# Patient Record
Sex: FEMALE | Race: WHITE | NOT HISPANIC OR LATINO | Employment: OTHER | URBAN - NONMETROPOLITAN AREA
[De-identification: names, ages, dates, MRNs, and addresses within clinical notes are randomized per-mention and may not be internally consistent; named-entity substitution may affect disease eponyms.]

---

## 2017-01-03 ENCOUNTER — OFFICE VISIT (OUTPATIENT)
Dept: ENDOCRINOLOGY | Facility: CLINIC | Age: 72
End: 2017-01-03

## 2017-01-03 VITALS
HEART RATE: 83 BPM | HEIGHT: 63 IN | SYSTOLIC BLOOD PRESSURE: 130 MMHG | BODY MASS INDEX: 28.74 KG/M2 | DIASTOLIC BLOOD PRESSURE: 78 MMHG | WEIGHT: 162.2 LBS

## 2017-01-03 DIAGNOSIS — M85.80 OSTEOPENIA: ICD-10-CM

## 2017-01-03 DIAGNOSIS — M81.0 OSTEOPOROSIS: ICD-10-CM

## 2017-01-03 DIAGNOSIS — E78.2 MIXED HYPERLIPIDEMIA: ICD-10-CM

## 2017-01-03 DIAGNOSIS — Z79.4 TYPE 2 DIABETES MELLITUS WITH DIABETIC POLYNEUROPATHY, WITH LONG-TERM CURRENT USE OF INSULIN (HCC): Primary | ICD-10-CM

## 2017-01-03 DIAGNOSIS — I10 ESSENTIAL HYPERTENSION: ICD-10-CM

## 2017-01-03 DIAGNOSIS — E11.42 TYPE 2 DIABETES MELLITUS WITH DIABETIC POLYNEUROPATHY, WITHOUT LONG-TERM CURRENT USE OF INSULIN (HCC): Primary | ICD-10-CM

## 2017-01-03 DIAGNOSIS — E11.42 TYPE 2 DIABETES MELLITUS WITH DIABETIC POLYNEUROPATHY, WITH LONG-TERM CURRENT USE OF INSULIN (HCC): Primary | ICD-10-CM

## 2017-01-03 LAB — GLUCOSE BLDC GLUCOMTR-MCNC: 185 MG/DL (ref 70–130)

## 2017-01-03 PROCEDURE — PTNOCHG PR CUSTOM PT NO CHARGE VISIT: Performed by: INTERNAL MEDICINE

## 2017-01-03 PROCEDURE — 95250 CONT GLUC MNTR PHYS/QHP EQP: CPT | Performed by: INTERNAL MEDICINE

## 2017-01-03 PROCEDURE — 99214 OFFICE O/P EST MOD 30 MIN: CPT | Performed by: INTERNAL MEDICINE

## 2017-01-03 NOTE — PROGRESS NOTES
Alisia Velez is a 71 y.o. female seen by diabetes educator 01/03/2017 for insertion of [employer] diagnostic continuous glucose monitor.     Sensor inserted in office. Instructed patient to wear sensor up to 14 days. Patient is to return to clinic in 2 weeks for sensor removal and results. Instructed patient to remove sensor if he has a CT scan, MRI, or X-ray, or if skin irritation occurs.     Mela Smith MS, RD, LD, CDE

## 2017-01-03 NOTE — PROGRESS NOTES
Alisia Velez is a 71 y.o. female who presents for  evaluation of   Chief Complaint   Patient presents with   • Diabetes         Primary Care / Referring Provider  Mike Erazo MD    Duration/Timing:  Diabetes mellitus type 2, Age at onset of diabetes: 42 years  timing, constant    quality , nearly well controlled    severity , moderate    Severity (Complications/Hospitalizations)  Secondary Macrovascular Complications:  No CAD, CVA, No PAD  cva in 1999  Secondary Microvascular Complications:  No Diabetic Nephropathy, No proteinuria, No Diabetic Retinopathy, No Diabetic Neuropathy    Context  Diabetes Regimen:  Insulin, Oral Medications, Compliant with regimen, Last HgbA1c% 8.5 from 9-16  Blood Glucose Readings  at goal   Diet  counts carbs  Exercise:  Exercises    Associated Signs/Symptoms  Hyperglycemic Symptoms:  Polyuria, Polydipsia, Polyphagia  Modifying Factors/Comorbidities  aggravating , steroids for back pain     Past Medical History   Diagnosis Date   • Blister of great toe of left foot      Nonthermal, initial encounter   • Dyslipidemia    • Elevated blood pressure    • Essential hypertension 9/13/2016   • GERD (gastroesophageal reflux disease)    • History of cerebrovascular accident    • Hypo-osmolality and hyponatremia    • Ingrowing nail    • Onychomycosis    • Type 2 diabetes mellitus    • 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   • Vitamin D deficiency      Family History   Problem Relation Age of Onset   • Cancer Other    • Diabetes Other    • Heart disease Other    • Hypertension Other      Social History   Substance Use Topics   • Smoking status: Never Smoker   • Smokeless tobacco: Not on file   • Alcohol use No         Current Outpatient Prescriptions:   •  Aspirin Buf,UhKcc-HmTjm-BiWea, (ASCRIPTIN PO), Take  by mouth. Ascriptin 81 mg tablet,delayed release, Disp: , Rfl:   •  atorvastatin (LIPITOR) 20 MG tablet, Take 20 mg by mouth daily., Disp: , Rfl:   •  JEROME  "CONTOUR TEST test strip, USE TO CHECK BLOOD SUGAR 5 TIMES A DAY, Disp: 150 each, Rfl: 3  •  Canagliflozin 100 MG tablet, One tablet daily by mouth before breakfast, Disp: 30 tablet, Rfl: 11  •  Coenzyme Q10-Vitamin E 200-400 MG-UNIT wafer, Take  by mouth., Disp: , Rfl:   •  insulin glargine (LANTUS) 100 UNIT/ML injection, Inject 90 Units under the skin every night., Disp: , Rfl:   •  Insulin Glargine (TOUJEO SOLOSTAR) 300 UNIT/ML solution pen-injector, Inject 60 Units under the skin every night., Disp: , Rfl:   •  insulin lispro (HumaLOG) 100 UNIT/ML injection, Inject 40 Units under the skin 3 (three) times a day., Disp: , Rfl:   •  Insulin Pen Needle (B-D ULTRAFINE III SHORT PEN) 31G X 8 MM misc, 4 (four) times a day. Use as indicated., Disp: , Rfl:   •  Insulin Syringe-Needle U-100 31G X 5/16\" 1 ML misc, daily. Use once daily., Disp: , Rfl:   •  metoprolol succinate XL (TOPROL-XL) 25 MG 24 hr tablet, , Disp: , Rfl:   •  metoprolol tartrate (LOPRESSOR) 25 MG tablet, Take 25 mg by mouth daily., Disp: , Rfl:   •  OMEGA-3 FATTY ACIDS-VITAMIN E PO, Take  by mouth. omega-3 fatty acids-vitamin E 1,000 mg-5 unit capsule., Disp: , Rfl:   •  ranitidine (ZANTAC) 150 MG tablet, Take 150 mg by mouth 2 (two) times a day., Disp: , Rfl:   •  sitaGLIPtin (JANUVIA) 100 MG tablet, Take 1 tablet by mouth daily., Disp: 30 tablet, Rfl: 11  •  sucralfate (CARAFATE) 1 G tablet, Take 1 g by mouth 3 (three) times a day., Disp: , Rfl:   •  tiZANidine (ZANAFLEX) 4 MG tablet, TAKE 1 TABLET BY ORAL ROUTE 3 TIMES EVERY DAY AS NEEDED NOT TO EXCEED 3 DOSES IN 24 HOURS, Disp: , Rfl: 0  •  Unable to find, 1 each 1 (one) time. B.infantis-B.animalis-B.longum-B.bifidum oral, Disp: , Rfl:   •  cetirizine (ZyrTEC) 10 MG tablet, Take 10 mg by mouth daily., Disp: , Rfl:   •  diclofenac (VOLTAREN) 75 MG EC tablet, TAKE 1 TABLET BY MOUTH TWICE A DAY EVERY DAY., Disp: , Rfl: 3  •  doxycycline (VIBRAMYCIN) 50 MG/5ML syrup, Take  by mouth. Take as directed., " Disp: , Rfl:   •  FLUZONE HIGH-DOSE 0.5 ML suspension prefilled syringe injection, TO BE ADMINISTERED BY PHARMACIST FOR IMMUNIZATION, Disp: , Rfl: 0    Review of Systems    Review of Systems   Constitutional: Negative for activity change, appetite change, chills, diaphoresis, fatigue, fever and unexpected weight change.   HENT: Negative for congestion, drooling, ear discharge, ear pain, facial swelling, mouth sores, nosebleeds, postnasal drip, sinus pressure, sneezing, sore throat, tinnitus, trouble swallowing and voice change.    Eyes: Negative.  Negative for photophobia, pain, discharge, redness and itching.   Respiratory: Negative.  Negative for apnea, cough, choking, chest tightness, shortness of breath, wheezing and stridor.    Cardiovascular: Negative.  Negative for chest pain, palpitations and leg swelling.   Gastrointestinal: Negative.  Negative for abdominal distention, abdominal pain, constipation, diarrhea, nausea and vomiting.   Endocrine: Negative.  Negative for cold intolerance, heat intolerance, polydipsia, polyphagia and polyuria.   Genitourinary: Negative for difficulty urinating, dysuria, flank pain and frequency.   Musculoskeletal: Negative for arthralgias, back pain, gait problem, joint swelling, myalgias, neck pain and neck stiffness.   Skin: Negative for color change, pallor, rash and wound.   Allergic/Immunologic: Negative for environmental allergies, food allergies and immunocompromised state.   Neurological: Negative for dizziness, tremors, seizures, syncope, facial asymmetry, speech difficulty, weakness, light-headedness, numbness and headaches.   Hematological: Negative for adenopathy. Does not bruise/bleed easily.   Psychiatric/Behavioral: Negative for agitation, behavioral problems, confusion, decreased concentration, dysphoric mood, hallucinations, self-injury, sleep disturbance and suicidal ideas. The patient is not nervous/anxious and is not hyperactive.         Objective:     Visit  "Vitals   • /78 (BP Location: Left arm, Patient Position: Sitting, Cuff Size: Large Adult)   • Pulse 83   • Ht 63\" (160 cm)   • Wt 162 lb 3.2 oz (73.6 kg)   • BMI 28.73 kg/m2       Physical Exam   Constitutional: She is oriented to person, place, and time. She appears well-developed.   HENT:   Head: Normocephalic.   Right Ear: External ear normal.   Left Ear: External ear normal.   Nose: Nose normal.   Eyes: Conjunctivae and EOM are normal. No scleral icterus.   Neck: Normal range of motion. Neck supple. No tracheal deviation present. No thyromegaly present.   Cardiovascular: Normal rate, regular rhythm, normal heart sounds and intact distal pulses.  Exam reveals no gallop and no friction rub.    No murmur heard.  Pulmonary/Chest: Effort normal and breath sounds normal. No stridor. No respiratory distress. She has no wheezes. She has no rales. She exhibits no tenderness.   Abdominal: Soft. Bowel sounds are normal. She exhibits no distension and no mass. There is no tenderness. There is no rebound and no guarding.   Musculoskeletal: Normal range of motion. She exhibits no tenderness or deformity.   Lymphadenopathy:     She has no cervical adenopathy.   Neurological: She is alert and oriented to person, place, and time. She displays normal reflexes. She exhibits normal muscle tone. Coordination normal.   Skin: No rash noted. No erythema. No pallor.   Psychiatric: She has a normal mood and affect. Her behavior is normal. Judgment and thought content normal.       Lab Review    Assessment/Plan        ICD-10-CM ICD-9-CM   1. Controlled type 2 diabetes mellitus without complication, with long-term current use of insulin E11.9 250.00    Z79.4 V58.67              Type 2 diabetes mellitus with circulatory disorder  Toujeo 68, back off to 60      compare night and morning and if dropping decrease         Humalog , 4-4-6 carb ratio for bkfast - lunch - supper change to 2-3-5    Change to 1.5-2-3    change 2.5 for bkfast " if cereal        Correction , 17               during steroids  typically the carb ratio and correction need to be doubled and the basal( Lantus ) needs to be increased by 30%         We will add orals     Suggest      Can't tolerate metformin , diarrhea  Can't tolerate glyburide, developed allergy       jardiance and tradjenta, too expensive and afraid of side effects       Mixed hyperlipidemia  On atorvastatin 20 mg qhs      Essential hypertension  On metoprolol ho cva and svt     longterm nsaid, on diclofenac, unfortunately can't use longterm   Takes carafte                      I reviewed and summarized records from Mike Erazo MD from 2016 and I reviewed / ordered labs.     Orders Placed This Encounter   Procedures   • POC Glucose Fingerstick         A copy of my note was sent to Mike Erazo MD    Please see my above opinion and suggestions.

## 2017-01-03 NOTE — MR AVS SNAPSHOT
Alisia Velez   1/3/2017 2:00 PM   Office Visit    Dept Phone:  [phone]   Encounter #:  [id number]    Provider:  Omid Jules MD   Department:  [facility] ENDOCRINOLOGY                Your Full Care Plan              Today's Medication Changes          These changes are accurate as of: 1/3/17  2:33 PM.  If you have any questions, ask your nurse or doctor.               New Medication(s)Ordered:     Insulin Lispro 100 UNIT/ML solution pen-injector   Commonly known as:  HUMALOG   Up to 50 units with meals   Replaces:  insulin lispro 100 UNIT/ML injection         Medication(s)that have changed:     glucose blood test strip   Commonly known as:  JEROME CONTOUR TEST   Use as instructed 5 x daily , ICD-10 code is E11.42   What changed:  See the new instructions.       Insulin Glargine 300 UNIT/ML solution pen-injector   Commonly known as:  TOUJEO SOLOSTAR   Inject 80 Units under the skin Every Night.   What changed:    - how much to take  - Another medication with the same name was removed. Continue taking this medication, and follow the directions you see here.       Insulin Pen Needle 31G X 5 MM misc   Commonly known as:  B-D UF III MINI PEN NEEDLES   Use 5 times daily   What changed:    - medication strength  - how to take this  - when to take this  - additional instructions         Stop taking medication(s)listed here:     insulin lispro 100 UNIT/ML injection   Commonly known as:  humaLOG   Replaced by:  Insulin Lispro 100 UNIT/ML solution pen-injector           SITagliptin 100 MG tablet   Commonly known as:  SUDHEER                Where to Get Your Medications      These medications were sent to [facility]/pharmacy #[id number] - [place] - [address] AT [facility] - [phone]  - [phone] FX  [address], [place] [address]     Phone:  [phone]     glucose blood test strip         You can get these medications from any pharmacy     Bring a paper  "prescription for each of these medications     Insulin Glargine 300 UNIT/ML solution pen-injector    Insulin Lispro 100 UNIT/ML solution pen-injector    Insulin Pen Needle 31G X 5 MM misc                  Your Updated Medication List          This list is accurate as of: 1/3/17  2:33 PM.  Always use your most recent med list.                ASCRIPTIN PO       atorvastatin 20 MG tablet   Commonly known as:  LIPITOR       Canagliflozin 100 MG tablet   One tablet daily by mouth before breakfast       cetirizine 10 MG tablet   Commonly known as:  zyrTEC       Coenzyme Q10-Vitamin E 200-400 MG-UNIT wafer       diclofenac 75 MG EC tablet   Commonly known as:  VOLTAREN       doxycycline 50 MG/5ML syrup   Commonly known as:  VIBRAMYCIN       FLUZONE HIGH-DOSE 0.5 ML suspension prefilled syringe injection   Generic drug:  influenza vac split high-dose       glucose blood test strip   Commonly known as:  JEROME CONTOUR TEST   Use as instructed 5 x daily , ICD-10 code is E11.42       Insulin Glargine 300 UNIT/ML solution pen-injector   Commonly known as:  TOUJEO SOLOSTAR   Inject 80 Units under the skin Every Night.       Insulin Lispro 100 UNIT/ML solution pen-injector   Commonly known as:  HUMALOG   Up to 50 units with meals       Insulin Pen Needle 31G X 5 MM misc   Commonly known as:  B-D UF III MINI PEN NEEDLES   Use 5 times daily       Insulin Syringe-Needle U-100 31G X 5/16\" 1 ML misc       metoprolol succinate XL 25 MG 24 hr tablet   Commonly known as:  TOPROL-XL       metoprolol tartrate 25 MG tablet   Commonly known as:  LOPRESSOR       OMEGA-3 FATTY ACIDS-VITAMIN E PO       raNITIdine 150 MG tablet   Commonly known as:  ZANTAC       sucralfate 1 G tablet   Commonly known as:  CARAFATE       tiZANidine 4 MG tablet   Commonly known as:  ZANAFLEX       Unable to find               We Performed the Following     CBC Auto Differential     CMP14+eGFR     DEXA Bone Density Axial     Hemoglobin A1c     Lipid Panel     " Microalbumin / Creatinine Urine Ratio     POC Glucose Fingerstick     Protein / Creatinine Ratio, Urine     TSH     Vitamin B12     Vitamin D 25 Hydroxy       You Were Diagnosed With        Codes Comments    Type 2 diabetes mellitus with diabetic polyneuropathy, without long-term current use of insulin    -  Primary ICD-10-CM: E11.42  ICD-9-CM: 250.60, 357.2     Mixed hyperlipidemia     ICD-10-CM: E78.2  ICD-9-CM: 272.2     Essential hypertension     ICD-10-CM: I10  ICD-9-CM: 401.9     Osteopenia     ICD-10-CM: M85.80  ICD-9-CM: 733.90     Osteoporosis     ICD-10-CM: M81.0  ICD-9-CM: 733.00       Instructions     None    Patient Instructions History      Upcoming Appointments     Visit Type Date Time Department    FOLLOW UP 1/3/2017  2:00 PM [facility] [place] [facility]    DIABETES EDUCATION 2017  2:00 PM [facility] [place] [facility]    FOLLOW UP 4/10/2017  2:00 PM [facility] [place] [facility] Signup     [facility] [employer] allows you to send messages to your doctor, view your test results, renew your prescriptions, schedule appointments, and more. To sign up, go to [employer] and click on the Sign Up Now link in the New User? box. Enter your [employer] Activation Code exactly as it appears below along with the last four digits of your Social Security Number and your Date of Birth () to complete the sign-up process. If you do not sign up before the expiration date, you must request a new code.    [employer] Activation Code: [email or web address]  Expires: 2017  2:33 PM    If you have questions, you can email [email or web address] or call [phone] to talk to our [employer] staff. Remember, [employer] is NOT to be used for urgent needs. For medical emergencies, dial 911.               Other Info from Your Visit           Your Appointments     2017  2:00 PM CST   Diabetic Education with [facility], [facility] ENDOCR[facility] (--)    [address]  [place] [facility] [address]   [phone]            Apr 10, 2017  2:00 PM CDT   Follow Up with Omid Jules MD   [facility] ENDOCRINOLOGY (--)    [address]  [place] [address] [facility] [id number] [phone]           Arrive 15 minutes prior to appointment.              Allergies     Erythromycin      Humulin N [Insulin Isophane]      Mold Extract [Trichophyton]      Other      Silk Tape    Sudafed [Pseudoephedrine]      Sulfa Antibiotics        Reason for Visit     Diabetes           Vital Signs     Blood Pressure Pulse Height Weight Body Mass Index Smoking Status    130/78 (BP Location: Left arm, Patient Position: Sitting, Cuff Size: Large Adult) 83 63\" (160 cm) 162 lb 3.2 oz (73.6 kg) 28.73 kg/m2 Never Smoker      Problems and Diagnoses Noted     High blood pressure    Mixed hyperlipidemia    Bone disease    Type 2 diabetes mellitus with diabetic polyneuropathy, without long-term current use of insulin    Osteoporosis          No Longer an Issue     Type 2 diabetes mellitus with circulatory disorder      Results     POC Glucose Fingerstick      Component Value Standard Range & Units    Glucose 185 70 - 130 mg/dL                    "

## 2017-01-24 ENCOUNTER — HOSPITAL ENCOUNTER (OUTPATIENT)
Facility: HOSPITAL | Age: 72
Setting detail: SURGERY ADMIT
End: 2017-01-24
Attending: ORTHOPAEDIC SURGERY | Admitting: ORTHOPAEDIC SURGERY

## 2017-03-06 ENCOUNTER — HOSPITAL ENCOUNTER (OUTPATIENT)
Dept: GENERAL RADIOLOGY | Facility: HOSPITAL | Age: 72
Discharge: HOME | End: 2017-03-06
Admitting: ORTHOPAEDIC SURGERY

## 2017-03-06 ENCOUNTER — APPOINTMENT (OUTPATIENT)
Dept: PREADMISSION TESTING | Facility: HOSPITAL | Age: 72
End: 2017-03-06

## 2017-03-06 VITALS
OXYGEN SATURATION: 96 % | BODY MASS INDEX: 28.7 KG/M2 | SYSTOLIC BLOOD PRESSURE: 145 MMHG | RESPIRATION RATE: 20 BRPM | DIASTOLIC BLOOD PRESSURE: 64 MMHG | HEART RATE: 72 BPM | HEIGHT: 63 IN | WEIGHT: 162 LBS

## 2017-03-06 LAB
ALBUMIN SERPL-MCNC: 4.3 G/DL (ref 3.5–5)
ALBUMIN/GLOB SERPL: 1.5 G/DL (ref 1.1–2.5)
ALP SERPL-CCNC: 53 U/L (ref 24–120)
ALT SERPL W P-5'-P-CCNC: 63 U/L (ref 0–54)
ANION GAP SERPL CALCULATED.3IONS-SCNC: 12 MMOL/L (ref 4–13)
APTT PPP: 26.4 SECONDS (ref 24.1–34.8)
AST SERPL-CCNC: 68 U/L (ref 7–45)
BASOPHILS # BLD AUTO: 0.01 10*3/MM3 (ref 0–0.2)
BASOPHILS NFR BLD AUTO: 0.1 % (ref 0–2)
BILIRUB SERPL-MCNC: 0.4 MG/DL (ref 0.1–1)
BILIRUB UR QL STRIP: NEGATIVE
BUN BLD-MCNC: 13 MG/DL (ref 5–21)
BUN/CREAT SERPL: 19.7 (ref 7–25)
CALCIUM SPEC-SCNC: 9.9 MG/DL (ref 8.4–10.4)
CHLORIDE SERPL-SCNC: 100 MMOL/L (ref 98–110)
CLARITY UR: CLEAR
CO2 SERPL-SCNC: 26 MMOL/L (ref 24–31)
COLOR UR: YELLOW
CREAT BLD-MCNC: 0.66 MG/DL (ref 0.5–1.4)
DEPRECATED RDW RBC AUTO: 42.7 FL (ref 40–54)
EOSINOPHIL # BLD AUTO: 0.08 10*3/MM3 (ref 0–0.7)
EOSINOPHIL NFR BLD AUTO: 1.1 % (ref 0–4)
ERYTHROCYTE [DISTWIDTH] IN BLOOD BY AUTOMATED COUNT: 13 % (ref 12–15)
GFR SERPL CREATININE-BSD FRML MDRD: 89 ML/MIN/1.73
GLOBULIN UR ELPH-MCNC: 2.8 GM/DL
GLUCOSE BLD-MCNC: 232 MG/DL (ref 70–100)
GLUCOSE UR STRIP-MCNC: ABNORMAL MG/DL
HCT VFR BLD AUTO: 40.8 % (ref 37–47)
HGB BLD-MCNC: 13.9 G/DL (ref 12–16)
HGB UR QL STRIP.AUTO: NEGATIVE
IMM GRANULOCYTES # BLD: 0.01 10*3/MM3 (ref 0–0.03)
IMM GRANULOCYTES NFR BLD: 0.1 % (ref 0–5)
INR PPP: 0.92 (ref 0.91–1.09)
KETONES UR QL STRIP: NEGATIVE
LEUKOCYTE ESTERASE UR QL STRIP.AUTO: NEGATIVE
LYMPHOCYTES # BLD AUTO: 2.37 10*3/MM3 (ref 0.72–4.86)
LYMPHOCYTES NFR BLD AUTO: 33.1 % (ref 15–45)
MCH RBC QN AUTO: 30.7 PG (ref 28–32)
MCHC RBC AUTO-ENTMCNC: 34.1 G/DL (ref 33–36)
MCV RBC AUTO: 90.1 FL (ref 82–98)
MONOCYTES # BLD AUTO: 0.71 10*3/MM3 (ref 0.19–1.3)
MONOCYTES NFR BLD AUTO: 9.9 % (ref 4–12)
NEUTROPHILS # BLD AUTO: 3.98 10*3/MM3 (ref 1.87–8.4)
NEUTROPHILS NFR BLD AUTO: 55.7 % (ref 39–78)
NITRITE UR QL STRIP: NEGATIVE
PH UR STRIP.AUTO: <=5 [PH] (ref 5–8)
PLATELET # BLD AUTO: 201 10*3/MM3 (ref 130–400)
PMV BLD AUTO: 10.6 FL (ref 6–12)
POTASSIUM BLD-SCNC: 4 MMOL/L (ref 3.5–5.3)
PROT SERPL-MCNC: 7.1 G/DL (ref 6.3–8.7)
PROT UR QL STRIP: NEGATIVE
PROTHROMBIN TIME: 12.6 SECONDS (ref 11.9–14.6)
RBC # BLD AUTO: 4.53 10*6/MM3 (ref 4.2–5.4)
SODIUM BLD-SCNC: 138 MMOL/L (ref 135–145)
SP GR UR STRIP: 1.02 (ref 1–1.03)
UROBILINOGEN UR QL STRIP: ABNORMAL
WBC NRBC COR # BLD: 7.16 10*3/MM3 (ref 4.8–10.8)

## 2017-03-06 PROCEDURE — 85025 COMPLETE CBC W/AUTO DIFF WBC: CPT | Performed by: ORTHOPAEDIC SURGERY

## 2017-03-06 PROCEDURE — 81003 URINALYSIS AUTO W/O SCOPE: CPT | Performed by: ORTHOPAEDIC SURGERY

## 2017-03-06 PROCEDURE — 93005 ELECTROCARDIOGRAM TRACING: CPT

## 2017-03-06 PROCEDURE — 85610 PROTHROMBIN TIME: CPT | Performed by: ORTHOPAEDIC SURGERY

## 2017-03-06 PROCEDURE — 80053 COMPREHEN METABOLIC PANEL: CPT | Performed by: ORTHOPAEDIC SURGERY

## 2017-03-06 PROCEDURE — 71010 HC CHEST PA OR AP: CPT

## 2017-03-06 PROCEDURE — 93010 ELECTROCARDIOGRAM REPORT: CPT | Performed by: INTERNAL MEDICINE

## 2017-03-06 PROCEDURE — 85730 THROMBOPLASTIN TIME PARTIAL: CPT | Performed by: ORTHOPAEDIC SURGERY

## 2017-03-06 RX ORDER — ASPIRIN 81 MG/1
81 TABLET ORAL DAILY
COMMUNITY

## 2017-03-06 RX ORDER — AMPICILLIN TRIHYDRATE 250 MG
1 CAPSULE ORAL DAILY
COMMUNITY

## 2017-03-06 RX ORDER — FAMOTIDINE 20 MG
3 TABLET ORAL DAILY
COMMUNITY
End: 2021-05-21

## 2017-03-06 RX ORDER — MELOXICAM 7.5 MG/1
7.5 TABLET ORAL DAILY
COMMUNITY
End: 2017-03-20 | Stop reason: HOSPADM

## 2017-03-06 NOTE — DISCHARGE INSTRUCTIONS
DAY OF SURGERY INSTRUCTIONS        YOUR SURGEON: dr borden    PROCEDURE: left lateral lumbar interbody fusion l2-3    DATE OF SURGERY: 3/13/2017    ARRIVAL TIME: AS DIRECTED BY OFFICE    DAY OF SURGERY TAKE ONLY THESE MEDICATIONS: ***metoprolol      BEFORE YOU COME TO THE HOSPITAL  (Pre-op instructions)  • Do not eat, drink, smoke or chew gum after midnight the night before surgery.  This also includes no mints.  • Morning of surgery take only the medicines you have been instructed with a sip of water unless otherwise instructed  by your physician.  • Do not shave, wear makeup or dark nail polish.  • Remove all jewelry including rings.  • Leave anything you consider valuable at home.  • Leave your suitcase in the car until after your surgery.  • Bring the following with you if applicable:  o Picture ID and insurance, Medicare or Medicaid cards  o Co-pay/deductible required by insurance (cash, check, credit card)  o Medications (no narcotics) in original bottles (not a list) including all over-the-counter medications.  o Copy of advance directive, living will or power-of- documents if not brought to PAT  o CPAP or BIPAP mask and tubing  o Skin prep instruction sheet  o Relaxation aids (MP3 player, book, magazine)  • Confirm your arrival time with you surgeon the day before your surgery (surgery times are subject to change)  • On the day of surgery check in at registration located at the main entrance of the hospital.       Outpatient Surgery Guidelines, Adult  Outpatient procedures are those for which the person having the procedure is allowed to go home the same day as the procedure. Various procedures are done on an outpatient basis. You should follow some general guidelines if you will be having an outpatient procedure.  LET YOUR HEALTH CARE PROVIDER KNOW ABOUT:  · Any allergies you have.  · All medicines you are taking, including vitamins, herbs, eye drops, creams, and over-the-counter  medicines.  · Previous problems you or members of your family have had with the use of anesthetics.  · Any blood disorders you have.  · Previous surgeries you have had.  · Medical conditions you have.  RISKS AND COMPLICATIONS  Your health care provider will discuss possible risks and complications with you before surgery. Common risks and complications include:    · Problems due to the use of anesthetics.  · Blood loss and replacement (does not apply to minor surgical procedures).  · Temporary increase in pain due to surgery.  · Uncorrected pain or problems that the surgery was meant to correct.  · Infection.  · New damage.  BEFORE THE PROCEDURE  · Ask your health care provider about changing or stopping your regular medicines. You may need to stop taking certain medicines in the days or weeks before the procedure.  · Stop smoking at least 2 weeks before surgery. This lowers your risk for complications during and after surgery. Ask your health care provider for help with this if needed.  · Eat your usual meals and a light supper the day before surgery. Continue fluid intake. Do not drink alcohol.  · Do not eat or drink after midnight the night before your surgery.   · Arrange for someone to take you home and to stay with you for 24 hours after the procedure. Medicine given for your procedure may affect your ability to drive or to care for yourself.  · Call your health care provider's office if you develop an illness or problem that may prevent you from safely having your procedure.  AFTER THE PROCEDURE  After surgery, you will be taken to a recovery area, where your progress will be monitored. If there are no complications, you will be allowed to go home when you are awake, stable, and taking fluids well. You may have numbness around the surgical site. Healing will take some time. You will have tenderness at the surgical site and may have some swelling and bruising. You may also have some nausea.  HOME CARE  INSTRUCTIONS  · Do not drive for 24 hours, or as directed by your health care provider. Do not drive while taking prescription pain medicines.  · Do not drink alcohol for 24 hours.  · Do not make important decisions or sign legal documents for 24 hours.  · You may resume a normal diet and activities as directed.  · Do not lift anything heavier than 10 pounds (4.5 kg) or play contact sports until your health care provider says it is okay.  · Change your bandages (dressings) as directed.  · Only take over-the-counter or prescription medicines as directed by your health care provider.  · Follow up with your health care provider as directed.  SEEK MEDICAL CARE IF:  · You have increased bleeding (more than a small spot) from the surgical site.  · You have redness, swelling, or increasing pain in the wound.  · You see pus coming from the wound.  · You have a fever.  · You notice a bad smell coming from the wound or dressing.  · You feel lightheaded or faint.  · You develop a rash.  · You have trouble breathing.  · You develop allergies.  MAKE SURE YOU:  · Understand these instructions.  · Will watch your condition.  · Will get help right away if you are not doing well or get worse.     This information is not intended to replace advice given to you by your health care provider. Make sure you discuss any questions you have with your health care provider.     Document Released: 09/12/2002 Document Revised: 05/03/2016 Document Reviewed: 05/22/2014  [employer] Interactive Patient Education ©2016 [employer]       Fall Prevention in Hospitals, Adult  As a hospital patient, your condition and the treatments you receive can increase your risk for falls. Some additional risk factors for falls in a hospital include:  · Being in an unfamiliar environment.  · Being on bed rest.  · Your surgery.  · Taking certain medicines.  · Your tubing requirements, such as intravenous (IV) therapy or catheters.  It is important that you learn how  to decrease fall risks while at the hospital. Below are important tips that can help prevent falls.  SAFETY TIPS FOR PREVENTING FALLS  Talk about your risk of falling.  · Ask your health care provider why you are at risk for falling. Is it your medicine, illness, tubing placement, or something else?  · Make a plan with your health care provider to keep you safe from falls.  · Ask your health care provider or pharmacist about side effects of your medicines. Some medicines can make you dizzy or affect your coordination.  Ask for help.  · Ask for help before getting out of bed. You may need to press your call button.  · Ask for assistance in getting safely to the toilet.  · Ask for a walker or cane to be put at your bedside. Ask that most of the side rails on your bed be placed up before your health care provider leaves the room.  · Ask family or friends to sit with you.  · Ask for things that are out of your reach, such as your glasses, hearing aids, telephone, bedside table, or call button.  Follow these tips to avoid falling:  · Stay lying or seated, rather than standing, while waiting for help.  · Wear rubber-soled slippers or shoes whenever you walk in the hospital.  · Avoid quick, sudden movements.  ¨ Change positions slowly.  ¨ Sit on the side of your bed before standing.  ¨ Stand up slowly and wait before you start to walk.  · Let your health care provider know if there is a spill on the floor.  · Pay careful attention to the medical equipment, electrical cords, and tubes around you.  · When you need help, use your call button by your bed or in the bathroom. Wait for one of your health care providers to help you.  · If you feel dizzy or unsure of your footing, return to bed and wait for assistance.  · Avoid being distracted by the TV, telephone, or another person in your room.  · Do not lean or support yourself on rolling objects, such as IV poles or bedside tables.     This information is not intended to  replace advice given to you by your health care provider. Make sure you discuss any questions you have with your health care provider.     Document Released: 12/15/2001 Document Revised: 01/08/2016 Document Reviewed: 08/25/2013  [employer] Interactive Patient Education ©2016 [employer]       Surgical Site Infections FAQs  What is a Surgical Site Infection (SSI)?  A surgical site infection is an infection that occurs after surgery in the part of the body where the surgery took place. Most patients who have surgery do not develop an infection. However, infections develop in about 1 to 3 out of every 100 patients who have surgery.  Some of the common symptoms of a surgical site infection are:  · Redness and pain around the area where you had surgery  · Drainage of cloudy fluid from your surgical wound  · Fever  Can SSIs be treated?  Yes. Most surgical site infections can be treated with antibiotics. The antibiotic given to you depends on the bacteria (germs) causing the infection. Sometimes patients with SSIs also need another surgery to treat the infection.  What are some of the things that hospitals are doing to prevent SSIs?  To prevent SSIs, doctors, nurses, and other healthcare providers:  · Clean their hands and arms up to their elbows with an antiseptic agent just before the surgery.  · Clean their hands with soap and water or an alcohol-based hand rub before and after caring for each patient.  · May remove some of your hair immediately before your surgery using electric clippers if the hair is in the same area where the procedure will occur. They should not shave you with a razor.  · Wear special hair covers, masks, gowns, and gloves during surgery to keep the surgery area clean.  · Give you antibiotics before your surgery starts. In most cases, you should get antibiotics within 60 minutes before the surgery starts and the antibiotics should be stopped within 24 hours after surgery.  · Clean the skin at the  site of your surgery with a special soap that kills germs.  What can I do to help prevent SSIs?  Before your surgery:  · Tell your doctor about other medical problems you may have. Health problems such as allergies, diabetes, and obesity could affect your surgery and your treatment.  · Quit smoking. Patients who smoke get more infections. Talk to your doctor about how you can quit before your surgery.  · Do not shave near where you will have surgery. Shaving with a razor can irritate your skin and make it easier to develop an infection.  At the time of your surgery:  · Speak up if someone tries to shave you with a razor before surgery. Ask why you need to be shaved and talk with your surgeon if you have any concerns.  · Ask if you will get antibiotics before surgery.  After your surgery:  · Make sure that your healthcare providers clean their hands before examining you, either with soap and water or an alcohol-based hand rub.  · If you do not see your providers clean their hands, please ask them to do so.  · Family and friends who visit you should not touch the surgical wound or dressings.  · Family and friends should clean their hands with soap and water or an alcohol-based hand rub before and after visiting you. If you do not see them clean their hands, ask them to clean their hands.  What do I need to do when I go home from the hospital?  · Before you go home, your doctor or nurse should explain everything you need to know about taking care of your wound. Make sure you understand how to care for your wound before you leave the hospital.  · Always clean your hands before and after caring for your wound.  · Before you go home, make sure you know who to contact if you have questions or problems after you get home.  · If you have any symptoms of an infection, such as redness and pain at the surgery site, drainage, or fever, call your doctor immediately.  If you have additional questions, please ask your doctor or  nurse.  Developed and co-sponsored by The Society for Healthcare Epidemiology of [place] (SHEA); Infectious Diseases Society of [place] (IDSA); American Hospital Association; Association for Professionals in Infection Control and Epidemiology (APIC); Centers for Disease Control and Prevention (CDC); and The Joint Commission.     This information is not intended to replace advice given to you by your health care provider. Make sure you discuss any questions you have with your health care provider.     Document Released: 12/23/2014 Document Revised: 01/08/2016 Document Reviewed: 03/02/2016  [employer] Interactive Patient Education ©2016 [employer]       [facility]  CHG 4% Patient Instruction Sheet    Preparing the Skin Before Surgery  Preparing or “prepping” skin before surgery can reduce the risk of infection at the surgical site. To make the process easier,[facility] has chosen 4% Chlorhexidine Gluconate (CHG) antiseptic solution.   The steps below outline the prepping process and should be carefully followed.                                                                                                                                                      Prep the skin at the following time(s):                                                      We recommend you shower the night before surgery, and again the morning of surgery with the 4% CHG antiseptic solution using half of the bottle and a cloth each time.  Dress in clean clothes/sleepwear after showering.  See instructions below for application.          Do not apply any lotions or moisturizers.       Do not shave the area to be prepped for at least 2 days prior to surgery.    Clipping the hair may be done immediately prior to your surgery at the hospital    if needed.    Directions:  Thoroughly rinse your body with water.  Apply 4% CHG to a cloth and wash skin gently, paying special attention to the operative site.  Rinse again thoroughly.  Once you  have begun using this product do not apply anything else to your skin. If itching or redness persists, rinse affected areas and discontinue use.    When using this product:  • Keep out of eyes, ears, and mouth.  • If solution should contact these areas, rinse out promptly and thoroughly with water.  • For external use only.  • Do not use in genital area, on your face or head.      PATIENT/FAMILY/RESPONSIBLE PARTY VERBALIZES UNDERSTANDING OF ABOVE EDUCATION

## 2017-03-13 ENCOUNTER — HOSPITAL ENCOUNTER (INPATIENT)
Facility: HOSPITAL | Age: 72
LOS: 7 days | Discharge: NURSING, REHAB OR LONG-TERM CARE | End: 2017-03-20
Attending: ORTHOPAEDIC SURGERY | Admitting: ORTHOPAEDIC SURGERY

## 2017-03-13 ENCOUNTER — ANESTHESIA (OUTPATIENT)
Dept: PERIOP | Facility: HOSPITAL | Age: 72
End: 2017-03-13

## 2017-03-13 ENCOUNTER — APPOINTMENT (OUTPATIENT)
Dept: GENERAL RADIOLOGY | Facility: HOSPITAL | Age: 72
End: 2017-03-13

## 2017-03-13 ENCOUNTER — ANESTHESIA EVENT (OUTPATIENT)
Dept: PERIOP | Facility: HOSPITAL | Age: 72
End: 2017-03-13

## 2017-03-13 DIAGNOSIS — Z74.09 IMPAIRED FUNCTIONAL MOBILITY AND ACTIVITY TOLERANCE: ICD-10-CM

## 2017-03-13 DIAGNOSIS — Z78.9 DECREASED ACTIVITIES OF DAILY LIVING (ADL): ICD-10-CM

## 2017-03-13 PROBLEM — M48.061 SPINAL STENOSIS, LUMBAR: Status: ACTIVE | Noted: 2017-03-13

## 2017-03-13 PROBLEM — K21.9 GASTROESOPHAGEAL REFLUX DISEASE WITHOUT ESOPHAGITIS: Chronic | Status: ACTIVE | Noted: 2017-03-13

## 2017-03-13 PROBLEM — E11.9 TYPE 2 DIABETES MELLITUS WITHOUT COMPLICATION, WITHOUT LONG-TERM CURRENT USE OF INSULIN (HCC): Status: ACTIVE | Noted: 2017-01-03

## 2017-03-13 LAB
ABO GROUP BLD: NORMAL
BLD GP AB SCN SERPL QL: NEGATIVE
GLUCOSE BLDC GLUCOMTR-MCNC: 166 MG/DL (ref 70–130)
GLUCOSE BLDC GLUCOMTR-MCNC: 169 MG/DL (ref 70–130)
GLUCOSE BLDC GLUCOMTR-MCNC: 184 MG/DL (ref 70–130)
GLUCOSE BLDC GLUCOMTR-MCNC: 204 MG/DL (ref 70–130)
GLUCOSE BLDC GLUCOMTR-MCNC: 244 MG/DL (ref 70–130)
RH BLD: POSITIVE

## 2017-03-13 PROCEDURE — 97161 PT EVAL LOW COMPLEX 20 MIN: CPT

## 2017-03-13 PROCEDURE — 0SB20ZZ EXCISION OF LUMBAR VERTEBRAL DISC, OPEN APPROACH: ICD-10-PCS | Performed by: ORTHOPAEDIC SURGERY

## 2017-03-13 PROCEDURE — C1713 ANCHOR/SCREW BN/BN,TIS/BN: HCPCS | Performed by: ORTHOPAEDIC SURGERY

## 2017-03-13 PROCEDURE — 94760 N-INVAS EAR/PLS OXIMETRY 1: CPT

## 2017-03-13 PROCEDURE — G8988 SELF CARE GOAL STATUS: HCPCS

## 2017-03-13 PROCEDURE — 86900 BLOOD TYPING SEROLOGIC ABO: CPT | Performed by: ORTHOPAEDIC SURGERY

## 2017-03-13 PROCEDURE — 94799 UNLISTED PULMONARY SVC/PX: CPT

## 2017-03-13 PROCEDURE — 63710000001 INSULIN LISPRO (HUMAN) PER 5 UNITS: Performed by: ORTHOPAEDIC SURGERY

## 2017-03-13 PROCEDURE — 25010000002 SUCCINYLCHOLINE PER 20 MG: Performed by: NURSE ANESTHETIST, CERTIFIED REGISTERED

## 2017-03-13 PROCEDURE — 25010000002 HYDROMORPHONE PER 4 MG: Performed by: ORTHOPAEDIC SURGERY

## 2017-03-13 PROCEDURE — G8978 MOBILITY CURRENT STATUS: HCPCS

## 2017-03-13 PROCEDURE — G8979 MOBILITY GOAL STATUS: HCPCS

## 2017-03-13 PROCEDURE — 25010000002 MIDAZOLAM PER 1 MG: Performed by: ANESTHESIOLOGY

## 2017-03-13 PROCEDURE — 25010000002 PROPOFOL 10 MG/ML EMULSION: Performed by: NURSE ANESTHETIST, CERTIFIED REGISTERED

## 2017-03-13 PROCEDURE — 25010000002 DEXAMETHASONE PER 1 MG: Performed by: ANESTHESIOLOGY

## 2017-03-13 PROCEDURE — 86901 BLOOD TYPING SEROLOGIC RH(D): CPT | Performed by: ORTHOPAEDIC SURGERY

## 2017-03-13 PROCEDURE — 25010000002 HEPARIN (PORCINE) PER 1000 UNITS: Performed by: ORTHOPAEDIC SURGERY

## 2017-03-13 PROCEDURE — 25010000002 ONDANSETRON PER 1 MG: Performed by: ORTHOPAEDIC SURGERY

## 2017-03-13 PROCEDURE — L0637 LSO SC R ANT/POS PNL PRE CST: HCPCS

## 2017-03-13 PROCEDURE — 25010000003 CEFAZOLIN PER 500 MG: Performed by: ORTHOPAEDIC SURGERY

## 2017-03-13 PROCEDURE — 97165 OT EVAL LOW COMPLEX 30 MIN: CPT

## 2017-03-13 PROCEDURE — 0SG10A0 FUSION OF 2 OR MORE LUMBAR VERTEBRAL JOINTS WITH INTERBODY FUSION DEVICE, ANTERIOR APPROACH, ANTERIOR COLUMN, OPEN APPROACH: ICD-10-PCS | Performed by: ORTHOPAEDIC SURGERY

## 2017-03-13 PROCEDURE — 86850 RBC ANTIBODY SCREEN: CPT | Performed by: ORTHOPAEDIC SURGERY

## 2017-03-13 PROCEDURE — G8987 SELF CARE CURRENT STATUS: HCPCS

## 2017-03-13 PROCEDURE — 25010000002 ONDANSETRON PER 1 MG: Performed by: NURSE ANESTHETIST, CERTIFIED REGISTERED

## 2017-03-13 PROCEDURE — 82962 GLUCOSE BLOOD TEST: CPT

## 2017-03-13 DEVICE — IMPLANTABLE DEVICE: Type: IMPLANTABLE DEVICE | Status: FUNCTIONAL

## 2017-03-13 DEVICE — BONE CANC CRUSHED FDZ 15CC: Type: IMPLANTABLE DEVICE | Status: FUNCTIONAL

## 2017-03-13 DEVICE — SCRW IPF TIMBERLINE VARI S/TAP 5.5X45MM: Type: IMPLANTABLE DEVICE | Status: FUNCTIONAL

## 2017-03-13 RX ORDER — ONDANSETRON 2 MG/ML
4 INJECTION INTRAMUSCULAR; INTRAVENOUS EVERY 6 HOURS PRN
Status: DISCONTINUED | OUTPATIENT
Start: 2017-03-13 | End: 2017-03-13 | Stop reason: SDUPTHER

## 2017-03-13 RX ORDER — SUCRALFATE 1 G/1
1 TABLET ORAL 3 TIMES DAILY
Status: DISCONTINUED | OUTPATIENT
Start: 2017-03-13 | End: 2017-03-20 | Stop reason: HOSPADM

## 2017-03-13 RX ORDER — VASOPRESSIN 20 U/ML
INJECTION PARENTERAL AS NEEDED
Status: DISCONTINUED | OUTPATIENT
Start: 2017-03-13 | End: 2017-03-13 | Stop reason: SURG

## 2017-03-13 RX ORDER — METOPROLOL SUCCINATE 25 MG/1
25 TABLET, EXTENDED RELEASE ORAL 2 TIMES DAILY
Status: DISCONTINUED | OUTPATIENT
Start: 2017-03-13 | End: 2017-03-20 | Stop reason: HOSPADM

## 2017-03-13 RX ORDER — CALCIUM CHLORIDE 100 MG/ML
INJECTION INTRAVENOUS; INTRAVENTRICULAR AS NEEDED
Status: DISCONTINUED | OUTPATIENT
Start: 2017-03-13 | End: 2017-03-13 | Stop reason: HOSPADM

## 2017-03-13 RX ORDER — FENTANYL CITRATE 50 UG/ML
25 INJECTION, SOLUTION INTRAMUSCULAR; INTRAVENOUS
Status: DISCONTINUED | OUTPATIENT
Start: 2017-03-13 | End: 2017-03-18 | Stop reason: HOSPADM

## 2017-03-13 RX ORDER — SODIUM CHLORIDE 9 MG/ML
75 INJECTION, SOLUTION INTRAVENOUS CONTINUOUS
Status: DISPENSED | OUTPATIENT
Start: 2017-03-13 | End: 2017-03-15

## 2017-03-13 RX ORDER — LABETALOL HYDROCHLORIDE 5 MG/ML
5 INJECTION, SOLUTION INTRAVENOUS
Status: DISCONTINUED | OUTPATIENT
Start: 2017-03-13 | End: 2017-03-13 | Stop reason: HOSPADM

## 2017-03-13 RX ORDER — HEPARIN SODIUM 10000 [USP'U]/ML
INJECTION, SOLUTION INTRAVENOUS; SUBCUTANEOUS AS NEEDED
Status: DISCONTINUED | OUTPATIENT
Start: 2017-03-13 | End: 2017-03-13 | Stop reason: HOSPADM

## 2017-03-13 RX ORDER — MEPERIDINE HYDROCHLORIDE 25 MG/ML
12.5 INJECTION INTRAMUSCULAR; INTRAVENOUS; SUBCUTANEOUS
Status: DISCONTINUED | OUTPATIENT
Start: 2017-03-13 | End: 2017-03-13 | Stop reason: HOSPADM

## 2017-03-13 RX ORDER — FAMOTIDINE 20 MG/1
20 TABLET, FILM COATED ORAL
Status: DISCONTINUED | OUTPATIENT
Start: 2017-03-13 | End: 2017-03-18 | Stop reason: HOSPADM

## 2017-03-13 RX ORDER — DIPHENHYDRAMINE HCL 25 MG
25 CAPSULE ORAL NIGHTLY PRN
Status: DISCONTINUED | OUTPATIENT
Start: 2017-03-13 | End: 2017-03-20 | Stop reason: HOSPADM

## 2017-03-13 RX ORDER — PROPOFOL 10 MG/ML
VIAL (ML) INTRAVENOUS AS NEEDED
Status: DISCONTINUED | OUTPATIENT
Start: 2017-03-13 | End: 2017-03-13 | Stop reason: SURG

## 2017-03-13 RX ORDER — ONDANSETRON 2 MG/ML
4 INJECTION INTRAMUSCULAR; INTRAVENOUS EVERY 6 HOURS PRN
Status: DISCONTINUED | OUTPATIENT
Start: 2017-03-13 | End: 2017-03-20 | Stop reason: HOSPADM

## 2017-03-13 RX ORDER — FAMOTIDINE 10 MG/ML
20 INJECTION, SOLUTION INTRAVENOUS 2 TIMES DAILY
Status: DISCONTINUED | OUTPATIENT
Start: 2017-03-13 | End: 2017-03-20 | Stop reason: HOSPADM

## 2017-03-13 RX ORDER — DEXAMETHASONE SODIUM PHOSPHATE 4 MG/ML
4 INJECTION, SOLUTION INTRA-ARTICULAR; INTRALESIONAL; INTRAMUSCULAR; INTRAVENOUS; SOFT TISSUE ONCE AS NEEDED
Status: COMPLETED | OUTPATIENT
Start: 2017-03-13 | End: 2017-03-13

## 2017-03-13 RX ORDER — DIPHENHYDRAMINE HYDROCHLORIDE 50 MG/ML
12.5 INJECTION INTRAMUSCULAR; INTRAVENOUS
Status: DISCONTINUED | OUTPATIENT
Start: 2017-03-13 | End: 2017-03-13 | Stop reason: HOSPADM

## 2017-03-13 RX ORDER — MIDAZOLAM HYDROCHLORIDE 1 MG/ML
2 INJECTION INTRAMUSCULAR; INTRAVENOUS
Status: DISCONTINUED | OUTPATIENT
Start: 2017-03-13 | End: 2017-03-18 | Stop reason: HOSPADM

## 2017-03-13 RX ORDER — FAMOTIDINE 10 MG/ML
20 INJECTION, SOLUTION INTRAVENOUS
Status: DISCONTINUED | OUTPATIENT
Start: 2017-03-13 | End: 2017-03-18 | Stop reason: HOSPADM

## 2017-03-13 RX ORDER — PHENYLEPHRINE HCL IN 0.9% NACL 0.8MG/10ML
SYRINGE (ML) INTRAVENOUS AS NEEDED
Status: DISCONTINUED | OUTPATIENT
Start: 2017-03-13 | End: 2017-03-13 | Stop reason: SURG

## 2017-03-13 RX ORDER — SODIUM CHLORIDE, SODIUM LACTATE, POTASSIUM CHLORIDE, CALCIUM CHLORIDE 600; 310; 30; 20 MG/100ML; MG/100ML; MG/100ML; MG/100ML
20 INJECTION, SOLUTION INTRAVENOUS CONTINUOUS
Status: DISCONTINUED | OUTPATIENT
Start: 2017-03-13 | End: 2017-03-20 | Stop reason: HOSPADM

## 2017-03-13 RX ORDER — FAMOTIDINE 20 MG/1
20 TABLET, FILM COATED ORAL 2 TIMES DAILY
Status: DISCONTINUED | OUTPATIENT
Start: 2017-03-13 | End: 2017-03-20 | Stop reason: HOSPADM

## 2017-03-13 RX ORDER — LIDOCAINE HYDROCHLORIDE 20 MG/ML
INJECTION, SOLUTION INFILTRATION; PERINEURAL AS NEEDED
Status: DISCONTINUED | OUTPATIENT
Start: 2017-03-13 | End: 2017-03-13 | Stop reason: SURG

## 2017-03-13 RX ORDER — LUBIPROSTONE 24 UG/1
24 CAPSULE ORAL 2 TIMES DAILY WITH MEALS
Status: DISCONTINUED | OUTPATIENT
Start: 2017-03-13 | End: 2017-03-20 | Stop reason: HOSPADM

## 2017-03-13 RX ORDER — ONDANSETRON 4 MG/1
4 TABLET, FILM COATED ORAL EVERY 6 HOURS PRN
Status: DISCONTINUED | OUTPATIENT
Start: 2017-03-13 | End: 2017-03-20 | Stop reason: HOSPADM

## 2017-03-13 RX ORDER — ONDANSETRON 2 MG/ML
INJECTION INTRAMUSCULAR; INTRAVENOUS AS NEEDED
Status: DISCONTINUED | OUTPATIENT
Start: 2017-03-13 | End: 2017-03-13 | Stop reason: SURG

## 2017-03-13 RX ORDER — SODIUM CHLORIDE 0.9 % (FLUSH) 0.9 %
1-10 SYRINGE (ML) INJECTION AS NEEDED
Status: DISCONTINUED | OUTPATIENT
Start: 2017-03-13 | End: 2017-03-20 | Stop reason: HOSPADM

## 2017-03-13 RX ORDER — OXYCODONE AND ACETAMINOPHEN 10; 325 MG/1; MG/1
2 TABLET ORAL EVERY 4 HOURS PRN
Status: DISCONTINUED | OUTPATIENT
Start: 2017-03-13 | End: 2017-03-20 | Stop reason: HOSPADM

## 2017-03-13 RX ORDER — DEXTROSE MONOHYDRATE 25 G/50ML
12.5 INJECTION, SOLUTION INTRAVENOUS AS NEEDED
Status: DISCONTINUED | OUTPATIENT
Start: 2017-03-13 | End: 2017-03-18 | Stop reason: HOSPADM

## 2017-03-13 RX ORDER — ONDANSETRON 2 MG/ML
4 INJECTION INTRAMUSCULAR; INTRAVENOUS ONCE AS NEEDED
Status: DISCONTINUED | OUTPATIENT
Start: 2017-03-13 | End: 2017-03-13 | Stop reason: HOSPADM

## 2017-03-13 RX ORDER — MIDAZOLAM HYDROCHLORIDE 1 MG/ML
1 INJECTION INTRAMUSCULAR; INTRAVENOUS
Status: DISCONTINUED | OUTPATIENT
Start: 2017-03-13 | End: 2017-03-18 | Stop reason: HOSPADM

## 2017-03-13 RX ORDER — MORPHINE SULFATE 2 MG/ML
2 INJECTION, SOLUTION INTRAMUSCULAR; INTRAVENOUS
Status: DISCONTINUED | OUTPATIENT
Start: 2017-03-13 | End: 2017-03-13 | Stop reason: HOSPADM

## 2017-03-13 RX ORDER — ONDANSETRON 4 MG/1
4 TABLET, ORALLY DISINTEGRATING ORAL EVERY 6 HOURS PRN
Status: DISCONTINUED | OUTPATIENT
Start: 2017-03-13 | End: 2017-03-20 | Stop reason: HOSPADM

## 2017-03-13 RX ORDER — SODIUM CHLORIDE 0.9 % (FLUSH) 0.9 %
1-10 SYRINGE (ML) INJECTION AS NEEDED
Status: DISCONTINUED | OUTPATIENT
Start: 2017-03-13 | End: 2017-03-18 | Stop reason: HOSPADM

## 2017-03-13 RX ORDER — SUFENTANIL CITRATE 50 UG/ML
INJECTION EPIDURAL; INTRAVENOUS AS NEEDED
Status: DISCONTINUED | OUTPATIENT
Start: 2017-03-13 | End: 2017-03-13 | Stop reason: SURG

## 2017-03-13 RX ORDER — MAGNESIUM HYDROXIDE 1200 MG/15ML
LIQUID ORAL AS NEEDED
Status: DISCONTINUED | OUTPATIENT
Start: 2017-03-13 | End: 2017-03-13 | Stop reason: HOSPADM

## 2017-03-13 RX ORDER — HYDRALAZINE HYDROCHLORIDE 20 MG/ML
5 INJECTION INTRAMUSCULAR; INTRAVENOUS
Status: DISCONTINUED | OUTPATIENT
Start: 2017-03-13 | End: 2017-03-13 | Stop reason: HOSPADM

## 2017-03-13 RX ORDER — SUCCINYLCHOLINE CHLORIDE 20 MG/ML
INJECTION INTRAMUSCULAR; INTRAVENOUS AS NEEDED
Status: DISCONTINUED | OUTPATIENT
Start: 2017-03-13 | End: 2017-03-13 | Stop reason: SURG

## 2017-03-13 RX ORDER — NALOXONE HCL 0.4 MG/ML
0.4 VIAL (ML) INJECTION AS NEEDED
Status: DISCONTINUED | OUTPATIENT
Start: 2017-03-13 | End: 2017-03-13 | Stop reason: HOSPADM

## 2017-03-13 RX ORDER — IPRATROPIUM BROMIDE AND ALBUTEROL SULFATE 2.5; .5 MG/3ML; MG/3ML
3 SOLUTION RESPIRATORY (INHALATION) ONCE AS NEEDED
Status: DISCONTINUED | OUTPATIENT
Start: 2017-03-13 | End: 2017-03-13 | Stop reason: HOSPADM

## 2017-03-13 RX ORDER — OXYCODONE HCL 20 MG/1
20 TABLET, FILM COATED, EXTENDED RELEASE ORAL ONCE
Status: COMPLETED | OUTPATIENT
Start: 2017-03-13 | End: 2017-03-13

## 2017-03-13 RX ORDER — SODIUM CHLORIDE, SODIUM LACTATE, POTASSIUM CHLORIDE, CALCIUM CHLORIDE 600; 310; 30; 20 MG/100ML; MG/100ML; MG/100ML; MG/100ML
9 INJECTION, SOLUTION INTRAVENOUS CONTINUOUS
Status: DISCONTINUED | OUTPATIENT
Start: 2017-03-13 | End: 2017-03-18 | Stop reason: HOSPADM

## 2017-03-13 RX ADMIN — SUFENTANIL CITRATE 25 MCG: 50 INJECTION, SOLUTION EPIDURAL; INTRAVENOUS at 07:50

## 2017-03-13 RX ADMIN — CEFAZOLIN SODIUM 1 G: 1 INJECTION, SOLUTION INTRAVENOUS at 15:23

## 2017-03-13 RX ADMIN — DEXAMETHASONE SODIUM PHOSPHATE 4 MG: 4 INJECTION, SOLUTION INTRA-ARTICULAR; INTRALESIONAL; INTRAMUSCULAR; INTRAVENOUS; SOFT TISSUE at 06:50

## 2017-03-13 RX ADMIN — ONDANSETRON 4 MG: 2 INJECTION INTRAMUSCULAR; INTRAVENOUS at 15:16

## 2017-03-13 RX ADMIN — LIDOCAINE HYDROCHLORIDE 0.5 ML: 10 INJECTION, SOLUTION EPIDURAL; INFILTRATION; INTRACAUDAL; PERINEURAL at 06:44

## 2017-03-13 RX ADMIN — FAMOTIDINE 20 MG: 20 TABLET, FILM COATED ORAL at 17:41

## 2017-03-13 RX ADMIN — ONDANSETRON HYDROCHLORIDE 4 MG: 2 SOLUTION INTRAMUSCULAR; INTRAVENOUS at 08:52

## 2017-03-13 RX ADMIN — FAMOTIDINE 20 MG: 10 INJECTION, SOLUTION INTRAVENOUS at 06:50

## 2017-03-13 RX ADMIN — SUCCINYLCHOLINE CHLORIDE 100 MG: 20 INJECTION, SOLUTION INTRAMUSCULAR; INTRAVENOUS at 07:04

## 2017-03-13 RX ADMIN — PROPOFOL 60 MG: 10 INJECTION, EMULSION INTRAVENOUS at 07:04

## 2017-03-13 RX ADMIN — SUFENTANIL CITRATE 50 MCG: 50 INJECTION, SOLUTION EPIDURAL; INTRAVENOUS at 07:04

## 2017-03-13 RX ADMIN — HYDROMORPHONE HYDROCHLORIDE 1 MG: 1 INJECTION, SOLUTION INTRAMUSCULAR; INTRAVENOUS; SUBCUTANEOUS at 10:51

## 2017-03-13 RX ADMIN — Medication 160 MCG: at 07:15

## 2017-03-13 RX ADMIN — SUCRALFATE 1 G: 1 TABLET ORAL at 12:09

## 2017-03-13 RX ADMIN — VASOPRESSIN 1 UNITS: 20 INJECTION INTRAVENOUS at 08:07

## 2017-03-13 RX ADMIN — METOPROLOL SUCCINATE 25 MG: 25 TABLET, FILM COATED, EXTENDED RELEASE ORAL at 17:41

## 2017-03-13 RX ADMIN — SODIUM CHLORIDE, POTASSIUM CHLORIDE, SODIUM LACTATE AND CALCIUM CHLORIDE: 600; 310; 30; 20 INJECTION, SOLUTION INTRAVENOUS at 09:00

## 2017-03-13 RX ADMIN — SUFENTANIL CITRATE 25 MCG: 50 INJECTION, SOLUTION EPIDURAL; INTRAVENOUS at 08:15

## 2017-03-13 RX ADMIN — VASOPRESSIN 0.5 UNITS: 20 INJECTION INTRAVENOUS at 08:00

## 2017-03-13 RX ADMIN — LIDOCAINE HYDROCHLORIDE 100 MG: 20 INJECTION, SOLUTION INFILTRATION; PERINEURAL at 07:04

## 2017-03-13 RX ADMIN — VASOPRESSIN 0.5 UNITS: 20 INJECTION INTRAVENOUS at 07:53

## 2017-03-13 RX ADMIN — SUCRALFATE 1 G: 1 TABLET ORAL at 16:19

## 2017-03-13 RX ADMIN — SODIUM CHLORIDE 75 ML/HR: 9 INJECTION, SOLUTION INTRAVENOUS at 10:52

## 2017-03-13 RX ADMIN — OXYCODONE HYDROCHLORIDE AND ACETAMINOPHEN 2 TABLET: 10; 325 TABLET ORAL at 16:19

## 2017-03-13 RX ADMIN — OXYCODONE HYDROCHLORIDE AND ACETAMINOPHEN 2 TABLET: 10; 325 TABLET ORAL at 12:09

## 2017-03-13 RX ADMIN — SODIUM CHLORIDE, POTASSIUM CHLORIDE, SODIUM LACTATE AND CALCIUM CHLORIDE 20 ML/HR: 600; 310; 30; 20 INJECTION, SOLUTION INTRAVENOUS at 06:45

## 2017-03-13 RX ADMIN — SODIUM CHLORIDE, POTASSIUM CHLORIDE, SODIUM LACTATE AND CALCIUM CHLORIDE 9 ML/HR: 600; 310; 30; 20 INJECTION, SOLUTION INTRAVENOUS at 06:44

## 2017-03-13 RX ADMIN — ONDANSETRON 4 MG: 2 INJECTION INTRAMUSCULAR; INTRAVENOUS at 21:43

## 2017-03-13 RX ADMIN — INSULIN LISPRO 33 UNITS: 100 INJECTION, SOLUTION INTRAVENOUS; SUBCUTANEOUS at 17:42

## 2017-03-13 RX ADMIN — INSULIN LISPRO 38 UNITS: 100 INJECTION, SOLUTION INTRAVENOUS; SUBCUTANEOUS at 12:37

## 2017-03-13 RX ADMIN — CEFAZOLIN SODIUM 1 G: 1 INJECTION, SOLUTION INTRAVENOUS at 21:43

## 2017-03-13 RX ADMIN — CEFAZOLIN 1 G: 1 INJECTION, POWDER, FOR SOLUTION INTRAMUSCULAR; INTRAVENOUS; PARENTERAL at 06:59

## 2017-03-13 RX ADMIN — HYDROMORPHONE HYDROCHLORIDE 1 MG: 1 INJECTION, SOLUTION INTRAMUSCULAR; INTRAVENOUS; SUBCUTANEOUS at 21:43

## 2017-03-13 RX ADMIN — OXYCODONE HYDROCHLORIDE 20 MG: 20 TABLET, FILM COATED, EXTENDED RELEASE ORAL at 06:14

## 2017-03-13 RX ADMIN — Medication 160 MCG: at 07:05

## 2017-03-13 RX ADMIN — MIDAZOLAM HYDROCHLORIDE 2 MG: 1 INJECTION, SOLUTION INTRAMUSCULAR; INTRAVENOUS at 06:50

## 2017-03-13 NOTE — PLAN OF CARE
Problem: Patient Care Overview (Adult)  Goal: Plan of Care Review  Outcome: Ongoing (interventions implemented as appropriate)    03/13/17 1531   Coping/Psychosocial Response Interventions   Plan Of Care Reviewed With patient   Outcome Evaluation   Outcome Summary/Follow up Plan PT eval completed. Pt completed bed mobility requiring supervision. Pt completed transfers and ambulation requiring CGA x 2 with either HHA x 2 or RW. Pt educated on spinal precautions, brace wear schedule, donning/doffing, and purpose of brace. Skilled physical therapy necessary to improve pts safety and I with functional mobility while improving her overall exercise tolerance. Anticipate d/c home with assist.          Problem: Inpatient Physical Therapy  Goal: Bed Mobility Goal LTG- PT  Outcome: Ongoing (interventions implemented as appropriate)    03/13/17 1531   Bed Mobility PT LTG   Bed Mobility PT LTG, Date Established 03/13/17   Bed Mobility PT LTG, Time to Achieve by discharge   Bed Mobility PT LTG, Activity Type all bed mobility   Bed Mobility PT LTG, [place] Level independent   Bed Mobility PT LTG, Additional Goal while maintaining spinal precautions       Goal: Transfer Training Goal 1 LTG- PT  Outcome: Ongoing (interventions implemented as appropriate)    03/13/17 1531   Transfer Training PT LTG   Transfer Training PT LTG, Date Established 03/13/17   Transfer Training PT LTG, Time to Achieve by discharge   Transfer Training PT LTG, Activity Type bed to chair /chair to bed;sit to stand/stand to sit   Transfer Training PT LTG, [place] Level conditional independence   Transfer Training PT LTG, Assist Device walker, rolling       Goal: Gait Training Goal LTG- PT  Outcome: Ongoing (interventions implemented as appropriate)    03/13/17 1531   Gait Training PT LTG   Gait Training Goal PT LTG, Date Established 03/13/17   Gait Training Goal PT LTG, Time to Achieve by discharge   Gait Training Goal PT LTG, [place] Level  conditional independence   Gait Training Goal PT LTG, Assist Device walker, rolling   Gait Training Goal PT LTG, Distance to Achieve 150       Goal: Stair Training Goal LTG- PT  Outcome: Ongoing (interventions implemented as appropriate)    03/13/17 1531   Stair Training PT LTG   Stair Training Goal PT LTG, Date Established 03/13/17   Stair Training Goal PT LTG, Time to Achieve by discharge   Stair Training Goal PT LTG, Number of Steps 4   Stair Training Goal PT LTG, [place] Level conditional independence   Stair Training Goal PT LTG, Assist Device 1 handrail  (on R)       Goal: Patient Education Goal LTG- PT  Outcome: Ongoing (interventions implemented as appropriate)    03/13/17 1531   Patient Education PT LTG   Patient Education PT LTG, Date Established 03/13/17   Patient Education PT LTG, Time to Achieve by discharge   Patient Education PT LTG, Education Type precaution per surgeon   Patient Education PT LTG, Education Understanding demonstrate adequately;verbalize understanding

## 2017-03-13 NOTE — PLAN OF CARE
Problem: Fall Risk (Adult)  Goal: Identify Related Risk Factors and Signs and Symptoms  Outcome: Outcome(s) achieved Date Met:  03/13/17 03/13/17 7235   Fall Risk   Fall Risk: Related Risk Factors environment unfamiliar;fear of falling;history of falls   Fall Risk: Signs and Symptoms presence of risk factors

## 2017-03-13 NOTE — ANESTHESIA POSTPROCEDURE EVALUATION
Patient: Alisia Velez    Procedure Summary     Date Anesthesia Start Anesthesia Stop Room / Location    03/13/17 0659 0910  [facility] OR 05 / [facility]       Procedure Diagnosis Surgeon Provider    LEFT LUMBAR LATERAL INTERBODY FUSION L 3-5 WITH INSTRUMENTATION (Left Spine Lumbar) (M54.4) MD Hung Bergman CRNA          Anesthesia Type: general  Last vitals  /60 (03/13/17 0930)    Temp 97.1 °F (36.2 °C) (03/13/17 0915)    Pulse 94 (03/13/17 0930)   Resp 8 (03/13/17 0930)    SpO2 100 % (03/13/17 0930)      Post Anesthesia Care and Evaluation    Patient location during evaluation: PACU  Patient participation: complete - patient participated  Level of consciousness: awake and alert  Pain score: 0  Pain management: adequate  Airway patency: patent  Anesthetic complications: No anesthetic complications    Cardiovascular status: acceptable and stable  Respiratory status: acceptable  Hydration status: acceptable

## 2017-03-13 NOTE — ADDENDUM NOTE
Addendum  created 03/13/17 1041 by Hung Faulkner, JOSE EDUARDO    Anesthesia Event edited, Anesthesia Intra Blocks edited, Anesthesia Intra Flowsheets edited, Anesthesia Intra Meds edited, Anesthesia Staff edited, Child order released for a procedure order, Flowsheet data copied forward, LDA created via procedure documentation, Patient device added, Patient device removed, Procedure Event Log accessed, Sign clinical note

## 2017-03-13 NOTE — PLAN OF CARE
Problem: Patient Care Overview (Adult)  Goal: Plan of Care Review  Outcome: Ongoing (interventions implemented as appropriate)    03/13/17 1215   Coping/Psychosocial Response Interventions   Plan Of Care Reviewed With patient;spouse   Patient Care Overview   Progress no change   Outcome Evaluation   Outcome Summary/Follow up Plan Prn iv pain med given as ordered. Left flank drsg mepilex CDI with no drainage. VSS. 3 lliters oxygen NC. Jonas. Perrla. ALert and orientated. SPouse at bedside. DTV. Waiting for brace.       Goal: Adult Individualization and Mutuality  Outcome: Ongoing (interventions implemented as appropriate)  Goal: Discharge Needs Assessment  Outcome: Ongoing (interventions implemented as appropriate)    Problem: Perioperative Period (Adult)  Goal: Signs and Symptoms of Listed Potential Problems Will be Absent or Manageable (Perioperative Period)  Outcome: Ongoing (interventions implemented as appropriate)    Problem: Diabetes, Type 2 (Adult)  Goal: Signs and Symptoms of Listed Potential Problems Will be Absent or Manageable (Diabetes, Type 2)  Outcome: Ongoing (interventions implemented as appropriate)    Problem: Fall Risk (Adult)  Goal: Identify Related Risk Factors and Signs and Symptoms  Outcome: Ongoing (interventions implemented as appropriate)  Goal: Absence of Falls  Outcome: Ongoing (interventions implemented as appropriate)

## 2017-03-13 NOTE — PROGRESS NOTES
Acute Care - Physical Therapy Initial Evaluation  [facility]     Patient Name: Alisia Velez  : 1945  MRN: [id number]  Today's Date: 3/13/2017   Onset of Illness/Injury or Date of Surgery Date: 17  Date of Referral to PT: 17  Referring Physician: Dr. Horton      Admit Date: 3/13/2017     Visit Dx:    ICD-10-CM ICD-9-CM   1. Impaired functional mobility and activity tolerance Z74.09 V49.89   2. Decreased activities of daily living (ADL) Z78.9 V49.89     Patient Active Problem List   Diagnosis   • Osteopenia   • Mixed hyperlipidemia   • Essential hypertension   • Type 2 diabetes mellitus with diabetic polyneuropathy, without long-term current use of insulin   • Spinal stenosis, lumbar     Past Medical History   Diagnosis Date   • Arthritis    • Back pain    • Blister of great toe of left foot      Nonthermal, initial encounter   • Cancer      skin    • Dyslipidemia    • Elevated blood pressure    • Essential hypertension 2016   • GERD (gastroesophageal reflux disease)    • History of cerebrovascular accident         • Hypo-osmolality and hyponatremia    • Ingrowing nail    • Onychomycosis    • PONV (postoperative nausea and vomiting)    • Snores    • Type 2 diabetes mellitus    • Type 2 diabetes mellitus with circulatory disorder 2016   • Urinary incontinence    • Vitamin D deficiency      Past Surgical History   Procedure Laterality Date   • Blepharoplasty     • Carpal tunnel release     • Nail bed removal/revision  2016   • Lumbar disc surgery     • Neck surgery     • Laparoscopic nissen fundoplication     • Cervical spine surgery       cydney-laminotomy c7-t1   • Other surgical history       had left and right big toenials removed mar 2016   • Colonoscopy     • Endoscopy     • Cardiac catheterization     • Lumbar fusion Left 3/13/2017     Procedure: LEFT LUMBAR LATERAL INTERBODY FUSION L 3-5 WITH INSTRUMENTATION;  Surgeon: MIGDALIA Horton MD;  Location: [facility];  Service:            PT ASSESSMENT (last 72 hours)      PT Evaluation       03/13/17 1540 03/13/17 1537    General Information    Equipment Currently Used at Home  walker, rolling;cane, straight;shower chair  -LR    Living Environment    Lives With spouse  -LR     Living Arrangements house  -LR     Home Accessibility bed and bath on same level  -LR     Stair Railings at Home outside, present on right side  -LR     Type of Financial/Environmental Concern none  -LR     Transportation Available none  -LR       03/13/17 1441 03/13/17 1200    Rehab Evaluation    Document Type evaluation   See MAR  -PB (r) KD (t) PB (c) evaluation   See MAR, entered room 1441  -ND    Subjective Information agree to therapy;complains of;pain;weakness;fatigue   shakiness  -PB (r) KD (t) PB (c) agree to therapy;complains of;pain;weakness;fatigue;nausea/vomiting  -ND    Patient Effort, Rehab Treatment good  -PB (r) KD (t) PB (c) good  -ND    Symptoms Noted During/After Treatment --   nausea  -PB (r) KD (t) PB (c) --   nausea  -ND    General Information    Patient Profile Review yes  -PB (r) KD (t) PB (c) yes  -ND    Onset of Illness/Injury or Date of Surgery Date 03/13/17  -PB (r) KD (t) PB (c) 03/13/17  -ND    Referring Physician Dr. Horton  -PB (r) KD (t) PB (c) Dr. Horton  -ND    General Observations Pt fowlers in bed, IV site, O2 via NC, alert, B SCD pumps  -PB (r) KD (t) PB (c) Pt. fowlers in bed, alert, SCDs donned, IV site  -ND    Pertinent History Of Current Problem Pt. having severe back pain last several years. Back pain radiates mainly through L. buttocks down L. legs with some R. side pain. S/P 3/13/17 L. lumbar lateral interbody fusion L3-5 with instrumentation. All labs therapeutic. 2nd part back scheduled 3/15/17   -PB (r) KD (t) PB (c) Pt. having severe back pain last several years. Back pain radiates mainly through L. buttocks down L. legs with some R. side pain.  S/P 3/13/17 L. lumbar lateral interbody fusion L3-5 with  instrumentation. All labs therapeutic. 2nd part back scheduled 3/15/17   -ND    Precautions/Limitations brace on when up;spinal precautions;fall precautions  -PB (r) KD (t) PB (c) brace on when up;fall precautions;spinal precautions  -ND    Prior Level of Function independent:;all household mobility;community mobility;gait;transfer;ADL's;dressing;bathing;home management;cooking;cleaning;driving  -PB (r) KD (t) PB (c) independent:;all household mobility;community mobility;ADL's;home management  -ND    Equipment Currently Used at Home walker, rolling;bath bench;cane, straight;grab bar  -PB (r) KD (t) PB (c) bath bench;walker, rolling;cane, straight;grab bar  -ND    Plans/Goals Discussed With patient;agreed upon  -PB (r) KD (t) PB (c) patient;agreed upon  -ND    Risks Reviewed patient:;nausea/vomiting;dizziness;increased discomfort;LOB;change in vital signs  -PB (r) KD (t) PB (c) patient:;nausea/vomiting;LOB;increased discomfort;dizziness;change in vital signs  -ND    Benefits Reviewed patient:;improve function;increase independence;increase strength;increase balance;decrease pain  -PB (r) KD (t) PB (c) patient:;improve function;increase independence;increase strength;increase balance;decrease pain;increase knowledge  -ND    Barriers to Rehab physical barrier  -PB (r) KD (t) PB (c) physical barrier  -ND    Living Environment    Lives With spouse  -PB (r) KD (t) PB (c) spouse  -ND    Living Arrangements house  -PB (r) KD (t) PB (c) house  -ND    Home Accessibility stairs to enter home;tub/shower is not walk in;bed and bath on same level;grab bars present (bathtub)  -PB (r) KD (t) PB (c) stairs to enter home;tub/shower is not walk in;bed and bath on same level;grab bars present (bathtub)  -ND    Number of Stairs to Enter Home 4  -PB (r) KD (t) PB (c) 4  -ND    Stair Railings at Home outside, present on right side  -PB (r) KD (t) PB (c) outside, present on right side  -ND    Clinical Impression    Date of Referral to PT  03/13/17  -PB (r) KD (t) PB (c)     PT Diagnosis impaired functional mobility  -PB (r) KD (t) PB (c)     Functional Level At Time Of Evaluation supervision bed mobility, CGA x 2 transfers and ambulation  -PB (r) KD (t) PB (c)     Patient/Family Goals Statement be able to walk longer distances  -PB (r) KD (t) PB (c)     Criteria for Skilled Therapeutic Interventions Met yes;treatment indicated  -PB (r) KD (t) PB (c)     Pathology/Pathophysiology Noted (Describe Specifically for Each System) musculoskeletal  -PB (r) KD (t) PB (c)     Impairments Found (describe specific impairments) aerobic capacity/endurance;gait, locomotion, and balance;motor function;muscle performance  -PB (r) KD (t) PB (c)     Functional Limitations in Following Categories (Describe Specific Limitations) self-care;home management;community/leisure  -PB (r) KD (t) PB (c)     Disability: Inability to Perform Actions/Activities of Required Roles (describe specific disability) community/leisure  -PB (r) KD (t) PB (c)     Rehab Potential good, to achieve stated therapy goals  -PB (r) KD (t) PB (c)     Predicted Duration of Therapy Intervention (days/wks) until d/c  -PB (r) KD (t) PB (c)     Vital Signs    Pre SpO2 (%) 96  -PB (r) KD (t) PB (c)     O2 Delivery Pre Treatment supplemental O2   2L  -PB (r) KD (t) PB (c)     Pre Patient Position Sitting  -PB (r) KD (t) PB (c)     Pain Assessment    Pain Assessment 0-10  -PB (r) KD (t) PB (c) 0-10  -ND    Pain Score 3  -PB (r) KD (t) PB (c) 3  -ND    Pain Type Surgical pain;Acute pain  -PB (r) KD (t) PB (c) Chronic pain;Acute pain  -ND    Pain Location Back  -PB (r) KD (t) PB (c) Back  -ND    Pain Orientation Right  -PB (r) KD (t) PB (c) Right  -ND    Pain Descriptors Dull  -PB (r) KD (t) PB (c) Dull  -ND    Pain Intervention(s) Medication (See MAR);Repositioned  -PB (r) KD (t) PB (c) Medication (See MAR);Repositioned  -ND    Response to Interventions tolerated  -PB (r) KD (t) PB (c) tolerated  -ND     Vision Assessment/Intervention    Visual Impairment WFL with corrective lenses  -PB (r) KD (t) PB (c) WFL with corrective lenses  -ND    Cognitive Assessment/Intervention    Current Cognitive/Communication Assessment functional  -PB (r) KD (t) PB (c) functional  -ND    Orientation Status oriented x 4  -PB (r) KD (t) PB (c) oriented x 4  -ND    Follows Commands/Answers Questions 100% of the time;able to follow multi-step instructions  -PB (r) KD (t) PB (c) 100% of the time;able to follow multi-step instructions  -ND    Personal Safety WNL/WFL  -PB (r) KD (t) PB (c) WNL/WFL  -ND    Personal Safety Interventions fall prevention program maintained;gait belt;nonskid shoes/slippers when out of bed;supervised activity  -PB (r) KD (t) PB (c) fall prevention program maintained;gait belt;nonskid shoes/slippers when out of bed;supervised activity  -ND    ROM (Range of Motion)    General ROM  no range of motion deficits identified  -ND    General ROM Detail B LE AROM WFL  -PB (r) KD (t) PB (c) BUE AROM WFL  -ND    MMT (Manual Muscle Testing)    General MMT Assessment  no strength deficits identified  -ND    General MMT Assessment Detail B LE functionally at least 3+/5  -PB (r) KD (t) PB (c)     Bed Mobility, Assessment/Treatment    Bed Mobility, Assistive Device bed rails  -PB (r) KD (t) PB (c) bed rails  -ND    Bed Mobility, Roll Left, [place] supervision required;verbal cues required  -PB (r) KD (t) PB (c) supervision required  -ND    Bed Mobility, Scoot/Bridge, [place] supervision required;verbal cues required  -PB (r) KD (t) PB (c) supervision required  -ND    Bed Mob, Sidelying to Sit, [place] verbal cues required;supervision required  -PB (r) KD (t) PB (c) supervision required;verbal cues required;nonverbal cues required (demo/gesture)  -ND    Bed Mob, Sit to Sidelying, [place] verbal cues required;supervision required  -PB (r) KD (t) PB (c) verbal cues required;nonverbal cues required  (demo/gesture);supervision required  -ND    Bed Mobility, Safety Issues decreased use of arms for pushing/pulling;decreased use of legs for bridging/pushing  -PB (r) KD (t) PB (c)     Bed Mobility, Impairments strength decreased;pain  -PB (r) KD (t) PB (c) pain  -ND    Bed Mobility, Comment Pt educated on log rolling technique to maintain spinal precautions  -PB (r) KD (t) PB (c)     Transfer Assessment/Treatment    Transfers, Sit-Stand [place] supervision required;verbal cues required;2 person assist required  -PB (r) KD (t) PB (c) verbal cues required;nonverbal cues required (demo/gesture);contact guard assist;2 person assist required;upper extremity support  -ND    Transfers, Stand-Sit [place] supervision required;verbal cues required;2 person assist required  -PB (r) KD (t) PB (c) verbal cues required;nonverbal cues required (demo/gesture);contact guard assist;2 person assist required;upper extremity support  -ND    Transfers, Sit-Stand-Sit, Assist Device rolling walker   HHA x 2 progressed to RW  -PB (r) KD (t) PB (c) rolling walker   HHAx2 initially then progressed with r/w  -ND    Toilet Transfer, [place] contact guard assist;verbal cues required;2 person assist required  -PB (r) KD (t) PB (c) verbal cues required;nonverbal cues required (demo/gesture);contact guard assist  -ND    Toilet Transfer, Assistive Device rolling walker  -PB (r) KD (t) PB (c) rolling walker   grab bar  -ND    Transfer, Safety Issues step length decreased;balance decreased during turns  -PB (r) KD (t) PB (c)     Transfer, Impairments strength decreased;impaired balance;pain  -PB (r) KD (t) PB (c) impaired balance  -ND    Gait Assessment/Treatment    Gait, [place] Level verbal cues required;contact guard assist;2 person assist required  -PB (r) KD (t) PB (c)     Gait, Assistive Device rolling walker   HHA x 2 progressed to RW  -PB (r) KD (t) PB (c)     Gait, Distance (Feet) 15   rest 15'  -PB (r) KD (t) PB (c)      Gait, Gait Pattern Analysis swing-through gait  -PB (r) KD (t) PB (c)     Gait, Gait Deviations antalgic;jaimie decreased;decreased heel strike;toe-to-floor clearance decreased  -PB (r) KD (t) PB (c)     Gait, Safety Issues balance decreased during turns;step length decreased  -PB (r) KD (t) PB (c)     Gait, Impairments strength decreased;impaired balance;pain  -PB (r) KD (t) PB (c)     Motor Skills/Interventions    Additional Documentation Balance Skills Training (Group)  -PB (r) KD (t) PB (c) Balance Skills Training (Group)  -ND    Balance Skills Training    Sitting-Level of Assistance Contact guard;x2  -PB (r) KD (t) PB (c) Close supervision  -ND    Sitting-Balance Support Right upper extremity supported;Left upper extremity supported;Feet supported  -PB (r) KD (t) PB (c) Right upper extremity supported;Left upper extremity supported;Feet supported  -ND    Standing-Level of Assistance Contact guard;x2  -PB (r) KD (t) PB (c) Contact guard;x2  -ND    Static Standing Balance Support assistive device   or HHA x 2  -PB (r) KD (t) PB (c) Left upper extremity supported;Right upper extremity supported  -ND    Gait Balance-Level of Assistance Contact guard;x2  -PB (r) KD (t) PB (c) Contact guard;x2  -ND    Gait Balance Support assistive device   HHA x 2 or RW  -PB (r) KD (t) PB (c) Right upper extremity supported;Left upper extremity supported;assistive device   HHAX2 then progressed to assitive device  -ND    Orthotics Prosthetics    Additional Documentation Orthosis Location (Group);Orthosis Management/Training (Group)  -PB (r) KD (t) PB (c) Orthosis Management/Training (Group);Orthosis Location (Group)  -ND    Orthosis Location    Orthosis Location/Type neck/back  -PB (r) KD (t) PB (c) neck/back  -ND    Orthosis, Neck/Back LSO (lumbar sacral orthosis)  -PB (r) KD (t) PB (c) LSO (lumbar sacral orthosis)  -ND    Orthosis Management/Training    Orthosis Indications immobilize, protect/position healing structures;restrict  motion;rest, reduce pain;rest, reduce inflammation  -PB (r) KD (t) PB (c) immobilize, protect/position healing structures;rest, reduce pain  -ND    Orthosis Skills Training doffing orthosis;donning orthosis;purpose/goals of orthosis  -PB (r) KD (t) PB (c) donning orthosis;doffing orthosis;purpose/goals of orthosis;restrictions/precautions  -ND    Orthosis Wear Schedule wear when out of bed only;wear with activity/work  -PB (r) KD (t) PB (c) wear when out of bed only  -ND    Sensory Assessment/Intervention    Sensory Impairment --   WNL per pt  -PB (r) KD (t) PB (c) --   WNL per pt.   -ND    Positioning and Restraints    Pre-Treatment Position in bed  -PB (r) KD (t) PB (c) in bed  -ND    Post Treatment Position bed  -PB (r) KD (t) PB (c) bed  -ND    In Bed fowlers;call light within reach;encouraged to call for assist;side rails up x3;SCD pump applied  -PB (r) KD (t) PB (c) fowlers;call light within reach;encouraged to call for assist;side rails up x2;SCD pump applied  -ND      User Key  (r) = Recorded By, (t) = Taken By, (c) = Cosigned By    Initials Name Provider Type    LAWRENCE Dorado, RN Registered Nurse    FRANCIS Hicks, PT DPT Physical Therapist    MATT Leyva, OTR/L Occupational Therapist    HENRY Hampton, PT Student PT Student          Physical Therapy Education     Title: PT OT SLP Therapies (Active)     Topic: Physical Therapy (Active)     Point: Mobility training (Active)    Learning Progress Summary    Learner Readiness Method Response Comment Documented by Status   Patient Acceptance E NR Pt educated on spinal precautions, donning/doffing LSO brace, brace purpose, and brace wear schedule. Pt educted log roll technique and importance of activity.  03/13/17 1529 Active               Point: Precautions (Active)    Learning Progress Summary    Learner Readiness Method Response Comment Documented by Status   Patient Acceptance E NR Pt educated on spinal precautions, donning/doffing LSO  brace, brace purpose, and brace wear schedule. Pt educted log roll technique and importance of activity.  03/13/17 1529 Active                      User Key     Initials Effective Dates Name Provider Type Discipline     12/19/16 -  Yarelis Hampton, PT Student PT Student PT                PT Recommendation and Plan  Anticipated Discharge Disposition: home with assist, home  Planned Therapy Interventions: balance training, bed mobility training, gait training, home exercise program, patient/family education, orthotic fitting/training, stair training, strengthening, transfer training  PT Frequency: 2 times/day, per priority policy  Plan of Care Review  Plan Of Care Reviewed With: patient  Outcome Summary/Follow up Plan: PT eval completed. Pt completed bed mobility requiring supervision. Pt completed transfers and ambulation requiring CGA x 2 with either HHA x 2 or RW. Pt educated on spinal precautions, brace wear schedule, donning/doffing, and purpose of brace.  Skilled physical therapy necessary to improve pts safety and I with functional mobility while improving her overall exercise tolerance. Anticipate d/c home with assist.           IP PT Goals       03/13/17 1531          Bed Mobility PT LTG    Bed Mobility PT LTG, Date Established 03/13/17  -PB (r) KD (t) PB (c)      Bed Mobility PT LTG, Time to Achieve by discharge  -PB (r) KD (t) PB (c)      Bed Mobility PT LTG, Activity Type all bed mobility  -PB (r) KD (t) PB (c)      Bed Mobility PT LTG, [place] Level independent  -PB (r) KD (t) PB (c)      Bed Mobility PT LTG, Additional Goal while maintaining spinal precautions  -PB (r) KD (t) PB (c)      Transfer Training PT LTG    Transfer Training PT LTG, Date Established 03/13/17  -PB (r) KD (t) PB (c)      Transfer Training PT LTG, Time to Achieve by discharge  -PB (r) KD (t) PB (c)      Transfer Training PT LTG, Activity Type bed to chair /chair to bed;sit to stand/stand to sit  -PB (r) KD (t) PB (c)       Transfer Training PT LTG, [place] Level conditional independence  -PB (r) KD (t) PB (c)      Transfer Training PT LTG, Assist Device walker, rolling  -PB (r) KD (t) PB (c)      Gait Training PT LTG    Gait Training Goal PT LTG, Date Established 03/13/17  -PB (r) KD (t) PB (c)      Gait Training Goal PT LTG, Time to Achieve by discharge  -PB (r) KD (t) PB (c)      Gait Training Goal PT LTG, [place] Level conditional independence  -PB (r) KD (t) PB (c)      Gait Training Goal PT LTG, Assist Device walker, rolling  -PB (r) KD (t) PB (c)      Gait Training Goal PT LTG, Distance to Achieve 150  -PB (r) KD (t) PB (c)      Stair Training PT LTG    Stair Training Goal PT LTG, Date Established 03/13/17  -PB (r) KD (t) PB (c)      Stair Training Goal PT LTG, Time to Achieve by discharge  -PB (r) KD (t) PB (c)      Stair Training Goal PT LTG, Number of Steps 4  -PB (r) KD (t) PB (c)      Stair Training Goal PT LTG, [place] Level conditional independence  -PB (r) KD (t) PB (c)      Stair Training Goal PT LTG, Assist Device 1 handrail   on R  -PB (r) KD (t) PB (c)      Patient Education PT LTG    Patient Education PT LTG, Date Established 03/13/17  -PB (r) KD (t) PB (c)      Patient Education PT LTG, Time to Achieve by discharge  -PB (r) KD (t) PB (c)      Patient Education PT LTG, Education Type precaution per surgeon  -PB (r) KD (t) PB (c)      Patient Education PT LTG, Education Understanding demonstrate adequately;verbalize understanding  -PB (r) KD (t) PB (c)        User Key  (r) = Recorded By, (t) = Taken By, (c) = Cosigned By    Initials Name Provider Type    PB Dilshad Hicks, PT DPT Physical Therapist    KD Yarelis Hampton, PT Student PT Student                Outcome Measures       03/13/17 1500 03/13/17 8811       How much help from another person do you currently need...    Turning from your back to your side while in flat bed without using bedrails?  3  -PB (r) KD (t) PB (c)     Moving from lying  on back to sitting on the side of a flat bed without bedrails?  3  -PB (r) KD (t) PB (c)     Moving to and from a bed to a chair (including a wheelchair)?  3  -PB (r) KD (t) PB (c)     Standing up from a chair using your arms (e.g., wheelchair, bedside chair)?  3  -PB (r) KD (t) PB (c)     Climbing 3-5 steps with a railing?  2  -PB (r) KD (t) PB (c)     To walk in hospital room?  3  -PB (r) KD (t) PB (c)     AM-PAC 6 Clicks Score  17  -PB (r) KD (t)     How much help from another is currently needed...    Putting on and taking off regular lower body clothing? 2  -ND      Bathing (including washing, rinsing, and drying) 2  -ND      Toileting (which includes using toilet bed pan or urinal) 3  -ND      Putting on and taking off regular upper body clothing 3  -ND      Taking care of personal grooming (such as brushing teeth) 4  -ND      Eating meals 4  -ND      Score 18  -ND      Functional Assessment    Outcome Measure Options AM-PAC 6 Clicks Daily Activity (OT)  -ND AM-PAC 6 Clicks Basic Mobility (PT)  -PB (r) KD (t) PB (c)       User Key  (r) = Recorded By, (t) = Taken By, (c) = Cosigned By    Initials Name Provider Type    FRANCIS Hicks, PT DPT Physical Therapist    MATT Leyva, OTR/L Occupational Therapist    HENRY Hampton, PT Student PT Student           Time Calculation:         PT Charges       03/13/17 1526          Time Calculation    Start Time 1441  -PB (r) KD (t) PB (c)      Stop Time 1516  -PB (r) KD (t) PB (c)      Time Calculation (min) 35 min  -PB (r) KD (t)      PT Received On 03/13/17  -PB (r) KD (t) PB (c)      PT Goal Re-Cert Due Date 03/23/17  -PB (r) KD (t) PB (c)        User Key  (r) = Recorded By, (t) = Taken By, (c) = Cosigned By    Initials Name Provider Type    FRANCIS Hicks, PT DPT Physical Therapist    HENRY Hampton, PT Student PT Student          Therapy Charges for Today     Code Description Service Date Service Provider Modifiers Qty    [id number]  PT  EVAL LOW COMPLEXITY 2 3/13/2017 Yarelis Hampton, PT Student GP 1          PT G-Codes  Outcome Measure Options: AM-PAC 6 Clicks Daily Activity (OT)  Score: 17  Functional Limitation: Mobility: Walking and moving around  Mobility: Walking and Moving Around Current Status (): At least 40 percent but less than 60 percent impaired, limited or restricted  Mobility: Walking and Moving Around Goal Status (): At least 20 percent but less than 40 percent impaired, limited or restricted      Yarelis Hampton, PT Student  3/13/2017

## 2017-03-13 NOTE — ANESTHESIA PREPROCEDURE EVALUATION
Anesthesia Evaluation     Patient summary reviewed   history of anesthetic complications: PONV  NPO Status: > 8 hours   Airway   Mallampati: II  TM distance: >3 FB  Neck ROM: full  Dental      Pulmonary    (-) COPD, asthma, sleep apnea, not a smoker  Cardiovascular   Exercise tolerance: good (4-7 METS)    ECG reviewed  Patient on routine beta blocker and Beta blocker given within 24 hours of surgery    (+) hypertension,   (-) pacemaker, past MI, angina, cardiac stents      Neuro/Psych  (+) TIA,    (-) seizures, CVA  GI/Hepatic/Renal/Endo    (+)  GERD, diabetes mellitus type 2 using insulin,   (-) liver disease, renal disease    Musculoskeletal     Abdominal    Substance History      OB/GYN          Other                                    Anesthesia Plan    ASA 3     general     intravenous induction   Anesthetic plan and risks discussed with patient.

## 2017-03-13 NOTE — OP NOTE
LUMBAR LATERAL INTERBODY FUSION  Procedure Note    Alisia Velez  3/13/2017    Pre-op Diagnosis:     1.  Status post previous left L5-S1 decompression, , October 2002  2.  Increasing chronic back pain  3.  Bilateral buttock, thigh, and leg radiculopathy, left worse than right  4.  Neurogenic claudication  5.  Multilevel thoracolumbar degenerative disc disease  6.  Degenerative lumbar scoliosis, worse L3 to S1  7.  Multilevel lumbar facet arthropathy, worse L3 to S1  8.  Central and bilateral foraminal stenosis L3 to S1    Post-op Diagnosis:    same    Procedure/CPT® Codes:    1.  Left LLIF L3-4, L4-5  2.  Anterior spinal instrumentation L3-4 (Lanx lateral plate and screws)  3.  Use of PEEK interbody biomechanical device for fusion L3-4, L4-5 (Lanx PEEK spacer x 2)  4.  Use of bone marrow aspirate obtained through separate incision for fusion  5.  Use of allograft bone chips for fusion  6.  Use of fluoroscopy for confirmation of surgical level, placement of PEEK spacers and instrumentation  7.  Intraoperative neural monitoring    Anesthesia: General    Surgeon: DENG Horton MD    Assistant: Clarence Morin PA-C    Estimated Blood Loss: Minimal    Complications: None    Condition: Stable to PACU.    Indications:    The patient is a 71-year-old who sees Dr. Mike Erazo for medical issues.  She presented to the office with a history of a previous left L5-S1 decompression done in October 2002.  She did well with this procedure but unfortunately, she has had worsening complaints of back pain for the last several years along with symptoms that radiated down her buttocks, thighs, and into her legs with the left side worse than the right.  Her symptoms were very consistent with neurogenic claudication.  Imaging studies revealed multilevel degenerative disc disease with a degenerative scoliosis and facet arthropathy worse from L3 to S1 causing central and bilateral foraminal stenosis.  This was felt to be  contributing to the vast majority of her symptoms.     After failing conservative measures, it was mutually decided that surgery would be the best option. Risks, benefits, and complications of surgery were discussed with the patient. The patient appeared well informed and wished to proceed. We specifically discussed the risk of infection, blood loss, nerve root injury, CSF leak, and the possibility of incomplete resolution of symptoms. We also discussed the possible risk of a nonunion and the potential need for additional surgery in the event of a pseudoarthrosis or hardware failure.     We elected proceed with a staged operation.  It is planned that we will be returning to surgery for a second posterior procedure at a later date.    Operative Procedure:    After obtaining informed consent and verifying the correct operative site, the patient was brought to the operating room and placed supine on operating table.  A general anesthetic was provided by the anesthesia service with the assistance of an endotracheal tube.  Once this was appropriately positioned and secured, the patient was carefully rotated into the lateral decubitus position with the left side up.  All bony prominences were well-padded.  3 inch wide cloth tape was used to maintain the patient's position.  It was also used to tip open the pelvis slightly allowing better access to the lumbar spine through a lateral approach.  Fluoroscopy was then used to identify the L3-4 and L4-5 levels, and the skin was marked for our planned incision.  The left flank was then prepped and draped in the usual sterile fashion.  A surgical timeout was taken to confirm this was the correct patient, we were working at the correct level, and that preoperative antibiotics were given in a timely fashion.    A small stab incision was created over the left anterior iliac crest using a 15 blade scalpel.  Through this stab incision, a Jamshidi needle was advanced into the iliac  crest.  Through the needle we aspirated approximately 50-60 mL of bone marrow from the iliac crest.  This bone marrow aspirate was then passed off in a sterile fashion for processing and concentration to be later mixed with allograft bone chips to assist with obtaining a fusion.    An oblique incision was created of the left flank using a 10 blade scalpel directly centered between the L3-4 and L4-5 segments. Dissection was carried bluntly through subcutaneous tissues. Blunt dissection was also carried between the external oblique and internal oblique musculature. The transversalis fascia was pierced allowing access to the retro-peroneal space. At this point, a series of neuro monitoring probes were used to safely access the L4-5 level. We used stimulated and free run EMG for this purpose. Once nerve roots were confirmed to be out of harm's way, self retaining retractors were placed for continuous exposure.     An annulotomy was then created at the L4-5 area using a 15 blade scalpel on a long handle. A Ross elevator was used to remove disc material off of the endplates. Disc material was removed using Kerrisons, pituitaries, and forward angled curettes. Once all disc material had been retrieved, I used the Ross elevator to divide the contralateral annulus. This assisted with mobilization of the vertebral body. We then used a series of endplate scrapers to prepare the endplates for interbody fusion. Trial spacers were malleted into position and for the disc space it was felt that a 12 mm x 50 mm implant would be the best fit to restore disc height. The disc space was then thoroughly irrigated with saline solution.     A PEEK spacer from the [employer] instrumentation set measuring 12 mm x 50 mm x 22 mm was then packed as tightly as possible with a combination of the previously obtained bone marrow aspirate and allograft bone chips. This PEEK spacer was then malleted into the L4-5 disc space under fluoroscopic guidance. It  was placed as a PEEK interbody biomechanical device to assist with interbody fusion.  After confirming the spacer was properly positioned in both the AP and lateral projections, next attention was turned to the L3-4 segment.    The neuro monitoring probes were once again used this time to access L3-4.  Once nerve roots were confirmed to be out of harm's way, self retaining retractors were placed for continuous exposure of L3-4.  This disc space was prepared in an identical fashion as L4-5.  We used first the 15 blade scalpel to create an annulotomy.  A Ross elevators were used to remove disc material off of the endplates.  Disc material was removed using Kerrisons, pituitaries, and forward angled curettes.  Endplate scrapers were used to prepare the endplates for interbody fusion and the contralateral annulus was divided with the Ross elevator.  Trial spacers were then malleted into position across L3-4.  A second PEEK spacer was then chosen matching the final trial.  In this case we used a 10 mm x 45 mm implant.  This spacer was packed as tightly as possible with a combination of allograft bone and bone marrow aspirate.  This spacer was malleted into the L3-4 disc space under fluoroscopic guidance as a PEEK interbody biomechanical device to assist with interbody fusion.    Once this spacer was confirmed to be properly positioned, I chose a 4-hole plate to help augment the fusion of L3-4. This plate was held into position using 4 screws. I placed 45 mm screws into both L3 and L4.  A cover plate was then screwed into position to prevent screw backout once all 4 screws were properly positioned.     The wound was then irrigated thoroughly. A thorough inspection was then undertaken to ensure that we had adequate hemostasis. Bleeding at this point was controlled using FloSeal and bipolar cautery. Closure was then accomplished with a #1 Vicryl reapproximating the transversalis fascia as well as the internal and external  oblique musculature. Immediate subcutaneous tissues were closed with a 3-0 Vicryl and skin closure was accomplished using Mastisol and Steri-Strips. The wound was then sterilely dressed. The patient was then carefully rotated supine onto a hospital gurney, extubated, and sent to the recovery room in good stable condition.     The patient tolerated the procedure well. There were no complications. We estimated blood loss to be minimal. The patient remained hemodynamically stable.     Clarence Morin PA-C provided critical assistance during the procedure. His assistance was medically necessary in order to allow the procedure to occur in the most safe and efficient manner.     DENG Horton MD     Date: 3/13/2017  Time: 6:38 AM

## 2017-03-13 NOTE — PROGRESS NOTES
Acute Care - Occupational Therapy Initial Evaluation  [facility]     Patient Name: Alisia Velez  : 1945  MRN: [id number]  Today's Date: 3/13/2017  Onset of Illness/Injury or Date of Surgery Date: (P) 17  Date of Referral to OT: 17  Referring Physician: (P) Dr. Horton    Admit Date: 3/13/2017       ICD-10-CM ICD-9-CM   1. Impaired functional mobility and activity tolerance Z74.09 V49.89   2. Decreased activities of daily living (ADL) Z78.9 V49.89     Patient Active Problem List   Diagnosis   • Osteopenia   • Mixed hyperlipidemia   • Essential hypertension   • Type 2 diabetes mellitus with diabetic polyneuropathy, without long-term current use of insulin   • Spinal stenosis, lumbar     Past Medical History   Diagnosis Date   • Arthritis    • Back pain    • Blister of great toe of left foot      Nonthermal, initial encounter   • Cancer      skin    • Dyslipidemia    • Elevated blood pressure    • Essential hypertension 2016   • GERD (gastroesophageal reflux disease)    • History of cerebrovascular accident         • Hypo-osmolality and hyponatremia    • Ingrowing nail    • Onychomycosis    • PONV (postoperative nausea and vomiting)    • Snores    • Type 2 diabetes mellitus    • Type 2 diabetes mellitus with circulatory disorder 2016   • Urinary incontinence    • Vitamin D deficiency      Past Surgical History   Procedure Laterality Date   • Blepharoplasty     • Carpal tunnel release     • Nail bed removal/revision  2016   • Lumbar disc surgery     • Neck surgery     • Laparoscopic nissen fundoplication     • Cervical spine surgery       cydney-laminotomy c7-t1   • Other surgical history       had left and right big toenials removed mar 2016   • Colonoscopy     • Endoscopy     • Cardiac catheterization     • Lumbar fusion Left 3/13/2017     Procedure: LEFT LUMBAR LATERAL INTERBODY FUSION L 3-5 WITH INSTRUMENTATION;  Surgeon: MIGDALIA Horton MD;  Location: [facility];  Service:            OT ASSESSMENT FLOWSHEET (last 72 hours)      OT Evaluation       03/13/17 1540 03/13/17 1537 03/13/17 1441 03/13/17 1200       Rehab Evaluation    Document Type   (P)  evaluation   See MAR  -KD evaluation   See MAR, entered room 1441  -ND     Subjective Information   (P)  agree to therapy;complains of;pain;weakness;fatigue   shakiness  -KD agree to therapy;complains of;pain;weakness;fatigue;nausea/vomiting  -ND     Patient Effort, Rehab Treatment   (P)  good  -KD good  -ND     Symptoms Noted During/After Treatment   (P)  --   nausea  -KD --   nausea  -ND     General Information    Patient Profile Review   (P)  yes  -KD yes  -ND     Onset of Illness/Injury or Date of Surgery Date   (P)  03/13/17  -KD 03/13/17  -ND     Referring Physician   (P)  Dr. Horton  -KD Dr. Horton  -ND     General Observations   (P)  Pt fowlers in bed, IV site, O2 via NC, alert, B SCD pumps  -KD Pt. fowlers in bed, alert, SCDs donned, IV site  -ND     Pertinent History Of Current Problem   (P)  Pt. having severe back pain last several years. Back pain radiates mainly through L. buttocks down L. legs with some R. side pain. S/P 3/13/17 L. lumbar lateral interbody fusion L3-5 with instrumentation. All labs therapeutic. 2nd part back scheduled 3/15/17   -KD Pt. having severe back pain last several years. Back pain radiates mainly through L. buttocks down L. legs with some R. side pain.  S/P 3/13/17 L. lumbar lateral interbody fusion L3-5 with instrumentation. All labs therapeutic. 2nd part back scheduled 3/15/17   -ND     Precautions/Limitations   (P)  brace on when up;spinal precautions;fall precautions  -KD brace on when up;fall precautions;spinal precautions  -ND     Prior Level of Function   (P)  independent:;all household mobility;community mobility;gait;transfer;ADL's;dressing;bathing;home management;cooking;cleaning;driving  -KD independent:;all household mobility;community mobility;ADL's;home management  -ND     Equipment  Currently Used at Home  walker, rolling;cane, straight;shower chair  -LR (P)  walker, rolling;bath bench;cane, straight;grab bar  -KD bath bench;walker, rolling;cane, straight;grab bar  -ND     Plans/Goals Discussed With   (P)  patient;agreed upon  -KD patient;agreed upon  -ND     Risks Reviewed   (P)  patient:;nausea/vomiting;dizziness;increased discomfort;LOB;change in vital signs  -KD patient:;nausea/vomiting;LOB;increased discomfort;dizziness;change in vital signs  -ND     Benefits Reviewed   (P)  patient:;improve function;increase independence;increase strength;increase balance;decrease pain  -KD patient:;improve function;increase independence;increase strength;increase balance;decrease pain;increase knowledge  -ND     Barriers to Rehab   (P)  physical barrier  -KD physical barrier  -ND     Living Environment    Lives With spouse  -LR  (P)  spouse  -KD spouse  -ND     Living Arrangements house  -LR  (P)  house  -KD house  -ND     Home Accessibility bed and bath on same level  -LR  (P)  stairs to enter home;tub/shower is not walk in;bed and bath on same level;grab bars present (bathtub)  -KD stairs to enter home;tub/shower is not walk in;bed and bath on same level;grab bars present (bathtub)  -ND     Number of Stairs to Enter Home   (P)  4  -KD 4  -ND     Stair Railings at Home outside, present on right side  -LR  (P)  outside, present on right side  -KD outside, present on right side  -ND     Type of Financial/Environmental Concern none  -LR        Transportation Available none  -LR        Clinical Impression    Date of Referral to OT    03/13/17  -ND     OT Diagnosis    decreased adl  -ND     Prognosis    good  -ND     Impairments Found (describe specific impairments)    ergonomics and body mechanics;gait, locomotion, and balance  -ND     Patient/Family Goals Statement    to go home  -ND     Criteria for Skilled Therapeutic Interventions Met    yes;treatment indicated  -ND     Rehab Potential    good, to  achieve stated therapy goals  -ND     Therapy Frequency    3-5 times/wk  -ND     Predicted Duration of Therapy Intervention (days/wks)    10 days  -ND     Anticipated Equipment Needs At Discharge    bathing equipment;dressing equipment   depending on pt. progress  -ND     Anticipated Discharge Disposition    home with assist;home with home health  -ND     Functional Level Prior    Ambulation  0-->independent  -LR       Transferring  0-->independent  -LR       Toileting  0-->independent  -LR       Bathing  0-->independent  -LR       Dressing  0-->independent  -LR       Eating  0-->independent  -LR       Communication  0-->understands/communicates without difficulty  -LR       Swallowing  0-->swallows foods/liquids without difficulty  -LR       Vital Signs    Pre SpO2 (%)   (P)  96  -KD      O2 Delivery Pre Treatment   (P)  supplemental O2   2L  -KD      Pre Patient Position   (P)  Sitting  -KD      Pain Assessment    Pain Assessment   (P)  0-10  -KD 0-10  -ND     Pain Score   (P)  3  -KD 3  -ND     Pain Type   (P)  Surgical pain;Acute pain  -KD Chronic pain;Acute pain  -ND     Pain Location   (P)  Back  -KD Back  -ND     Pain Orientation   (P)  Right  -KD Right  -ND     Pain Descriptors   (P)  Dull  -KD Dull  -ND     Pain Intervention(s)   (P)  Medication (See MAR);Repositioned  -KD Medication (See MAR);Repositioned  -ND     Response to Interventions   (P)  tolerated  -KD tolerated  -ND     Vision Assessment/Intervention    Visual Impairment   (P)  WFL with corrective lenses  -KD WFL with corrective lenses  -ND     Cognitive Assessment/Intervention    Current Cognitive/Communication Assessment   (P)  functional  -KD functional  -ND     Orientation Status   (P)  oriented x 4  -KD oriented x 4  -ND     Follows Commands/Answers Questions   (P)  100% of the time;able to follow multi-step instructions  -% of the time;able to follow multi-step instructions  -ND     Personal Safety   (P)  WNL/WFL  -KD WNL/WFL  -ND      Personal Safety Interventions   (P)  fall prevention program maintained;gait belt;nonskid shoes/slippers when out of bed;supervised activity  -KD fall prevention program maintained;gait belt;nonskid shoes/slippers when out of bed;supervised activity  -ND     ROM (Range of Motion)    General ROM    no range of motion deficits identified  -ND     General ROM Detail   (P)  B LE AROM WFL  -KD BUE AROM WFL  -ND     MMT (Manual Muscle Testing)    General MMT Assessment    no strength deficits identified  -ND     General MMT Assessment Detail   (P)  B LE functionally at least 3+/5  -KD      Bed Mobility, Assessment/Treatment    Bed Mobility, Assistive Device   (P)  bed rails  -KD bed rails  -ND     Bed Mobility, Roll Left, [place]   (P)  supervision required;verbal cues required  -KD supervision required  -ND     Bed Mobility, Scoot/Bridge, [place]   (P)  supervision required;verbal cues required  -KD supervision required  -ND     Bed Mob, Sidelying to Sit, [place]   (P)  verbal cues required;supervision required  -KD supervision required;verbal cues required;nonverbal cues required (demo/gesture)  -ND     Bed Mob, Sit to Sidelying, [place]   (P)  verbal cues required;supervision required  -KD verbal cues required;nonverbal cues required (demo/gesture);supervision required  -ND     Bed Mobility, Safety Issues   (P)  decreased use of arms for pushing/pulling;decreased use of legs for bridging/pushing  -KD      Bed Mobility, Impairments   (P)  strength decreased;pain  -KD pain  -ND     Bed Mobility, Comment   (P)  Pt educated on log rolling technique to maintain spinal precautions  -KD      Transfer Assessment/Treatment    Transfers, Sit-Stand [place]ricks   (P)  supervision required;verbal cues required;2 person assist required  -KD verbal cues required;nonverbal cues required (demo/gesture);contact guard assist;2 person assist required;upper extremity support  -ND     Transfers, Stand-Sit [place]ricks    (P)  supervision required;verbal cues required;2 person assist required  -KD verbal cues required;nonverbal cues required (demo/gesture);contact guard assist;2 person assist required;upper extremity support  -ND     Transfers, Sit-Stand-Sit, Assist Device   (P)  rolling walker   HHA x 2 progressed to RW  -KD rolling walker   HHAx2 initially then progressed with r/w  -ND     Toilet Transfer, [place]   (P)  contact guard assist;verbal cues required;2 person assist required  -KD verbal cues required;nonverbal cues required (demo/gesture);contact guard assist  -ND     Toilet Transfer, Assistive Device   (P)  rolling walker  -KD rolling walker   grab bar  -ND     Transfer, Safety Issues   (P)  step length decreased;balance decreased during turns  -KD      Transfer, Impairments   (P)  strength decreased;impaired balance;pain  -KD impaired balance  -ND     Functional Mobility    Functional Mobility- Ind. Level    verbal cues required;nonverbal cues required (demo/gesture);contact guard assist;2 person assist required   HHAX2 initially then progressed with r/w  -ND     Functional Mobility- Device    rolling walker  -ND     Functional Mobility-Distance (Feet)    --   from room to bathroom and back to bed  -ND     Functional Mobility- Comment    Pt. became nauseated while walking in bathroom and had to lay back down  -ND     Lower Body Dressing Assessment/Training    LB Dressing Assess/Train, Clothing Type    doffing:;donning:;socks  -ND     LB Dressing Assess/Train, Position    edge of bed  -ND     LB Dressing Assess/Train, [place]    maximum assist (25% patient effort)  -ND     LB Dressing Assess/Train, Impairments    decreased flexibility;pain  -ND     Toileting Assessment/Training    Toileting Assess/Train, Assistive Device    grab bars  -ND     Toileting Assess/Train, Position    sitting;standing  -ND     Toileting Assess/Train, Indepen Level    contact guard assist;nonverbal cues required (demo/gesture);verbal  cues required  -ND     Toileting Assess/Train, Impairments    impaired balance;pain  -ND     Grooming Assessment/Training    Grooming Assess/Train, Assistive Device    --   wash/dry hands  -ND     Grooming Assess/Train, Position    sink side  -ND     Grooming Assess/Train, Indepen Level    supervision required  -ND     Motor Skills/Interventions    Additional Documentation   (P)  Balance Skills Training (Group)  -KD Balance Skills Training (Group)  -ND     Balance Skills Training    Sitting-Level of Assistance   (P)  Contact guard;x2  -KD Close supervision  -ND     Sitting-Balance Support   (P)  Right upper extremity supported;Left upper extremity supported;Feet supported  -KD Right upper extremity supported;Left upper extremity supported;Feet supported  -ND     Standing-Level of Assistance   (P)  Contact guard;x2  -KD Contact guard;x2  -ND     Static Standing Balance Support   (P)  assistive device   or HHA x 2  -KD Left upper extremity supported;Right upper extremity supported  -ND     Gait Balance-Level of Assistance   (P)  Contact guard;x2  -KD Contact guard;x2  -ND     Gait Balance Support   (P)  assistive device   HHA x 2 or RW  -KD Right upper extremity supported;Left upper extremity supported;assistive device   HHAX2 then progressed to assitive device  -ND     Orthotics Prosthetics    Additional Documentation   (P)  Orthosis Location (Group);Orthosis Management/Training (Group)  -KD Orthosis Management/Training (Group);Orthosis Location (Group)  -ND     Orthosis Location    Orthosis Location/Type   (P)  neck/back  -KD neck/back  -ND     Orthosis, Neck/Back   (P)  LSO (lumbar sacral orthosis)  -KD LSO (lumbar sacral orthosis)  -ND     Orthosis Management/Training    Orthosis Indications   (P)  immobilize, protect/position healing structures;restrict motion;rest, reduce pain;rest, reduce inflammation  -KD immobilize, protect/position healing structures;rest, reduce pain  -ND     Orthosis Skills Training   (P)   doffing orthosis;donning orthosis;purpose/goals of orthosis  -KD donning orthosis;doffing orthosis;purpose/goals of orthosis;restrictions/precautions  -ND     Orthosis Wear Schedule   (P)  wear when out of bed only;wear with activity/work  -KD wear when out of bed only  -ND     Sensory Assessment/Intervention    Sensory Impairment   (P)  --   WNL per pt  -KD --   WNL per pt.   -ND     General Therapy Interventions    Planned Therapy Interventions    activity intolerance;ADL retraining;adaptive equipment training;balance training;bed mobility training;transfer training  -ND     Positioning and Restraints    Pre-Treatment Position   (P)  in bed  -KD in bed  -ND     Post Treatment Position   (P)  bed  -KD bed  -ND     In Bed   (P)  fowlers;call light within reach;encouraged to call for assist;side rails up x3;SCD pump applied  -KD fowlers;call light within reach;encouraged to call for assist;side rails up x2;SCD pump applied  -ND       User Key  (r) = Recorded By, (t) = Taken By, (c) = Cosigned By    Initials Name Effective Dates    LR Denisha Dorado RN 08/02/16 -     ND Beatrice Leyva, OTR/L 10/21/16 -     KD Yarelis Hampton, PT Student 12/19/16 -            Occupational Therapy Education     Title: PT OT SLP Therapies (Active)     Topic: Occupational Therapy (Done)     Point: ADL training (Done)    Description: Instruct learner(s) on proper safety adaptation and remediation techniques during self care or transfers.   Instruct in proper use of assistive devices.    Learning Progress Summary    Learner Readiness Method Response Comment Documented by Status   Patient Acceptance E VU,NR Pt. educated on role of OT, safe t/f, safe functional mob, LSO brace use/care, back precautions, and progression with poc ND 03/13/17 1550 Done               Point: Home exercise program (Done)    Description: Instruct learner(s) on appropriate technique for monitoring, assisting and/or progressing therapeutic exercises/activities.     Learning Progress Summary    Learner Readiness Method Response Comment Documented by Status   Patient Acceptance E VU,NR Pt. educated on role of OT, safe t/f, safe functional mob, LSO brace use/care, back precautions, and progression with poc ND 03/13/17 1550 Done               Point: Precautions (Done)    Description: Instruct learner(s) on prescribed precautions during self-care and functional transfers.    Learning Progress Summary    Learner Readiness Method Response Comment Documented by Status   Patient Acceptance E VU,NR Pt. educated on role of OT, safe t/f, safe functional mob, LSO brace use/care, back precautions, and progression with poc ND 03/13/17 1550 Done               Point: Body mechanics (Done)    Description: Instruct learner(s) on proper positioning and spine alignment during self-care, functional mobility activities and/or exercises.    Learning Progress Summary    Learner Readiness Method Response Comment Documented by Status   Patient Acceptance E VU,NR Pt. educated on role of OT, safe t/f, safe functional mob, LSO brace use/care, back precautions, and progression with poc ND 03/13/17 1550 Done                      User Key     Initials Effective Dates Name Provider Type Discipline    ND 10/21/16 -  Beatrice Leyva, OTR/L Occupational Therapist OT                  OT Recommendation and Plan  Anticipated Equipment Needs At Discharge: bathing equipment, dressing equipment (depending on pt. progress)  Anticipated Discharge Disposition: home with assist, home with home health  Planned Therapy Interventions: activity intolerance, ADL retraining, adaptive equipment training, balance training, bed mobility training, transfer training  Therapy Frequency: 3-5 times/wk  Plan of Care Review  Plan Of Care Reviewed With: patient  Progress: progress toward functional goals as expected  Outcome Summary/Follow up Plan: OT june completed this date. Pt. required supervision to complete all bed mobility. Pt.  first required CGA x2 with HHAX2 to complete sit to stand t/f from bed. Pt. completed CGA with r/w and use of grab bar to t/f from standard commode. Pt. required CGAx2 with HHA x2 to complete functional mobility initially and then was given walker. Pt. educated on brace use and care and back precautions. Pt. cont to benefit from skilled OT due to decreased balance, decreased activity tolerance, and decreased independence in ADLs. Anticipated dc is home with HH and assist. 3/13/17 1514          OT Goals       03/13/17 1551          Transfer Training OT LTG    Transfer Training OT LTG, Date Established 03/13/17  -ND      Transfer Training OT LTG, Time to Achieve by discharge  -ND      Transfer Training OT LTG, Activity Type all transfers  -ND      Transfer Training OT LTG, [place] Level conditional independence  -ND      Transfer Training OT LTG, Assist Device walker, rolling;commode, bedside  -ND      Toileting OT LTG    Toileting Goal OT LTG, Date Established 03/13/17  -ND      Toileting Goal OT LTG, Time to Achieve by discharge  -ND      Toileting Goal OT LTG, [place] Level supervision required  -ND      Toileting Goal OT LTG, Additional Goal Practice wiping after bowel movement.   -ND      LB Dressing OT LTG    LB Dressing Goal OT LTG, Date Established 03/13/17  -ND      LB Dressing Goal OT LTG, Time to Achieve by discharge  -ND      LB Dressing Goal OT LTG, [place] Level conditional independence  -ND      LB Dressing Goal OT LTG, Additional Goal AE PRN  -ND        User Key  (r) = Recorded By, (t) = Taken By, (c) = Cosigned By    Initials Name Provider Type    MATT Leyva, OTR/L Occupational Therapist                Outcome Measures       03/13/17 1500 03/13/17 1441       How much help from another person do you currently need...    Turning from your back to your side while in flat bed without using bedrails?  (P)  3  -KD     Moving from lying on back to sitting on the side of a flat bed  without bedrails?  (P)  3  -KD     Moving to and from a bed to a chair (including a wheelchair)?  (P)  3  -KD     Standing up from a chair using your arms (e.g., wheelchair, bedside chair)?  (P)  3  -KD     Climbing 3-5 steps with a railing?  (P)  2  -KD     To walk in hospital room?  (P)  3  -KD     AM-PAC 6 Clicks Score  (P)  17  -ND (r) KD (t)     How much help from another is currently needed...    Putting on and taking off regular lower body clothing? 2  -ND      Bathing (including washing, rinsing, and drying) 2  -ND      Toileting (which includes using toilet bed pan or urinal) 3  -ND      Putting on and taking off regular upper body clothing 3  -ND      Taking care of personal grooming (such as brushing teeth) 4  -ND      Eating meals 4  -ND      Score 18  -ND      Functional Assessment    Outcome Measure Options AM-PAC 6 Clicks Daily Activity (OT)  -ND (P)  AM-PAC 6 Clicks Basic Mobility (PT)  -KD       User Key  (r) = Recorded By, (t) = Taken By, (c) = Cosigned By    Initials Name Provider Type    ND Beatrice Leyva OTR/L Occupational Therapist    KD Yarelis Hampton, PT Student PT Student          Time Calculation:   OT Start Time: 1441 (10 min chart review not included)  OT Stop Time: 1515  OT Time Calculation (min): 34 min    Therapy Charges for Today     Code Description Service Date Service Provider Modifiers Qty    [id number]  OT SELFCARE CURRENT 3/13/2017 Beatrice Leyva OTR/L ISAEL CK 1    [id number] HC OT SELFCARE PROJECTED 3/13/2017 Beatrice Leyva OTR/L ISAEL CJ 1    [id number]  OT EVAL LOW COMPLEXITY 3 3/13/2017 Beatrice Leyva OTR/L ISAEL KX 1          OT G-codes  OT Functional Scales Options: AM-PAC 6 Clicks Daily Activity (OT)  Functional Limitation: Self care  Self Care Current Status (): At least 40 percent but less than 60 percent impaired, limited or restricted  Self Care Goal Status (): At least 20 percent but less than 40 percent impaired, limited or  restricted    Beatrice Leyva, OTR/L  3/13/2017

## 2017-03-13 NOTE — ANESTHESIA PROCEDURE NOTES
Airway  Urgency: elective    Airway not difficult    General Information and Staff    Patient location during procedure: OR  CRNA: MICKEY BRADY    Indications and Patient Condition  Indications for airway management: airway protection    Preoxygenated: yes  MILS maintained throughout  Mask difficulty assessment: 1 - vent by mask    Final Airway Details  Final airway type: endotracheal airway      Successful airway: ETT  Cuffed: yes   Successful intubation technique: direct laryngoscopy  Facilitating devices/methods: intubating stylet  Endotracheal tube insertion site: oral  Blade: [place]  Blade size: #3  ETT size: 7.0 mm  Cormack-Lehane Classification: grade I - full view of glottis  Placement verified by: chest auscultation and capnometry   Cuff volume (mL): 7  Measured from: lips  ETT to lips (cm): 21  Number of attempts at approach: 1

## 2017-03-13 NOTE — CONSULTS
Referring Provider: Sol  Reason for Consultation: Medical management    Patient Care Team:  Mike Erazo MD as PCP - General  Mike Erazo MD as PCP - Family Medicine  Serina Mendoza as Technician    Chief complaint back pain    Subjective .     History of present illness:  Patient is a 71-year-old white female followed by Dr. 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  She presents to Dr. Horton service after failing conservative management of her chronic back pain.  She has been through anti-inflammatories muscle relaxers and opiate pain medication and continues to have worsening back pain is now affecting her activities of daily living.  She is admitted postoperatively after step 1 of 2 decompressive surgery.  Pain is as expected no other precipitating or relieving factors.      REVIEW OF SYSTEMS:    CONSTITUTIONAL:  Negative for anorexia, chills, fevers, night sweats and weight loss  EYES:  negative for eye dryness, icterus and redness  HEENT:   negative for dental problems, epistaxis, facial trauma and thrush  RESPIRATORY:  negative for chest tightness, cough, dyspnea on exertion, pneumonia and sputum  CARDIOVASCULAR: negative for chest pain, dyspnea, exertional chest pressure/discomfort, irregular heart beat, palpitations, paroxysmal nocturnal dyspnea and syncope  GASTROINTESTINAL:  negative for abdominal pain, hematemesis, jaundice, melena and rectal bleeding  MUSCULOSKELETAL:  negative for muscle weakness, myalgias and neck pain  NEUROLOGICAL:   negative for dizziness, headaches, seizures, speech problems, tremors and vertigo  INTEGUMENT: negative for pruritus, rash, skin color change and skin lesion(s)         History    Past Medical History   Diagnosis Date   • Arthritis    • Back pain    • Blister of great toe of left foot      Nonthermal, initial encounter   • Cancer      skin    • Dyslipidemia    • Elevated blood pressure    •  Essential hypertension 9/13/2016   • GERD (gastroesophageal reflux disease)    • History of cerebrovascular accident      1999   • Hypo-osmolality and hyponatremia    • Ingrowing nail    • Onychomycosis    • PONV (postoperative nausea and vomiting)    • Snores    • Type 2 diabetes mellitus    • 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   • Urinary incontinence    • Vitamin D deficiency      Past Surgical History   Procedure Laterality Date   • Blepharoplasty     • Carpal tunnel release     • Nail bed removal/revision  03/11/2016   • Lumbar disc surgery     • Neck surgery     • Laparoscopic nissen fundoplication     • Cervical spine surgery       cydney-laminotomy [id number]   • Other surgical history       had left and right big toenials removed mar 2016   • Colonoscopy     • Endoscopy     • Cardiac catheterization     • Lumbar fusion Left 3/13/2017     Procedure: LEFT LUMBAR LATERAL INTERBODY FUSION L 3-5 WITH INSTRUMENTATION;  Surgeon: MIGDALIA Horton MD;  Location: [facility] OR;  Service:      Family History   Problem Relation Age of Onset   • Cancer Other    • Diabetes Other    • Heart disease Other    • Hypertension Other    • Cancer Mother    • Heart disease Father      Social History   Substance Use Topics   • Smoking status: Never Smoker   • Smokeless tobacco: Never Used   • Alcohol use No     Prescriptions Prior to Admission   Medication Sig Dispense Refill Last Dose   • atorvastatin (LIPITOR) 20 MG tablet Take 20 mg by mouth daily.   3/12/2017 at 0800   • cetirizine (ZyrTEC) 10 MG tablet Take 10 mg by mouth daily.   3/12/2017 at 0800   • Coenzyme Q10 (COQ10) 200 MG capsule Take 1 tablet/day by mouth.   3/12/2017 at 1600   • Insulin Glargine (TOUJEO SOLOSTAR) 300 UNIT/ML solution pen-injector Inject 80 Units under the skin Every Night. (Patient taking differently: Inject 62 Units under the skin Every Night.) 16 pen 11 3/12/2017 at 2100   • insulin lispro (humaLOG) 100 UNIT/ML injection Inject 50  Units under the skin 3 (Three) Times a Day With Meals. Up to 50 units scale as follows: 1 unit per every 1.5 carbs at breakfast, 1u per every 2 grams at lunch, and 1u for every 3 grams at supper   3/12/2017 at 1600   • metoprolol succinate XL (TOPROL-XL) 25 MG 24 hr tablet Take 25 mg by mouth 2 (Two) Times a Day.   3/13/2017 at 0315   • Probiotic Product (PROBIOTIC MULTI-ENZYME) tablet Take 1 tablet by mouth 2 (Two) Times a Day.   3/12/2017 at 1600   • ranitidine (ZANTAC) 150 MG tablet Take 150 mg by mouth 2 (two) times a day.   3/12/2017 at 1600   • sucralfate (CARAFATE) 1 G tablet Take 1 g by mouth 3 (three) times a day.   3/12/2017 at 1500   • Vitamin D, Cholecalciferol, 1000 UNITS capsule 3 tablets Daily.   3/12/2017 at 1600   • aspirin 81 MG EC tablet Take 81 mg by mouth Daily.   3/3/2017   • glucose blood (JEROME CONTOUR TEST) test strip Use as instructed 5 x daily , ICD-10 code is E11.42 150 each 11    • Insulin Pen Needle (B-D UF III MINI PEN NEEDLES) 31G X 5 MM misc Use 5 times daily 450 each 3    • meloxicam (MOBIC) 7.5 MG tablet Take 7.5 mg by mouth Daily.   3/3/2017   • OMEGA-3 FATTY ACIDS-VITAMIN E PO Take 1 capsule by mouth. omega-3 fatty acids-vitamin E 1,000 mg-5 unit capsule.    3/3/2017     Humulin n [insulin isophane]; Mold extract [trichophyton]; Sudafed [pseudoephedrine]; Sulfa antibiotics; Other; and Erythromycin  Objective     Vital Signs   Temp:  [97.1 °F (36.2 °C)-97.9 °F (36.6 °C)] 97.3 °F (36.3 °C)  Heart Rate:  [71-98] 84  Resp:  [8-18] 16  BP: (116-168)/(47-87) 116/55          Physical Exam:  Constitutional: oriented to person, place, and time. appears well-developed.   HEENT:   Head: Normocephalic and atraumatic.   Eyes: Pupils are equal, round, and reactive to light.   Neck: Neck supple.   Cardiovascular: Regular rhythm and normal heart sounds.    Pulmonary/Chest: Effort normal and breath sounds normal. CTAB  Abdominal: Soft. Bowel sounds are normal. He exhibits no distension. There is  no tenderness. There is no rebound and no guarding.   Musculoskeletal: Normal range of motion. He exhibits no edema or tenderness.   Neurological: He is alert and oriented to person, place, and time. He has normal reflexes.   Skin: Skin is warm and dry.     Results Review:   I reviewed the patient's new imaging results and agree with the interpretation.      Assessment/Plan     Active Problems:    Mixed hyperlipidemia    Essential hypertension    Type 2 diabetes mellitus without complication, without long-term current use of insulin    Spinal stenosis, lumbar    Gastroesophageal reflux disease without esophagitis    Constipation-start with Miralax 1 capful BID until BM, then decrease to 1 x a day,then can step up to Amitizia 24 mcg po BID which can be used for opiod induced constipation,and ultimately end up with Relistor 12 mcg sub Q q 48 hours to block the effect of narcotics on the gut.  Hypertension-review pre and post BP's,will restart home BP meds when BP allows. Add Clonidine 0.1 mg q 4 hours prn if bp> 140/90,monitor BP and adjust meds as necessary.  Explained to patient and staff that we were consulted for medical management during their acute care hospitalization. They need to f/u with their regular primary care provider concerning any further treatment and review of abnormalities found during their hospitalization at [facility] and they agree with that treatment plan.     She states she has had constipation since childhood therefore we will go ahead and add Amitiza 24 mg bid, give her a dose of Relistor to block the narcotics effect on the gut.  Labs ordered this evening.  If she is nothing by mouth for surgical intervention would recommend half dose of her basal insulin.  She can continue with sliding scale coverage otherwise    I discussed the patients findings and my recommendations with patient and nursing staff    Enrico Ureña MD  03/13/17  5:42 PM

## 2017-03-13 NOTE — PLAN OF CARE
Problem: Patient Care Overview (Adult)  Goal: Plan of Care Review  Outcome: Ongoing (interventions implemented as appropriate)    03/13/17 1551   Coping/Psychosocial Response Interventions   Plan Of Care Reviewed With patient   Patient Care Overview   Progress progress toward functional goals as expected   Outcome Evaluation   Outcome Summary/Follow up Plan OT june completed this date. Pt. required supervision to complete all bed mobility. Pt. first required CGA x2 with HHAX2 to complete sit to stand t/f from bed. Pt. completed CGA with r/w and use of grab bar to t/f from standard commode. Pt. required CGAx2 with HHA x2 to complete functional mobility initially and then was given walker. Pt. educated on brace use and care and back precautions. Pt. cont to benefit from skilled OT due to decreased balance, decreased activity tolerance, and decreased independence in ADLs. Anticipated dc is home with HH and assist. 3/13/17 1514         Problem: Inpatient Occupational Therapy  Goal: Transfer Training Goal 1 LTG- OT  Outcome: Ongoing (interventions implemented as appropriate)    03/13/17 1551   Transfer Training OT LTG   Transfer Training OT LTG, Date Established 03/13/17   Transfer Training OT LTG, Time to Achieve by discharge   Transfer Training OT LTG, Activity Type all transfers   Transfer Training OT LTG, [place] Level conditional independence   Transfer Training OT LTG, Assist Device walker, rolling;commode, bedside       Goal: Toileting Goal LTG- OT  Outcome: Ongoing (interventions implemented as appropriate)    03/13/17 1551   Toileting OT LTG   Toileting Goal OT LTG, Date Established 03/13/17   Toileting Goal OT LTG, Time to Achieve by discharge   Toileting Goal OT LTG, [place] Level supervision required   Toileting Goal OT LTG, Additional Goal Practice wiping after bowel movement.        Goal: LB Dressing Goal LTG- OT  Outcome: Ongoing (interventions implemented as appropriate)    03/13/17 1551   LB  Dressing OT LTG   LB Dressing Goal OT LTG, Date Established 03/13/17   LB Dressing Goal OT LTG, Time to Achieve by discharge   LB Dressing Goal OT LTG, [place] Level conditional independence   LB Dressing Goal OT LTG, Additional Goal AE PRN

## 2017-03-13 NOTE — PLAN OF CARE
Problem: Perioperative Period (Adult)  Goal: Signs and Symptoms of Listed Potential Problems Will be Absent or Manageable (Perioperative Period)    03/13/17 1638   Perioperative Period   Problems Assessed (Perioperative Period) pain   Problems Present (Perioperative Period) pain   Medicated x 2 for c/o. drsg to left flank c/d/i. ivf infusing. lso brace. Worked with physical therapy    Problem: Fall Risk (Adult)  Goal: Absence of Falls  Outcome: Ongoing (interventions implemented as appropriate)    03/13/17 1638   Fall Risk (Adult)   Absence of Falls making progress toward outcome

## 2017-03-14 LAB
ANION GAP SERPL CALCULATED.3IONS-SCNC: 8 MMOL/L (ref 4–13)
BASOPHILS # BLD AUTO: 0.01 10*3/MM3 (ref 0–0.2)
BASOPHILS NFR BLD AUTO: 0.1 % (ref 0–2)
BUN BLD-MCNC: 15 MG/DL (ref 5–21)
BUN/CREAT SERPL: 21.1 (ref 7–25)
CALCIUM SPEC-SCNC: 8.7 MG/DL (ref 8.4–10.4)
CHLORIDE SERPL-SCNC: 98 MMOL/L (ref 98–110)
CO2 SERPL-SCNC: 26 MMOL/L (ref 24–31)
CREAT BLD-MCNC: 0.71 MG/DL (ref 0.5–1.4)
DEPRECATED RDW RBC AUTO: 44.8 FL (ref 40–54)
EOSINOPHIL # BLD AUTO: 0 10*3/MM3 (ref 0–0.7)
EOSINOPHIL NFR BLD AUTO: 0 % (ref 0–4)
ERYTHROCYTE [DISTWIDTH] IN BLOOD BY AUTOMATED COUNT: 13.3 % (ref 12–15)
GFR SERPL CREATININE-BSD FRML MDRD: 82 ML/MIN/1.73
GLUCOSE BLD-MCNC: 156 MG/DL (ref 70–100)
GLUCOSE BLDC GLUCOMTR-MCNC: 156 MG/DL (ref 70–130)
HBA1C MFR BLD: 7.9 %
HCT VFR BLD AUTO: 34.3 % (ref 37–47)
HGB BLD-MCNC: 11.5 G/DL (ref 12–16)
IMM GRANULOCYTES # BLD: 0.02 10*3/MM3 (ref 0–0.03)
IMM GRANULOCYTES NFR BLD: 0.2 % (ref 0–5)
IRON 24H UR-MRATE: 60 MCG/DL (ref 42–180)
LYMPHOCYTES # BLD AUTO: 1.59 10*3/MM3 (ref 0.72–4.86)
LYMPHOCYTES NFR BLD AUTO: 13.3 % (ref 15–45)
MCH RBC QN AUTO: 31.1 PG (ref 28–32)
MCHC RBC AUTO-ENTMCNC: 33.5 G/DL (ref 33–36)
MCV RBC AUTO: 92.7 FL (ref 82–98)
MONOCYTES # BLD AUTO: 1.21 10*3/MM3 (ref 0.19–1.3)
MONOCYTES NFR BLD AUTO: 10.1 % (ref 4–12)
NEUTROPHILS # BLD AUTO: 9.14 10*3/MM3 (ref 1.87–8.4)
NEUTROPHILS NFR BLD AUTO: 76.3 % (ref 39–78)
PLATELET # BLD AUTO: 201 10*3/MM3 (ref 130–400)
PMV BLD AUTO: 10.5 FL (ref 6–12)
POTASSIUM BLD-SCNC: 4.4 MMOL/L (ref 3.5–5.3)
RBC # BLD AUTO: 3.7 10*6/MM3 (ref 4.2–5.4)
SODIUM BLD-SCNC: 132 MMOL/L (ref 135–145)
VIT B12 BLD-MCNC: 423 PG/ML (ref 239–931)
WBC NRBC COR # BLD: 11.97 10*3/MM3 (ref 4.8–10.8)

## 2017-03-14 PROCEDURE — 25010000002 LEVOFLOXACIN PER 250 MG: Performed by: PHYSICIAN ASSISTANT

## 2017-03-14 PROCEDURE — 82607 VITAMIN B-12: CPT | Performed by: FAMILY MEDICINE

## 2017-03-14 PROCEDURE — 25010000002 HYDROMORPHONE PER 4 MG: Performed by: ORTHOPAEDIC SURGERY

## 2017-03-14 PROCEDURE — 80048 BASIC METABOLIC PNL TOTAL CA: CPT | Performed by: ORTHOPAEDIC SURGERY

## 2017-03-14 PROCEDURE — 83036 HEMOGLOBIN GLYCOSYLATED A1C: CPT | Performed by: FAMILY MEDICINE

## 2017-03-14 PROCEDURE — 25010000003 CEFAZOLIN PER 500 MG: Performed by: ORTHOPAEDIC SURGERY

## 2017-03-14 PROCEDURE — 82962 GLUCOSE BLOOD TEST: CPT

## 2017-03-14 PROCEDURE — 83540 ASSAY OF IRON: CPT | Performed by: FAMILY MEDICINE

## 2017-03-14 PROCEDURE — 87040 BLOOD CULTURE FOR BACTERIA: CPT | Performed by: PHYSICIAN ASSISTANT

## 2017-03-14 PROCEDURE — 97116 GAIT TRAINING THERAPY: CPT

## 2017-03-14 PROCEDURE — 85025 COMPLETE CBC W/AUTO DIFF WBC: CPT | Performed by: ORTHOPAEDIC SURGERY

## 2017-03-14 RX ORDER — ACETAMINOPHEN 325 MG/1
650 TABLET ORAL EVERY 6 HOURS PRN
Status: DISCONTINUED | OUTPATIENT
Start: 2017-03-14 | End: 2017-03-20 | Stop reason: HOSPADM

## 2017-03-14 RX ORDER — LEVOFLOXACIN 5 MG/ML
500 INJECTION, SOLUTION INTRAVENOUS EVERY 24 HOURS
Status: DISCONTINUED | OUTPATIENT
Start: 2017-03-14 | End: 2017-03-17

## 2017-03-14 RX ADMIN — CEFAZOLIN SODIUM 1 G: 1 INJECTION, SOLUTION INTRAVENOUS at 05:37

## 2017-03-14 RX ADMIN — INSULIN LISPRO 40 UNITS: 100 INJECTION, SOLUTION INTRAVENOUS; SUBCUTANEOUS at 12:32

## 2017-03-14 RX ADMIN — HYDROMORPHONE HYDROCHLORIDE 1 MG: 1 INJECTION, SOLUTION INTRAMUSCULAR; INTRAVENOUS; SUBCUTANEOUS at 04:03

## 2017-03-14 RX ADMIN — INSULIN LISPRO 7 UNITS: 100 INJECTION, SOLUTION INTRAVENOUS; SUBCUTANEOUS at 18:12

## 2017-03-14 RX ADMIN — LEVOFLOXACIN 500 MG: 500 INJECTION, SOLUTION INTRAVENOUS at 21:55

## 2017-03-14 RX ADMIN — LUBIPROSTONE 24 MCG: 24 CAPSULE, GELATIN COATED ORAL at 08:14

## 2017-03-14 RX ADMIN — SUCRALFATE 1 G: 1 TABLET ORAL at 21:23

## 2017-03-14 RX ADMIN — SUCRALFATE 1 G: 1 TABLET ORAL at 16:29

## 2017-03-14 RX ADMIN — FAMOTIDINE 20 MG: 20 TABLET, FILM COATED ORAL at 08:14

## 2017-03-14 RX ADMIN — SODIUM CHLORIDE 75 ML/HR: 9 INJECTION, SOLUTION INTRAVENOUS at 00:13

## 2017-03-14 RX ADMIN — INSULIN LISPRO 37 UNITS: 100 INJECTION, SOLUTION INTRAVENOUS; SUBCUTANEOUS at 08:13

## 2017-03-14 RX ADMIN — SODIUM CHLORIDE 75 ML/HR: 9 INJECTION, SOLUTION INTRAVENOUS at 18:37

## 2017-03-14 RX ADMIN — METOPROLOL SUCCINATE 25 MG: 25 TABLET, FILM COATED, EXTENDED RELEASE ORAL at 21:23

## 2017-03-14 RX ADMIN — SUCRALFATE 1 G: 1 TABLET ORAL at 10:13

## 2017-03-14 RX ADMIN — HYDROMORPHONE HYDROCHLORIDE 1 MG: 1 INJECTION, SOLUTION INTRAMUSCULAR; INTRAVENOUS; SUBCUTANEOUS at 10:36

## 2017-03-14 RX ADMIN — METOPROLOL SUCCINATE 25 MG: 25 TABLET, FILM COATED, EXTENDED RELEASE ORAL at 08:14

## 2017-03-14 RX ADMIN — HYDROMORPHONE HYDROCHLORIDE 1 MG: 1 INJECTION, SOLUTION INTRAMUSCULAR; INTRAVENOUS; SUBCUTANEOUS at 16:29

## 2017-03-14 RX ADMIN — FAMOTIDINE 20 MG: 20 TABLET, FILM COATED ORAL at 18:11

## 2017-03-14 RX ADMIN — ACETAMINOPHEN 650 MG: 325 TABLET ORAL at 21:40

## 2017-03-14 NOTE — PROGRESS NOTES
Discharge Planning Assessment  [facility]     Patient Name: Alisia Velez  MRN: [id number]  Today's Date: 3/14/2017    Admit Date: 3/13/2017          Discharge Needs Assessment       03/14/17 1042    Living Environment    Lives With spouse    Living Arrangements house    Primary Care Provided By spouse/significant other    Quality Of Family Relationships supportive;helpful;involved    Able to Return to Prior Living Arrangements yes    Discharge Needs Assessment    Equipment Currently Used at Home walker, rolling;shower chair;cane, straight    Equipment Needed After Discharge none    Discharge Facility/Level Of Care Needs acute rehab;home with home health;other (see comments)   to be determined following second surgery    Discharge Planning Comments Patient lives with spouse and has needed DME at home. Patient to have second surgery tomorrow. SW will follow up with her to assist in discharge planning: home with spouse, home health or acute rehab if needed.            Discharge Plan     None        Discharge Placement     No information found        Expected Discharge Date and Time     Expected Discharge Date Expected Discharge Time    Mar 16, 2017               Demographic Summary     None            Functional Status     None            Psychosocial     None            Abuse/Neglect     None            Legal     None            Substance Abuse     None            Patient Forms     None          SARAH Wells

## 2017-03-14 NOTE — PROGRESS NOTES
Alisia Velez is a 71 y.o. female patient.    Current Facility-Administered Medications   Medication Dose Route Frequency Provider Last Rate Last Dose   • dextrose (D50W) solution 12.5 g  12.5 g Intravenous PRN Mike Ann MD       • diphenhydrAMINE (BENADRYL) capsule 25 mg  25 mg Oral Nightly PRN MIGDALIA Horton MD       • famotidine (PEPCID) injection 20 mg  20 mg Intravenous 60 Min Pre-Op Mike Ann MD   20 mg at 03/13/17 0650    Or   • famotidine (PEPCID) tablet 20 mg  20 mg Oral 60 Min Pre-Op Mike Ann MD       • famotidine (PEPCID) injection 20 mg  20 mg Intravenous BID K Jose Horton MD        Or   • famotidine (PEPCID) tablet 20 mg  20 mg Oral BID K Jose Horton MD   20 mg at 03/13/17 1741   • FentaNYL Citrate (PF) (SUBLIMAZE) injection 25 mcg  25 mcg Intravenous Q5 Min PRN Mike Ann MD       • HYDROmorphone (DILAUDID) injection 1 mg  1 mg Intravenous Q3H PRN K Jose Horton MD   1 mg at 03/14/17 0403   • insulin detemir (LEVEMIR) injection 62 Units  62 Units Subcutaneous Nightly MIGDALIA Horton MD   62 Units at 03/13/17 2015   • insulin lispro (humaLOG) injection 20 Units  20 Units Subcutaneous TID With Meals DENISA Mari   33 Units at 03/13/17 1742   • lactated ringers infusion  9 mL/hr Intravenous Continuous Mike Ann MD   Stopped at 03/13/17 1051   • lactated ringers infusion  20 mL/hr Intravenous Continuous Mike Ann MD   Stopped at 03/13/17 1051   • lubiprostone (AMITIZA) capsule 24 mcg  24 mcg Oral BID With Meals Enrico Ureña MD   24 mcg at 03/13/17 2016   • metoprolol succinate XL (TOPROL-XL) 24 hr tablet 25 mg  25 mg Oral BID MIGDALIA Horton MD   25 mg at 03/13/17 1741   • midazolam (VERSED) injection 1 mg  1 mg Intravenous Q5 Min PRN Mike Ann MD        Or   • midazolam (VERSED) injection 2 mg  2 mg Intravenous Q5 Min PRN Mike Ann MD   2  "mg at 03/13/17 0650   • ondansetron (ZOFRAN) tablet 4 mg  4 mg Oral Q6H PRN K Jose Horton MD        Or   • ondansetron ODT (ZOFRAN-ODT) disintegrating tablet 4 mg  4 mg Oral Q6H PRN K Jose Horton MD        Or   • ondansetron (ZOFRAN) injection 4 mg  4 mg Intravenous Q6H PRN K Jose Horton MD   4 mg at 03/13/17 2143   • oxyCODONE-acetaminophen (PERCOCET)  MG per tablet 2 tablet  2 tablet Oral Q4H PRN K Jose Horton MD   2 tablet at 03/13/17 1619   • sodium chloride 0.9 % flush 1-10 mL  1-10 mL Intravenous PRN Mike Ann MD       • sodium chloride 0.9 % flush 1-10 mL  1-10 mL Intravenous PRN Mike Ann MD       • sodium chloride 0.9 % flush 1-10 mL  1-10 mL Intravenous PRN K Jose Horton MD       • sodium chloride 0.9 % infusion  75 mL/hr Intravenous Continuous MIGDALIA Horton MD 75 mL/hr at 03/13/17 1052 75 mL/hr at 03/13/17 1052   • sodium chloride 0.9 % infusion  75 mL/hr Intravenous Continuous MIGDALIA Horton MD 75 mL/hr at 03/14/17 0013 75 mL/hr at 03/14/17 0013   • sucralfate (CARAFATE) tablet 1 g  1 g Oral TID MIGDALIA Horton MD   1 g at 03/13/17 1619     Allergies   Allergen Reactions   • Humulin N [Insulin Isophane] Shortness Of Breath   • Mold Extract [Trichophyton] Shortness Of Breath   • Sudafed [Pseudoephedrine] Shortness Of Breath   • Sulfa Antibiotics Shortness Of Breath   • Other Itching     Silk Tape   • Erythromycin Other (See Comments)     Eyes see big black spots     Active Problems:    Mixed hyperlipidemia    Essential hypertension    Type 2 diabetes mellitus without complication, without long-term current use of insulin    Spinal stenosis, lumbar    Gastroesophageal reflux disease without esophagitis    Blood pressure 110/52, pulse 76, temperature 98 °F (36.7 °C), temperature source Oral, resp. rate 18, height 63\" (160 cm), weight 162 lb (73.5 kg), SpO2 97 %.    Subjective:  Symptoms:  Stable.  She reports weakness and anxiety.  " "No shortness of breath or chest pain.    Diet:  Adequate intake.  No nausea or vomiting.    Activity level: Impaired due to weakness.    Pain:  She complains of pain that is moderate.  She reports pain is improving.  Pain is well controlled.      Review of Systems   Constitutional: Negative for activity change, appetite change, chills and fever.   HENT: Negative for congestion, ear pain, trouble swallowing and voice change.    Eyes: Negative for pain, discharge and visual disturbance.   Respiratory: Negative for apnea, chest tightness, shortness of breath and wheezing.    Cardiovascular: Negative for chest pain and palpitations.   Gastrointestinal: Negative for abdominal distention, blood in stool, nausea and vomiting.   Endocrine: Negative for cold intolerance, heat intolerance, polydipsia, polyphagia and polyuria.   Genitourinary: Negative for difficulty urinating, frequency and hematuria.   Musculoskeletal: Negative for joint swelling and myalgias.   Skin: Negative for color change, pallor and rash.   Neurological: Positive for weakness. Negative for tremors, seizures, syncope, speech difficulty and headaches.   Hematological: Negative for adenopathy. Does not bruise/bleed easily.   Psychiatric/Behavioral: Negative for behavioral problems, confusion and hallucinations.     Objective:  General Appearance:  Comfortable.    Vital signs: (most recent): Blood pressure 110/52, pulse 76, temperature 98 °F (36.7 °C), temperature source Oral, resp. rate 18, height 63\" (160 cm), weight 162 lb (73.5 kg), SpO2 97 %.  Vital signs are normal.  No fever.    Output: Producing urine and producing stool.    HEENT: Normal HEENT exam.    Lungs:  Normal respiratory rate and normal effort.    Heart: Normal rate.  Regular rhythm.    Abdomen: Abdomen is soft.    Extremities: Normal range of motion.    Neurological: Patient is alert and oriented to person, place and time.    Skin:  Warm and dry.            Assessment:    Condition: In " stable condition.  Improving.   (Patient Active Problem List:     Osteopenia     Mixed hyperlipidemia     Essential hypertension     Type 2 diabetes mellitus without complication, without long-term current use of insulin     Spinal stenosis, lumbar     Gastroesophageal reflux disease without esophagitis    ).     Plan:   Encourage ambulation and per physical therapy.  (She is concerned about her insulin-explained will get keep her dose the same just rec 1/2 of basal when NPO, explained to pt  Type 2 DM- review labs and home medication. Discuss with patient importance of blood sugar control in the healing process. Review diet, medical,and lifestyle modifications for optimal medical treatment.  Hypertension-review pre and post BP's,will restart home BP meds when BP allows. Add Clonidine 0.1 mg q 4 hours prn if bp> 140/90,monitor BP and adjust meds as necessary.  Constipation-start with Miralax 1 capful BID until BM, then decrease to 1 x a day,then can step up to Amitizia 24 mcg po BID which can be used for opiod induced constipation,and ultimately end up with Relistor 12 mcg sub Q q 48 hours to block the effect of narcotics on the gut.).     Patient Active Problem List   Diagnosis   • Osteopenia   • Mixed hyperlipidemia   • Essential hypertension   • Type 2 diabetes mellitus without complication, without long-term current use of insulin   • Spinal stenosis, lumbar   • Gastroesophageal reflux disease without esophagitis     Enrico Ureña MD  3/14/2017

## 2017-03-14 NOTE — PLAN OF CARE
Problem: Patient Care Overview (Adult)  Goal: Plan of Care Review  Outcome: Ongoing (interventions implemented as appropriate)    03/14/17 0315   Coping/Psychosocial Response Interventions   Plan Of Care Reviewed With patient   Patient Care Overview   Progress progress toward functional goals as expected       03/14/17 0315   Coping/Psychosocial Response Interventions   Plan Of Care Reviewed With patient   Patient Care Overview   Progress progress toward functional goals as expected   Outcome Evaluation   Outcome Summary/Follow up Plan Patient was medicatd with nausea and pain meds early in shift. Effective towards relief. Dressing to left flank, clean/dry and intact. Neuro intact. Wearing brace when up. Safety maintained. Part 2 surgery Wednesday.           Goal: Adult Individualization and Mutuality  Outcome: Ongoing (interventions implemented as appropriate)  Goal: Discharge Needs Assessment  Outcome: Ongoing (interventions implemented as appropriate)    Problem: Perioperative Period (Adult)  Goal: Signs and Symptoms of Listed Potential Problems Will be Absent or Manageable (Perioperative Period)  Outcome: Ongoing (interventions implemented as appropriate)    Problem: Diabetes, Type 2 (Adult)  Goal: Signs and Symptoms of Listed Potential Problems Will be Absent or Manageable (Diabetes, Type 2)  Outcome: Ongoing (interventions implemented as appropriate)    Problem: Fall Risk (Adult)  Goal: Absence of Falls  Outcome: Ongoing (interventions implemented as appropriate)

## 2017-03-14 NOTE — PROGRESS NOTES
Orthopedic Spine Progress Note    Alisia Velez  71 y.o.  female  Subjective     Post-Operative Day: 1  Systemic or Specific Complaints:   Sore, no new c/o    Objective     Vital signs in last 24 hours:  Temp:  [97.1 °F (36.2 °C)-98 °F (36.7 °C)] 98 °F (36.7 °C)  Heart Rate:  [64-98] 83  Resp:  [8-18] 16  BP: (102-148)/(47-66) 108/48    Physical Exam:  lert oriented ×3, no acute distress, vital signs stable, grossly neurovascularly intact, dressing clean dry and intact    Diagnostic Data:  CBC:  Results from last 7 days  Lab Units 03/14/17  0243   WBC 10*3/mm3 11.97*   RBC 10*6/mm3 3.70*   HEMOGLOBIN g/dL 11.5*   HEMATOCRIT % 34.3*   PLATELETS 10*3/mm3 201      BMP:@LABCNTIP(BMP)@    Assessment/Plan     1. Status Post left LLIF L3-5 with instrumentation 3/13/17          Plan:  1. Nothing by mouth after midnight  2. Plan second stage intervention tomorrow  3. PT/OT/brace today                  DENISA Mari    Date: 3/14/2017  Time: 7:35 AMs

## 2017-03-15 ENCOUNTER — APPOINTMENT (OUTPATIENT)
Dept: GENERAL RADIOLOGY | Facility: HOSPITAL | Age: 72
End: 2017-03-15

## 2017-03-15 ENCOUNTER — ANESTHESIA EVENT (OUTPATIENT)
Dept: PERIOP | Facility: HOSPITAL | Age: 72
End: 2017-03-15

## 2017-03-15 ENCOUNTER — ANESTHESIA (OUTPATIENT)
Dept: PERIOP | Facility: HOSPITAL | Age: 72
End: 2017-03-15

## 2017-03-15 LAB
BILIRUB UR QL STRIP: NEGATIVE
CLARITY UR: CLEAR
COLOR UR: YELLOW
GLUCOSE BLDC GLUCOMTR-MCNC: 170 MG/DL (ref 70–130)
GLUCOSE BLDC GLUCOMTR-MCNC: 208 MG/DL (ref 70–130)
GLUCOSE BLDC GLUCOMTR-MCNC: 220 MG/DL (ref 70–130)
GLUCOSE UR STRIP-MCNC: NEGATIVE MG/DL
HGB UR QL STRIP.AUTO: NEGATIVE
KETONES UR QL STRIP: NEGATIVE
LEUKOCYTE ESTERASE UR QL STRIP.AUTO: NEGATIVE
NITRITE UR QL STRIP: NEGATIVE
PH UR STRIP.AUTO: <=5 [PH] (ref 5–8)
PROT UR QL STRIP: NEGATIVE
SP GR UR STRIP: 1.01 (ref 1–1.03)
UROBILINOGEN UR QL STRIP: NORMAL

## 2017-03-15 PROCEDURE — 0SG1071 FUSION OF 2 OR MORE LUMBAR VERTEBRAL JOINTS WITH AUTOLOGOUS TISSUE SUBSTITUTE, POSTERIOR APPROACH, POSTERIOR COLUMN, OPEN APPROACH: ICD-10-PCS | Performed by: ORTHOPAEDIC SURGERY

## 2017-03-15 PROCEDURE — 25010000002 LEVOFLOXACIN PER 250 MG: Performed by: PHYSICIAN ASSISTANT

## 2017-03-15 PROCEDURE — 25010000003 CEFAZOLIN PER 500 MG: Performed by: ORTHOPAEDIC SURGERY

## 2017-03-15 PROCEDURE — 81003 URINALYSIS AUTO W/O SCOPE: CPT | Performed by: PHYSICIAN ASSISTANT

## 2017-03-15 PROCEDURE — 76000 FLUOROSCOPY <1 HR PHYS/QHP: CPT

## 2017-03-15 PROCEDURE — C1713 ANCHOR/SCREW BN/BN,TIS/BN: HCPCS | Performed by: ORTHOPAEDIC SURGERY

## 2017-03-15 PROCEDURE — 0SG30AJ FUSION OF LUMBOSACRAL JOINT WITH INTERBODY FUSION DEVICE, POSTERIOR APPROACH, ANTERIOR COLUMN, OPEN APPROACH: ICD-10-PCS | Performed by: ORTHOPAEDIC SURGERY

## 2017-03-15 PROCEDURE — 72100 X-RAY EXAM L-S SPINE 2/3 VWS: CPT

## 2017-03-15 PROCEDURE — 25010000002 DEXAMETHASONE PER 1 MG: Performed by: ANESTHESIOLOGY

## 2017-03-15 PROCEDURE — 82962 GLUCOSE BLOOD TEST: CPT

## 2017-03-15 PROCEDURE — 25010000002 MIDAZOLAM PER 1 MG: Performed by: ANESTHESIOLOGY

## 2017-03-15 PROCEDURE — 25010000002 PROPOFOL 10 MG/ML EMULSION: Performed by: NURSE ANESTHETIST, CERTIFIED REGISTERED

## 2017-03-15 PROCEDURE — 25010000002 HYDROMORPHONE PER 4 MG: Performed by: ORTHOPAEDIC SURGERY

## 2017-03-15 PROCEDURE — 25010000002 NEOSTIGMINE PER 0.5 MG: Performed by: NURSE ANESTHETIST, CERTIFIED REGISTERED

## 2017-03-15 PROCEDURE — 25010000002 ONDANSETRON PER 1 MG: Performed by: NURSE ANESTHETIST, CERTIFIED REGISTERED

## 2017-03-15 PROCEDURE — 25010000002 HEPARIN (PORCINE) PER 1000 UNITS: Performed by: ORTHOPAEDIC SURGERY

## 2017-03-15 PROCEDURE — 25010000002 SUCCINYLCHOLINE PER 20 MG: Performed by: NURSE ANESTHETIST, CERTIFIED REGISTERED

## 2017-03-15 DEVICE — SCRW T2 2HD 6.5X45MM: Type: IMPLANTABLE DEVICE | Status: FUNCTIONAL

## 2017-03-15 DEVICE — SCRW ST UNIV FIX SYS: Type: IMPLANTABLE DEVICE | Status: FUNCTIONAL

## 2017-03-15 DEVICE — IMPLANTABLE DEVICE: Type: IMPLANTABLE DEVICE | Status: FUNCTIONAL

## 2017-03-15 DEVICE — SCRW T2 2HD 6.5X50MM: Type: IMPLANTABLE DEVICE | Status: FUNCTIONAL

## 2017-03-15 RX ORDER — FLUMAZENIL 0.1 MG/ML
0.2 INJECTION INTRAVENOUS AS NEEDED
Status: DISCONTINUED | OUTPATIENT
Start: 2017-03-15 | End: 2017-03-15 | Stop reason: HOSPADM

## 2017-03-15 RX ORDER — DEXAMETHASONE SODIUM PHOSPHATE 4 MG/ML
4 INJECTION, SOLUTION INTRA-ARTICULAR; INTRALESIONAL; INTRAMUSCULAR; INTRAVENOUS; SOFT TISSUE ONCE AS NEEDED
Status: COMPLETED | OUTPATIENT
Start: 2017-03-15 | End: 2017-03-15

## 2017-03-15 RX ORDER — DIPHENHYDRAMINE HCL 25 MG
25 CAPSULE ORAL NIGHTLY PRN
Status: DISCONTINUED | OUTPATIENT
Start: 2017-03-15 | End: 2017-03-15 | Stop reason: SDUPTHER

## 2017-03-15 RX ORDER — ROCURONIUM BROMIDE 10 MG/ML
INJECTION, SOLUTION INTRAVENOUS AS NEEDED
Status: DISCONTINUED | OUTPATIENT
Start: 2017-03-15 | End: 2017-03-15 | Stop reason: SURG

## 2017-03-15 RX ORDER — LIDOCAINE HYDROCHLORIDE 20 MG/ML
INJECTION, SOLUTION INFILTRATION; PERINEURAL AS NEEDED
Status: DISCONTINUED | OUTPATIENT
Start: 2017-03-15 | End: 2017-03-15 | Stop reason: SURG

## 2017-03-15 RX ORDER — HEPARIN SODIUM 10000 [USP'U]/ML
INJECTION, SOLUTION INTRAVENOUS; SUBCUTANEOUS AS NEEDED
Status: DISCONTINUED | OUTPATIENT
Start: 2017-03-15 | End: 2017-03-15 | Stop reason: HOSPADM

## 2017-03-15 RX ORDER — MAGNESIUM HYDROXIDE 1200 MG/15ML
LIQUID ORAL AS NEEDED
Status: DISCONTINUED | OUTPATIENT
Start: 2017-03-15 | End: 2017-03-15 | Stop reason: HOSPADM

## 2017-03-15 RX ORDER — ONDANSETRON 2 MG/ML
4 INJECTION INTRAMUSCULAR; INTRAVENOUS EVERY 6 HOURS PRN
Status: DISCONTINUED | OUTPATIENT
Start: 2017-03-15 | End: 2017-03-15 | Stop reason: SDUPTHER

## 2017-03-15 RX ORDER — IPRATROPIUM BROMIDE AND ALBUTEROL SULFATE 2.5; .5 MG/3ML; MG/3ML
3 SOLUTION RESPIRATORY (INHALATION)
Status: CANCELLED | OUTPATIENT
Start: 2017-03-15

## 2017-03-15 RX ORDER — ONDANSETRON 2 MG/ML
INJECTION INTRAMUSCULAR; INTRAVENOUS AS NEEDED
Status: DISCONTINUED | OUTPATIENT
Start: 2017-03-15 | End: 2017-03-15 | Stop reason: SURG

## 2017-03-15 RX ORDER — PHENYLEPHRINE HCL IN 0.9% NACL 0.8MG/10ML
SYRINGE (ML) INTRAVENOUS AS NEEDED
Status: DISCONTINUED | OUTPATIENT
Start: 2017-03-15 | End: 2017-03-15 | Stop reason: SURG

## 2017-03-15 RX ORDER — FAMOTIDINE 20 MG/1
20 TABLET, FILM COATED ORAL 2 TIMES DAILY
Status: DISCONTINUED | OUTPATIENT
Start: 2017-03-15 | End: 2017-03-15 | Stop reason: SDUPTHER

## 2017-03-15 RX ORDER — SODIUM CHLORIDE 9 MG/ML
75 INJECTION, SOLUTION INTRAVENOUS CONTINUOUS
Status: DISPENSED | OUTPATIENT
Start: 2017-03-15 | End: 2017-03-17

## 2017-03-15 RX ORDER — SODIUM CHLORIDE 0.9 % (FLUSH) 0.9 %
1-10 SYRINGE (ML) INJECTION AS NEEDED
Status: DISCONTINUED | OUTPATIENT
Start: 2017-03-15 | End: 2017-03-20 | Stop reason: HOSPADM

## 2017-03-15 RX ORDER — GLYCOPYRROLATE 0.2 MG/ML
INJECTION INTRAMUSCULAR; INTRAVENOUS AS NEEDED
Status: DISCONTINUED | OUTPATIENT
Start: 2017-03-15 | End: 2017-03-15 | Stop reason: SURG

## 2017-03-15 RX ORDER — ONDANSETRON 2 MG/ML
4 INJECTION INTRAMUSCULAR; INTRAVENOUS AS NEEDED
Status: DISCONTINUED | OUTPATIENT
Start: 2017-03-15 | End: 2017-03-15 | Stop reason: HOSPADM

## 2017-03-15 RX ORDER — HYDRALAZINE HYDROCHLORIDE 20 MG/ML
5 INJECTION INTRAMUSCULAR; INTRAVENOUS
Status: DISCONTINUED | OUTPATIENT
Start: 2017-03-15 | End: 2017-03-15 | Stop reason: HOSPADM

## 2017-03-15 RX ORDER — PROPOFOL 10 MG/ML
VIAL (ML) INTRAVENOUS AS NEEDED
Status: DISCONTINUED | OUTPATIENT
Start: 2017-03-15 | End: 2017-03-15 | Stop reason: SURG

## 2017-03-15 RX ORDER — IPRATROPIUM BROMIDE AND ALBUTEROL SULFATE 2.5; .5 MG/3ML; MG/3ML
3 SOLUTION RESPIRATORY (INHALATION) ONCE AS NEEDED
Status: DISCONTINUED | OUTPATIENT
Start: 2017-03-15 | End: 2017-03-15 | Stop reason: HOSPADM

## 2017-03-15 RX ORDER — FAMOTIDINE 10 MG/ML
20 INJECTION, SOLUTION INTRAVENOUS 2 TIMES DAILY
Status: DISCONTINUED | OUTPATIENT
Start: 2017-03-15 | End: 2017-03-15 | Stop reason: SDUPTHER

## 2017-03-15 RX ORDER — MIDAZOLAM HYDROCHLORIDE 1 MG/ML
2 INJECTION INTRAMUSCULAR; INTRAVENOUS
Status: DISCONTINUED | OUTPATIENT
Start: 2017-03-15 | End: 2017-03-15 | Stop reason: HOSPADM

## 2017-03-15 RX ORDER — MEPERIDINE HYDROCHLORIDE 25 MG/ML
12.5 INJECTION INTRAMUSCULAR; INTRAVENOUS; SUBCUTANEOUS
Status: DISCONTINUED | OUTPATIENT
Start: 2017-03-15 | End: 2017-03-15 | Stop reason: HOSPADM

## 2017-03-15 RX ORDER — ONDANSETRON 4 MG/1
4 TABLET, FILM COATED ORAL EVERY 6 HOURS PRN
Status: DISCONTINUED | OUTPATIENT
Start: 2017-03-15 | End: 2017-03-15 | Stop reason: SDUPTHER

## 2017-03-15 RX ORDER — CALCIUM CHLORIDE 100 MG/ML
INJECTION INTRAVENOUS; INTRAVENTRICULAR AS NEEDED
Status: DISCONTINUED | OUTPATIENT
Start: 2017-03-15 | End: 2017-03-15 | Stop reason: HOSPADM

## 2017-03-15 RX ORDER — SCOLOPAMINE TRANSDERMAL SYSTEM 1 MG/1
1 PATCH, EXTENDED RELEASE TRANSDERMAL ONCE
Status: DISCONTINUED | OUTPATIENT
Start: 2017-03-15 | End: 2017-03-16

## 2017-03-15 RX ORDER — LIDOCAINE HYDROCHLORIDE 40 MG/ML
SOLUTION TOPICAL AS NEEDED
Status: DISCONTINUED | OUTPATIENT
Start: 2017-03-15 | End: 2017-03-15 | Stop reason: SURG

## 2017-03-15 RX ORDER — SODIUM CHLORIDE, SODIUM LACTATE, POTASSIUM CHLORIDE, CALCIUM CHLORIDE 600; 310; 30; 20 MG/100ML; MG/100ML; MG/100ML; MG/100ML
100 INJECTION, SOLUTION INTRAVENOUS CONTINUOUS
Status: DISCONTINUED | OUTPATIENT
Start: 2017-03-15 | End: 2017-03-18 | Stop reason: HOSPADM

## 2017-03-15 RX ORDER — METOCLOPRAMIDE HYDROCHLORIDE 5 MG/ML
10 INJECTION INTRAMUSCULAR; INTRAVENOUS ONCE AS NEEDED
Status: DISCONTINUED | OUTPATIENT
Start: 2017-03-15 | End: 2017-03-15 | Stop reason: HOSPADM

## 2017-03-15 RX ORDER — NALOXONE HCL 0.4 MG/ML
0.04 VIAL (ML) INJECTION AS NEEDED
Status: DISCONTINUED | OUTPATIENT
Start: 2017-03-15 | End: 2017-03-15 | Stop reason: HOSPADM

## 2017-03-15 RX ORDER — METOCLOPRAMIDE HYDROCHLORIDE 5 MG/ML
5 INJECTION INTRAMUSCULAR; INTRAVENOUS
Status: DISCONTINUED | OUTPATIENT
Start: 2017-03-15 | End: 2017-03-15 | Stop reason: HOSPADM

## 2017-03-15 RX ORDER — SUCCINYLCHOLINE CHLORIDE 20 MG/ML
INJECTION INTRAMUSCULAR; INTRAVENOUS AS NEEDED
Status: DISCONTINUED | OUTPATIENT
Start: 2017-03-15 | End: 2017-03-15 | Stop reason: SURG

## 2017-03-15 RX ORDER — SUFENTANIL CITRATE 50 UG/ML
INJECTION EPIDURAL; INTRAVENOUS AS NEEDED
Status: DISCONTINUED | OUTPATIENT
Start: 2017-03-15 | End: 2017-03-15 | Stop reason: SURG

## 2017-03-15 RX ORDER — SODIUM CHLORIDE 0.9 % (FLUSH) 0.9 %
1-10 SYRINGE (ML) INJECTION AS NEEDED
Status: DISCONTINUED | OUTPATIENT
Start: 2017-03-15 | End: 2017-03-15 | Stop reason: HOSPADM

## 2017-03-15 RX ORDER — LABETALOL HYDROCHLORIDE 5 MG/ML
5 INJECTION, SOLUTION INTRAVENOUS
Status: DISCONTINUED | OUTPATIENT
Start: 2017-03-15 | End: 2017-03-15 | Stop reason: HOSPADM

## 2017-03-15 RX ORDER — OXYCODONE AND ACETAMINOPHEN 10; 325 MG/1; MG/1
2 TABLET ORAL EVERY 4 HOURS PRN
Status: DISCONTINUED | OUTPATIENT
Start: 2017-03-15 | End: 2017-03-20 | Stop reason: HOSPADM

## 2017-03-15 RX ORDER — ONDANSETRON 4 MG/1
4 TABLET, ORALLY DISINTEGRATING ORAL EVERY 6 HOURS PRN
Status: DISCONTINUED | OUTPATIENT
Start: 2017-03-15 | End: 2017-03-15 | Stop reason: SDUPTHER

## 2017-03-15 RX ORDER — SODIUM CHLORIDE 9 MG/ML
INJECTION, SOLUTION INTRAVENOUS AS NEEDED
Status: DISCONTINUED | OUTPATIENT
Start: 2017-03-15 | End: 2017-03-15 | Stop reason: HOSPADM

## 2017-03-15 RX ORDER — MIDAZOLAM HYDROCHLORIDE 1 MG/ML
1 INJECTION INTRAMUSCULAR; INTRAVENOUS
Status: DISCONTINUED | OUTPATIENT
Start: 2017-03-15 | End: 2017-03-15 | Stop reason: HOSPADM

## 2017-03-15 RX ORDER — MORPHINE SULFATE 2 MG/ML
2 INJECTION, SOLUTION INTRAMUSCULAR; INTRAVENOUS AS NEEDED
Status: DISCONTINUED | OUTPATIENT
Start: 2017-03-15 | End: 2017-03-15 | Stop reason: HOSPADM

## 2017-03-15 RX ORDER — ACETAMINOPHEN 10 MG/ML
1000 INJECTION, SOLUTION INTRAVENOUS ONCE
Status: COMPLETED | OUTPATIENT
Start: 2017-03-15 | End: 2017-03-15

## 2017-03-15 RX ADMIN — GLYCOPYRROLATE 0.4 MG: 0.2 INJECTION, SOLUTION INTRAMUSCULAR; INTRAVENOUS at 13:25

## 2017-03-15 RX ADMIN — ACETAMINOPHEN 1000 MG: 10 INJECTION, SOLUTION INTRAVENOUS at 09:09

## 2017-03-15 RX ADMIN — SUFENTANIL CITRATE 30 MCG: 50 INJECTION, SOLUTION EPIDURAL; INTRAVENOUS at 10:06

## 2017-03-15 RX ADMIN — SODIUM CHLORIDE, POTASSIUM CHLORIDE, SODIUM LACTATE AND CALCIUM CHLORIDE: 600; 310; 30; 20 INJECTION, SOLUTION INTRAVENOUS at 11:55

## 2017-03-15 RX ADMIN — SODIUM CHLORIDE, POTASSIUM CHLORIDE, SODIUM LACTATE AND CALCIUM CHLORIDE: 600; 310; 30; 20 INJECTION, SOLUTION INTRAVENOUS at 12:25

## 2017-03-15 RX ADMIN — METOPROLOL SUCCINATE 25 MG: 25 TABLET, FILM COATED, EXTENDED RELEASE ORAL at 07:41

## 2017-03-15 RX ADMIN — SODIUM CHLORIDE, POTASSIUM CHLORIDE, SODIUM LACTATE AND CALCIUM CHLORIDE 20 ML/HR: 600; 310; 30; 20 INJECTION, SOLUTION INTRAVENOUS at 08:13

## 2017-03-15 RX ADMIN — HYDROMORPHONE HYDROCHLORIDE 1 MG: 1 INJECTION, SOLUTION INTRAMUSCULAR; INTRAVENOUS; SUBCUTANEOUS at 17:21

## 2017-03-15 RX ADMIN — FAMOTIDINE 20 MG: 20 TABLET, FILM COATED ORAL at 17:21

## 2017-03-15 RX ADMIN — CEFAZOLIN SODIUM 1 G: 1 INJECTION, SOLUTION INTRAVENOUS at 18:06

## 2017-03-15 RX ADMIN — LIDOCAINE HYDROCHLORIDE 1 EACH: 40 SOLUTION TOPICAL at 09:45

## 2017-03-15 RX ADMIN — DEXAMETHASONE SODIUM PHOSPHATE 4 MG: 4 INJECTION, SOLUTION INTRA-ARTICULAR; INTRALESIONAL; INTRAMUSCULAR; INTRAVENOUS; SOFT TISSUE at 09:09

## 2017-03-15 RX ADMIN — PROPOFOL 150 MG: 10 INJECTION, EMULSION INTRAVENOUS at 09:43

## 2017-03-15 RX ADMIN — ROCURONIUM BROMIDE 40 MG: 10 INJECTION INTRAVENOUS at 09:52

## 2017-03-15 RX ADMIN — EPHEDRINE SULFATE 10 MG: 50 INJECTION INTRAMUSCULAR; INTRAVENOUS; SUBCUTANEOUS at 09:55

## 2017-03-15 RX ADMIN — LEVOFLOXACIN 500 MG: 500 INJECTION, SOLUTION INTRAVENOUS at 21:29

## 2017-03-15 RX ADMIN — FAMOTIDINE 20 MG: 10 INJECTION, SOLUTION INTRAVENOUS at 09:10

## 2017-03-15 RX ADMIN — LIDOCAINE HYDROCHLORIDE 100 MG: 20 INJECTION, SOLUTION INFILTRATION; PERINEURAL at 09:43

## 2017-03-15 RX ADMIN — HYDROMORPHONE HYDROCHLORIDE 1 MG: 1 INJECTION, SOLUTION INTRAMUSCULAR; INTRAVENOUS; SUBCUTANEOUS at 05:55

## 2017-03-15 RX ADMIN — SODIUM CHLORIDE, POTASSIUM CHLORIDE, SODIUM LACTATE AND CALCIUM CHLORIDE 9 ML/HR: 600; 310; 30; 20 INJECTION, SOLUTION INTRAVENOUS at 08:13

## 2017-03-15 RX ADMIN — ROCURONIUM BROMIDE 15 MG: 10 INJECTION INTRAVENOUS at 12:10

## 2017-03-15 RX ADMIN — SUCRALFATE 1 G: 1 TABLET ORAL at 21:29

## 2017-03-15 RX ADMIN — CEFAZOLIN 1 G: 1 INJECTION, POWDER, FOR SOLUTION INTRAMUSCULAR; INTRAVENOUS; PARENTERAL at 09:52

## 2017-03-15 RX ADMIN — SCOPALAMINE 1 PATCH: 1 PATCH, EXTENDED RELEASE TRANSDERMAL at 09:10

## 2017-03-15 RX ADMIN — SUFENTANIL CITRATE 50 MCG: 50 INJECTION, SOLUTION EPIDURAL; INTRAVENOUS at 10:11

## 2017-03-15 RX ADMIN — ROCURONIUM BROMIDE 15 MG: 10 INJECTION INTRAVENOUS at 11:50

## 2017-03-15 RX ADMIN — PROPOFOL 50 MG: 10 INJECTION, EMULSION INTRAVENOUS at 10:06

## 2017-03-15 RX ADMIN — Medication 5 MG: at 13:25

## 2017-03-15 RX ADMIN — MIDAZOLAM HYDROCHLORIDE 2 MG: 1 INJECTION, SOLUTION INTRAMUSCULAR; INTRAVENOUS at 09:10

## 2017-03-15 RX ADMIN — Medication 80 MCG: at 10:25

## 2017-03-15 RX ADMIN — SUFENTANIL CITRATE 20 MCG: 50 INJECTION, SOLUTION EPIDURAL; INTRAVENOUS at 09:43

## 2017-03-15 RX ADMIN — SUCCINYLCHOLINE CHLORIDE 100 MG: 20 INJECTION, SOLUTION INTRAMUSCULAR; INTRAVENOUS at 09:44

## 2017-03-15 RX ADMIN — ONDANSETRON HYDROCHLORIDE 4 MG: 2 SOLUTION INTRAMUSCULAR; INTRAVENOUS at 13:11

## 2017-03-15 RX ADMIN — HYDROMORPHONE HYDROCHLORIDE 1 MG: 1 INJECTION, SOLUTION INTRAMUSCULAR; INTRAVENOUS; SUBCUTANEOUS at 21:44

## 2017-03-15 RX ADMIN — ROCURONIUM BROMIDE 10 MG: 10 INJECTION INTRAVENOUS at 09:43

## 2017-03-15 RX ADMIN — SODIUM CHLORIDE 75 ML/HR: 9 INJECTION, SOLUTION INTRAVENOUS at 17:20

## 2017-03-15 RX ADMIN — HYDROMORPHONE HYDROCHLORIDE 1 MG: 1 INJECTION, SOLUTION INTRAMUSCULAR; INTRAVENOUS; SUBCUTANEOUS at 01:01

## 2017-03-15 NOTE — PLAN OF CARE
Problem: Patient Care Overview (Adult)  Goal: Plan of Care Review  Outcome: Ongoing (interventions implemented as appropriate)    03/15/17 0510   Coping/Psychosocial Response Interventions   Plan Of Care Reviewed With patient   Patient Care Overview   Progress no change   Outcome Evaluation   Outcome Summary/Follow up Plan Patient has complained of pain this shift was given PRN pain medications, patients vitlas have been stable except elevated temp, called  and recieved orders for tylenol temp is now within normal limtis , [place] [facility] [place]1 and had her brace on at the time, call light within reach , patient has had pre op bath and bed change ,consent is signed on the chart will continue to monitor        Goal: Adult Individualization and Mutuality  Outcome: Ongoing (interventions implemented as appropriate)    03/13/17 1215 03/15/17 0510   Individualization   Patient Specific Preferences --  Patient likes door left open   Patient Specific Goals Pain control and second part surgery. --    Patient Specific Interventions --  PRN medications and education on 2nd part surgery        Goal: Discharge Needs Assessment  Outcome: Ongoing (interventions implemented as appropriate)    03/14/17 1042 03/14/17 1938   Discharge Needs Assessment   Concerns To Be Addressed --  denies needs/concerns at this time   Equipment Needed After Discharge none --    Discharge Facility/Level Of Care Needs acute rehab;home with home health;other (see comments)  (to be determined following second surgery) --    Self-Care   Equipment Currently Used at Home walker, rolling;shower chair;cane, straight --          Problem: Perioperative Period (Adult)  Goal: Signs and Symptoms of Listed Potential Problems Will be Absent or Manageable (Perioperative Period)  Outcome: Ongoing (interventions implemented as appropriate)    03/15/17 0510   Perioperative Period   Problems Assessed (Perioperative Period) all    Problems Present (Perioperative Period) pain;situational response         Problem: Diabetes, Type 2 (Adult)  Goal: Signs and Symptoms of Listed Potential Problems Will be Absent or Manageable (Diabetes, Type 2)  Outcome: Ongoing (interventions implemented as appropriate)    03/15/17 0510   Diabetes, Type 2   Problems Assessed (Type 2 Diabetes) all   Problems Present (Type 2 Diabetes) none         Problem: Fall Risk (Adult)  Goal: Absence of Falls  Outcome: Outcome(s) achieved Date Met:  03/15/17    03/15/17 0510   Fall Risk (Adult)   Absence of Falls achieves outcome

## 2017-03-15 NOTE — PROGRESS NOTES
Alisia Velez is a 71 y.o. female patient.    Current Facility-Administered Medications   Medication Dose Route Frequency Provider Last Rate Last Dose   • [MAR Hold] acetaminophen (TYLENOL) tablet 650 mg  650 mg Oral Q6H PRN DENISA Smith   650 mg at 03/14/17 2140   • buffered lidocaine syringe 0.5 mL  0.5 mL Injection Once PRN Linda Dewitt MD       • ceFAZolin (ANCEF) 1 g/100 mL 0.9% NS IVPB (mbp)  1 g Intravenous On Call to OR DENISA Mari       • [MAR Hold] dextrose (D50W) solution 12.5 g  12.5 g Intravenous PRN Mike Ann MD       • [MAR Hold] diphenhydrAMINE (BENADRYL) capsule 25 mg  25 mg Oral Nightly PRN K Jose Horton MD       • famotidine (PEPCID) injection 20 mg  20 mg Intravenous 60 Min Pre-Op Mike Ann MD   20 mg at 03/13/17 0650    Or   • famotidine (PEPCID) tablet 20 mg  20 mg Oral 60 Min Pre-Op Mike Ann MD       • famotidine (PEPCID) injection 20 mg  20 mg Intravenous BID K Jose Horton MD   20 mg at 03/15/17 0910    Or   • famotidine (PEPCID) tablet 20 mg  20 mg Oral BID K Jose Horton MD   20 mg at 03/14/17 1811   • [MAR Hold] FentaNYL Citrate (PF) (SUBLIMAZE) injection 25 mcg  25 mcg Intravenous Q5 Min PRN Mike Ann MD       • [MAR Hold] HYDROmorphone (DILAUDID) injection 1 mg  1 mg Intravenous Q3H PRN K Jose Horton MD   1 mg at 03/15/17 0555   • [MAR Hold] insulin detemir (LEVEMIR) injection 62 Units  62 Units Subcutaneous Nightly K Jose Horton MD   62 Units at 03/13/17 2015   • [MAR Hold] insulin lispro (humaLOG) injection 20 Units  20 Units Subcutaneous TID With Meals DENISA Mari   7 Units at 03/14/17 1812   • lactated ringers infusion  9 mL/hr Intravenous Continuous Mike Ann MD 9 mL/hr at 03/15/17 0813 9 mL/hr at 03/15/17 0813   • lactated ringers infusion  20 mL/hr Intravenous Continuous Mike Ann MD 20 mL/hr at 03/15/17 0813 20 mL/hr at  03/15/17 0813   • lactated ringers infusion  100 mL/hr Intravenous Continuous Linda Dewitt MD       • [MAR Hold] levoFLOXacin (LEVAQUIN) 500 mg/100 mL D5W (premix) (LEVAQUIN) 500 mg  500 mg Intravenous Q24H DENISA Smith   500 mg at 03/14/17 2155   • [MAR Hold] lubiprostone (AMITIZA) capsule 24 mcg  24 mcg Oral BID With Meals Enrico Ureña MD   24 mcg at 03/14/17 0814   • metoclopramide (REGLAN) injection 10 mg  10 mg Intravenous Once PRN Linda Dewitt MD       • metoprolol succinate XL (TOPROL-XL) 24 hr tablet 25 mg  25 mg Oral BID K Jose Horton MD   25 mg at 03/15/17 0741   • [MAR Hold] midazolam (VERSED) injection 1 mg  1 mg Intravenous Q5 Min PRN Mike Ann MD        Or   • [MAR Hold] midazolam (VERSED) injection 2 mg  2 mg Intravenous Q5 Min PRN Mike Ann MD   2 mg at 03/13/17 0650   • midazolam (VERSED) injection 1 mg  1 mg Intravenous Q5 Min PRN Linda Dewitt MD        Or   • midazolam (VERSED) injection 2 mg  2 mg Intravenous Q5 Min PRN Linda Dewitt MD   2 mg at 03/15/17 0910   • [MAR Hold] ondansetron (ZOFRAN) tablet 4 mg  4 mg Oral Q6H PRN K Jose Horton MD        Or   • [MAR Hold] ondansetron ODT (ZOFRAN-ODT) disintegrating tablet 4 mg  4 mg Oral Q6H PRN K Jose Horton MD        Or   • [MAR Hold] ondansetron (ZOFRAN) injection 4 mg  4 mg Intravenous Q6H PRN K Jose Horton MD   4 mg at 03/13/17 2143   • [MAR Hold] oxyCODONE-acetaminophen (PERCOCET)  MG per tablet 2 tablet  2 tablet Oral Q4H PRN K Jose Horton MD   2 tablet at 03/13/17 1619   • Scopolamine (TRANSDERM-SCOP) 1.5 MG/3DAYS patch 1 patch  1 patch Transdermal Once Linda Dewitt MD   1 patch at 03/15/17 0910   • [MAR Hold] sodium chloride 0.9 % flush 1-10 mL  1-10 mL Intravenous PRN Mike Ann MD       • [MAR Hold] sodium chloride 0.9 % flush 1-10 mL  1-10 mL Intravenous PRN Mike Ann MD       • [MAR  "Hold] sodium chloride 0.9 % flush 1-10 mL  1-10 mL Intravenous PRN MIGDALIA Horton MD       • sodium chloride 0.9 % flush 1-10 mL  1-10 mL Intravenous PRN Linda Dewitt MD       • sodium chloride 0.9 % infusion  75 mL/hr Intravenous Continuous K Jose Horton MD 75 mL/hr at 03/13/17 1052 75 mL/hr at 03/13/17 1052   • [MAR Hold] sucralfate (CARAFATE) tablet 1 g  1 g Oral TID MIGDALIA Horton MD   1 g at 03/14/17 2123     Allergies   Allergen Reactions   • Humulin N [Insulin Isophane] Shortness Of Breath   • Mold Extract [Trichophyton] Shortness Of Breath   • Sudafed [Pseudoephedrine] Shortness Of Breath   • Sulfa Antibiotics Shortness Of Breath   • Other Itching     Silk Tape   • Erythromycin Other (See Comments)     Eyes see big black spots     Active Problems:    Mixed hyperlipidemia    Essential hypertension    Type 2 diabetes mellitus without complication, without long-term current use of insulin    Spinal stenosis, lumbar    Gastroesophageal reflux disease without esophagitis    Blood pressure 122/47, pulse 91, temperature 99.6 °F (37.6 °C), temperature source Tympanic, resp. rate 20, height 63\" (160 cm), weight 162 lb (73.5 kg), SpO2 92 %.      Subjective:  Symptoms:  She reports anxiety.  No shortness of breath or chest pain.  (Pain controlled overnight doing well, anxiously awaiting step 2 of LIFF procedure).    Diet:  NPO.  No nausea or vomiting.    Activity level: Returning to normal.    Pain:  She complains of pain that is moderate.  She reports pain is improving.  Pain is well controlled.      Review of Systems   Constitutional: Negative for activity change, appetite change, chills and fever.   HENT: Negative for congestion, ear pain, trouble swallowing and voice change.    Eyes: Negative for pain, discharge and visual disturbance.   Respiratory: Negative for apnea, chest tightness, shortness of breath and wheezing.    Cardiovascular: Negative for chest pain and palpitations. " "  Gastrointestinal: Negative for abdominal distention, blood in stool, nausea and vomiting.   Endocrine: Negative for cold intolerance, heat intolerance, polydipsia, polyphagia and polyuria.   Genitourinary: Negative for difficulty urinating, frequency and hematuria.   Musculoskeletal: Negative for joint swelling and myalgias.   Skin: Negative for color change, pallor and rash.   Neurological: Negative for tremors, seizures, syncope, speech difficulty and headaches.   Hematological: Negative for adenopathy. Does not bruise/bleed easily.   Psychiatric/Behavioral: Negative for behavioral problems, confusion and hallucinations.     Objective:  General Appearance:  Comfortable and well-appearing.    Vital signs: (most recent): Blood pressure 122/47, pulse 91, temperature 99.6 °F (37.6 °C), temperature source Tympanic, resp. rate 20, height 63\" (160 cm), weight 162 lb (73.5 kg), SpO2 92 %.  Vital signs are normal.  No fever.    Output: Producing urine and producing stool.    HEENT: Normal HEENT exam.    Lungs:  Normal respiratory rate and normal effort.    Heart: Normal rate.  Regular rhythm.    Abdomen: Abdomen is soft.  Bowel sounds are normal.     Extremities: Normal range of motion.    Neurological: Patient is alert.    Skin:  Warm and dry.              Labs:  Lab Results (last 72 hours)     Procedure Component Value Units Date/Time    POC Glucose Fingerstick [[id number]]  (Abnormal) Collected:  03/13/17 0634    Specimen:  Blood Updated:  03/13/17 0646     Glucose 169 (H) mg/dL       : [id number] Corby Banda ID: [id number]       POC Glucose Fingerstick [[id number]]  (Abnormal) Collected:  03/13/17 0919    Specimen:  Blood Updated:  03/13/17 0931     Glucose 204 (H) mg/dL       : [id number] Phillip Rojas ID: [id number]       POC Glucose Fingerstick [[id number]]  (Abnormal) Collected:  03/13/17 1158    Specimen:  Blood Updated:  03/13/17 1209     Glucose 244 (H) mg/dL       : [id number] Estrada Denny " AnnMeter ID: [id number]       POC Glucose Fingerstick [[id number]]  (Abnormal) Collected:  03/13/17 1654    Specimen:  Blood Updated:  03/13/17 1715     Glucose 184 (H) mg/dL       : [id number] Estrada Denny AnnMeter ID: [id number]       POC Glucose Fingerstick [[id number]]  (Abnormal) Collected:  03/13/17 2012    Specimen:  Blood Updated:  03/13/17 2024     Glucose 166 (H) mg/dL       : [id number] Adrianna AleaciaMeter ID: [id number]       CBC & Differential [[id number]] Collected:  03/14/17 0243    Specimen:  Blood Updated:  03/14/17 0429    Narrative:       The following orders were created for panel order CBC & Differential.  Procedure                               Abnormality         Status                     ---------                               -----------         ------                     CBC Auto Differential[[id number]]         Abnormal            Final result                 Please view results for these tests on the individual orders.    CBC Auto Differential [[id number]]  (Abnormal) Collected:  03/14/17 0243    Specimen:  Blood Updated:  03/14/17 0429     WBC 11.97 (H) 10*3/mm3      RBC 3.70 (L) 10*6/mm3      Hemoglobin 11.5 (L) g/dL      Hematocrit 34.3 (L) %      MCV 92.7 fL      MCH 31.1 pg      MCHC 33.5 g/dL      RDW 13.3 %      RDW-SD 44.8 fl      MPV 10.5 fL      Platelets 201 10*3/mm3      Neutrophil % 76.3 %      Lymphocyte % 13.3 (L) %      Monocyte % 10.1 %      Eosinophil % 0.0 %      Basophil % 0.1 %      Immature Grans % 0.2 %      Neutrophils, Absolute 9.14 (H) 10*3/mm3      Lymphocytes, Absolute 1.59 10*3/mm3      Monocytes, Absolute 1.21 10*3/mm3      Eosinophils, Absolute 0.00 10*3/mm3      Basophils, Absolute 0.01 10*3/mm3      Immature Grans, Absolute 0.02 10*3/mm3     Basic Metabolic Panel [[id number]]  (Abnormal) Collected:  03/14/17 0243    Specimen:  Blood Updated:  03/14/17 0444     Glucose 156 (H) mg/dL      BUN 15 mg/dL      Creatinine 0.71 mg/dL      Sodium 132 (L) mmol/L       Potassium 4.4 mmol/L      Chloride 98 mmol/L      CO2 26.0 mmol/L      Calcium 8.7 mg/dL      eGFR Non African Amer 82 mL/min/1.73      BUN/Creatinine Ratio 21.1      Anion Gap 8.0 mmol/L     Narrative:       GFR Normal >60  Chronic Kidney Disease <60  Kidney Failure <15    Iron [[id number]]  (Normal) Collected:  03/14/17 0243    Specimen:  Blood Updated:  03/14/17 0445     Iron 60 mcg/dL     Vitamin B12 [[id number]]  (Normal) Collected:  03/14/17 0243    Specimen:  Blood Updated:  03/14/17 0530     Vitamin B-12 423 pg/mL     Hemoglobin A1c [[id number]] Collected:  03/14/17 0243    Specimen:  Blood Updated:  03/14/17 0621     Hemoglobin A1C 7.9 %     Narrative:       Less than 6.0           Non-Diabetic Range  6.0-7.0                 ADA Therapeutic Target  Greater than 7.0        Action Suggested    POC Glucose Fingerstick [[id number]]  (Abnormal) Collected:  03/14/17 2125    Specimen:  Blood Updated:  03/14/17 2136     Glucose 156 (H) mg/dL       : [id number] Bickerstaff RachaelMeter ID: [id number]       Blood Culture [[id number]] Collected:  03/14/17 2148    Specimen:  Blood from Arm, Right Updated:  03/14/17 2210    Urinalysis With / Microscopic If Indicated [[id number]]  (Normal) Collected:  03/15/17 0018    Specimen:  Urine from Urine, Clean Catch Updated:  03/15/17 0031     Color, UA Yellow      Appearance, UA Clear      pH, UA <=5.0      Specific Gravity, UA 1.006      Glucose, UA Negative      Ketones, UA Negative      Bilirubin, UA Negative      Blood, UA Negative      Protein, UA Negative      Leuk Esterase, UA Negative      Nitrite, UA Negative      Urobilinogen, UA 0.2 E.U./dL     Narrative:       Urine microscopic not indicated.    POC Glucose Fingerstick [[id number]]  (Abnormal) Collected:  03/15/17 0810    Specimen:  Blood Updated:  03/15/17 0822     Glucose 170 (H) mg/dL       : [id number] Ken GreereMeter ID: [id number]             Imaging Results (last 72 hours)     ** No results found for the last 72  hours. **                Assessment:    Condition: In stable condition.  Improving.   (Patient Active Problem List:     Osteopenia     Mixed hyperlipidemia     Essential hypertension     Type 2 diabetes mellitus without complication, without long-term current use of insulin     Spinal stenosis, lumbar     Gastroesophageal reflux disease without esophagitis    ).     Plan:   Transfer Plan: To OR today.  Encourage ambulation and per physical therapy.  Administer medications as ordered.   (Type 2 DM- review labs and home medication. Discuss with patient importance of blood sugar control in the healing process. Review diet, medical,and lifestyle modifications for optimal medical treatment.  Anemia post-op expected-check iron, B12,and folate. Replenish as needed.Transfuse at acceptable levels depending on clinical judgement and comorbidities. Recheck in AM  GERD-exacerbated by pain meds and anesthesia, will add PPI as needed and can step up to Carafate 1 gm po q AC and qhs.  ).     Patient Active Problem List   Diagnosis   • Osteopenia   • Mixed hyperlipidemia   • Essential hypertension   • Type 2 diabetes mellitus without complication, without long-term current use of insulin   • Spinal stenosis, lumbar   • Gastroesophageal reflux disease without esophagitis     Enrico Ureña MD  3/15/2017

## 2017-03-15 NOTE — ANESTHESIA PROCEDURE NOTES
Airway  Urgency: elective    Date/Time: 3/15/2017 9:45 AM  End Time:3/15/2017 9:45 AM  Airway not difficult    General Information and Staff    Patient location during procedure: OR  CRNA: PINKY MORILLO    Indications and Patient Condition  Indications for airway management: airway protection    Preoxygenated: yes  MILS maintained throughout  Mask difficulty assessment: 1 - vent by mask    Final Airway Details  Final airway type: endotracheal airway      Successful airway: ETT  Cuffed: yes   Successful intubation technique: direct laryngoscopy  Endotracheal tube insertion site: oral  Blade: Moreno  Blade size: #2  ETT size: 7.5 mm  Cormack-Lehane Classification: grade I - full view of glottis  Placement verified by: chest auscultation and capnometry   Cuff volume (mL): 8  Measured from: lips  ETT to lips (cm): 22  Number of attempts at approach: 1

## 2017-03-15 NOTE — ANESTHESIA PREPROCEDURE EVALUATION
Anesthesia Evaluation     Patient summary reviewed   history of anesthetic complications: PONV  NPO Status: > 8 hours   Airway   Mallampati: II  TM distance: >3 FB  Neck ROM: full  Dental - normal exam     Pulmonary    (-) COPD, asthma, sleep apnea, not a smoker  Cardiovascular   Exercise tolerance: good (4-7 METS)    ECG reviewed  Patient on routine beta blocker and Beta blocker given within 24 hours of surgery    (+) hypertension, dysrhythmias Tachycardia,   (-) pacemaker, past MI, angina, cardiac stents      Neuro/Psych  (+) TIA (1999),    (-) seizures, CVA  GI/Hepatic/Renal/Endo    (+)  GERD, diabetes mellitus type 2 using insulin,   (-) liver disease, renal disease    Musculoskeletal     (+) back pain,   Abdominal    Substance History      OB/GYN          Other                                      Anesthesia Plan    ASA 3     general     intravenous induction   Anesthetic plan and risks discussed with patient.

## 2017-03-15 NOTE — OP NOTE
Procedure Note    Alisia Velez  3/15/2017    Pre-op Diagnosis:      1. Status post previous left L5-S1 decompression, , October 2002  2. Increasing chronic back pain  3. Bilateral buttock, thigh, and leg radiculopathy, left worse than right  4. Neurogenic claudication  5. Multilevel thoracolumbar degenerative disc disease  6. Degenerative lumbar scoliosis, worse L3 to S1  7. Multilevel lumbar facet arthropathy, worse L3 to S1  8. Central and bilateral foraminal stenosis L3 to S1  9.  Status post left LLIF L3 to 5 with instrumentation L3-4, 3/13/17     Post-op Diagnosis:     same     Procedure/CPT® Codes:    1. Right L3-S1 hemilaminectomy decompression with complete facetectomy and neural foraminotomy  2. Posterior lumbar interbody fusion L5-S1  3. Posterior spinal fusion L3-4, L4-5, L5-S1  4. Posterior spinal instrumentation L3 to S1 (Lanx pedicle screws and rods)  5. Use of locally obtained autograft bone for fusion  6. Use of allograft bone chips for fusion  7. Use of bone marrow aspirate obtained through separate incision for fusion  8. Use of operating room microscope for decompression and microdissection  9. Use of fluoroscopy for confirmation of surgical level, placement of PEEK spacer, and instrumentation    Anesthesia: General     Surgeon: DENG Horton MD     Assistant: Clarence Morin PA-C     Estimated Blood Loss: 300 mL     Complications: None     Condition: Stable to PACU.     Indications:     The patient is a 71-year-old who sees Dr. Mike Erazo for medical issues. She presented to the office with a history of a previous left L5-S1 decompression done in October 2002. She did well with this procedure but unfortunately, she has had worsening complaints of back pain for the last several years along with symptoms that radiated down her buttocks, thighs, and into her legs with the left side worse than the right. Her symptoms were very consistent with neurogenic claudication. Imaging studies  revealed multilevel degenerative disc disease with a degenerative scoliosis and facet arthropathy worse from L3 to S1 causing central and bilateral foraminal stenosis. This was felt to be contributing to the vast majority of her symptoms.    After failing conservative measures, it was mutually decided that surgery would be his best option. Risks, benefits, and complications of surgery were discussed with the patient. The patient appeared well informed and wished to proceed. We specifically discussed the risk of infection, blood loss, nerve root injury, CSF leak, and the possibility of incomplete resolution of symptoms. We also discussed the possible risk of a nonunion and the potential need for additional surgery in the event of a pseudoarthrosis or hardware failure.      We elected proceed with a staged operation. Previously on 3/13/17, the patient underwent a left lateral fusion of L3-5 with instrumentation at L3-4. Today we return to surgery for the second posterior stage the procedure.     Operative Procedure:     After obtaining informed consent and verifying the correct operative levels, the patient was brought to the operating room. A general anesthetic was provided by the anesthesia service with the assistance of an endotracheal tube. Once this was appropriately positioned and secured, the patient was carefully rotated into the prone position on a Mikey frame. All bony prominences were well-padded. The lumbar region was prepped and draped in the usual sterile fashion. A surgical timeout was taken to confirm this was the correct patient, we were working at the correct levels, and that preoperative antibiotics were given in a timely fashion.      A small stab incision was created over the right posterior iliac crest using a 15 blade scalpel. Through this stab incision, a Jamshidi needle was advanced into the iliac crest. Through the needle we aspirated approximately 50-60 mL of bone marrow from the iliac  crest. This bone marrow aspirate was then passed off in a sterile fashion for processing and concentration to be later mixed with allograft bone chips to assist with obtaining a fusion.      Next using fluoroscopy for guidance a right sided Wiltsey incision was created spanning L3 to S1 with a 10 blade scalpel. Dissection was carried to subcutaneous cutaneous tissues using Bovie cautery. The lumbar fascia was divided in line with the incision and blunt dissection was carried between the multifidus and the longissimus. Dissection was carried laterally exposing the transverse processes from L3 to the sacral ala. Medial dissection was then done from approximately L3 to S1 exposing the facet joints as well as the inner laminar spaces. Self retaining retractors were placed for continuous exposure. A forward angled curet was placed under the lamina of L5 to confirm we were indeed at the correct level and then the microscope was positioned for the remaining decompression portion of the procedure.      After clearly identifying the facet joints from L3-S1, a rongeur was used to remove some of the lamina of L3, L4 and L5 and as much of the facets joint of L3-4, L4-5 and L5-S1 as possible. All locally obtained autograft bone was saved for later assisting with obtaining a fusion. This bone was cleaned of soft tissues and morselized. A forward angled curette was used to free up the undersurface of the remaining lamina. A right sided hemilaminectomy decompression was then performed at L3, L4 and L5 using Kerrisons. Hypertrophied ligamentum flavum was dissected free of the underlying dural sac using forward angled curettes. This was then resected using Kerrisons. The facetectomy decompression was completed using Kerrisons removing the complete L5-S1 facet joint including the inferior articular process of L5 and the superior articular process of S1. This allowed complete exposure of the exiting L5 nerve root. This nerve root was  highly compressed due to the facet arthropathy as well as a chronic calcified disc herniation from L5-S1. After clearly delineating the border of L5 inferiorly, a 15 blade scalpel was used to cut into the disc material in the foramen of L5-S1 and disc material was removed using backdown curettes and pituitaries.    A similar complete facetectomy was then completed at both L3-4 and L4-5 using forward angled curettes and kerrisons.  After clearly identifying the central dural sac, I was able to dissect laterally on the contralateral left side, which allowed better exposure of the contralateral facets medially and the hypertrophied ligamentum flavum.  This also was resected using Kerrisons completing the decompression.      The decompression performed at L5-S1 was much more involved than what is usually required for a lumbar interbody fusion by itself due to the high-grade foraminal stenosis of the exiting L5 nerve root from a combination of foraminal disc material as well as the severe facet arthropathy.      Next with the decompression completed at L3-4, L4-5, and L5-S1, attention was turned to the L5-S1 interbody fusion. A nerve root retractor was used to gently retract the S1 nerve root and the central dural sac medially allowing more visualization of the L5-S1 disc space. The annulotomy which was done in the foramen was extended medially using a 15 blade scalpel and disc material was removed using backdown curettes pituitaries and Kerrisons. The disc space was markedly degenerated. I attempted to distract the disc space with a paddle distractor but due to the advanced degeneration this was not effective.  I then removed as much disc material as possible from the L5-S1 disc space itself using backdown curettes and pituitaries as well as Kerrisons.  The disc space was thoroughly irrigated with saline solution.      The L5-S1 disc space was then packed with a combination of locally obtained autograft bone and the  bone marrow aspirate allograft bone chips mixture.  Again due to the advanced disc space degeneration and the lack of mobility with attempts at distraction, I did not attempt placing an interbody device but instead used only bone graft into the disc space.    Next attention was turned to obtaining a posterior lateral fusion. I reexposed the transverse processes of L3, L4, L5 and the sacral ala. The high-speed bur was used to decorticate these areas and the lateral gutter was then filled with the remaining bone graft mixture. This constituted a posterolateral fusion from L3 to S1.      Now with the decompression and fusion completed completed from L3 to S1 including the interbody fusion of L5-S1, we turned our attention to placing posterior instrumentation. Under fluoroscopic guidance, I created starting holes for pedicle screws on the right side at L3, L4, L5 and S1. Once the starting holes were created, a pedicle probe was used to gain access through the pedicle to the anterior vertebral body. Each pedicle tract was palpated with a ball-tipped feeler to ensure that there is no medial or lateral breach. I placed a 6.5 mm x 50 mm screw into each of the pedicles on the right at L3 and L4.  I placed shorter 45 mm screws into the right pedicles of L5 and S1.  An appropriate-sized tom was then chosen to span the pedicle screw saddles and this was held into position using set screws. The setscrews were tightened using the appropriate antitorque wrench and torque limiting screwdriver provided by [employer].      This right sided Wiltsey incision was then irrigated and inspected to ensure that we had adequate hemostasis. Bleeding at this point was controlled using FloSeal and bipolar cautery. The wound was then packed while attention was turned to the contralateral side after placing a drain below the fascia exiting through a small poke hole just superior of the incision.      Under fluoroscopic guidance, I created a Wiltsey  incision spanning approximately L5 to S1 with a 10 blade scalpel. Through this incision, I divided the fascia, and bluntly dissected between the multifidus and the longissimus.  I then used Jamsheedi needles to place pedicle screws on the left side at L5 and S1.  Once the Jamshidi needles were properly positioned, guidewires were placed to maintain position. I then placed 6.5 mm x 45 mm Lanx pedicle screws into the pedicles on the left of L5 and S1.  A separate stab incision was then created using a 10 blade scalpel overlying the left L3 pedicle.  The Jamshidi needle was used to cannulate this pedicle and a third pedicle screw was placed measuring 6.5 mm x 50 mm from the Lan[employer] set into the L3 pedicle on the left.  A second tom was chosen to span these pedicle screws which was then held into position using set screws.  Once again the setscrews were tightened using the appropriate antitorque wrench and torque limiting screwdriver provided by [employer].     All incisions were then copiously irrigated with saline solution and again an inspection was done to ensure that we had adequate hemostasis. Final fluoroscopy imaging confirmed adequate position of the instrumentation spanning L3 to S1.      Closure was then accomplished by reapproximating the fascia with a #1 Vicryl. Immediate subcutaneous tissues were closed with a 2-0 Vicryl. Skin closure was then augmented using Mastisol and Steri-Strips. All incisions were then washed and covered with Bioclusive dressings. The patient was then carefully [place] [facility] [place], extubated, and sent to the recovery room in good stable condition. We estimated blood loss to be approximately 300 mL. The patient remained hemodynamically stable and there were no complications.      Clarence Morin PA-C provided critical assistance during the procedure. His assistance was medically necessary in order to allow the procedure to occur in the most safe and efficient manner. He assisted  not only with patient positioning and wound closure, but he also assisted with retraction of delicate neurovascular structures during the decompression from L3-S1, the interbody fusion of L5-S1, and assistance during the placement of the bone graft and instrumentation from L3 to S1 to obtain a fusion.    DENG Horton MD     Date: 3/15/2017  Time: 6:57 AM

## 2017-03-15 NOTE — PLAN OF CARE
Problem: Patient Care Overview (Adult)  Goal: Plan of Care Review  Outcome: Ongoing (interventions implemented as appropriate)  NPO after midnight for part 2 of surgery tomorrow.  Safety maintained, brace when out of bed.   Blood sugars and dosing per patient per Dr. Gibbs.    Goal: Adult Individualization and Mutuality  Outcome: Ongoing (interventions implemented as appropriate)  Goal: Discharge Needs Assessment  Outcome: Ongoing (interventions implemented as appropriate)    Problem: Perioperative Period (Adult)  Goal: Signs and Symptoms of Listed Potential Problems Will be Absent or Manageable (Perioperative Period)  Outcome: Ongoing (interventions implemented as appropriate)    Problem: Diabetes, Type 2 (Adult)  Goal: Signs and Symptoms of Listed Potential Problems Will be Absent or Manageable (Diabetes, Type 2)  Outcome: Ongoing (interventions implemented as appropriate)    Problem: Fall Risk (Adult)  Goal: Absence of Falls  Outcome: Ongoing (interventions implemented as appropriate)

## 2017-03-15 NOTE — ANESTHESIA POSTPROCEDURE EVALUATION
Patient: Alisia Velez    Procedure Summary     Date Anesthesia Start Anesthesia Stop Room / Location    03/15/17 0940 1352 [facility] / [facility]       Procedure Diagnosis Surgeon Provider    RIGHT L3-S1 POSSIBLE RIGHT L 3-4 HEMILAMINECTOMY FACETECTOMY DECOMPRESSION TRANSFORAMINAL LUMBAR INTERBODY FUSION L4-S1 POSTERIOR SPINAL FUSION WITH INSTRUMENTATION L3-S1 (Right Spine Lumbar) (M54.5) MD Ian Bergman CRNA          Anesthesia Type: general  Last vitals  /64 (03/15/17 1518)    Temp      Pulse 80 (03/15/17 1518)   Resp 14 (03/15/17 1518)    SpO2 96 % (03/15/17 1518)      Post Anesthesia Care and Evaluation    Patient location during evaluation: PACU  Patient participation: complete - patient participated  Level of consciousness: awake and alert  Pain management: adequate  Airway patency: patent  Anesthetic complications: No anesthetic complications  PONV Status: none  Cardiovascular status: acceptable and hemodynamically stable  Respiratory status: acceptable  Hydration status: acceptable

## 2017-03-15 NOTE — PLAN OF CARE
Problem: Patient Care Overview (Adult)  Goal: Plan of Care Review  Outcome: Ongoing (interventions implemented as appropriate)    03/15/17 2801   Coping/Psychosocial Response Interventions   Plan Of Care Reviewed With patient;spouse   Outcome Evaluation   Outcome Summary/Follow up Plan Pt had part 2 of back sx today per Dr. Horton. Has moderate pain, using IV med. SPICER. Safety maintained. Monitoring Blood sugars.          Problem: Perioperative Period (Adult)  Goal: Signs and Symptoms of Listed Potential Problems Will be Absent or Manageable (Perioperative Period)  Outcome: Ongoing (interventions implemented as appropriate)    Problem: Diabetes, Type 2 (Adult)  Goal: Signs and Symptoms of Listed Potential Problems Will be Absent or Manageable (Diabetes, Type 2)  Outcome: Ongoing (interventions implemented as appropriate)

## 2017-03-16 LAB
ANION GAP SERPL CALCULATED.3IONS-SCNC: 4 MMOL/L (ref 4–13)
BASOPHILS # BLD AUTO: 0.01 10*3/MM3 (ref 0–0.2)
BASOPHILS NFR BLD AUTO: 0.1 % (ref 0–2)
BUN BLD-MCNC: 10 MG/DL (ref 5–21)
BUN/CREAT SERPL: 16.7 (ref 7–25)
CALCIUM SPEC-SCNC: 8.4 MG/DL (ref 8.4–10.4)
CHLORIDE SERPL-SCNC: 100 MMOL/L (ref 98–110)
CO2 SERPL-SCNC: 31 MMOL/L (ref 24–31)
CREAT BLD-MCNC: 0.6 MG/DL (ref 0.5–1.4)
DEPRECATED RDW RBC AUTO: 44.8 FL (ref 40–54)
EOSINOPHIL # BLD AUTO: 0.01 10*3/MM3 (ref 0–0.7)
EOSINOPHIL NFR BLD AUTO: 0.1 % (ref 0–4)
ERYTHROCYTE [DISTWIDTH] IN BLOOD BY AUTOMATED COUNT: 13.3 % (ref 12–15)
GFR SERPL CREATININE-BSD FRML MDRD: 99 ML/MIN/1.73
GLUCOSE BLD-MCNC: 178 MG/DL (ref 70–100)
GLUCOSE BLDC GLUCOMTR-MCNC: 176 MG/DL (ref 70–130)
GLUCOSE BLDC GLUCOMTR-MCNC: 205 MG/DL (ref 70–130)
GLUCOSE BLDC GLUCOMTR-MCNC: 258 MG/DL (ref 70–130)
GLUCOSE BLDC GLUCOMTR-MCNC: 334 MG/DL (ref 70–130)
HCT VFR BLD AUTO: 27.7 % (ref 37–47)
HGB BLD-MCNC: 9.3 G/DL (ref 12–16)
IMM GRANULOCYTES # BLD: 0.03 10*3/MM3 (ref 0–0.03)
IMM GRANULOCYTES NFR BLD: 0.3 % (ref 0–5)
LYMPHOCYTES # BLD AUTO: 1.88 10*3/MM3 (ref 0.72–4.86)
LYMPHOCYTES NFR BLD AUTO: 20.7 % (ref 15–45)
MCH RBC QN AUTO: 31 PG (ref 28–32)
MCHC RBC AUTO-ENTMCNC: 33.6 G/DL (ref 33–36)
MCV RBC AUTO: 92.3 FL (ref 82–98)
MONOCYTES # BLD AUTO: 1.06 10*3/MM3 (ref 0.19–1.3)
MONOCYTES NFR BLD AUTO: 11.7 % (ref 4–12)
NEUTROPHILS # BLD AUTO: 6.08 10*3/MM3 (ref 1.87–8.4)
NEUTROPHILS NFR BLD AUTO: 67.1 % (ref 39–78)
PLATELET # BLD AUTO: 160 10*3/MM3 (ref 130–400)
PMV BLD AUTO: 10.2 FL (ref 6–12)
POTASSIUM BLD-SCNC: 3.9 MMOL/L (ref 3.5–5.3)
RBC # BLD AUTO: 3 10*6/MM3 (ref 4.2–5.4)
SODIUM BLD-SCNC: 135 MMOL/L (ref 135–145)
WBC NRBC COR # BLD: 9.07 10*3/MM3 (ref 4.8–10.8)

## 2017-03-16 PROCEDURE — 97116 GAIT TRAINING THERAPY: CPT

## 2017-03-16 PROCEDURE — 85025 COMPLETE CBC W/AUTO DIFF WBC: CPT | Performed by: ORTHOPAEDIC SURGERY

## 2017-03-16 PROCEDURE — 63710000001 INSULIN LISPRO (HUMAN) PER 5 UNITS: Performed by: ORTHOPAEDIC SURGERY

## 2017-03-16 PROCEDURE — 25010000002 HYDROMORPHONE PER 4 MG: Performed by: ORTHOPAEDIC SURGERY

## 2017-03-16 PROCEDURE — 25010000002 DEXAMETHASONE PER 1 MG: Performed by: PHYSICIAN ASSISTANT

## 2017-03-16 PROCEDURE — 97530 THERAPEUTIC ACTIVITIES: CPT

## 2017-03-16 PROCEDURE — G8979 MOBILITY GOAL STATUS: HCPCS | Performed by: PHYSICAL THERAPIST

## 2017-03-16 PROCEDURE — 82962 GLUCOSE BLOOD TEST: CPT

## 2017-03-16 PROCEDURE — 25010000002 LEVOFLOXACIN PER 250 MG: Performed by: PHYSICIAN ASSISTANT

## 2017-03-16 PROCEDURE — 97168 OT RE-EVAL EST PLAN CARE: CPT

## 2017-03-16 PROCEDURE — 25010000003 CEFAZOLIN PER 500 MG: Performed by: ORTHOPAEDIC SURGERY

## 2017-03-16 PROCEDURE — 97110 THERAPEUTIC EXERCISES: CPT

## 2017-03-16 PROCEDURE — 97164 PT RE-EVAL EST PLAN CARE: CPT

## 2017-03-16 PROCEDURE — G8978 MOBILITY CURRENT STATUS: HCPCS | Performed by: PHYSICAL THERAPIST

## 2017-03-16 PROCEDURE — 80048 BASIC METABOLIC PNL TOTAL CA: CPT | Performed by: ORTHOPAEDIC SURGERY

## 2017-03-16 RX ORDER — BISACODYL 10 MG
10 SUPPOSITORY, RECTAL RECTAL DAILY PRN
Status: DISCONTINUED | OUTPATIENT
Start: 2017-03-16 | End: 2017-03-20 | Stop reason: HOSPADM

## 2017-03-16 RX ORDER — MAGNESIUM CARB/ALUMINUM HYDROX 105-160MG
296 TABLET,CHEWABLE ORAL ONCE
Status: COMPLETED | OUTPATIENT
Start: 2017-03-16 | End: 2017-03-16

## 2017-03-16 RX ORDER — DEXAMETHASONE SODIUM PHOSPHATE 10 MG/ML
10 INJECTION INTRAMUSCULAR; INTRAVENOUS ONCE
Status: COMPLETED | OUTPATIENT
Start: 2017-03-16 | End: 2017-03-16

## 2017-03-16 RX ADMIN — HYDROMORPHONE HYDROCHLORIDE 1 MG: 1 INJECTION, SOLUTION INTRAMUSCULAR; INTRAVENOUS; SUBCUTANEOUS at 06:13

## 2017-03-16 RX ADMIN — SUCRALFATE 1 G: 1 TABLET ORAL at 18:04

## 2017-03-16 RX ADMIN — INSULIN LISPRO 18 UNITS: 100 INJECTION, SOLUTION INTRAVENOUS; SUBCUTANEOUS at 18:05

## 2017-03-16 RX ADMIN — METOPROLOL SUCCINATE 25 MG: 25 TABLET, FILM COATED, EXTENDED RELEASE ORAL at 20:41

## 2017-03-16 RX ADMIN — HYDROMORPHONE HYDROCHLORIDE 1 MG: 1 INJECTION, SOLUTION INTRAMUSCULAR; INTRAVENOUS; SUBCUTANEOUS at 01:24

## 2017-03-16 RX ADMIN — BISACODYL 10 MG: 5 TABLET, COATED ORAL at 13:16

## 2017-03-16 RX ADMIN — CEFAZOLIN SODIUM 1 G: 1 INJECTION, SOLUTION INTRAVENOUS at 01:24

## 2017-03-16 RX ADMIN — SUCRALFATE 1 G: 1 TABLET ORAL at 20:41

## 2017-03-16 RX ADMIN — HYDROMORPHONE HYDROCHLORIDE 1 MG: 1 INJECTION, SOLUTION INTRAMUSCULAR; INTRAVENOUS; SUBCUTANEOUS at 13:17

## 2017-03-16 RX ADMIN — FAMOTIDINE 20 MG: 20 TABLET, FILM COATED ORAL at 08:51

## 2017-03-16 RX ADMIN — HYDROMORPHONE HYDROCHLORIDE 1 MG: 1 INJECTION, SOLUTION INTRAMUSCULAR; INTRAVENOUS; SUBCUTANEOUS at 18:04

## 2017-03-16 RX ADMIN — DEXAMETHASONE SODIUM PHOSPHATE 10 MG: 10 INJECTION, SOLUTION INTRAMUSCULAR; INTRAVENOUS at 13:16

## 2017-03-16 RX ADMIN — MAGESIUM CITRATE 296 ML: 1.75 LIQUID ORAL at 15:15

## 2017-03-16 RX ADMIN — INSULIN LISPRO 38 UNITS: 100 INJECTION, SOLUTION INTRAVENOUS; SUBCUTANEOUS at 13:17

## 2017-03-16 RX ADMIN — LEVOFLOXACIN 500 MG: 500 INJECTION, SOLUTION INTRAVENOUS at 21:01

## 2017-03-16 RX ADMIN — CEFAZOLIN SODIUM 1 G: 1 INJECTION, SOLUTION INTRAVENOUS at 09:06

## 2017-03-16 RX ADMIN — INSULIN LISPRO 20 UNITS: 100 INJECTION, SOLUTION INTRAVENOUS; SUBCUTANEOUS at 08:52

## 2017-03-16 RX ADMIN — HYDROMORPHONE HYDROCHLORIDE 1 MG: 1 INJECTION, SOLUTION INTRAMUSCULAR; INTRAVENOUS; SUBCUTANEOUS at 09:06

## 2017-03-16 RX ADMIN — SODIUM CHLORIDE 75 ML/HR: 9 INJECTION, SOLUTION INTRAVENOUS at 06:17

## 2017-03-16 RX ADMIN — SUCRALFATE 1 G: 1 TABLET ORAL at 08:51

## 2017-03-16 RX ADMIN — METOPROLOL SUCCINATE 25 MG: 25 TABLET, FILM COATED, EXTENDED RELEASE ORAL at 08:51

## 2017-03-16 RX ADMIN — FAMOTIDINE 20 MG: 20 TABLET, FILM COATED ORAL at 18:04

## 2017-03-16 NOTE — PLAN OF CARE
Problem: Patient Care Overview (Adult)  Goal: Plan of Care Review  Outcome: Ongoing (interventions implemented as appropriate)    03/16/17 1611   Coping/Psychosocial Response Interventions   Plan Of Care Reviewed With patient   Patient Care Overview   Progress progress toward functional goals as expected   Outcome Evaluation   Outcome Summary/Follow up Plan PT treatment complete. Patient shows improvement from evaluation completed this morning. Patient demonstrates ability to ambulate with CGA and improved L foot clearance. Patient still demonstrates impaired balance, activity tolerance, and functional mobility. Patient was still unable to actively dorsiflex R foot. Patient will continue to benefit from skilled PT to include functional mobilty, gait training, transfer training, balance training, strengthening, stair training, and patient education. PT will continue to follow for safety to return home.

## 2017-03-16 NOTE — PROGRESS NOTES
Acute Care - Occupational Therapy Re-Evaluation  [facility]     Patient Name: Alisia Velez  : 1945  MRN: [id number]  Today's Date: 3/16/2017  Onset of Illness/Injury or Date of Surgery Date: (P) 17  Date of Referral to OT: 17  Referring Physician: MIGDALIA Horton MD (P)    Admit Date: 3/13/2017       ICD-10-CM ICD-9-CM   1. Impaired functional mobility and activity tolerance Z74.09 V49.89   2. Decreased activities of daily living (ADL) Z78.9 V49.89     Patient Active Problem List   Diagnosis   • Osteopenia   • Mixed hyperlipidemia   • Essential hypertension   • Type 2 diabetes mellitus without complication, without long-term current use of insulin   • Spinal stenosis, lumbar   • Gastroesophageal reflux disease without esophagitis     Past Medical History   Diagnosis Date   • Arthritis    • Back pain    • Blister of great toe of left foot      Nonthermal, initial encounter   • Cancer      skin    • Dyslipidemia    • Elevated blood pressure    • Essential hypertension 2016   • GERD (gastroesophageal reflux disease)    • History of cerebrovascular accident         • Hypo-osmolality and hyponatremia    • Ingrowing nail    • Onychomycosis    • PONV (postoperative nausea and vomiting)    • Snores    • Type 2 diabetes mellitus    • Type 2 diabetes mellitus with circulatory disorder 2016   • Urinary incontinence    • Vitamin D deficiency      Past Surgical History   Procedure Laterality Date   • Blepharoplasty     • Carpal tunnel release     • Nail bed removal/revision  2016   • Lumbar disc surgery     • Neck surgery     • Laparoscopic nissen fundoplication     • Cervical spine surgery       cydney-laminotomy c7-t1   • Other surgical history       had left and right big toenials removed mar 2016   • Colonoscopy     • Endoscopy     • Cardiac catheterization     • Lumbar fusion Left 3/13/2017     Procedure: LEFT LUMBAR LATERAL INTERBODY FUSION L 3-5 WITH INSTRUMENTATION;  Surgeon: MIGDALIA  Jose Horton MD;  Location:  [facility] OR;  Service:    • Lumbar laminectomy with fusion Right 3/15/2017     Procedure: RIGHT L3-S1 POSSIBLE RIGHT L 3-4 HEMILAMINECTOMY FACETECTOMY DECOMPRESSION TRANSFORAMINAL LUMBAR INTERBODY FUSION L4-S1 POSTERIOR SPINAL FUSION WITH INSTRUMENTATION L3-S1;  Surgeon: MIGDALIA Horton MD;  Location:  [facility] OR;  Service:           OT ASSESSMENT FLOWSHEET (last 72 hours)      OT Evaluation       03/16/17 0920 03/16/17 0819 03/14/17 1458 03/14/17 1042 03/14/17 0900    Rehab Evaluation    Document Type (P)  re-evaluation  -EK re-evaluation  -ND therapy note (daily note)  -TR  therapy note (daily note)  -TR    Subjective Information (P)  agree to therapy;dizziness;nausea/vomiting;weakness  -EK agree to therapy;complains of;weakness;pain;numbness   numbness from L. knee down to toes  -ND agree to therapy;complains of;pain  -TR  agree to therapy;complains of;pain;weakness  -TR    Patient Effort, Rehab Treatment   good  -TR  good  -TR    Symptoms Noted Comment (P)  pt reports numbness in R leg on ant thigh and foot; pt reports inability to  R toes  -EK Pt. requesting to get OOB so OT re-eval completed. Pt. stated numbness in LLE from knee down into toes and pt. unable to wiggle R. toes but is able to complete ankle pumps.   -ND       General Information    Patient Profile Review (P)  yes  -EK yes  -ND       Onset of Illness/Injury or Date of Surgery Date (P)  03/13/17  -EK        Referring Physician (P)  MIGDALIA Horton MD  -EK        General Observations (P)  pt sitting in chair on NC O2 with spouse  -EK Pt. side lying on L. side, alert, O2 via nc, SCDs donned, IV site  -ND       Pertinent History Of Current Problem (P)  s/p 3/15/17 R L3-S1 decompression, PLIF L5-S1, PSF with instrumentation L3-S1   -EK S/P 3/15/17 R. L3-S1, R. L3-4 hemilaminectomy facetectomy decompression transforaminal lumbar interbody fusion L4-S1 posterior fusion with instrumentation.   -ND        Precautions/Limitations (P)  brace on when up;fall precautions;spinal precautions  -EK brace on when up;fall precautions;spinal precautions  -ND brace on when up;spinal precautions;fall precautions   LSO  -TR  brace on when up;spinal precautions;fall precautions   LSO  -TR    Equipment Currently Used at Home    walker, rolling;shower chair;cane, straight  -KP     Plans/Goals Discussed With  patient;agreed upon  -ND       Risks Reviewed  patient:;LOB;nausea/vomiting;dizziness;increased discomfort;change in vital signs  -ND       Benefits Reviewed  patient:;improve function;increase independence;increase strength;increase balance;decrease pain;increase knowledge  -ND       Barriers to Rehab  physical barrier;impaired sensation  -ND       Living Environment    Lives With    spouse  -     Living Arrangements    house  -     Living Environment Comment  Living environment same as eval  -ND       Clinical Impression    OT Diagnosis  decreased adl  -ND       Prognosis  good  -ND       Impairments Found (describe specific impairments)  aerobic capacity/endurance;gait, locomotion, and balance;ergonomics and body mechanics;sensory Integrity;motor function  -ND       Patient/Family Goals Statement  to go home  -ND       Criteria for Skilled Therapeutic Interventions Met  yes;treatment indicated  -ND       Rehab Potential  good, to achieve stated therapy goals  -ND       Therapy Frequency  3-5 times/wk  -ND       Predicted Duration of Therapy Intervention (days/wks)  10 days  -ND       Anticipated Equipment Needs At Discharge  bathing equipment;dressing equipment  -ND       Anticipated Discharge Disposition  home with assist;home with home health  -ND       Vital Signs    Pretreatment Heart Rate (beats/min) (P)  97  -EK        Pre SpO2 (%) (P)  98  -EK        O2 Delivery Pre Treatment   room air  -TR  room air  -TR    Pain Assessment    Pain Assessment (P)  No/denies pain  -EK 0-10  -ND 0-10  -TR  0-10  -TR    Pain Score  7   -ND 7  -TR  3  -TR    Post Pain Score   7  -TR  3  -TR    Pain Type  Acute pain;Surgical pain  -ND Acute pain;Surgical pain  -TR  Surgical pain;Acute pain  -TR    Pain Location  Back  -ND Back  -TR  Back  -TR    Pain Orientation   Right  -TR  Right  -TR    Pain Descriptors  Aching  -ND Dull  -TR  Dull  -TR    Pain Intervention(s)  Medication (See MAR);Repositioned  -ND Medication (See MAR);Repositioned  -TR  Medication (See MAR);Repositioned  -TR    Response to Interventions  tolerated  -ND tolerated  -TR  tolerated  -TR    Vision Assessment/Intervention    Visual Impairment  WFL with corrective lenses  -ND       Cognitive Assessment/Intervention    Current Cognitive/Communication Assessment  functional  -ND functional  -TR  functional  -TR    Orientation Status  oriented x 4  -ND oriented x 4  -TR  oriented x 4  -TR    Follows Commands/Answers Questions  100% of the time;able to follow multi-step instructions  -ND able to follow multi-step instructions;100% of the time  -TR  able to follow multi-step instructions;100% of the time  -TR    Personal Safety  WNL/WFL  -ND       Personal Safety Interventions  fall prevention program maintained;gait belt;nonskid shoes/slippers when out of bed;supervised activity  -ND fall prevention program maintained;gait belt;nonskid shoes/slippers when out of bed  -TR  fall prevention program maintained;muscle strengthening facilitated;nonskid shoes/slippers when out of bed  -TR    ROM (Range of Motion)    General ROM  no range of motion deficits identified  -ND       General ROM Detail  BUE AROM WFL  -ND       MMT (Manual Muscle Testing)    General MMT Assessment  no strength deficits identified  -ND       Bed Mobility, Assessment/Treatment    Bed Mobility, Assistive Device  bed rails  -ND bed rails  -TR  bed rails  -TR    Bed Mobility, Scoot/Bridge, [place]   supervision required  -TR  supervision required  -TR    Bed Mob, Sidelying to Sit, [place]  verbal cues  required;nonverbal cues required (demo/gesture);minimum assist (75% patient effort)  -ND supervision required  -TR      Bed Mob, Sit to Sidelying, [place]  --   up in chair  -ND verbal cues required;contact guard assist  -TR  verbal cues required;contact guard assist  -TR    Bed Mobility, Safety Issues  decreased use of arms for pushing/pulling;decreased use of legs for bridging/pushing  -ND decreased use of arms for pushing/pulling  -TR  decreased use of arms for pushing/pulling  -TR    Bed Mobility, Impairments  pain;impaired balance  -ND strength decreased;pain  -TR  strength decreased;pain  -TR    Bed Mobility, Comment   Pt prefers to get out on the R side  -TR      Transfer Assessment/Treatment    Transfers, Sit-Stand [place]  verbal cues required;nonverbal cues required (demo/gesture);minimum assist (75% patient effort)  -ND supervision required  -TR  supervision required  -TR    Transfers, Stand-Sit [place]  verbal cues required;nonverbal cues required (demo/gesture);contact guard assist  -ND supervision required  -TR  supervision required  -TR    Transfers, Sit-Stand-Sit, Assist Device  rolling walker  -ND rolling walker  -TR  rolling walker  -TR    Toilet Transfer, [place]   contact guard assist  -TR  contact guard assist  -TR    Toilet Transfer, Assistive Device   rolling walker  -TR  rolling walker  -TR    Transfer, Safety Issues   step length decreased  -TR  step length decreased  -TR    Transfer, Impairments  sensory feedback impaired;impaired balance;strength decreased  -ND strength decreased;impaired balance  -TR  strength decreased;impaired balance  -TR    Functional Mobility    Functional Mobility- Ind. Level  verbal cues required;nonverbal cues required (demo/gesture);contact guard assist  -ND       Functional Mobility- Device  rolling walker  -ND       Functional Mobility-Distance (Feet)  --   from bed to chair  -ND       Upper Body Dressing Assessment/Training    UB Dressing  Assess/Train, Clothing Type  donning:   LSO brace  -ND       UB Dressing Assess/Train, Position  edge of bed  -ND       UB Dressing Assess/Train, [place]  moderate assist (50% patient effort)  -ND       UB Dressing Assess/Train, Impairments  decreased flexibility;pain  -ND       Lower Body Dressing Assessment/Training    LB Dressing Assess/Train, Clothing Type  doffing:;donning:;socks  -ND       LB Dressing Assess/Train, Position  edge of bed  -ND       LB Dressing Assess/Train, [place]  maximum assist (25% patient effort)  -ND       LB Dressing Assess/Train, Impairments  decreased flexibility;pain  -ND       Motor Skills/Interventions    Additional Documentation  Balance Skills Training (Group)  -ND       Balance Skills Training    Sitting-Level of Assistance  Close supervision  -ND       Sitting-Balance Support  Right upper extremity supported;Left upper extremity supported;Feet supported  -ND       Standing-Level of Assistance  Contact guard  -ND       Static Standing Balance Support  assistive device  -ND       Gait Balance-Level of Assistance  Contact guard  -ND       Gait Balance Support  assistive device  -ND       Orthotics Prosthetics    Additional Documentation  Orthosis Location (Group);Orthosis Management/Training (Group)  -ND Orthosis Location (Group)  -TR  Orthosis Location (Group)  -TR    Orthosis Location    Orthosis Location/Type  neck/back  -ND neck/back  -TR  neck/back  -TR    Orthosis, Neck/Back  LSO (lumbar sacral orthosis)  -ND LSO (lumbar sacral orthosis)  -TR  LSO (lumbar sacral orthosis)  -TR    Orthosis Management/Training    Orthosis Indications  immobilize, protect/position healing structures;rest, reduce pain  -ND immobilize, protect/position healing structures  -TR  immobilize, protect/position healing structures  -TR    Orthosis Skills Training  donning orthosis;doffing orthosis;purpose/goals of orthosis;restrictions/precautions  -ND donning orthosis;doffing orthosis  -TR   doffing orthosis;donning orthosis;purpose/goals of orthosis;restrictions/precautions  -TR    Orthosis Wear Schedule  wear when out of bed only  -ND wear when out of bed only  -TR  wear when out of bed only  -TR    Sensory Assessment/Intervention    Sensory Impairment  --   Numbness in LLE  -ND       Light Touch (P)  RLE  -EK        RLE Light Touch (P)  moderate impairment  -EK        Sharp/Dull Discrimination (P)  RLE;LLE  -EK        General Therapy Interventions    Planned Therapy Interventions  adaptive equipment training;activity intolerance;ADL retraining;balance training;bed mobility training;energy conservation;orthotic fitting/training;transfer training  -ND       Positioning and Restraints    Pre-Treatment Position  in bed  -ND in bed  -TR  in bed  -TR    Post Treatment Position  chair  -ND bed  -TR  bed  -TR    In Bed   fowlers;call light within reach;encouraged to call for assist;side rails up x2;SCD pump applied;exit alarm on  -TR  fowlers;call light within reach;encouraged to call for assist;side rails up x2  -TR    In Chair  sitting;call light within reach;encouraged to call for assist;with brace  -ND         03/13/17 1540 03/13/17 1537 03/13/17 1441 03/13/17 1200       Rehab Evaluation    Document Type   evaluation   See MAR  -PB (r) KD (t) PB (c) evaluation   See MAR, entered room 1441  -ND     Subjective Information   agree to therapy;complains of;pain;weakness;fatigue   shakiness  -PB (r) KD (t) PB (c) agree to therapy;complains of;pain;weakness;fatigue;nausea/vomiting  -ND     Patient Effort, Rehab Treatment   good  -PB (r) KD (t) PB (c) good  -ND     Symptoms Noted During/After Treatment   --   nausea  -PB (r) KD (t) PB (c) --   nausea  -ND     General Information    Patient Profile Review   yes  -PB (r) KD (t) PB (c) yes  -ND     Onset of Illness/Injury or Date of Surgery Date   03/13/17  -PB (r) KD (t) PB (c) 03/13/17  -ND     Referring Physician   Dr. Horton  -PB (r) KD (t) PB (c) Dr. Horton   -ND     General Observations   Pt fowlers in bed, IV site, O2 via NC, alert, B SCD pumps  -PB (r) KD (t) PB (c) Pt. fowlers in bed, alert, SCDs donned, IV site  -ND     Pertinent History Of Current Problem   Pt. having severe back pain last several years. Back pain radiates mainly through L. buttocks down L. legs with some R. side pain. S/P 3/13/17 L. lumbar lateral interbody fusion L3-5 with instrumentation. All labs therapeutic. 2nd part back scheduled 3/15/17   -PB (r) KD (t) PB (c) Pt. having severe back pain last several years. Back pain radiates mainly through L. buttocks down L. legs with some R. side pain.  S/P 3/13/17 L. lumbar lateral interbody fusion L3-5 with instrumentation. All labs therapeutic. 2nd part back scheduled 3/15/17   -ND     Precautions/Limitations   brace on when up;spinal precautions;fall precautions  -PB (r) KD (t) PB (c) brace on when up;fall precautions;spinal precautions  -ND     Prior Level of Function   independent:;all household mobility;community mobility;gait;transfer;ADL's;dressing;bathing;home management;cooking;cleaning;driving  -PB (r) KD (t) PB (c) independent:;all household mobility;community mobility;ADL's;home management  -ND     Equipment Currently Used at Home  walker, rolling;cane, straight;shower chair  -LR walker, rolling;bath bench;cane, straight;grab bar  -PB (r) KD (t) PB (c) bath bench;walker, rolling;cane, straight;grab bar  -ND     Plans/Goals Discussed With   patient;agreed upon  -PB (r) KD (t) PB (c) patient;agreed upon  -ND     Risks Reviewed   patient:;nausea/vomiting;dizziness;increased discomfort;LOB;change in vital signs  -PB (r) KD (t) PB (c) patient:;nausea/vomiting;LOB;increased discomfort;dizziness;change in vital signs  -ND     Benefits Reviewed   patient:;improve function;increase independence;increase strength;increase balance;decrease pain  -PB (r) KD (t) PB (c) patient:;improve function;increase independence;increase strength;increase  balance;decrease pain;increase knowledge  -ND     Barriers to Rehab   physical barrier  -PB (r) KD (t) PB (c) physical barrier  -ND     Living Environment    Lives With spouse  -LR  spouse  -PB (r) KD (t) PB (c) spouse  -ND     Living Arrangements house  -LR  house  -PB (r) KD (t) PB (c) house  -ND     Home Accessibility bed and bath on same level  -LR  stairs to enter home;tub/shower is not walk in;bed and bath on same level;grab bars present (bathtub)  -PB (r) KD (t) PB (c) stairs to enter home;tub/shower is not walk in;bed and bath on same level;grab bars present (bathtub)  -ND     Number of Stairs to Enter Home   4  -PB (r) KD (t) PB (c) 4  -ND     Stair Railings at Home outside, present on right side  -LR  outside, present on right side  -PB (r) KD (t) PB (c) outside, present on right side  -ND     Type of Financial/Environmental Concern none  -LR        Transportation Available none  -LR        Clinical Impression    Date of Referral to OT    03/13/17  -ND     OT Diagnosis    decreased adl  -ND     Prognosis    good  -ND     Impairments Found (describe specific impairments)    ergonomics and body mechanics;gait, locomotion, and balance  -ND     Patient/Family Goals Statement    to go home  -ND     Criteria for Skilled Therapeutic Interventions Met    yes;treatment indicated  -ND     Rehab Potential    good, to achieve stated therapy goals  -ND     Therapy Frequency    3-5 times/wk  -ND     Predicted Duration of Therapy Intervention (days/wks)    10 days  -ND     Anticipated Equipment Needs At Discharge    bathing equipment;dressing equipment   depending on pt. progress  -ND     Anticipated Discharge Disposition    home with assist;home with home health  -ND     Functional Level Prior    Ambulation  0-->independent  -LR       Transferring  0-->independent  -LR       Toileting  0-->independent  -LR       Bathing  0-->independent  -LR       Dressing  0-->independent  -LR       Eating  0-->independent  -LR        Communication  0-->understands/communicates without difficulty  -LR       Swallowing  0-->swallows foods/liquids without difficulty  -LR       Vital Signs    Pre SpO2 (%)   96  -PB (r) KD (t) PB (c)      O2 Delivery Pre Treatment   supplemental O2   2L  -PB (r) KD (t) PB (c)      Pre Patient Position   Sitting  -PB (r) KD (t) PB (c)      Pain Assessment    Pain Assessment   0-10  -PB (r) KD (t) PB (c) 0-10  -ND     Pain Score   3  -PB (r) KD (t) PB (c) 3  -ND     Pain Type   Surgical pain;Acute pain  -PB (r) KD (t) PB (c) Chronic pain;Acute pain  -ND     Pain Location   Back  -PB (r) KD (t) PB (c) Back  -ND     Pain Orientation   Right  -PB (r) KD (t) PB (c) Right  -ND     Pain Descriptors   Dull  -PB (r) KD (t) PB (c) Dull  -ND     Pain Intervention(s)   Medication (See MAR);Repositioned  -PB (r) KD (t) PB (c) Medication (See MAR);Repositioned  -ND     Response to Interventions   tolerated  -PB (r) KD (t) PB (c) tolerated  -ND     Vision Assessment/Intervention    Visual Impairment   WFL with corrective lenses  -PB (r) KD (t) PB (c) WFL with corrective lenses  -ND     Cognitive Assessment/Intervention    Current Cognitive/Communication Assessment   functional  -PB (r) KD (t) PB (c) functional  -ND     Orientation Status   oriented x 4  -PB (r) KD (t) PB (c) oriented x 4  -ND     Follows Commands/Answers Questions   100% of the time;able to follow multi-step instructions  -PB (r) KD (t) PB (c) 100% of the time;able to follow multi-step instructions  -ND     Personal Safety   WNL/WFL  -PB (r) KD (t) PB (c) WNL/WFL  -ND     Personal Safety Interventions   fall prevention program maintained;gait belt;nonskid shoes/slippers when out of bed;supervised activity  -PB (r) KD (t) PB (c) fall prevention program maintained;gait belt;nonskid shoes/slippers when out of bed;supervised activity  -ND     ROM (Range of Motion)    General ROM    no range of motion deficits identified  -ND     General ROM Detail   B LE AROM WFL  -PB  (r) KD (t) PB (c) BUE AROM WFL  -ND     MMT (Manual Muscle Testing)    General MMT Assessment    no strength deficits identified  -ND     General MMT Assessment Detail   B LE functionally at least 3+/5  -PB (r) KD (t) PB (c)      Bed Mobility, Assessment/Treatment    Bed Mobility, Assistive Device   bed rails  -PB (r) KD (t) PB (c) bed rails  -ND     Bed Mobility, Roll Left, [place]   supervision required;verbal cues required  -PB (r) KD (t) PB (c) supervision required  -ND     Bed Mobility, Scoot/Bridge, [place]   supervision required;verbal cues required  -PB (r) KD (t) PB (c) supervision required  -ND     Bed Mob, Sidelying to Sit, [place]   verbal cues required;supervision required  -PB (r) KD (t) PB (c) supervision required;verbal cues required;nonverbal cues required (demo/gesture)  -ND     Bed Mob, Sit to Sidelying, [place]   verbal cues required;supervision required  -PB (r) KD (t) PB (c) verbal cues required;nonverbal cues required (demo/gesture);supervision required  -ND     Bed Mobility, Safety Issues   decreased use of arms for pushing/pulling;decreased use of legs for bridging/pushing  -PB (r) KD (t) PB (c)      Bed Mobility, Impairments   strength decreased;pain  -PB (r) KD (t) PB (c) pain  -ND     Bed Mobility, Comment   Pt educated on log rolling technique to maintain spinal precautions  -PB (r) KD (t) PB (c)      Transfer Assessment/Treatment    Transfers, Sit-Stand [place]   supervision required;verbal cues required;2 person assist required  -PB (r) KD (t) PB (c) verbal cues required;nonverbal cues required (demo/gesture);contact guard assist;2 person assist required;upper extremity support  -ND     Transfers, Stand-Sit [place]   supervision required;verbal cues required;2 person assist required  -PB (r) KD (t) PB (c) verbal cues required;nonverbal cues required (demo/gesture);contact guard assist;2 person assist required;upper extremity support  -ND     Transfers,  Sit-Stand-Sit, Assist Device   rolling walker   HHA x 2 progressed to RW  -PB (r) KD (t) PB (c) rolling walker   HHAx2 initially then progressed with r/w  -ND     Toilet Transfer, [place]   contact guard assist;verbal cues required;2 person assist required  -PB (r) KD (t) PB (c) verbal cues required;nonverbal cues required (demo/gesture);contact guard assist  -ND     Toilet Transfer, Assistive Device   rolling walker  -PB (r) KD (t) PB (c) rolling walker   grab bar  -ND     Transfer, Safety Issues   step length decreased;balance decreased during turns  -PB (r) KD (t) PB (c)      Transfer, Impairments   strength decreased;impaired balance;pain  -PB (r) KD (t) PB (c) impaired balance  -ND     Functional Mobility    Functional Mobility- Ind. Level    verbal cues required;nonverbal cues required (demo/gesture);contact guard assist;2 person assist required   HHAX2 initially then progressed with r/w  -ND     Functional Mobility- Device    rolling walker  -ND     Functional Mobility-Distance (Feet)    --   from room to bathroom and back to bed  -ND     Functional Mobility- Comment    Pt. became nauseated while walking in bathroom and had to lay back down  -ND     Lower Body Dressing Assessment/Training    LB Dressing Assess/Train, Clothing Type    doffing:;donning:;socks  -ND     LB Dressing Assess/Train, Position    edge of bed  -ND     LB Dressing Assess/Train, [place]    maximum assist (25% patient effort)  -ND     LB Dressing Assess/Train, Impairments    decreased flexibility;pain  -ND     Toileting Assessment/Training    Toileting Assess/Train, Assistive Device    grab bars  -ND     Toileting Assess/Train, Position    sitting;standing  -ND     Toileting Assess/Train, Indepen Level    contact guard assist;nonverbal cues required (demo/gesture);verbal cues required  -ND     Toileting Assess/Train, Impairments    impaired balance;pain  -ND     Grooming Assessment/Training    Grooming Assess/Train, Assistive  Device    --   wash/dry hands  -ND     Grooming Assess/Train, Position    sink side  -ND     Grooming Assess/Train, Indepen Level    supervision required  -ND     Motor Skills/Interventions    Additional Documentation   Balance Skills Training (Group)  -PB (r) KD (t) PB (c) Balance Skills Training (Group)  -ND     Balance Skills Training    Sitting-Level of Assistance   Contact guard;x2  -PB (r) KD (t) PB (c) Close supervision  -ND     Sitting-Balance Support   Right upper extremity supported;Left upper extremity supported;Feet supported  -PB (r) KD (t) PB (c) Right upper extremity supported;Left upper extremity supported;Feet supported  -ND     Standing-Level of Assistance   Contact guard;x2  -PB (r) KD (t) PB (c) Contact guard;x2  -ND     Static Standing Balance Support   assistive device   or HHA x 2  -PB (r) KD (t) PB (c) Left upper extremity supported;Right upper extremity supported  -ND     Gait Balance-Level of Assistance   Contact guard;x2  -PB (r) KD (t) PB (c) Contact guard;x2  -ND     Gait Balance Support   assistive device   HHA x 2 or RW  -PB (r) KD (t) PB (c) Right upper extremity supported;Left upper extremity supported;assistive device   HHAX2 then progressed to assitive device  -ND     Orthotics Prosthetics    Additional Documentation   Orthosis Location (Group);Orthosis Management/Training (Group)  -PB (r) KD (t) PB (c) Orthosis Management/Training (Group);Orthosis Location (Group)  -ND     Orthosis Location    Orthosis Location/Type   neck/back  -PB (r) KD (t) PB (c) neck/back  -ND     Orthosis, Neck/Back   LSO (lumbar sacral orthosis)  -PB (r) KD (t) PB (c) LSO (lumbar sacral orthosis)  -ND     Orthosis Management/Training    Orthosis Indications   immobilize, protect/position healing structures;restrict motion;rest, reduce pain;rest, reduce inflammation  -PB (r) KD (t) PB (c) immobilize, protect/position healing structures;rest, reduce pain  -ND     Orthosis Skills Training   doffing  orthosis;donning orthosis;purpose/goals of orthosis  -PB (r) KD (t) PB (c) donning orthosis;doffing orthosis;purpose/goals of orthosis;restrictions/precautions  -ND     Orthosis Wear Schedule   wear when out of bed only;wear with activity/work  -PB (r) KD (t) PB (c) wear when out of bed only  -ND     Sensory Assessment/Intervention    Sensory Impairment   --   WNL per pt  -PB (r) KD (t) PB (c) --   WNL per pt.   -ND     General Therapy Interventions    Planned Therapy Interventions    activity intolerance;ADL retraining;adaptive equipment training;balance training;bed mobility training;transfer training  -ND     Positioning and Restraints    Pre-Treatment Position   in bed  -PB (r) KD (t) PB (c) in bed  -ND     Post Treatment Position   bed  -PB (r) KD (t) PB (c) bed  -ND     In Bed   fowlers;call light within reach;encouraged to call for assist;side rails up x3;SCD pump applied  -PB (r) KD (t) PB (c) fowlers;call light within reach;encouraged to call for assist;side rails up x2;SCD pump applied  -ND       User Key  (r) = Recorded By, (t) = Taken By, (c) = Cosigned By    Initials Name Effective Dates    LR Denisha Dorado RN 08/02/16 -     IRMA Limon, PTA 08/02/16 -     PB Dilshad Hicks, PT DPT 08/02/16 -     KP Lena Watt, BSW 09/15/16 -     ND Beatrice Leyva, OTR/L 10/21/16 -     KD Yarelis Hampton, PT Student 12/19/16 -     EK Kathi Johnson, PT Student 03/23/16 -            Occupational Therapy Education     Title: PT OT SLP Therapies (Active)     Topic: Occupational Therapy (Done)     Point: ADL training (Done)    Description: Instruct learner(s) on proper safety adaptation and remediation techniques during self care or transfers.   Instruct in proper use of assistive devices.    Learning Progress Summary    Learner Readiness Method Response Comment Documented by Status   Patient Acceptance E VU,NR Pt. educated on role of OT, safe t/f, brace use/care, back precautions ND 03/16/17 0946 Done     Acceptance E VU,NR Pt. educated on role of OT, safe t/f, safe functional mob, LSO brace use/care, back precautions, and progression with poc ND 03/13/17 1550 Done               Point: Home exercise program (Done)    Description: Instruct learner(s) on appropriate technique for monitoring, assisting and/or progressing therapeutic exercises/activities.    Learning Progress Summary    Learner Readiness Method Response Comment Documented by Status   Patient Acceptance E VU,NR Pt. educated on role of OT, safe t/f, brace use/care, back precautions ND 03/16/17 0946 Done    Acceptance E VU,NR Pt. educated on role of OT, safe t/f, safe functional mob, LSO brace use/care, back precautions, and progression with poc ND 03/13/17 1550 Done               Point: Precautions (Done)    Description: Instruct learner(s) on prescribed precautions during self-care and functional transfers.    Learning Progress Summary    Learner Readiness Method Response Comment Documented by Status   Patient Acceptance E VU,NR Pt. educated on role of OT, safe t/f, brace use/care, back precautions ND 03/16/17 0946 Done    Acceptance E VU,NR Pt. educated on role of OT, safe t/f, safe functional mob, LSO brace use/care, back precautions, and progression with poc ND 03/13/17 1550 Done               Point: Body mechanics (Done)    Description: Instruct learner(s) on proper positioning and spine alignment during self-care, functional mobility activities and/or exercises.    Learning Progress Summary    Learner Readiness Method Response Comment Documented by Status   Patient Acceptance E VU,NR Pt. educated on role of OT, safe t/f, brace use/care, back precautions ND 03/16/17 0946 Done    Acceptance E VU,NR Pt. educated on role of OT, safe t/f, safe functional mob, LSO brace use/care, back precautions, and progression with poc ND 03/13/17 1550 Done                      User Key     Initials Effective Dates Name Provider Type Discipline    ND 10/21/16 -   Beatrice Leyva, OTR/L Occupational Therapist OT                  OT Recommendation and Plan  Anticipated Equipment Needs At Discharge: bathing equipment, dressing equipment  Anticipated Discharge Disposition: home with assist, home with home health  Planned Therapy Interventions: adaptive equipment training, activity intolerance, ADL retraining, balance training, bed mobility training, energy conservation, orthotic fitting/training, transfer training  Therapy Frequency: 3-5 times/wk  Plan of Care Review  Plan Of Care Reviewed With: patient  Progress: progress toward functional goals as expected  Outcome Summary/Follow up Plan: OT re-eval completed this date after 2nd part back sx. Pt. requesting to get OOB due to back pain lying in bed. Pt. required Min A for bed mobility. Pt. required Min A for sit to stand t/f with r/w. Pt. required Mod A to jennifer LSO brace EOB. Pt. required CGA with r/w to complete functional mob. Pt. stated she had numbness in LLE and unable to wiggle toes during eval. Pt. cont to benefit from skilled OT due to decreased balance, increased pain, and decreased independence in ADLs. 3/16/17 0850          OT Goals       03/16/17 0947 03/13/17 1551       Transfer Training OT LTG    Transfer Training OT LTG, Date Established  03/13/17  -ND     Transfer Training OT LTG, Time to Achieve  by discharge  -ND     Transfer Training OT LTG, Activity Type  all transfers  -ND     Transfer Training OT LTG, [place]  conditional independence  -ND     Transfer Training OT LTG, Assist Device  walker, rolling;commode, bedside  -ND     Transfer Training OT LTG, Date Goal Reviewed 03/16/17  -ND      Transfer Training OT LTG, Outcome goal ongoing  -ND      Toileting OT LTG    Toileting Goal OT LTG, Date Established  03/13/17  -ND     Toileting Goal OT LTG, Time to Achieve  by discharge  -ND     Toileting Goal OT LTG, [place]  supervision required  -ND     Toileting Goal OT LTG, Additional Goal   Practice wiping after bowel movement.   -ND     Toileting Goal OT LTG, Date Goal Reviewed 03/16/17  -ND      Toileting Goal OT LTG, Outcome goal ongoing  -ND      LB Dressing OT LTG    LB Dressing Goal OT LTG, Date Established  03/13/17  -ND     LB Dressing Goal OT LTG, Time to Achieve  by discharge  -ND     LB Dressing Goal OT LTG, [place] Level  conditional independence  -ND     LB Dressing Goal OT LTG, Additional Goal  AE PRN  -ND     LB Dressing Goal OT LTG, Date Goal Reviewed 03/16/17  -ND      LB Dressing Goal OT LTG, Outcome goal ongoing  -ND        User Key  (r) = Recorded By, (t) = Taken By, (c) = Cosigned By    Initials Name Provider Type    MATT Leyva, OTR/L Occupational Therapist                Outcome Measures       03/16/17 0900 03/14/17 0900 03/13/17 1500    How much help from another person do you currently need...    Turning from your back to your side while in flat bed without using bedrails?  3  -TR     Moving from lying on back to sitting on the side of a flat bed without bedrails?  3  -TR     Moving to and from a bed to a chair (including a wheelchair)?  3  -TR     Standing up from a chair using your arms (e.g., wheelchair, bedside chair)?  3  -TR     Climbing 3-5 steps with a railing?  3  -TR     To walk in hospital room?  3  -TR     AM-PAC 6 Clicks Score  18  -TR     How much help from another is currently needed...    Putting on and taking off regular lower body clothing? 2  -ND  2  -ND    Bathing (including washing, rinsing, and drying) 2  -ND  2  -ND    Toileting (which includes using toilet bed pan or urinal) 3  -ND  3  -ND    Putting on and taking off regular upper body clothing 3  -ND  3  -ND    Taking care of personal grooming (such as brushing teeth) 4  -ND  4  -ND    Eating meals 4  -ND  4  -ND    Score 18  -ND  18  -ND    Functional Assessment    Outcome Measure Options  AM-PAC 6 Clicks Basic Mobility (PT)  -TR AM-PAC 6 Clicks Daily Activity (OT)  -ND      03/13/17 9931           How much help from another person do you currently need...    Turning from your back to your side while in flat bed without using bedrails? 3  -PB (r) KD (t) PB (c)      Moving from lying on back to sitting on the side of a flat bed without bedrails? 3  -PB (r) KD (t) PB (c)      Moving to and from a bed to a chair (including a wheelchair)? 3  -PB (r) KD (t) PB (c)      Standing up from a chair using your arms (e.g., wheelchair, bedside chair)? 3  -PB (r) KD (t) PB (c)      Climbing 3-5 steps with a railing? 2  -PB (r) KD (t) PB (c)      To walk in hospital room? 3  -PB (r) KD (t) PB (c)      AM-PAC 6 Clicks Score 17  -PB (r) KD (t)      Functional Assessment    Outcome Measure Options AM-PAC 6 Clicks Basic Mobility (PT)  -PB (r) KD (t) PB (c)        User Key  (r) = Recorded By, (t) = Taken By, (c) = Cosigned By    Initials Name Provider Type    TR Lyn Limon, PTA Physical Therapy Assistant    PB Dilshad Hicks, PT DPT Physical Therapist    ND Beatrice Leyva OTR/L Occupational Therapist    HENRY Hampton, PT Student PT Student          Time Calculation:   OT Start Time: 0820  OT Stop Time: 0850  OT Time Calculation (min): 30 min    Therapy Charges for Today     Code Description Service Date Service Provider Modifiers Qty    [id number] HC OT RE-EVAL 2 3/16/2017 SOFI Painter/L GO, KX 1          OT G-codes  OT Functional Scales Options: AM-PAC 6 Clicks Daily Activity (OT)  Functional Limitation: Self care  Self Care Current Status (): At least 40 percent but less than 60 percent impaired, limited or restricted  Self Care Goal Status (): At least 20 percent but less than 40 percent impaired, limited or restricted    SOFI Wood/CARLOS  3/16/2017

## 2017-03-16 NOTE — PROGRESS NOTES
Acute Care - Physical Therapy Re-Evaluation  [facility]     Patient Name: Alisia Velez  : 1945  MRN: [id number]  Today's Date: 3/16/2017   Onset of Illness/Injury or Date of Surgery Date: 17  Date of Referral to PT: 03/15/17  Referring Physician: MIGDALIA Horton MD      Admit Date: 3/13/2017     Visit Dx:    ICD-10-CM ICD-9-CM   1. Impaired functional mobility and activity tolerance Z74.09 V49.89   2. Decreased activities of daily living (ADL) Z78.9 V49.89     Patient Active Problem List   Diagnosis   • Osteopenia   • Mixed hyperlipidemia   • Essential hypertension   • Type 2 diabetes mellitus without complication, without long-term current use of insulin   • Spinal stenosis, lumbar   • Gastroesophageal reflux disease without esophagitis     Past Medical History   Diagnosis Date   • Arthritis    • Back pain    • Blister of great toe of left foot      Nonthermal, initial encounter   • Cancer      skin    • Dyslipidemia    • Elevated blood pressure    • Essential hypertension 2016   • GERD (gastroesophageal reflux disease)    • History of cerebrovascular accident         • Hypo-osmolality and hyponatremia    • Ingrowing nail    • Onychomycosis    • PONV (postoperative nausea and vomiting)    • Snores    • Type 2 diabetes mellitus    • Type 2 diabetes mellitus with circulatory disorder 2016   • Urinary incontinence    • Vitamin D deficiency      Past Surgical History   Procedure Laterality Date   • Blepharoplasty     • Carpal tunnel release     • Nail bed removal/revision  2016   • Lumbar disc surgery     • Neck surgery     • Laparoscopic nissen fundoplication     • Cervical spine surgery       cydney-laminotomy c7-t1   • Other surgical history       had left and right big toenials removed mar 2016   • Colonoscopy     • Endoscopy     • Cardiac catheterization     • Lumbar fusion Left 3/13/2017     Procedure: LEFT LUMBAR LATERAL INTERBODY FUSION L 3-5 WITH INSTRUMENTATION;  Surgeon: MIGDALIA  Jose Horton MD;  Location:  [facility] OR;  Service:    • Lumbar laminectomy with fusion Right 3/15/2017     Procedure: RIGHT L3-S1 POSSIBLE RIGHT L 3-4 HEMILAMINECTOMY FACETECTOMY DECOMPRESSION TRANSFORAMINAL LUMBAR INTERBODY FUSION L4-S1 POSTERIOR SPINAL FUSION WITH INSTRUMENTATION L3-S1;  Surgeon: MIGDALIA Horton MD;  Location:  [facility] OR;  Service:           PT ASSESSMENT (last 72 hours)      PT Evaluation       03/16/17 1405 03/16/17 0920    Rehab Evaluation    Document Type therapy note (daily note)  -MS (r) EK (t) MS (c) re-evaluation  -MS (r) EK (t) MS (c)    Subjective Information agree to therapy;complains of;pain  -MS (r) EK (t) MS (c) agree to therapy;dizziness;nausea/vomiting;weakness  -MS (r) EK (t) MS (c)    Symptoms Noted Comment pt reports continued numbness in R foot and inability to DF R ankle  -MS (r) EK (t) MS (c) pt reports numbness in R leg on ant thigh and foot; pt reports inability to  R toes  -MS (r) EK (t) MS (c)    General Information    Patient Profile Review  yes  -MS (r) EK (t) MS (c)    Onset of Illness/Injury or Date of Surgery Date  03/13/17  -MS (r) EK (t) MS (c)    Referring Physician  MIGDALIA Horton MD  -MS (r) EK (t) MS (c)    General Observations  pt sitting in chair on NC O2 with spouse  -MS (r) EK (t) MS (c)    Pertinent History Of Current Problem  s/p 3/15/17 R L3-S1 decompression, PLIF L5-S1, PSF with instrumentation L3-S1   -MS (r) EK (t) MS (c)    Precautions/Limitations brace on when up;fall precautions  -MS (r) EK (t) MS (c) brace on when up;fall precautions;spinal precautions  -MS (r) EK (t) MS (c)    Plans/Goals Discussed With  patient;agreed upon  -MS (r) EK (t) MS (c)    Risks Reviewed  patient:;LOB;nausea/vomiting;dizziness;increased discomfort;change in vital signs;increased drainage;lines disloged  -MS (r) EK (t) MS (c)    Benefits Reviewed  patient:;improve function;increase independence;increase strength;increase balance;decrease pain;decrease risk  of DVT;improve skin integrity;increase knowledge  -MS (r) EK (t) MS (c)    Barriers to Rehab  physical barrier;impaired sensation  -MS (r) EK (t) MS (c)    Clinical Impression    Date of Referral to PT  03/15/17  -MS (r) EK (t) MS (c)    Criteria for Skilled Therapeutic Interventions Met  yes;treatment indicated  -MS (r) EK (t) MS (c)    Pathology/Pathophysiology Noted (Describe Specifically for Each System)  neuromuscular  -MS (r) EK (t) MS (c)    Rehab Potential  good, to achieve stated therapy goals  -MS (r) EK (t) MS (c)    Predicted Duration of Therapy Intervention (days/wks)  until discharge  -MS (r) EK (t) MS (c)    Vital Signs    Pretreatment Heart Rate (beats/min) 90  -MS (r) EK (t) MS (c) 97  -MS (r) EK (t) MS (c)    Intratreatment Heart Rate (beats/min) 102  -MS (r) EK (t) MS (c)     Posttreatment Heart Rate (beats/min) 100  -MS (r) EK (t) MS (c) 105  -MS (r) EK (t) MS (c)    Pre SpO2 (%) 91  -MS (r) EK (t) MS (c) 98  -MS (r) EK (t) MS (c)    O2 Delivery Pre Treatment room air  -MS (r) EK (t) MS (c) room air  -MS (r) EK (t) MS (c)    Intra SpO2 (%) 93  -MS (r) EK (t) MS (c)     O2 Delivery Intra Treatment room air  -MS (r) EK (t) MS (c)     Post SpO2 (%) 94  -MS (r) EK (t) MS (c) 95  -MS (r) EK (t) MS (c)    O2 Delivery Post Treatment room air  -MS (r) EK (t) MS (c) room air  -MS (r) EK (t) MS (c)    Pre Patient Position Sitting  -MS (r) EK (t) MS (c) Sitting  -MS (r) EK (t) MS (c)    Intra Patient Position Standing  -MS (r) EK (t) MS (c)     Post Patient Position Sitting  -MS (r) EK (t) MS (c) Sitting  -MS (r) EK (t) MS (c)    Pain Assessment    Pain Assessment 0-10  -MS (r) EK (t) MS (c) No/denies pain  -MS (r) EK (t) MS (c)    Pain Score 3  -MS (r) EK (t) MS (c)     Post Pain Score 3  -MS (r) EK (t) MS (c)     Pain Type Acute pain;Surgical pain  -MS (r) EK (t) MS (c)     Pain Location Back  -MS (r) EK (t) MS (c)     Pain Descriptors Aching  -MS (r) EK (t) MS (c)     Pain Intervention(s)  Repositioned;Ambulation/increased activity  -MS (r) EK (t) MS (c)     Response to Interventions tolerated  -MS (r) EK (t) MS (c)     Vision Assessment/Intervention    Visual Impairment  WFL with corrective lenses  -MS (r) EK (t) MS (c)    Cognitive Assessment/Intervention    Current Cognitive/Communication Assessment functional  -MS (r) EK (t) MS (c) functional  -MS (r) EK (t) MS (c)    Orientation Status oriented x 4  -MS (r) EK (t) MS (c) oriented x 4  -MS (r) EK (t) MS (c)    Follows Commands/Answers Questions able to follow multi-step instructions;100% of the time  -MS (r) EK (t) MS (c) able to follow multi-step instructions;100% of the time  -MS (r) EK (t) MS (c)    Personal Safety WNL/WFL  -MS (r) EK (t) MS (c) WNL/WFL  -MS (r) EK (t) MS (c)    Personal Safety Interventions fall prevention program maintained;gait belt;nonskid shoes/slippers when out of bed;supervised activity  -MS (r) EK (t) MS (c) fall prevention program maintained;gait belt;nonskid shoes/slippers when out of bed;supervised activity  -MS (r) EK (t) MS (c)    ROM (Range of Motion)    General ROM  no range of motion deficits identified  -MS (r) EK (t) MS (c)    MMT (Manual Muscle Testing)    General MMT Assessment  lower extremity strength deficits identified  -MS (r) EK (t) MS (c)    General MMT Assessment Detail  BLE functionally 4/5 except R ankle DF (1/5)  -MS (r) EK (t) MS (c)    Lower Extremity    Lower Ext Manual Muscle Testing  right ankle strength deficit  -MS (r) EK (t) MS (c)    Lower Ext Manual Muscle Testing Detail  R ankle DF 1/5  -MS (r) EK (t) MS (c)    Transfer Assessment/Treatment    Transfers, Sit-Stand [place] contact guard assist;verbal cues required  -MS (r) EK (t) MS (c) verbal cues required;contact guard assist;2 person assist required  -MS    Transfers, Stand-Sit [place] contact guard assist;verbal cues required  -MS (r) EK (t) MS (c) verbal cues required;contact guard assist  -MS    Transfers, Sit-Stand-Sit,  Assist Device rolling walker  -MS (r) EK (t) MS (c) rolling walker  -MS (r) EK (t) MS (c)    Toilet Transfer, [place] contact guard assist;verbal cues required  -MS (r) EK (t) MS (c)     Toilet Transfer, Assistive Device rolling walker  -MS (r) EK (t) MS (c)     Transfer, Safety Issues  weight-shifting ability decreased  -MS (r) EK (t) MS (c)    Transfer, Impairments sensation decreased;impaired balance;pain  -MS (r) EK (t) MS (c) sensation decreased;sensory feedback impaired;impaired balance;strength decreased  -MS (r) EK (t) MS (c)    Transfer, Comment pt required minimal VC's for correct body mechanics; demonstrates carryover from evaluation this morning  -MS (r) EK (t) MS (c) pt required cueing for proper foot placement and body mechanics to perform sit to stand  -MS (r) EK (t) MS (c)    Gait Assessment/Treatment    Gait, [place] Level verbal cues required;contact guard assist  -MS (r) EK (t) MS (c) verbal cues required;contact guard assist;2 person assist required  -MS (r) EK (t) MS (c)    Gait, Assistive Device rolling walker  -MS (r) EK (t) MS (c) rolling walker  -MS (r) EK (t) MS (c)    Gait, Distance (Feet) 30  -MS (r) EK (t) MS (c) 10  -MS (r) EK (t) MS (c)    Gait, Gait Pattern Analysis swing-through gait  -MS (r) EK (t) MS (c) swing-to gait  -MS (r) EK (t) MS (c)    Gait, Gait Deviations step length decreased   decreased L foot clearance intermittently  -MS (r) EK (t) MS (c) step length decreased;stride length decreased;weight-shifting ability decreased;decreased heel strike;left:   decreased foot clearance  -MS (r) EK (t) MS (c)    Gait, Safety Issues step length decreased  -MS (r) EK (t) MS (c) sequencing ability decreased;step length decreased;weight-shifting ability decreased  -MS (r) EK (t) MS (c)    Gait, Impairments sensation decreased;strength decreased;impaired balance;sensory feedback impaired  -MS (r) EK (t) MS (c) sensation decreased;strength decreased;impaired balance;sensory  feedback impaired  -MS (r) EK (t) MS (c)    Gait, Comment Pt demonstrates improved weight bearing and L foot clearance with VC's   -MS (r) EK (t) MS (c) Pt demonstrates poor weight shifting ability leading to decreased clearance and heel strike of the L foot; pt demos drop foot on the R leading to increased R hip flexion during ambulation  -MS (r) EK (t) MS (c)    Motor Skills/Interventions    Additional Documentation Balance Skills Training (Group)  -MS (r) EK (t) MS (c) Balance Skills Training (Group)  -MS (r) EK (t) MS (c)    Balance Skills Training    Sitting-Level of Assistance Close supervision  -MS (r) EK (t) MS (c) Close supervision  -MS (r) EK (t) MS (c)    Sitting-Balance Support Feet supported  -MS (r) EK (t) MS (c) Right upper extremity supported;Left upper extremity supported;Feet supported  -MS (r) EK (t) MS (c)    Standing-Level of Assistance Contact guard  -MS (r) EK (t) MS (c) Contact guard  -MS (r) EK (t) MS (c)    Static Standing Balance Support assistive device  -MS (r) EK (t) MS (c) assistive device  -MS (r) EK (t) MS (c)    Standing-Balance Activities Weight Shift R-L  -MS (r) EK (t) MS (c)     Standing Balance # of Minutes practiced weight shifting R-L for 5 minutes  -MS (r) EK (t) MS (c)     Gait Balance-Level of Assistance Contact guard  -MS (r) EK (t) MS (c) Contact guard;x2  -MS (r) EK (t) MS (c)    Gait Balance Support assistive device  -MS (r) EK (t) MS (c) assistive device  -MS (r) EK (t) MS (c)    Therapy Exercises    Right Lower Extremity AAROM:;30 reps;sitting;ankle pumps/circles   in gravity lessened position and with foot on floor  -MS (r) EK (t) MS (c)     Orthotics Prosthetics    Additional Documentation Orthosis Location (Group)  -MS (r) EK (t) MS (c) Orthosis Location (Group);Orthosis Management/Training (Group)  -MS (r) EK (t) MS (c)    Orthosis Location    Orthosis Location/Type neck/back  -MS (r) EK (t) MS (c) neck/back  -MS (r) EK (t) MS (c)    Orthosis, Neck/Back LSO  (lumbar sacral orthosis)  -MS (r) EK (t) MS (c) LSO (lumbar sacral orthosis)  -MS (r) EK (t) MS (c)    Orthosis Management/Training    Orthosis Indications immobilize, protect/position healing structures;rest, reduce pain  -MS (r) EK (t) MS (c) immobilize, protect/position healing structures;rest, reduce pain  -MS (r) EK (t) MS (c)    Orthosis Skills Training donning orthosis;doffing orthosis;purpose/goals of orthosis;restrictions/precautions  -MS (r) EK (t) MS (c) donning orthosis;doffing orthosis;purpose/goals of orthosis;restrictions/precautions  -MS (r) EK (t) MS (c)    Orthosis Wear Schedule wear when out of bed only  -MS (r) EK (t) MS (c) wear when out of bed only  -MS (r) EK (t) MS (c)    Sensory Assessment/Intervention    Sensory Impairment  numbness  -MS (r) EK (t) MS (c)    Light Touch  RLE;LLE  -MS (r) EK (t) MS (c)    LLE Light Touch  mild impairment   less feeling per pt report  -MS (r) EK (t) MS (c)    RLE Light Touch  mild impairment   less feeling per pt report  -MS (r) EK (t) MS (c)    Sharp/Dull Discrimination  RLE;LLE  -MS (r) EK (t) MS (c)    LLE Sharp/Dull Discrimination  mild impairment   L4 and L5 dermatome per pt report  -MS (r) EK (t) MS (c)    RLE Sharp/Dull Discrimination  moderate impairment   L4 and L5 dermatome  -MS (r) EK (t) MS (c)    Deep Pressure  RLE  -MS (r) EK (t) MS (c)    RUE Deep Pressure  --  -MS (r) EK (t) MS (c)    LLE Deep Pressure  --  -MS (r) EK (t) MS (c)    RLE Deep Pressure  severe impairment   severe at L5 dermatome, moderate at L4 dermatome  -MS (r) EK (t) MS (c)    Positioning and Restraints    Pre-Treatment Position sitting in chair/recliner  -MS (r) EK (t) MS (c) sitting in chair/recliner  -MS (r) EK (t) MS (c)    Post Treatment Position chair  -MS (r) EK (t) MS (c) chair  -MS (r) EK (t) MS (c)    In Chair sitting;call light within reach;encouraged to call for assist  -MS (r) EK (t) MS (c) sitting;call light within reach;encouraged to call for assist;with  family/caregiver  -MS (r) EK (t) MS (c)      03/16/17 0819 03/14/17 1458    Rehab Evaluation    Document Type re-evaluation  -ND therapy note (daily note)  -TR    Subjective Information agree to therapy;complains of;weakness;pain;numbness   numbness from L. knee down to toes  -ND agree to therapy;complains of;pain  -TR    Patient Effort, Rehab Treatment  good  -TR    Symptoms Noted Comment Pt. requesting to get OOB so OT re-eval completed. Pt. stated numbness in LLE from knee down into toes and pt. unable to wiggle R. toes but is able to complete ankle pumps.   -ND     General Information    Patient Profile Review yes  -ND     General Observations Pt. side lying on L. side, alert, O2 via nc, SCDs donned, IV site  -ND     Pertinent History Of Current Problem S/P 3/15/17 R. L3-S1, R. L3-4 hemilaminectomy facetectomy decompression transforaminal lumbar interbody fusion L4-S1 posterior fusion with instrumentation.   -ND     Precautions/Limitations brace on when up;fall precautions;spinal precautions  -ND brace on when up;spinal precautions;fall precautions   LSO  -TR    Plans/Goals Discussed With patient;agreed upon  -ND     Risks Reviewed patient:;LOB;nausea/vomiting;dizziness;increased discomfort;change in vital signs  -ND     Benefits Reviewed patient:;improve function;increase independence;increase strength;increase balance;decrease pain;increase knowledge  -ND     Barriers to Rehab physical barrier;impaired sensation  -ND     Living Environment    Living Environment Comment Living environment same as eval  -ND     Vital Signs    O2 Delivery Pre Treatment  room air  -TR    Pain Assessment    Pain Assessment 0-10  -ND 0-10  -TR    Pain Score 7  -ND 7  -TR    Post Pain Score  7  -TR    Pain Type Acute pain;Surgical pain  -ND Acute pain;Surgical pain  -TR    Pain Location Back  -ND Back  -TR    Pain Orientation  Right  -TR    Pain Descriptors Aching  -ND Dull  -TR    Pain Intervention(s) Medication (See  MAR);Repositioned  -ND Medication (See MAR);Repositioned  -TR    Response to Interventions tolerated  -ND tolerated  -TR    Vision Assessment/Intervention    Visual Impairment WFL with corrective lenses  -ND     Cognitive Assessment/Intervention    Current Cognitive/Communication Assessment functional  -ND functional  -TR    Orientation Status oriented x 4  -ND oriented x 4  -TR    Follows Commands/Answers Questions 100% of the time;able to follow multi-step instructions  -ND able to follow multi-step instructions;100% of the time  -TR    Personal Safety WNL/WFL  -ND     Personal Safety Interventions fall prevention program maintained;gait belt;nonskid shoes/slippers when out of bed;supervised activity  -ND fall prevention program maintained;gait belt;nonskid shoes/slippers when out of bed  -TR    ROM (Range of Motion)    General ROM no range of motion deficits identified  -ND     General ROM Detail BUE AROM WFL  -ND     MMT (Manual Muscle Testing)    General MMT Assessment no strength deficits identified  -ND     Bed Mobility, Assessment/Treatment    Bed Mobility, Assistive Device bed rails  -ND bed rails  -TR    Bed Mobility, Scoot/Bridge, [place]  supervision required  -TR    Bed Mob, [place] to Sit, [place] verbal cues required;nonverbal cues required (demo/gesture);minimum assist (75% patient effort)  -ND supervision required  -TR    Bed Mob, Sit to [place]ely[place], [place] --   up in chair  -ND verbal cues required;contact guard assist  -TR    Bed Mobility, Safety Issues decreased use of arms for pushing/pulling;decreased use of legs for bridging/pushing  -ND decreased use of arms for pushing/pulling  -TR    Bed Mobility, Impairments pain;impaired balance  -ND strength decreased;pain  -TR    Bed Mobility, Comment  Pt prefers to get out on the R side  -TR    Transfer Assessment/Treatment    Transfers, Sit-Stand [place] verbal cues required;nonverbal cues required (demo/gesture);minimum assist  (75% patient effort)  -ND supervision required  -TR    Transfers, Stand-Sit [place] verbal cues required;nonverbal cues required (demo/gesture);contact guard assist  -ND supervision required  -TR    Transfers, Sit-Stand-Sit, Assist Device rolling walker  -ND rolling walker  -TR    Toilet Transfer, [place]  contact guard assist  -TR    Toilet Transfer, Assistive Device  rolling walker  -TR    Transfer, Safety Issues  step length decreased  -TR    Transfer, Impairments sensory feedback impaired;impaired balance;strength decreased  -ND strength decreased;impaired balance  -TR    Gait Assessment/Treatment    Gait, [place] Level  verbal cues required;contact guard assist  -TR    Gait, Assistive Device  rolling walker  -TR    Gait, Distance (Feet)  75  -TR    Gait, Gait Pattern Analysis  swing-through gait  -TR    Gait, Gait Deviations  antalgic;jaimie decreased  -TR    Gait, Safety Issues  sequencing ability decreased;step length decreased  -TR    Gait, Impairments  strength decreased;impaired balance  -TR    Motor Skills/Interventions    Additional Documentation Balance Skills Training (Group)  -ND     Balance Skills Training    Sitting-Level of Assistance Close supervision  -ND     Sitting-Balance Support Right upper extremity supported;Left upper extremity supported;Feet supported  -ND     Standing-Level of Assistance Contact guard  -ND     Static Standing Balance Support assistive device  -ND     Gait Balance-Level of Assistance Contact guard  -ND     Gait Balance Support assistive device  -ND     Orthotics Prosthetics    Additional Documentation Orthosis Location (Group);Orthosis Management/Training (Group)  -ND Orthosis Location (Group)  -TR    Orthosis Location    Orthosis Location/Type neck/back  -ND neck/back  -TR    Orthosis, Neck/Back LSO (lumbar sacral orthosis)  -ND LSO (lumbar sacral orthosis)  -TR    Orthosis Management/Training    Orthosis Indications immobilize, protect/position healing  structures;rest, reduce pain  -ND immobilize, protect/position healing structures  -TR    Orthosis Skills Training donning orthosis;doffing orthosis;purpose/goals of orthosis;restrictions/precautions  -ND donning orthosis;doffing orthosis  -TR    Orthosis Wear Schedule wear when out of bed only  -ND wear when out of bed only  -TR    Sensory Assessment/Intervention    Sensory Impairment --   Numbness in LLE  -ND     Positioning and Restraints    Pre-Treatment Position in bed  -ND in bed  -TR    Post Treatment Position chair  -ND bed  -TR    In Bed  fowlers;call light within reach;encouraged to call for assist;side rails up x2;SCD pump applied;exit alarm on  -TR    In Chair sitting;call light within reach;encouraged to call for assist;with brace  -ND       03/14/17 1042 03/14/17 0900    Rehab Evaluation    Document Type  therapy note (daily note)  -TR    Subjective Information  agree to therapy;complains of;pain;weakness  -TR    Patient Effort, Rehab Treatment  good  -TR    General Information    Precautions/Limitations  brace on when up;spinal precautions;fall precautions   LSO  -TR    Equipment Currently Used at Home walker, rolling;shower chair;cane, straight  -KP     Living Environment    Lives With spouse  -     Living Arrangements house  -     Vital Signs    O2 Delivery Pre Treatment  room air  -TR    Pain Assessment    Pain Assessment  0-10  -TR    Pain Score  3  -TR    Post Pain Score  3  -TR    Pain Type  Surgical pain;Acute pain  -TR    Pain Location  Back  -TR    Pain Orientation  Right  -TR    Pain Descriptors  Dull  -TR    Pain Intervention(s)  Medication (See MAR);Repositioned  -TR    Response to Interventions  tolerated  -TR    Cognitive Assessment/Intervention    Current Cognitive/Communication Assessment  functional  -TR    Orientation Status  oriented x 4  -TR    Follows Commands/Answers Questions  able to follow multi-step instructions;100% of the time  -TR    Personal Safety Interventions  fall  prevention program maintained;muscle strengthening facilitated;nonskid shoes/slippers when out of bed  -TR    Bed Mobility, Assessment/Treatment    Bed Mobility, Assistive Device  bed rails  -TR    Bed Mobility, Scoot/Bridge, [place]  supervision required  -TR    Bed Mob, Sit to [place]ely[place], [place]  verbal cues required;contact guard assist  -TR    Bed Mobility, Safety Issues  decreased use of arms for pushing/pulling  -TR    Bed Mobility, Impairments  strength decreased;pain  -TR    Transfer Assessment/Treatment    Transfers, Sit-Stand [place]  supervision required  -TR    Transfers, Stand-Sit [place]  supervision required  -TR    Transfers, Sit-Stand-Sit, Assist Device  rolling walker  -TR    Toilet Transfer, [place]  contact guard assist  -TR    Toilet Transfer, Assistive Device  rolling walker  -TR    Transfer, Safety Issues  step length decreased  -TR    Transfer, Impairments  strength decreased;impaired balance  -TR    Gait Assessment/Treatment    Gait, [place] Level  verbal cues required;contact guard assist  -TR    Gait, Assistive Device  rolling walker  -TR    Gait, Distance (Feet)  50  -TR    Gait, Gait Pattern Analysis  swing-through gait  -TR    Gait, Gait Deviations  antalgic;[place]aim[place] decreased;decreased heel strike;toe-to-floor clearance decreased  -TR    Gait, Safety Issues  balance decreased during turns;step length decreased  -    Gait, Impairments  strength decreased;impaired balance  -TR    Orthotics Prosthetics    Additional Documentation  Orthosis Location (Group)  -    Orthosis Location    Orthosis Location/Type  neck/back  -TR    Orthosis, Neck/Back  LSO (lumbar sacral orthosis)  -    Orthosis Management/Training    Orthosis Indications  immobilize, protect/position healing structures  -    Orthosis Skills Training  doffing orthosis;donning orthosis;purpose/goals of orthosis;restrictions/precautions  -    Orthosis Wear Schedule  wear when out of bed only   -TR    Positioning and Restraints    Pre-Treatment Position  in bed  -TR    Post Treatment Position  bed  -TR    In Bed  fowlers;call light within reach;encouraged to call for assist;side rails up x2  -TR      User Key  (r) = Recorded By, (t) = Taken By, (c) = Cosigned By    Initials Name Provider Type    MS Helena MURRELL Morgan, PT DPT Physical Therapist    TR Lyn Limon, PTA Physical Therapy Assistant    KP Lena Watt, BSW     MATT Leyva, OTR/L Occupational Therapist    EK Kathi Johnson, PT Student PT Student          Physical Therapy Education     Title: PT OT SLP Therapies (Active)     Topic: Physical Therapy (Active)     Point: Mobility training (Done)    Learning Progress Summary    Learner Readiness Method Response Comment Documented by Status   Patient Acceptance E VU,NR Education provided on maintaing spinal precautions with functional mobility and correct body mechanics and sequencing with ambulation. EK 03/16/17 1119 Done    Acceptance E NR Pt educated on spinal precautions, donning/doffing LSO brace, brace purpose, and brace wear schedule. Pt educted log roll technique and importance of activity. KD 03/13/17 1529 Active   Significant Other Acceptance E VU,NR Education provided on maintaing spinal precautions with functional mobility and correct body mechanics and sequencing with ambulation. EK 03/16/17 1119 Done               Point: Body mechanics (Done)    Learning Progress Summary    Learner Readiness Method Response Comment Documented by Status   Patient Acceptance E VU,NR Education provided on maintaing spinal precautions with functional mobility and correct body mechanics and sequencing with ambulation. EK 03/16/17 1119 Done   Significant Other Acceptance E VU,NR Education provided on maintaing spinal precautions with functional mobility and correct body mechanics and sequencing with ambulation. EK 03/16/17 1119 Done               Point: Precautions (Done)    Learning  Progress Summary    Learner Readiness Method Response Comment Documented by Status   Patient Acceptance E VU,NR Education provided on maintaing spinal precautions with functional mobility and correct body mechanics and sequencing with ambulation. EK 03/16/17 1119 Done    Acceptance E,D VU Spinal precautions, log roll, brace use/care, and Safety with transfers TR 03/14/17 0900 Done    Acceptance E NR Pt educated on spinal precautions, donning/doffing LSO brace, brace purpose, and brace wear schedule. Pt educted log roll technique and importance of activity. KD 03/13/17 1529 Active   Family Acceptance E,D VU Spinal precautions, log roll, brace use/care, and Safety with transfers TR 03/14/17 0900 Done   Significant Other Acceptance E VU,NR Education provided on maintaing spinal precautions with functional mobility and correct body mechanics and sequencing with ambulation. EK 03/16/17 1119 Done                      User Key     Initials Effective Dates Name Provider Type Discipline    TR 08/02/16 -  Lyn Limon, PTA Physical Therapy Assistant PT    KD 12/19/16 -  Yarelis Hampton, PT Student PT Student PT    EK 03/23/16 -  Kathi Johnson, PT Student PT Student PT                PT Recommendation and Plan  Anticipated Discharge Disposition: home  Planned Therapy Interventions: balance training, gait training, home exercise program, neuromuscular re-education, orthotic fitting/training, patient/family education, stair training, strengthening, transfer training  PT Frequency: 2 times/day, daily, per priority policy  Plan of Care Review  Plan Of Care Reviewed With: patient  Progress: progress toward functional goals as expected  Outcome Summary/Follow up Plan: PT treatment complete. Patient shows improvement from evaluation completed this morning. Patient demonstrates ability to ambulate with CGA and improved L foot clearance. Patient still demonstrates impaired balance, activity tolerance, and functional  mobility. Patient was still unable to actively dorsiflex R foot. Patient will continue to benefit from skilled PT to include functional mobilty, gait training, transfer training, balance training, strengthening, stair training, and patient education.  PT will continue to follow for safety to return home.          IP PT Goals       03/16/17 1120 03/13/17 1531       Bed Mobility PT LTG    Bed Mobility PT LTG, Date Established  03/13/17  -PB (r) KD (t) PB (c)     Bed Mobility PT LTG, Time to Achieve  by discharge  -PB (r) KD (t) PB (c)     Bed Mobility PT LTG, Activity Type  all bed mobility  -PB (r) KD (t) PB (c)     Bed Mobility PT LTG, [place] Level  independent  -PB (r) KD (t) PB (c)     Bed Mobility PT LTG, Additional Goal  while maintaining spinal precautions  -PB (r) KD (t) PB (c)     Bed Mobility PT LTG, Date Goal Reviewed 03/16/17  -MS (r) EK (t) MS (c)      Bed Mobility PT LTG, Outcome goal ongoing  -MS (r) EK (t) MS (c)      Bed Mobility PT LTG, Reason Goal Not Met other (see comments)   second surgery  -MS (r) EK (t) MS (c)      Transfer Training PT LTG    Transfer Training PT LTG, Date Established  03/13/17  -PB (r) KD (t) PB (c)     Transfer Training PT LTG, Time to Achieve  by discharge  -PB (r) KD (t) PB (c)     Transfer Training PT LTG, Activity Type  bed to chair /chair to bed;sit to stand/stand to sit  -PB (r) KD (t) PB (c)     Transfer Training PT LTG, [place] Level  conditional independence  -PB (r) KD (t) PB (c)     Transfer Training PT LTG, Assist Device  walker, rolling  -PB (r) KD (t) PB (c)     Transfer Training PT  LTG, Date Goal Reviewed 03/16/17  -MS (r) EK (t) MS (c)      Transfer Training PT LTG, Outcome goal ongoing  -MS (r) EK (t) MS (c)      Transfer Training PT LTG, Reason Goal Not Met other (see comments)   second surgery  -MS (r) EK (t) MS (c)      Gait Training PT LTG    Gait Training Goal PT LTG, Date Established  03/13/17  -PB (r) KD (t) PB (c)     Gait Training Goal PT  LTG, Time to Achieve  by discharge  -PB (r) KD (t) PB (c)     Gait Training Goal PT LTG, [place] Level  conditional independence  -PB (r) KD (t) PB (c)     Gait Training Goal PT LTG, Assist Device  walker, rolling  -PB (r) KD (t) PB (c)     Gait Training Goal PT LTG, Distance to Achieve 50 feet while maintaining heel-toe strike pattern and L foot clearance  -MS (r) EK (t) MS (c) 150  -PB (r) KD (t) PB (c)     Gait Training Goal PT LTG, Date Goal Reviewed 03/16/17  -MS (r) EK (t) MS (c)      Gait Training Goal PT LTG, Outcome goal revised  -MS (r) EK (t) MS (c)      Gait Training Goal PT LTG, Reason Goal Not Met goals revised this date  -MS (r) EK (t) MS (c)      Stair Training PT LTG    Stair Training Goal PT LTG, Date Established  03/13/17  -PB (r) KD (t) PB (c)     Stair Training Goal PT LTG, Time to Achieve  by discharge  -PB (r) KD (t) PB (c)     Stair Training Goal PT LTG, Number of Steps  4  -PB (r) KD (t) PB (c)     Stair Training Goal PT LTG, [place] Level  conditional independence  -PB (r) KD (t) PB (c)     Stair Training Goal PT LTG, Assist Device  1 handrail   on R  -PB (r) KD (t) PB (c)     Stair Training Goal PT LTG, Date Goal Reviewed 03/16/17  -MS (r) EK (t) MS (c)      Stair Training Goal PT LTG, Outcome goal ongoing  -MS (r) EK (t) MS (c)      Stair Training Goal PT LTG, Reason Goal Not Met other (see comments)   second surgery  -MS (r) EK (t) MS (c)      Patient Education PT LTG    Patient Education PT LTG, Date Established  03/13/17  -PB (r) KD (t) PB (c)     Patient Education PT LTG, Time to Achieve  by discharge  -PB (r) KD (t) PB (c)     Patient Education PT LTG, Education Type  precaution per surgeon  -PB (r) KD (t) PB (c)     Patient Education PT LTG, Education Understanding  demonstrate adequately;verbalize understanding  -PB (r) KD (t) PB (c)     Patient Education PT LTG, Date Goal Reviewed 03/16/17  -MS (r) EK (t) MS (c)      Patient Education PT LTG Outcome goal ongoing  -MS (r)  EK (t) MS (c)      Patient Education PT LTG, Reason Goal Not Met other (see comments)   second surgery  -MS (r) EK (t) MS (c)        User Key  (r) = Recorded By, (t) = Taken By, (c) = Cosigned By    Initials Name Provider Type    MS Helena Mora, PT DPT Physical Therapist    FRANCIS Hicks, PT DPT Physical Therapist    HENRY Hampton, PT Student PT Student    EK Kathi Johnson, PT Student PT Student                Outcome Measures       03/16/17 1600 03/16/17 1100 03/16/17 0900    How much help from another person do you currently need...    Turning from your back to your side while in flat bed without using bedrails? 3  -MS (r) EK (t) MS (c) 3  -MS (r) EK (t) MS (c)     Moving from lying on back to sitting on the side of a flat bed without bedrails? 3  -MS (r) EK (t) MS (c) 3  -MS (r) EK (t) MS (c)     Moving to and from a bed to a chair (including a wheelchair)? 3  -MS (r) EK (t) MS (c) 3  -MS (r) EK (t) MS (c)     Standing up from a chair using your arms (e.g., wheelchair, bedside chair)? 3  -MS (r) EK (t) MS (c) 3  -MS (r) EK (t) MS (c)     Climbing 3-5 steps with a railing? 2  -MS (r) EK (t) MS (c) 1  -MS (r) EK (t) MS (c)     To walk in hospital room? 3  -MS (r) EK (t) MS (c) 3  -MS (r) EK (t) MS (c)     AM-PAC 6 Clicks Score 17  -MS (r) EK (t) 16  -MS (r) EK (t)     How much help from another is currently needed...    Putting on and taking off regular lower body clothing?   2  -ND    Bathing (including washing, rinsing, and drying)   2  -ND    Toileting (which includes using toilet bed pan or urinal)   3  -ND    Putting on and taking off regular upper body clothing   3  -ND    Taking care of personal grooming (such as brushing teeth)   4  -ND    Eating meals   4  -ND    Score   18  -ND    Functional Assessment    Outcome Measure Options AM-PAC 6 Clicks Basic Mobility (PT)  -MS (r) EK (t) MS (c) AM-PAC 6 Clicks Basic Mobility (PT)  -MS (r) EK (t) MS (c)       03/14/17 0900          How much help  from another person do you currently need...    Turning from your back to your side while in flat bed without using bedrails? 3  -TR      Moving from lying on back to sitting on the side of a flat bed without bedrails? 3  -TR      Moving to and from a bed to a chair (including a wheelchair)? 3  -TR      Standing up from a chair using your arms (e.g., wheelchair, bedside chair)? 3  -TR      Climbing 3-5 steps with a railing? 3  -TR      To walk in hospital room? 3  -TR      AM-PAC 6 Clicks Score 18  -TR      Functional Assessment    Outcome Measure Options AM-PAC 6 Clicks Basic Mobility (PT)  -TR        User Key  (r) = Recorded By, (t) = Taken By, (c) = Cosigned By    Initials Name Provider Type    MS Helena Mora, PT DPT Physical Therapist    IRMA Limon, PTA Physical Therapy Assistant    MATT Leyva, OTR/L Occupational Therapist    HAY Johnson, PT Student PT Student           Time Calculation:         PT Charges       03/16/17 1617 03/16/17 1137       Time Calculation    Start Time 1405  -MS (r) EK (t) MS (c) 0920  -MS (r) EK (t) MS (c)     Stop Time 1500  -MS (r) EK (t) MS (c) 1010  -MS (r) EK (t) MS (c)     Time Calculation (min) 55 min  -MS (r) EK (t) 50 min  -MS (r) EK (t)     PT Received On 03/16/17  -MS (r) EK (t) MS (c) 03/16/17  -MS (r) EK (t) MS (c)     PT Goal Re-Cert Due Date 03/26/17  -MS (r) EK (t) MS (c) 03/26/17  -MS (r) EK (t) MS (c)     Time Calculation- PT    Total Timed Code Minutes- PT 55 minute(s)  -MS (r) EK (t) MS (c) 10 minute(s)  -MS (r) EK (t) MS (c)       User Key  (r) = Recorded By, (t) = Taken By, (c) = Cosigned By    Initials Name Provider Type    MS Helena Mora, PT DPT Physical Therapist    HAY Johnson, PT Student PT Student          Therapy Charges for Today     Code Description Service Date Service Provider Modifiers Qty    97771237692 HC PT RE-EVAL ESTABLISHED PLAN 2 3/16/2017 Kathi Johnson, PT Student GP, KX 1    [id number]  GAIT  TRAINING EA 15 MIN 3/16/2017 Kathi Johnson, PT Student GP, KX 1    39594781873 HC PT THER PROC EA 15 MIN 3/16/2017 Kathi Johnson, PT Student GP, KX 1    77232575606 HC GAIT TRAINING EA 15 MIN 3/16/2017 Kathi Johnson, PT Student GP, KX 1    79583336830 HC PT THERAPEUTIC ACT EA 15 MIN 3/16/2017 Kathi Johnson, PT Student GP, KX 2          PT G-Codes  Outcome Measure Options: AM-PAC 6 Clicks Basic Mobility (PT)  Score: 16  Functional Limitation: Mobility: Walking and moving around  Mobility: Walking and Moving Around Current Status (): At least 40 percent but less than 60 percent impaired, limited or restricted  Mobility: Walking and Moving Around Goal Status (): At least 20 percent but less than 40 percent impaired, limited or restricted      Kathi Johnson, PT Student  3/16/2017

## 2017-03-16 NOTE — PLAN OF CARE
Problem: Patient Care Overview (Adult)  Goal: Plan of Care Review  Outcome: Ongoing (interventions implemented as appropriate)    03/16/17 0947   Coping/Psychosocial Response Interventions   Plan Of Care Reviewed With patient   Patient Care Overview   Progress progress toward functional goals as expected   Outcome Evaluation   Outcome Summary/Follow up Plan OT re-eval completed this date after 2nd part back sx. Pt. requesting to get OOB due to back pain lying in bed. Pt. required Min A for bed mobility. Pt. required Min A for sit to stand t/f with r/w. Pt. required Mod A to jennifer LSO brace EOB. Pt. required CGA with r/w to complete functional mob. Pt. stated she had numbness in LLE and unable to wiggle toes during eval. Pt. cont to benefit from skilled OT due to decreased balance, increased pain, and decreased independence in ADLs. 3/16/17 0850         Problem: Inpatient Occupational Therapy  Goal: Transfer Training Goal 1 LTG- OT  Outcome: Ongoing (interventions implemented as appropriate)    03/13/17 1551 03/16/17 0947   Transfer Training OT LTG   Transfer Training OT LTG, Date Established 03/13/17 --    Transfer Training OT LTG, Time to Achieve by discharge --    Transfer Training OT LTG, Activity Type all transfers --    Transfer Training OT LTG, [place] Level conditional independence --    Transfer Training OT LTG, Assist Device walker, rolling;commode, bedside --    Transfer Training OT LTG, Date Goal Reviewed --  03/16/17   Transfer Training OT LTG, Outcome --  goal ongoing       Goal: Toileting Goal LTG- OT  Outcome: Ongoing (interventions implemented as appropriate)    03/13/17 1551 03/16/17 0947   Toileting OT LTG   Toileting Goal OT LTG, Date Established 03/13/17 --    Toileting Goal OT LTG, Time to Achieve by discharge --    Toileting Goal OT LTG, [place] Level supervision required --    Toileting Goal OT LTG, Additional Goal Practice wiping after bowel movement.  --    Toileting Goal OT LTG,  Date Goal Reviewed --  03/16/17   Toileting Goal OT LTG, Outcome --  goal ongoing       Goal: LB Dressing Goal LTG- OT  Outcome: Ongoing (interventions implemented as appropriate)    03/13/17 1551 03/16/17 0947   LB Dressing OT LTG   LB Dressing Goal OT LTG, Date Established 03/13/17 --    LB Dressing Goal OT LTG, Time to Achieve by discharge --    LB Dressing Goal OT LTG, [place] Level conditional independence --    LB Dressing Goal OT LTG, Additional Goal AE PRN --    LB Dressing Goal OT LTG, Date Goal Reviewed --  03/16/17   LB Dressing Goal OT LTG, Outcome --  goal ongoing

## 2017-03-16 NOTE — PLAN OF CARE
Problem: Patient Care Overview (Adult)  Goal: Plan of Care Review  Outcome: Ongoing (interventions implemented as appropriate)    03/16/17 1120   Coping/Psychosocial Response Interventions   Plan Of Care Reviewed With patient   Patient Care Overview   Progress progress towards functional goals is fair   Outcome Evaluation   Outcome Summary/Follow up Plan PT re-eval complete after 3/15 surgery. Patient demonstrates ability to perform transfers at [facility]. Patient reports decreased sensation in RLE, particularly the R foot, since surgery on 3/15/17. Patient unable to actively dorsiflex R foot, although demonstrates muscle contraction. Patient demonstrates impaired gait, balance, transfers, strength, sensation, and functional mobility. Patient would continue to benefit from skilled PT to include functional mobility, gait training, balance training, strengthening, stair training, transfer training, and patient education. Patient is currently not safe to discharge to home due to her inability to climb stairs at this time.         Problem: Inpatient Physical Therapy  Goal: Bed Mobility Goal LTG- PT    03/13/17 1531 03/16/17 1120   Bed Mobility PT LTG   Bed Mobility PT LTG, Date Established 03/13/17 --    Bed Mobility PT LTG, Time to Achieve by discharge --    Bed Mobility PT LTG, Activity Type all bed mobility --    Bed Mobility PT LTG, [place] Level independent --    Bed Mobility PT LTG, Additional Goal while maintaining spinal precautions --    Bed Mobility PT LTG, Date Goal Reviewed --  03/16/17   Bed Mobility PT LTG, Outcome --  goal ongoing   Bed Mobility PT LTG, Reason Goal Not Met --  other (see comments)  (second surgery)       Goal: Transfer Training Goal 1 LTG- PT  Outcome: Ongoing (interventions implemented as appropriate)    03/13/17 1531 03/16/17 1120   Transfer Training PT LTG   Transfer Training PT LTG, Date Established 03/13/17 --    Transfer Training PT LTG, Time to Achieve by discharge --    Transfer  Training PT LTG, Activity Type bed to chair /chair to bed;sit to stand/stand to sit --    Transfer Training PT LTG, [place] Level conditional independence --    Transfer Training PT LTG, Assist Device walker, rolling --    Transfer Training PT LTG, Date Goal Reviewed --  03/16/17   Transfer Training PT LTG, Outcome --  goal ongoing   Transfer Training PT LTG, Reason Goal Not Met --  other (see comments)  (second surgery)       Goal: Gait Training Goal LTG- PT  Outcome: Revised    03/13/17 1531 03/16/17 1120   Gait Training PT LTG   Gait Training Goal PT LTG, Date Established 03/13/17 --    Gait Training Goal PT LTG, Time to Achieve by discharge --    Gait Training Goal PT LTG, [place] Level conditional independence --    Gait Training Goal PT LTG, Assist Device walker, rolling --    Gait Training Goal PT LTG, Distance to Achieve --  50 feet while maintaining heel-toe strike pattern and L foot clearance   Gait Training Goal PT LTG, Date Goal Reviewed --  03/16/17   Gait Training Goal PT LTG, Outcome --  goal revised   Gait Training Goal PT LTG, Reason Goal Not Met --  goals revised this date       Goal: Stair Training Goal LTG- PT  Outcome: Ongoing (interventions implemented as appropriate)    03/13/17 1531 03/16/17 1120   Stair Training PT LTG   Stair Training Goal PT LTG, Date Established 03/13/17 --    Stair Training Goal PT LTG, Time to Achieve by discharge --    Stair Training Goal PT LTG, Number of Steps 4 --    Stair Training Goal PT LTG, [place] Level conditional independence --    Stair Training Goal PT LTG, Assist Device 1 handrail  (on R) --    Stair Training Goal PT LTG, Date Goal Reviewed --  03/16/17   Stair Training Goal PT LTG, Outcome --  goal ongoing   Stair Training Goal PT LTG, Reason Goal Not Met --  other (see comments)  (second surgery)       Goal: Patient Education Goal LTG- PT  Outcome: Ongoing (interventions implemented as appropriate)    03/13/17 1531 03/16/17 1120   Patient  Education PT LTG   Patient Education PT LTG, Date Established 03/13/17 --    Patient Education PT LTG, Time to Achieve by discharge --    Patient Education PT LTG, Education Type precaution per surgeon --    Patient Education PT LTG, Education Understanding demonstrate adequately;verbalize understanding --    Patient Education PT LTG, Date Goal Reviewed --  03/16/17   Patient Education PT LTG Outcome --  goal ongoing   Patient Education PT LTG, Reason Goal Not Met --  other (see comments)  (second surgery)

## 2017-03-16 NOTE — PROGRESS NOTES
Orthopedic Spine Progress Note    Alisia Velez  71 y.o.  female  Subjective     Post-Operative Day: 3/1  Systemic or Specific Complaints:   Sore, c/o right foot tingling    Objective     Vital signs in last 24 hours:  Temp:  [96.4 °F (35.8 °C)-99.6 °F (37.6 °C)] 99.3 °F (37.4 °C)  Heart Rate:  [69-97] 94  Resp:  [12-20] 20  BP: (106-133)/(43-65) 115/47    Physical Exam:  lert oriented ×3, no acute distress, vital signs stable, grossly neurovascularly intact with some right anterior tibialis weakness, dressing clean dry and intact    Diagnostic Data:  CBC:    Results from last 7 days  Lab Units 03/16/17  0454   WBC 10*3/mm3 9.07   RBC 10*6/mm3 3.00*   HEMOGLOBIN g/dL 9.3*   HEMATOCRIT % 27.7*   PLATELETS 10*3/mm3 160      BMP:@LABCNTIP(BMP)@    Assessment/Plan     1. Status Post left LLIF L3-5 with instrumentation 3/13/17  2. Status post Right L3-S1 decompression, PLIF L5-S1, PSF with instrumentation L3-S1 3/15/17          Plan:  1.periops  2. Pt/ot/brace  3. Probably home tomorrow                  DENISA Mari    Date: 3/16/2017  Time: 7:35 AMs

## 2017-03-16 NOTE — PROGRESS NOTES
Continued Stay Note  HILDA Phelan     Patient Name: Alisia Velez  MRN: [id number]  Today's Date: 3/16/2017    Admit Date: 3/13/2017          Discharge Plan       03/16/17 1134    Case Management/Social Work Plan    Plan Undetermined    Additional Comments SW spoke to patient and spouse re options for continued therapy at discharge. SW informed patient/spouse that home health and acute rehab were potential options. Patient states she will consider but was planning to go outpatient for therapy. Patient asked SW to check back with her this afternoon.              Discharge Codes     None        Expected Discharge Date and Time     Expected Discharge Date Expected Discharge Time    Mar 16, 2017             SARAH Wells

## 2017-03-16 NOTE — PLAN OF CARE
Problem: Patient Care Overview (Adult)  Goal: Plan of Care Review  Outcome: Ongoing (interventions implemented as appropriate)    03/16/17 0328   Coping/Psychosocial Response Interventions   Plan Of Care Reviewed With patient   Patient Care Overview   Progress no change   Outcome Evaluation   Outcome Summary/Follow up Plan Patient had part 2 of back surgery today, patient has cyr catheter care done at shift change with [facility], patient has had complaints of pain this shift was given PRN pain medications, Reinforced dressing to right side of back for small amount of drainage, left side clean dry intact with mepilex, applied, Accuchecks monitored , vitals stable this shift call light within reach will continue to monitor        Goal: Adult Individualization and Mutuality  Outcome: Ongoing (interventions implemented as appropriate)    03/13/17 1215 03/15/17 0510   Individualization   Patient Specific Preferences --  Patient likes door left open   Patient Specific Goals Pain control and second part surgery. --    Patient Specific Interventions --  PRN medications and education on 2nd part surgery        Goal: Discharge Needs Assessment  Outcome: Ongoing (interventions implemented as appropriate)    03/13/17 1540 03/14/17 1042 03/14/17 1938   Discharge Needs Assessment   Concerns To Be Addressed --  --  denies needs/concerns at this time   Equipment Needed After Discharge --  none --    Discharge Facility/Level Of Care Needs --  acute rehab;home with home health;other (see comments)  (to be determined following second surgery) --    Living Environment   Transportation Available none --  --    Self-Care   Equipment Currently Used at Home --  walker, rolling;shower chair;cane, straight --          Problem: Perioperative Period (Adult)  Goal: Signs and Symptoms of Listed Potential Problems Will be Absent or Manageable (Perioperative Period)  Outcome: Ongoing (interventions implemented as appropriate)    03/16/17 0328    Perioperative Period   Problems Assessed (Perioperative Period) all   Problems Present (Perioperative Period) pain;situational response         Problem: Diabetes, Type 2 (Adult)  Goal: Signs and Symptoms of Listed Potential Problems Will be Absent or Manageable (Diabetes, Type 2)  Outcome: Ongoing (interventions implemented as appropriate)    03/16/17 0328   Diabetes, Type 2   Problems Assessed (Type 2 Diabetes) all   Problems Present (Type 2 Diabetes) impaired glycemic control

## 2017-03-16 NOTE — PROGRESS NOTES
Alisia Velez is a 71 y.o. female patient.    Current Facility-Administered Medications   Medication Dose Route Frequency Provider Last Rate Last Dose   • acetaminophen (TYLENOL) tablet 650 mg  650 mg Oral Q6H PRN DENISA Smith   650 mg at 03/14/17 2140   • ceFAZolin (ANCEF) IVPB 1 g  1 g Intravenous Q8H K Jose Horton MD   1 g at 03/16/17 0124   • dextrose (D50W) solution 12.5 g  12.5 g Intravenous PRN Mike Ann MD       • diphenhydrAMINE (BENADRYL) capsule 25 mg  25 mg Oral Nightly PRN K Jose Horton MD       • famotidine (PEPCID) injection 20 mg  20 mg Intravenous 60 Min Pre-Op Mike Ann MD   20 mg at 03/13/17 0650    Or   • famotidine (PEPCID) tablet 20 mg  20 mg Oral 60 Min Pre-Op Mike Ann MD       • famotidine (PEPCID) injection 20 mg  20 mg Intravenous BID K Jose Horton MD   20 mg at 03/15/17 0910    Or   • famotidine (PEPCID) tablet 20 mg  20 mg Oral BID K Jose Horton MD   20 mg at 03/15/17 1721   • FentaNYL Citrate (PF) (SUBLIMAZE) injection 25 mcg  25 mcg Intravenous Q5 Min PRN Mike Ann MD       • HYDROmorphone (DILAUDID) injection 1 mg  1 mg Intravenous Q3H PRN K Jose Horton MD   1 mg at 03/16/17 0613   • HYDROmorphone (DILAUDID) injection 1 mg  1 mg Intravenous Q3H PRN MIGDALIA Horton MD       • insulin detemir (LEVEMIR) injection 62 Units  62 Units Subcutaneous Nightly K Jose Horton MD   62 Units at 03/13/17 2015   • insulin lispro (humaLOG) injection 50 Units  50 Units Subcutaneous TID With Meals MIGDALIA Horton MD   29 Units at 03/15/17 1719   • lactated ringers infusion  9 mL/hr Intravenous Continuous Mike Ann MD 9 mL/hr at 03/15/17 0813     • lactated ringers infusion  20 mL/hr Intravenous Continuous Mike Ann MD 20 mL/hr at 03/15/17 0813     • lactated ringers infusion  100 mL/hr Intravenous Continuous Linda Dewitt  mL/hr at 03/15/17 0915 100 mL/hr  at 03/15/17 0915   • levoFLOXacin (LEVAQUIN) 500 mg/100 mL D5W (premix) (LEVAQUIN) 500 mg  500 mg Intravenous Q24H DENISA Smith   500 mg at 03/15/17 2129   • lubiprostone (AMITIZA) capsule 24 mcg  24 mcg Oral BID With Meals Enrico Ureña MD   24 mcg at 03/14/17 0814   • metoprolol succinate XL (TOPROL-XL) 24 hr tablet 25 mg  25 mg Oral BID K Jose Horton MD   25 mg at 03/15/17 0741   • midazolam (VERSED) injection 1 mg  1 mg Intravenous Q5 Min PRN Mike Ann MD        Or   • midazolam (VERSED) injection 2 mg  2 mg Intravenous Q5 Min PRN Mike Ann MD   2 mg at 03/13/17 0650   • ondansetron (ZOFRAN) tablet 4 mg  4 mg Oral Q6H PRN K Jose Horton MD        Or   • ondansetron ODT (ZOFRAN-ODT) disintegrating tablet 4 mg  4 mg Oral Q6H PRN K Jose Horton MD        Or   • ondansetron (ZOFRAN) injection 4 mg  4 mg Intravenous Q6H PRN K Jose Horton MD   4 mg at 03/13/17 2143   • oxyCODONE-acetaminophen (PERCOCET)  MG per tablet 2 tablet  2 tablet Oral Q4H PRN K Jose Horton MD   2 tablet at 03/13/17 1619   • oxyCODONE-acetaminophen (PERCOCET)  MG per tablet 2 tablet  2 tablet Oral Q4H PRN K Jose Horton MD       • sodium chloride 0.9 % flush 1-10 mL  1-10 mL Intravenous PRN Mike Ann MD       • sodium chloride 0.9 % flush 1-10 mL  1-10 mL Intravenous PRN Mike Ann MD       • sodium chloride 0.9 % flush 1-10 mL  1-10 mL Intravenous PRN K Jose Horton MD       • sodium chloride 0.9 % flush 1-10 mL  1-10 mL Intravenous PRN K Jose Horton MD       • sodium chloride 0.9 % infusion  75 mL/hr Intravenous Continuous K Jose Horton MD 75 mL/hr at 03/16/17 0617 75 mL/hr at 03/16/17 0617   • sucralfate (CARAFATE) tablet 1 g  1 g Oral TID MIGDALIA Horton MD   1 g at 03/15/17 2129     Allergies   Allergen Reactions   • Humulin N [Insulin Isophane] Shortness Of Breath   • Mold Extract [Trichophyton] Shortness Of  "Breath   • Sudafed [Pseudoephedrine] Shortness Of Breath   • Sulfa Antibiotics Shortness Of Breath   • Other Itching     Silk Tape   • Erythromycin Other (See Comments)     Eyes see big black spots     Active Problems:    Mixed hyperlipidemia    Essential hypertension    Type 2 diabetes mellitus without complication, without long-term current use of insulin    Spinal stenosis, lumbar    Gastroesophageal reflux disease without esophagitis    Blood pressure 115/47, pulse 94, temperature 99.3 °F (37.4 °C), temperature source Oral, resp. rate 20, height 63\" (160 cm), weight 162 lb (73.5 kg), SpO2 98 %.      Subjective:  Symptoms:  Stable.  No shortness of breath or chest pain.    Diet:  Adequate intake.  No nausea or vomiting.    Activity level: Impaired due to pain.    Pain:  She complains of pain that is moderate.  She reports pain is improving.  Pain is partially controlled.      Review of Systems   Constitutional: Negative for activity change, appetite change, chills and fever.   HENT: Negative for congestion, ear pain, trouble swallowing and voice change.    Eyes: Negative for pain, discharge and visual disturbance.   Respiratory: Negative for apnea, chest tightness, shortness of breath and wheezing.    Cardiovascular: Negative for chest pain and palpitations.   Gastrointestinal: Negative for abdominal distention, blood in stool, nausea and vomiting.   Endocrine: Negative for cold intolerance, heat intolerance, polydipsia, polyphagia and polyuria.   Genitourinary: Negative for difficulty urinating, frequency and hematuria.   Musculoskeletal: Negative for joint swelling and myalgias.   Skin: Negative for color change, pallor and rash.   Neurological: Negative for tremors, seizures, syncope, speech difficulty and headaches.   Hematological: Negative for adenopathy. Does not bruise/bleed easily.   Psychiatric/Behavioral: Negative for behavioral problems, confusion and hallucinations.     Objective:  General " "Appearance:  Comfortable.    Vital signs: (most recent): Blood pressure 115/47, pulse 94, temperature 99.3 °F (37.4 °C), temperature source Oral, resp. rate 20, height 63\" (160 cm), weight 162 lb (73.5 kg), SpO2 98 %.  Vital signs are normal.  No fever.    Output: Producing urine and producing stool.    HEENT: Normal HEENT exam.    Lungs:  Normal respiratory rate and normal effort.    Heart: Normal rate.    Abdomen: Bowel sounds are normal.     Extremities: Normal range of motion.    Skin:  Warm and dry.              Labs:  Lab Results (last 72 hours)     Procedure Component Value Units Date/Time    POC Glucose Fingerstick [[id number]]  (Abnormal) Collected:  03/13/17 0919    Specimen:  Blood Updated:  03/13/17 0931     Glucose 204 (H) mg/dL       : [id number] Phillip RobledoaShaMeter ID: [id number]       POC Glucose Fingerstick [[id number]]  (Abnormal) Collected:  03/13/17 1158    Specimen:  Blood Updated:  03/13/17 1209     Glucose 244 (H) mg/dL       : [id number] Estrada Cerongh AnnMeter ID: [id number]       POC Glucose Fingerstick [[id number]]  (Abnormal) Collected:  03/13/17 1654    Specimen:  Blood Updated:  03/13/17 1715     Glucose 184 (H) mg/dL       : [id number] Estrada Denisha AnnMeter ID: [id number]       POC Glucose Fingerstick [[id number]]  (Abnormal) Collected:  03/13/17 2012    Specimen:  Blood Updated:  03/13/17 2024     Glucose 166 (H) mg/dL       : [id number] Adrianna AlaciaMeter ID: [id number]       CBC & Differential [[id number]] Collected:  03/14/17 0243    Specimen:  Blood Updated:  03/14/17 0429    Narrative:       The following orders were created for panel order CBC & Differential.  Procedure                               Abnormality         Status                     ---------                               -----------         ------                     CBC Auto Differential[[id number]]         Abnormal            Final result                 Please view results for these tests on the individual orders. "    CBC Auto Differential [[id number]]  (Abnormal) Collected:  03/14/17 0243    Specimen:  Blood Updated:  03/14/17 0429     WBC 11.97 (H) 10*3/mm3      RBC 3.70 (L) 10*6/mm3      Hemoglobin 11.5 (L) g/dL      Hematocrit 34.3 (L) %      MCV 92.7 fL      MCH 31.1 pg      MCHC 33.5 g/dL      RDW 13.3 %      RDW-SD 44.8 fl      MPV 10.5 fL      Platelets 201 10*3/mm3      Neutrophil % 76.3 %      Lymphocyte % 13.3 (L) %      Monocyte % 10.1 %      Eosinophil % 0.0 %      Basophil % 0.1 %      Immature Grans % 0.2 %      Neutrophils, Absolute 9.14 (H) 10*3/mm3      Lymphocytes, Absolute 1.59 10*3/mm3      Monocytes, Absolute 1.21 10*3/mm3      Eosinophils, Absolute 0.00 10*3/mm3      Basophils, Absolute 0.01 10*3/mm3      Immature Grans, Absolute 0.02 10*3/mm3     Basic Metabolic Panel [[id number]]  (Abnormal) Collected:  03/14/17 0243    Specimen:  Blood Updated:  03/14/17 0444     Glucose 156 (H) mg/dL      BUN 15 mg/dL      Creatinine 0.71 mg/dL      Sodium 132 (L) mmol/L      Potassium 4.4 mmol/L      Chloride 98 mmol/L      CO2 26.0 mmol/L      Calcium 8.7 mg/dL      eGFR Non African Amer 82 mL/min/1.73      BUN/Creatinine Ratio 21.1      Anion Gap 8.0 mmol/L     Narrative:       GFR Normal >60  Chronic Kidney Disease <60  Kidney Failure <15    Iron [[id number]]  (Normal) Collected:  03/14/17 0243    Specimen:  Blood Updated:  03/14/17 0445     Iron 60 mcg/dL     Vitamin B12 [[id number]]  (Normal) Collected:  03/14/17 0243    Specimen:  Blood Updated:  03/14/17 0530     Vitamin B-12 423 pg/mL     Hemoglobin A1c [[id number]] Collected:  03/14/17 0243    Specimen:  Blood Updated:  03/14/17 0621     Hemoglobin A1C 7.9 %     Narrative:       Less than 6.0           Non-Diabetic Range  6.0-7.0                 ADA Therapeutic Target  Greater than 7.0        Action Suggested    POC Glucose Fingerstick [[id number]]  (Abnormal) Collected:  03/14/17 2125    Specimen:  Blood Updated:  03/14/17 2136     Glucose 156 (H) mg/dL        : [id number] Bickerstaff RachaelMeter ID: [id number]       Urinalysis With / Microscopic If Indicated [[id number]]  (Normal) Collected:  03/15/17 0018    Specimen:  Urine from Urine, Clean Catch Updated:  03/15/17 0031     Color, UA Yellow      Appearance, UA Clear      pH, UA <=5.0      Specific Gravity, UA 1.006      Glucose, UA Negative      Ketones, UA Negative      Bilirubin, UA Negative      Blood, UA Negative      Protein, UA Negative      Leuk Esterase, UA Negative      Nitrite, UA Negative      Urobilinogen, UA 0.2 E.U./dL     Narrative:       Urine microscopic not indicated.    POC Glucose Fingerstick [[id number]]  (Abnormal) Collected:  03/15/17 0810    Specimen:  Blood Updated:  03/15/17 0822     Glucose 170 (H) mg/dL       : [id number] Ken Husain ID: [id number]       POC Glucose Fingerstick [[id number]]  (Abnormal) Collected:  03/15/17 1351    Specimen:  Blood Updated:  03/15/17 1404     Glucose 220 (H) mg/dL       : [id number] Chela BoxMeter ID: [id number]       POC Glucose Fingerstick [[id number]]  (Abnormal) Collected:  03/15/17 2149    Specimen:  Blood Updated:  03/15/17 2207     Glucose 208 (H) mg/dL       : [id number] Bickerstaff RachaelMeter ID: [id number]       Blood Culture [[id number]]  (Normal) Collected:  03/14/17 2148    Specimen:  Blood from Arm, Right Updated:  03/15/17 2301     Blood Culture No growth at 24 hours     CBC & Differential [[id number]] Collected:  03/16/17 0454    Specimen:  Blood Updated:  03/16/17 0513    Narrative:       The following orders were created for panel order CBC & Differential.  Procedure                               Abnormality         Status                     ---------                               -----------         ------                     CBC Auto Differential[[id number]]         Abnormal            Final result                 Please view results for these tests on the individual orders.    CBC Auto Differential [[id number]]  (Abnormal)  Collected:  03/16/17 0454    Specimen:  Blood Updated:  03/16/17 0513     WBC 9.07 10*3/mm3      RBC 3.00 (L) 10*6/mm3      Hemoglobin 9.3 (L) g/dL      Hematocrit 27.7 (L) %      MCV 92.3 fL      MCH 31.0 pg      MCHC 33.6 g/dL      RDW 13.3 %      RDW-SD 44.8 fl      MPV 10.2 fL      Platelets 160 10*3/mm3      Neutrophil % 67.1 %      Lymphocyte % 20.7 %      Monocyte % 11.7 %      Eosinophil % 0.1 %      Basophil % 0.1 %      Immature Grans % 0.3 %      Neutrophils, Absolute 6.08 10*3/mm3      Lymphocytes, Absolute 1.88 10*3/mm3      Monocytes, Absolute 1.06 10*3/mm3      Eosinophils, Absolute 0.01 10*3/mm3      Basophils, Absolute 0.01 10*3/mm3      Immature Grans, Absolute 0.03 10*3/mm3     Basic Metabolic Panel [[id number]]  (Abnormal) Collected:  03/16/17 0454    Specimen:  Blood Updated:  03/16/17 0528     Glucose 178 (H) mg/dL      BUN 10 mg/dL      Creatinine 0.60 mg/dL      Sodium 135 mmol/L      Potassium 3.9 mmol/L      Chloride 100 mmol/L      CO2 31.0 mmol/L      Calcium 8.4 mg/dL      eGFR Non African Amer 99 mL/min/1.73      BUN/Creatinine Ratio 16.7      Anion Gap 4.0 mmol/L     Narrative:       GFR Normal >60  Chronic Kidney Disease <60  Kidney Failure <15          Imaging Results (last 72 hours)     Procedure Component Value Units Date/Time    XR Spine Lumbar AP & Lateral [[id number]] Collected:  03/15/17 1428     Updated:  03/15/17 1509    Narrative:       EXAMINATION:   XR SPINE LUMBAR AP AND LATERAL-, FL C ARM DURING SURGERY-   3/15/2017 3:16 PM EDT     HISTORY: Lumbar fusion     Three images are obtained in the operating room under the direction of  Dr. Jose Horton. 1 minute and 33 seconds of fluoroscopy time was  utilized.     Two screws are present through the pedicles at L3, L5 and S1. A single  screw is present in the pedicle of L4. Disc space devices are present at  the L3-4 and L4-5 levels. Lateral plate and transverse fixation screws  are present at the L3-4 level.       Impression:        Status post fusion of the lumbar spine.  This report was finalized on 03/15/2017 15:07 by Dr. Emigdio Dillard MD.    FL C Arm During Surgery [[id number]] Collected:  03/15/17 1428     Updated:  03/15/17 1509    Narrative:       EXAMINATION:   XR SPINE LUMBAR AP AND LATERAL-, FL C ARM DURING SURGERY-   3/15/2017 3:16 PM EDT     HISTORY: Lumbar fusion     Three images are obtained in the operating room under the direction of  Dr. Jose Horton. 1 minute and 33 seconds of fluoroscopy time was  utilized.     Two screws are present through the pedicles at L3, L5 and S1. A single  screw is present in the pedicle of L4. Disc space devices are present at  the L3-4 and L4-5 levels. Lateral plate and transverse fixation screws  are present at the L3-4 level.       Impression:       Status post fusion of the lumbar spine.  This report was finalized on 03/15/2017 15:07 by Dr. Emigdio Dillard MD.                Assessment:    Condition: In stable condition.  Improving.   (Patient Active Problem List:     Osteopenia     Mixed hyperlipidemia     Essential hypertension     Type 2 diabetes mellitus without complication, without long-term current use of insulin     Spinal stenosis, lumbar     Gastroesophageal reflux disease without esophagitis    ).     Plan:   Encourage ambulation.  Administer medications as ordered.   (Blood sugars controlled.  The more pain than expected  Ambulation with physical therapy  Be ready to be discharged home in the morning  Medically stable.).     Patient Active Problem List   Diagnosis   • Osteopenia   • Mixed hyperlipidemia   • Essential hypertension   • Type 2 diabetes mellitus without complication, without long-term current use of insulin   • Spinal stenosis, lumbar   • Gastroesophageal reflux disease without esophagitis     Enrico Ureña MD  3/16/2017

## 2017-03-17 LAB
GLUCOSE BLDC GLUCOMTR-MCNC: 100 MG/DL (ref 70–130)
GLUCOSE BLDC GLUCOMTR-MCNC: 145 MG/DL (ref 70–130)
GLUCOSE BLDC GLUCOMTR-MCNC: 148 MG/DL (ref 70–130)
GLUCOSE BLDC GLUCOMTR-MCNC: 195 MG/DL (ref 70–130)

## 2017-03-17 PROCEDURE — 97116 GAIT TRAINING THERAPY: CPT

## 2017-03-17 PROCEDURE — 25010000002 HYDROMORPHONE PER 4 MG: Performed by: ORTHOPAEDIC SURGERY

## 2017-03-17 PROCEDURE — 97535 SELF CARE MNGMENT TRAINING: CPT

## 2017-03-17 PROCEDURE — 82962 GLUCOSE BLOOD TEST: CPT

## 2017-03-17 RX ADMIN — BISACODYL 10 MG: 5 TABLET, COATED ORAL at 08:43

## 2017-03-17 RX ADMIN — HYDROMORPHONE HYDROCHLORIDE 1 MG: 1 INJECTION, SOLUTION INTRAMUSCULAR; INTRAVENOUS; SUBCUTANEOUS at 00:05

## 2017-03-17 RX ADMIN — METOPROLOL SUCCINATE 25 MG: 25 TABLET, FILM COATED, EXTENDED RELEASE ORAL at 22:23

## 2017-03-17 RX ADMIN — FAMOTIDINE 20 MG: 20 TABLET, FILM COATED ORAL at 08:43

## 2017-03-17 RX ADMIN — ONDANSETRON 4 MG: 4 TABLET, FILM COATED ORAL at 08:43

## 2017-03-17 RX ADMIN — OXYCODONE HYDROCHLORIDE AND ACETAMINOPHEN 1 TABLET: 10; 325 TABLET ORAL at 16:51

## 2017-03-17 RX ADMIN — INSULIN LISPRO 52 UNITS: 100 INJECTION, SOLUTION INTRAVENOUS; SUBCUTANEOUS at 08:30

## 2017-03-17 RX ADMIN — METOPROLOL SUCCINATE 25 MG: 25 TABLET, FILM COATED, EXTENDED RELEASE ORAL at 08:43

## 2017-03-17 RX ADMIN — ONDANSETRON 4 MG: 4 TABLET, FILM COATED ORAL at 16:51

## 2017-03-17 RX ADMIN — SUCRALFATE 1 G: 1 TABLET ORAL at 16:52

## 2017-03-17 RX ADMIN — SUCRALFATE 1 G: 1 TABLET ORAL at 08:43

## 2017-03-17 RX ADMIN — INSULIN LISPRO 35 UNITS: 100 INJECTION, SOLUTION INTRAVENOUS; SUBCUTANEOUS at 12:20

## 2017-03-17 RX ADMIN — FAMOTIDINE 20 MG: 20 TABLET, FILM COATED ORAL at 18:10

## 2017-03-17 RX ADMIN — SUCRALFATE 1 G: 1 TABLET ORAL at 22:22

## 2017-03-17 RX ADMIN — INSULIN LISPRO 27 UNITS: 100 INJECTION, SOLUTION INTRAVENOUS; SUBCUTANEOUS at 18:10

## 2017-03-17 RX ADMIN — OXYCODONE HYDROCHLORIDE AND ACETAMINOPHEN 1 TABLET: 10; 325 TABLET ORAL at 08:44

## 2017-03-17 NOTE — PROGRESS NOTES
Orthopedic Spine Progress Note    Alisia Velez  71 y.o.  female  Subjective     Post-Operative Day: 3/1  Systemic or Specific Complaints:   Sore, c/o right foot tingling. Fell off BSC overnight - no change in pain. No obvious sequelae from fall.     Objective     Vital signs in last 24 hours:  Temp:  [97.2 °F (36.2 °C)-99.1 °F (37.3 °C)] 98.1 °F (36.7 °C)  Heart Rate:  [73-93] 73  Resp:  [16-20] 18  BP: (118-144)/(40-85) 121/40    Physical Exam:  lert oriented ×3, no acute distress, vital signs stable, grossly neurovascularly intact with some right anterior tibialis weakness, dressing clean dry and intact    Diagnostic Data:  CBC:    Results from last 7 days  Lab Units 03/16/17  0454   WBC 10*3/mm3 9.07   RBC 10*6/mm3 3.00*   HEMOGLOBIN g/dL 9.3*   HEMATOCRIT % 27.7*   PLATELETS 10*3/mm3 160      BMP:@LABCNTIP(BMP)@    Assessment/Plan     1. Status Post left LLIF L3-5 with instrumentation 3/13/17  2. Status post Right L3-S1 decompression, PLIF L5-S1, PSF with instrumentation L3-S1 3/15/17          Plan:  1.periops  2. Pt/ot/brace  3. Probably home tomorrow                  DENISA Mari    Date: 3/17/2017  Time: 7:54 AMs

## 2017-03-17 NOTE — PLAN OF CARE
"Problem: Patient Care Overview (Adult)  Goal: Plan of Care Review  Outcome: Ongoing (interventions implemented as appropriate)    03/16/17 1700   Coping/Psychosocial Response Interventions   Plan Of Care Reviewed With patient   Patient Care Overview   Progress no change   Outcome Evaluation   Outcome Summary/Follow up Plan Patient stated her \"right leg buckled\" when she was standing at the bedside commode with the nurse aide and her walker; patient fell to the floor. Dr. Jensen on call and notified, no tests ordered at this time. Medicate prn for pain with relief noted. Up to [facility] [place] 2 assist and walker/gait belt. Patient checking own blood glucose levels, SSI per patient's home routine. Dressing changed to incisions, hemovac dc'd.       Goal: Adult Individualization and Mutuality  Outcome: Ongoing (interventions implemented as appropriate)  Goal: Discharge Needs Assessment  Outcome: Ongoing (interventions implemented as appropriate)    Problem: Perioperative Period (Adult)  Goal: Signs and Symptoms of Listed Potential Problems Will be Absent or Manageable (Perioperative Period)  Outcome: Ongoing (interventions implemented as appropriate)    Problem: Diabetes, Type 2 (Adult)  Goal: Signs and Symptoms of Listed Potential Problems Will be Absent or Manageable (Diabetes, Type 2)  Outcome: Ongoing (interventions implemented as appropriate)    Problem: Fall Risk (Adult)  Goal: Absence of Falls  Outcome: Ongoing (interventions implemented as appropriate)      "

## 2017-03-17 NOTE — PROGRESS NOTES
Alisia Velez is a 71 y.o. female patient.    Current Facility-Administered Medications   Medication Dose Route Frequency Provider Last Rate Last Dose   • acetaminophen (TYLENOL) tablet 650 mg  650 mg Oral Q6H PRN DENISA Smith   650 mg at 03/14/17 2140   • bisacodyl (DULCOLAX) EC tablet 10 mg  10 mg Oral Daily DENISA Mari   10 mg at 03/16/17 1316   • bisacodyl (DULCOLAX) suppository 10 mg  10 mg Rectal Daily PRN DENISA Mari       • dextrose (D50W) solution 12.5 g  12.5 g Intravenous PRN Mike Ann MD       • diphenhydrAMINE (BENADRYL) capsule 25 mg  25 mg Oral Nightly PRN K Jose Horton MD       • famotidine (PEPCID) injection 20 mg  20 mg Intravenous 60 Min Pre-Op Mike Ann MD   20 mg at 03/13/17 0650    Or   • famotidine (PEPCID) tablet 20 mg  20 mg Oral 60 Min Pre-Op Mike Ann MD       • famotidine (PEPCID) injection 20 mg  20 mg Intravenous BID K Jose Horton MD   20 mg at 03/15/17 0910    Or   • famotidine (PEPCID) tablet 20 mg  20 mg Oral BID K Jose Horton MD   20 mg at 03/16/17 1804   • FentaNYL Citrate (PF) (SUBLIMAZE) injection 25 mcg  25 mcg Intravenous Q5 Min PRN Mike Ann MD       • HYDROmorphone (DILAUDID) injection 1 mg  1 mg Intravenous Q3H PRN K Jose Horton MD   1 mg at 03/17/17 0005   • HYDROmorphone (DILAUDID) injection 1 mg  1 mg Intravenous Q3H PRN MIGDALIA Horton MD       • insulin detemir (LEVEMIR) injection 62 Units  62 Units Subcutaneous Nightly K Jose Horton MD   62 Units at 03/13/17 2015   • insulin lispro (humaLOG) injection 50 Units  50 Units Subcutaneous TID With Meals MIGDALIA Horton MD   18 Units at 03/16/17 1805   • lactated ringers infusion  9 mL/hr Intravenous Continuous Mike Ann MD 9 mL/hr at 03/15/17 0813     • lactated ringers infusion  20 mL/hr Intravenous Continuous Mike Ann MD 20 mL/hr at 03/15/17 0813     • lactated ringers  infusion  100 mL/hr Intravenous Continuous Linda Dewitt  mL/hr at 03/15/17 0915 100 mL/hr at 03/15/17 0915   • levoFLOXacin (LEVAQUIN) 500 mg/100 mL D5W (premix) (LEVAQUIN) 500 mg  500 mg Intravenous Q24H DENISA Smith   500 mg at 03/16/17 2101   • lubiprostone (AMITIZA) capsule 24 mcg  24 mcg Oral BID With Meals Enrico Ureña MD   24 mcg at 03/14/17 0814   • metoprolol succinate XL (TOPROL-XL) 24 hr tablet 25 mg  25 mg Oral BID K Jose Horton MD   25 mg at 03/16/17 2041   • midazolam (VERSED) injection 1 mg  1 mg Intravenous Q5 Min PRN Mike Ann MD        Or   • midazolam (VERSED) injection 2 mg  2 mg Intravenous Q5 Min PRN Mike Ann MD   2 mg at 03/13/17 0650   • ondansetron (ZOFRAN) tablet 4 mg  4 mg Oral Q6H PRN K Jose Horton MD        Or   • ondansetron ODT (ZOFRAN-ODT) disintegrating tablet 4 mg  4 mg Oral Q6H PRN K Jose Horton MD        Or   • ondansetron (ZOFRAN) injection 4 mg  4 mg Intravenous Q6H PRN K Jose Horton MD   4 mg at 03/13/17 2143   • oxyCODONE-acetaminophen (PERCOCET)  MG per tablet 2 tablet  2 tablet Oral Q4H PRN K Jose Horton MD   2 tablet at 03/13/17 1619   • oxyCODONE-acetaminophen (PERCOCET)  MG per tablet 2 tablet  2 tablet Oral Q4H PRN K Jose Horton MD       • sodium chloride 0.9 % flush 1-10 mL  1-10 mL Intravenous PRN Mike Ann MD       • sodium chloride 0.9 % flush 1-10 mL  1-10 mL Intravenous PRN Mike Ann MD       • sodium chloride 0.9 % flush 1-10 mL  1-10 mL Intravenous PRN K Jose Horton MD       • sodium chloride 0.9 % flush 1-10 mL  1-10 mL Intravenous PRN K Jose Horton MD       • sodium chloride 0.9 % infusion  75 mL/hr Intravenous Continuous K Jose Horton MD 75 mL/hr at 03/16/17 0617 75 mL/hr at 03/16/17 0617   • sucralfate (CARAFATE) tablet 1 g  1 g Oral TID K Jose Horton MD   1 g at 03/16/17 2041     Allergies   Allergen  "Reactions   • Humulin N [Insulin Isophane] Shortness Of Breath   • Mold Extract [Trichophyton] Shortness Of Breath   • Sudafed [Pseudoephedrine] Shortness Of Breath   • Sulfa Antibiotics Shortness Of Breath   • Other Itching     Silk Tape   • Erythromycin Other (See Comments)     Eyes see big black spots     Active Problems:    Mixed hyperlipidemia    Essential hypertension    Type 2 diabetes mellitus without complication, without long-term current use of insulin    Spinal stenosis, lumbar    Gastroesophageal reflux disease without esophagitis    Blood pressure 118/49, pulse 77, temperature 98.7 °F (37.1 °C), temperature source Oral, resp. rate 20, height 63\" (160 cm), weight 162 lb (73.5 kg), SpO2 95 %.      Subjective:  Symptoms:  Improved.  She reports anxiety.  No shortness of breath or chest pain.    Diet:  Adequate intake.  No nausea or vomiting.    Activity level: Impaired due to weakness.    Pain:  She complains of pain that is moderate.  She reports pain is improving.  Pain is well controlled.      Review of Systems   Constitutional: Negative for activity change, appetite change, chills and fever.   HENT: Negative for congestion, ear pain, trouble swallowing and voice change.    Eyes: Negative for pain, discharge and visual disturbance.   Respiratory: Negative for apnea, chest tightness, shortness of breath and wheezing.    Cardiovascular: Negative for chest pain and palpitations.   Gastrointestinal: Negative for abdominal distention, blood in stool, nausea and vomiting.   Endocrine: Negative for cold intolerance, heat intolerance, polydipsia, polyphagia and polyuria.   Genitourinary: Negative for difficulty urinating, frequency and hematuria.   Musculoskeletal: Negative for joint swelling and myalgias.   Skin: Negative for color change, pallor and rash.   Neurological: Negative for tremors, seizures, syncope, speech difficulty and headaches.   Hematological: Negative for adenopathy. Does not bruise/bleed " "easily.   Psychiatric/Behavioral: Negative for behavioral problems, confusion and hallucinations.     Objective:  General Appearance:  Comfortable and well-appearing.    Vital signs: (most recent): Blood pressure 118/49, pulse 77, temperature 98.7 °F (37.1 °C), temperature source Oral, resp. rate 20, height 63\" (160 cm), weight 162 lb (73.5 kg), SpO2 95 %.  Vital signs are normal.  No fever.    Output: Producing urine and producing stool.    HEENT: Normal HEENT exam.    Lungs:  Normal effort.    Heart: Normal rate.    Abdomen: Abdomen is soft.  Bowel sounds are normal.     Neurological: Patient is alert.    Skin:  Warm and dry.              Labs:  Lab Results (last 72 hours)     Procedure Component Value Units Date/Time    POC Glucose Fingerstick [[id number]]  (Abnormal) Collected:  03/14/17 2125    Specimen:  Blood Updated:  03/14/17 2136     Glucose 156 (H) mg/dL       : [id number] Bicania RachaelMeter ID: [id number]       Urinalysis With / Microscopic If Indicated [[id number]]  (Normal) Collected:  03/15/17 0018    Specimen:  Urine from Urine, Clean Catch Updated:  03/15/17 0031     Color, UA Yellow      Appearance, UA Clear      pH, UA <=5.0      Specific Gravity, UA 1.006      Glucose, UA Negative      Ketones, UA Negative      Bilirubin, UA Negative      Blood, UA Negative      Protein, UA Negative      Leuk Esterase, UA Negative      Nitrite, UA Negative      Urobilinogen, UA 0.2 E.U./dL     Narrative:       Urine microscopic not indicated.    POC Glucose Fingerstick [[id number]]  (Abnormal) Collected:  03/15/17 0810    Specimen:  Blood Updated:  03/15/17 0822     Glucose 170 (H) mg/dL       : [id number] Ken Sainiter ID: [id number]       POC Glucose Fingerstick [[id number]]  (Abnormal) Collected:  03/15/17 1351    Specimen:  Blood Updated:  03/15/17 1404     Glucose 220 (H) mg/dL       : [id number] Chela SamuaMeter ID: [id number]       POC Glucose Fingerstick [[id number]]  (Abnormal) Collected:  " 03/15/17 2149    Specimen:  Blood Updated:  03/15/17 2207     Glucose 208 (H) mg/dL       : [id number] [place] RachaelMeter ID: [id number]       CBC & Differential [[id number]] Collected:  03/16/17 0454    Specimen:  Blood Updated:  03/16/17 0513    Narrative:       The following orders were created for panel order CBC & Differential.  Procedure                               Abnormality         Status                     ---------                               -----------         ------                     CBC Auto Differential[[id number]]         Abnormal            Final result                 Please view results for these tests on the individual orders.    CBC Auto Differential [[id number]]  (Abnormal) Collected:  03/16/17 0454    Specimen:  Blood Updated:  03/16/17 0513     WBC 9.07 10*3/mm3      RBC 3.00 (L) 10*6/mm3      Hemoglobin 9.3 (L) g/dL      Hematocrit 27.7 (L) %      MCV 92.3 fL      MCH 31.0 pg      MCHC 33.6 g/dL      RDW 13.3 %      RDW-SD 44.8 fl      MPV 10.2 fL      Platelets 160 10*3/mm3      Neutrophil % 67.1 %      Lymphocyte % 20.7 %      Monocyte % 11.7 %      Eosinophil % 0.1 %      Basophil % 0.1 %      Immature Grans % 0.3 %      Neutrophils, Absolute 6.08 10*3/mm3      Lymphocytes, Absolute 1.88 10*3/mm3      Monocytes, Absolute 1.06 10*3/mm3      Eosinophils, Absolute 0.01 10*3/mm3      Basophils, Absolute 0.01 10*3/mm3      Immature Grans, Absolute 0.03 10*3/mm3     Basic Metabolic Panel [[id number]]  (Abnormal) Collected:  03/16/17 0454    Specimen:  Blood Updated:  03/16/17 0528     Glucose 178 (H) mg/dL      BUN 10 mg/dL      Creatinine 0.60 mg/dL      Sodium 135 mmol/L      Potassium 3.9 mmol/L      Chloride 100 mmol/L      CO2 31.0 mmol/L      Calcium 8.4 mg/dL      eGFR Non African Amer 99 mL/min/1.73      BUN/Creatinine Ratio 16.7      Anion Gap 4.0 mmol/L     Narrative:       GFR Normal >60  Chronic Kidney Disease <60  Kidney Failure <15    POC Glucose Fingerstick [[id number]]   (Abnormal) Collected:  03/16/17 0848    Specimen:  Blood Updated:  03/16/17 0910     Glucose 176 (H) mg/dL       : [id number] Tessa MeganMeter ID: [id number]       POC Glucose Fingerstick [[id number]]  (Abnormal) Collected:  03/16/17 1118    Specimen:  Blood Updated:  03/16/17 1130     Glucose 205 (H) mg/dL       : [id number] Tessa MeganMeter ID: [id number]       POC Glucose Fingerstick [[id number]]  (Abnormal) Collected:  03/16/17 2104    Specimen:  Blood Updated:  03/16/17 2137     Glucose 334 (H) mg/dL       : [id number] Padilla KristaMeter ID: [id number]       POC Glucose Fingerstick [[id number]]  (Abnormal) Collected:  03/16/17 2106    Specimen:  Blood Updated:  03/16/17 2137     Glucose 258 (H) mg/dL       : [id number] Padilla KristaMeter ID: [id number]       Blood Culture [[id number]]  (Normal) Collected:  03/14/17 2148    Specimen:  Blood from Arm, Right Updated:  03/16/17 2302     Blood Culture No growth at 2 days           Imaging Results (last 72 hours)     Procedure Component Value Units Date/Time    XR Spine Lumbar AP & Lateral [[id number]] Collected:  03/15/17 1428     Updated:  03/15/17 1509    Narrative:       EXAMINATION:   XR SPINE LUMBAR AP AND LATERAL-, FL C ARM DURING SURGERY-   3/15/2017 3:16 PM EDT     HISTORY: Lumbar fusion     Three images are obtained in the operating room under the direction of  Dr. Jose Horton. 1 minute and 33 seconds of fluoroscopy time was  utilized.     Two screws are present through the pedicles at L3, L5 and S1. A single  screw is present in the pedicle of L4. Disc space devices are present at  the L3-4 and L4-5 levels. Lateral plate and transverse fixation screws  are present at the L3-4 level.       Impression:       Status post fusion of the lumbar spine.  This report was finalized on 03/15/2017 15:07 by Dr. Emigdio Dillard MD.    FL C Arm During Surgery [[id number]] Collected:  03/15/17 1428     Updated:  03/15/17 1509    Narrative:       EXAMINATION:   XR  SPINE LUMBAR AP AND LATERAL-, FL C ARM DURING SURGERY-   3/15/2017 3:16 PM EDT     HISTORY: Lumbar fusion     Three images are obtained in the operating room under the direction of  Dr. Jose Horton. 1 minute and 33 seconds of fluoroscopy time was  utilized.     Two screws are present through the pedicles at L3, L5 and S1. A single  screw is present in the pedicle of L4. Disc space devices are present at  the L3-4 and L4-5 levels. Lateral plate and transverse fixation screws  are present at the L3-4 level.       Impression:       Status post fusion of the lumbar spine.  This report was finalized on 03/15/2017 15:07 by Dr. Emigdio Dillard MD.                Assessment:    Condition: In stable condition.  Improving.   (Patient Active Problem List:     Osteopenia     Mixed hyperlipidemia     Essential hypertension     Type 2 diabetes mellitus without complication, without long-term current use of insulin     Spinal stenosis, lumbar     Gastroesophageal reflux disease without esophagitis    ).     Plan:   Encourage ambulation.  Regular diet.  (She had a spell of right lower extremity weakness yesterday while getting on and off the bedside commode.  She ended up with a fall despite 2 people being beside her.  No apparent injury and she states her leg is waking up today.  Discuss with spine surgery.  Medically stable for discharge when cleared by them suspect next 24 hours.  She can follow-up with her primary care doctor in 2-3 weeks .  Keep insulin regimen the same.).     Patient Active Problem List   Diagnosis   • Osteopenia   • Mixed hyperlipidemia   • Essential hypertension   • Type 2 diabetes mellitus without complication, without long-term current use of insulin   • Spinal stenosis, lumbar   • Gastroesophageal reflux disease without esophagitis     Enrico Ureña MD  3/17/2017

## 2017-03-17 NOTE — PLAN OF CARE
Problem: Patient Care Overview (Adult)  Goal: Plan of Care Review  Outcome: Ongoing (interventions implemented as appropriate)    03/17/17 1000   Coping/Psychosocial Response Interventions   Plan Of Care Reviewed With patient   Patient Care Overview   Progress improving   Outcome Evaluation   Outcome Summary/Follow up Plan Pt maxA for LB dressing without AE. MaxA for toilet hygiene. Pt issued LH bath sponge and toilet aid for increased ADL independence. Pt CGA for transfers from chair and commode. Pt educated on safe transfer technique, home safety, and AE. Cont OT POC. Recommend HH at discharge. Next tx pt needs to practice tub transfers.

## 2017-03-17 NOTE — PLAN OF CARE
Problem: Patient Care Overview (Adult)  Goal: Plan of Care Review  Outcome: Ongoing (interventions implemented as appropriate)    03/17/17 0277   Coping/Psychosocial Response Interventions   Plan Of Care Reviewed With patient   Patient Care Overview   Progress improving   Outcome Evaluation   Outcome Summary/Follow up Plan Medicated prn for pain with relief noted. Safety maintained, fall precautions in place. SSI per home routine. VSS, dressing to lumbar stained, c/o right foot tingling.       Goal: Adult Individualization and Mutuality  Outcome: Ongoing (interventions implemented as appropriate)  Goal: Discharge Needs Assessment  Outcome: Ongoing (interventions implemented as appropriate)    Problem: Perioperative Period (Adult)  Goal: Signs and Symptoms of Listed Potential Problems Will be Absent or Manageable (Perioperative Period)  Outcome: Ongoing (interventions implemented as appropriate)    Problem: Diabetes, Type 2 (Adult)  Goal: Signs and Symptoms of Listed Potential Problems Will be Absent or Manageable (Diabetes, Type 2)  Outcome: Ongoing (interventions implemented as appropriate)    Problem: Fall Risk (Adult)  Goal: Absence of Falls  Outcome: Ongoing (interventions implemented as appropriate)

## 2017-03-17 NOTE — PROGRESS NOTES
Continued Stay Note  HILDA Phelan     Patient Name: Alisia Velez  MRN: [id number]  Today's Date: 3/17/2017    Admit Date: 3/13/2017          Discharge Plan       03/17/17 1316    Case Management/Social Work Plan    Plan Home    Additional Comments SW spoke to patient in room re discharge planning decisions (home health vs potential acute rehab referral). Patient states that she has spoken to her  and she wants to discharge to home and continue plan for outpatient. SW provided instructions to patient on how to arrange home health care if she decides she wants home health after discharge to home. Patient states she understands and will call her primary care physician or Dr. Horton's office if she feels she needs home health care.               Discharge Codes     None        Expected Discharge Date and Time     Expected Discharge Date Expected Discharge Time    Mar 16, 2017             SARAH Wells

## 2017-03-17 NOTE — PLAN OF CARE
Problem: Patient Care Overview (Adult)  Goal: Plan of Care Review  Outcome: Ongoing (interventions implemented as appropriate)    03/17/17 1012   Coping/Psychosocial Response Interventions   Plan Of Care Reviewed With patient   Patient Care Overview   Progress improving   Outcome Evaluation   Outcome Summary/Follow up Plan Pt up in chair able to stand min x1 amb rwx LSO cga x1. Pt hopes to d/c home tomorrow w/ HH. Will practice steps in pm

## 2017-03-17 NOTE — PLAN OF CARE
Problem: Patient Care Overview (Adult)  Goal: Plan of Care Review  Outcome: Ongoing (interventions implemented as appropriate)    03/17/17 0428   Coping/Psychosocial Response Interventions   Plan Of Care Reviewed With patient   Patient Care Overview   Progress no change   Outcome Evaluation   Outcome Summary/Follow up Plan Pt. had BM tonight (3-17). No neuro changes. Medicated for pain x1 so far. Very unsteady when transferring (with walker/gait belt and assist x2) Tegaderm dressing saturated serosang drainage. Changed to 4x4s and medipore tape.          Problem: Perioperative Period (Adult)  Goal: Signs and Symptoms of Listed Potential Problems Will be Absent or Manageable (Perioperative Period)  Outcome: Ongoing (interventions implemented as appropriate)    Problem: Diabetes, Type 2 (Adult)  Goal: Signs and Symptoms of Listed Potential Problems Will be Absent or Manageable (Diabetes, Type 2)  Outcome: Ongoing (interventions implemented as appropriate)    Problem: Fall Risk (Adult)  Goal: Absence of Falls  Outcome: Ongoing (interventions implemented as appropriate)

## 2017-03-18 LAB
GLUCOSE BLDC GLUCOMTR-MCNC: 183 MG/DL (ref 70–130)
GLUCOSE BLDC GLUCOMTR-MCNC: 215 MG/DL (ref 70–130)
GLUCOSE BLDC GLUCOMTR-MCNC: 257 MG/DL (ref 70–130)
GLUCOSE BLDC GLUCOMTR-MCNC: 84 MG/DL (ref 70–130)

## 2017-03-18 PROCEDURE — 97116 GAIT TRAINING THERAPY: CPT

## 2017-03-18 PROCEDURE — 97530 THERAPEUTIC ACTIVITIES: CPT

## 2017-03-18 PROCEDURE — 82962 GLUCOSE BLOOD TEST: CPT

## 2017-03-18 RX ADMIN — OXYCODONE HYDROCHLORIDE AND ACETAMINOPHEN 1 TABLET: 10; 325 TABLET ORAL at 03:12

## 2017-03-18 RX ADMIN — SUCRALFATE 1 G: 1 TABLET ORAL at 20:40

## 2017-03-18 RX ADMIN — LUBIPROSTONE 24 MCG: 24 CAPSULE, GELATIN COATED ORAL at 17:07

## 2017-03-18 RX ADMIN — OXYCODONE HYDROCHLORIDE AND ACETAMINOPHEN 1 TABLET: 10; 325 TABLET ORAL at 15:32

## 2017-03-18 RX ADMIN — FAMOTIDINE 20 MG: 20 TABLET, FILM COATED ORAL at 17:07

## 2017-03-18 RX ADMIN — OXYCODONE HYDROCHLORIDE AND ACETAMINOPHEN 1 TABLET: 10; 325 TABLET ORAL at 20:41

## 2017-03-18 RX ADMIN — METOPROLOL SUCCINATE 25 MG: 25 TABLET, FILM COATED, EXTENDED RELEASE ORAL at 09:13

## 2017-03-18 RX ADMIN — INSULIN LISPRO 69 UNITS: 100 INJECTION, SOLUTION INTRAVENOUS; SUBCUTANEOUS at 09:14

## 2017-03-18 RX ADMIN — FAMOTIDINE 20 MG: 20 TABLET, FILM COATED ORAL at 09:13

## 2017-03-18 RX ADMIN — INSULIN LISPRO 25 UNITS: 100 INJECTION, SOLUTION INTRAVENOUS; SUBCUTANEOUS at 17:06

## 2017-03-18 RX ADMIN — BISACODYL 10 MG: 5 TABLET, COATED ORAL at 09:14

## 2017-03-18 RX ADMIN — INSULIN LISPRO 54 UNITS: 100 INJECTION, SOLUTION INTRAVENOUS; SUBCUTANEOUS at 13:04

## 2017-03-18 RX ADMIN — OXYCODONE HYDROCHLORIDE AND ACETAMINOPHEN 1 TABLET: 10; 325 TABLET ORAL at 09:14

## 2017-03-18 RX ADMIN — SUCRALFATE 1 G: 1 TABLET ORAL at 16:39

## 2017-03-18 RX ADMIN — METOPROLOL SUCCINATE 25 MG: 25 TABLET, FILM COATED, EXTENDED RELEASE ORAL at 20:40

## 2017-03-18 RX ADMIN — ONDANSETRON 4 MG: 4 TABLET, FILM COATED ORAL at 03:11

## 2017-03-18 RX ADMIN — SUCRALFATE 1 G: 1 TABLET ORAL at 09:13

## 2017-03-18 NOTE — PLAN OF CARE
Problem: Patient Care Overview (Adult)  Goal: Plan of Care Review  Outcome: Ongoing (interventions implemented as appropriate)    03/18/17 0439   Coping/Psychosocial Response Interventions   Plan Of Care Reviewed With patient   Patient Care Overview   Progress improving   Outcome Evaluation   Outcome Summary/Follow up Plan Slept well tonight. no neuro changes. Medicated x1 for pain. Lumbar dressing changed r/t saturated with serosang drainage.         Problem: Perioperative Period (Adult)  Goal: Signs and Symptoms of Listed Potential Problems Will be Absent or Manageable (Perioperative Period)  Outcome: Ongoing (interventions implemented as appropriate)    Problem: Diabetes, Type 2 (Adult)  Goal: Signs and Symptoms of Listed Potential Problems Will be Absent or Manageable (Diabetes, Type 2)  Outcome: Ongoing (interventions implemented as appropriate)    Problem: Fall Risk (Adult)  Goal: Absence of Falls  Outcome: Ongoing (interventions implemented as appropriate)

## 2017-03-18 NOTE — PROGRESS NOTES
Orthopedic Spine Progress Note    Alisia Velez  71 y.o.  female  Subjective     Post-Operative Day: 4/2  Systemic or Specific Complaints:   Sore, c/o right foot tingling and weakness. PT notes indicate needs assistance for arising from bed. Patient would be willing to go to rehab at [facility]    Objective     Vital signs in last 24 hours:  Temp:  [98.1 °F (36.7 °C)-99.5 °F (37.5 °C)] 98.3 °F (36.8 °C)  Heart Rate:  [70-72] 72  Resp:  [16-18] 18  BP: (111-125)/(48-69) 111/69    Physical Exam:  lert oriented ×3, no acute distress, vital signs stable, grossly neurovascularly intact with some right anterior tibialis weakness, dressing clean dry and intact    Diagnostic Data:  CBC:    Results from last 7 days  Lab Units 03/16/17  0454   WBC 10*3/mm3 9.07   RBC 10*6/mm3 3.00*   HEMOGLOBIN g/dL 9.3*   HEMATOCRIT % 27.7*   PLATELETS 10*3/mm3 160      BMP:@LABCNTIP(BMP)@    Assessment/Plan     1. Status Post left LLIF L3-5 with instrumentation 3/13/17  2. Status post Right L3-S1 decompression, PLIF L5-S1, PSF with instrumentation L3-S1 3/15/17          Plan:  1.periops  2. Pt/ot/brace  3. [facility] rehab consult                  DENISA Mari    Date: 3/18/2017  Time: 9:28 AMs

## 2017-03-18 NOTE — PLAN OF CARE
Problem: Patient Care Overview (Adult)  Goal: Plan of Care Review  Outcome: Ongoing (interventions implemented as appropriate)    03/18/17 1590   Coping/Psychosocial Response Interventions   Plan Of Care Reviewed With patient   Patient Care Overview   Progress improving   Outcome Evaluation   Outcome Summary/Follow up Plan Pain managed with PO medication. Safety maintained. Using brace when out of bed. Monitor BS and cover per pt direction. Anticipated Eval by [facility] on Monday.          Problem: Perioperative Period (Adult)  Goal: Signs and Symptoms of Listed Potential Problems Will be Absent or Manageable (Perioperative Period)  Outcome: Ongoing (interventions implemented as appropriate)    Problem: Diabetes, Type 2 (Adult)  Goal: Signs and Symptoms of Listed Potential Problems Will be Absent or Manageable (Diabetes, Type 2)  Outcome: Ongoing (interventions implemented as appropriate)    Problem: Fall Risk (Adult)  Goal: Absence of Falls  Outcome: Ongoing (interventions implemented as appropriate)

## 2017-03-18 NOTE — PROGRESS NOTES
Chief Complaint:  Denies new complaints      Interval History:     Pain adequately controlled.  Possible discharge today.  -215.      Review of Systems:   General ROS: negative for - chills or fever  Respiratory ROS: no cough, shortness of breath, or wheezing  Cardiovascular ROS: no chest pain or dyspnea on exertion  Gastrointestinal ROS: negative for - abdominal pain, diarrhea or nausea/vomiting       Vital Signs  Temp:  [98.1 °F (36.7 °C)-99.5 °F (37.5 °C)] 98.3 °F (36.8 °C)  Heart Rate:  [70-72] 72  Resp:  [16-18] 18  BP: (111-125)/(48-69) 111/69    Intake/Output Summary (Last 24 hours) at 03/18/17 0832  Last data filed at 03/17/17 2221   Gross per 24 hour   Intake      0 ml   Output    400 ml   Net   -400 ml       Physical Exam:     General Appearance:    Alert, cooperative, in no acute distress   Head:    N/A   Throat:   N/A   Neck:   N/A   Lungs:     Clear to auscultation,respirations regular, even and                  unlabored    Heart:    Regular rhythm and normal rate, normal S1 and S2, no            murmur, no gallop, no rub   Abdomen:     Normal bowel sounds, no masses, no organomegaly, soft        non-tender, non-distended, no guarding, no rebound                tenderness   Extremities: No edema   Pulses:   N/A   Skin:   N/A   Lymph nodes:   N/A   Neurologic:   N/A          Results Review:       Lab Results (last 24 hours)     Procedure Component Value Units Date/Time    POC Glucose Fingerstick [[id number]]  (Abnormal) Collected:  03/17/17 0841    Specimen:  Blood Updated:  03/17/17 0852     Glucose 195 (H) mg/dL       : [id number] [place] MeganMeter ID: [id number]       POC Glucose Fingerstick [[id number]]  (Abnormal) Collected:  03/17/17 1219    Specimen:  Blood Updated:  03/17/17 1230     Glucose 148 (H) mg/dL       : [id number] Tessa MeganMeter ID: [id number]       POC Glucose Fingerstick [[id number]]  (Abnormal) Collected:  03/17/17 1650    Specimen:  Blood Updated:  03/17/17 1701      Glucose 145 (H) mg/dL       : [id number] Tessa MeganMeter ID: [id number]       POC Glucose Fingerstick [[id number]]  (Normal) Collected:  03/17/17 2226    Specimen:  Blood Updated:  03/17/17 2256     Glucose 100 mg/dL       : [id number] Randy KristaMeter ID: [id number]       Blood Culture [[id number]]  (Normal) Collected:  03/14/17 2148    Specimen:  Blood from Arm, Right Updated:  03/17/17 2302     Blood Culture No growth at 3 days     POC Glucose Fingerstick [[id number]]  (Abnormal) Collected:  03/18/17 0749    Specimen:  Blood Updated:  03/18/17 0809     Glucose 215 (H) mg/dL       : [id number] Moseley ShannonMeter ID: [id number]                        Imaging Results (last 24 hours)     ** No results found for the last 24 hours. **            ASSESSMENT:    Active Problems:    Mixed hyperlipidemia    Essential hypertension    Type 2 diabetes mellitus without complication, without long-term current use of insulin    Spinal stenosis, lumbar    Gastroesophageal reflux disease without esophagitis      PLAN:    Continue current diabetes regimen.  Pt. Follows closely at home.  Possible discharge today.  Will continue stool softener and IS at home and follow-up with primary MD.        DENISA Batres  03/18/17  8:32 AM        I have reviewed the notes, assessments, and/or procedures performed by Ivory Friedman PA-C, I concur with her documentation of Alisia Velez.    I have seen and examined the patient.  She is having some fluctuant blood sugars.  She is not on a diabetic diet.  We will adjust her diet and her nutritional supplements to diabetic formulas.  Continue other medications. Plan for [facility] rehabilitation.    Carlos Eduardo Friedman MD  3/18/2017  5:35 PM      Please note that portions of this note may have been completed with a voice recognition program. Efforts were made to edit the dictations, but occasionally words are mistranscribed.

## 2017-03-19 LAB
BACTERIA SPEC AEROBE CULT: NORMAL
GLUCOSE BLDC GLUCOMTR-MCNC: 127 MG/DL (ref 70–130)
GLUCOSE BLDC GLUCOMTR-MCNC: 226 MG/DL (ref 70–130)
GLUCOSE BLDC GLUCOMTR-MCNC: 252 MG/DL (ref 70–130)
GLUCOSE BLDC GLUCOMTR-MCNC: 99 MG/DL (ref 70–130)

## 2017-03-19 PROCEDURE — 97116 GAIT TRAINING THERAPY: CPT

## 2017-03-19 PROCEDURE — 97110 THERAPEUTIC EXERCISES: CPT

## 2017-03-19 PROCEDURE — 82962 GLUCOSE BLOOD TEST: CPT

## 2017-03-19 RX ADMIN — FAMOTIDINE 20 MG: 20 TABLET, FILM COATED ORAL at 08:56

## 2017-03-19 RX ADMIN — SUCRALFATE 1 G: 1 TABLET ORAL at 16:39

## 2017-03-19 RX ADMIN — SUCRALFATE 1 G: 1 TABLET ORAL at 08:56

## 2017-03-19 RX ADMIN — OXYCODONE HYDROCHLORIDE AND ACETAMINOPHEN 1 TABLET: 10; 325 TABLET ORAL at 04:59

## 2017-03-19 RX ADMIN — LUBIPROSTONE 24 MCG: 24 CAPSULE, GELATIN COATED ORAL at 08:56

## 2017-03-19 RX ADMIN — BISACODYL 10 MG: 5 TABLET, COATED ORAL at 08:56

## 2017-03-19 RX ADMIN — INSULIN LISPRO 49 UNITS: 100 INJECTION, SOLUTION INTRAVENOUS; SUBCUTANEOUS at 08:55

## 2017-03-19 RX ADMIN — SUCRALFATE 1 G: 1 TABLET ORAL at 19:43

## 2017-03-19 RX ADMIN — OXYCODONE HYDROCHLORIDE AND ACETAMINOPHEN 1 TABLET: 10; 325 TABLET ORAL at 09:01

## 2017-03-19 RX ADMIN — OXYCODONE HYDROCHLORIDE AND ACETAMINOPHEN 1 TABLET: 10; 325 TABLET ORAL at 13:06

## 2017-03-19 RX ADMIN — METOPROLOL SUCCINATE 25 MG: 25 TABLET, FILM COATED, EXTENDED RELEASE ORAL at 08:56

## 2017-03-19 RX ADMIN — FAMOTIDINE 20 MG: 20 TABLET, FILM COATED ORAL at 17:25

## 2017-03-19 RX ADMIN — INSULIN LISPRO 41 UNITS: 100 INJECTION, SOLUTION INTRAVENOUS; SUBCUTANEOUS at 12:18

## 2017-03-19 RX ADMIN — OXYCODONE HYDROCHLORIDE AND ACETAMINOPHEN 1 TABLET: 10; 325 TABLET ORAL at 19:44

## 2017-03-19 RX ADMIN — INSULIN LISPRO 23 UNITS: 100 INJECTION, SOLUTION INTRAVENOUS; SUBCUTANEOUS at 17:25

## 2017-03-19 RX ADMIN — METOPROLOL SUCCINATE 25 MG: 25 TABLET, FILM COATED, EXTENDED RELEASE ORAL at 19:43

## 2017-03-19 RX ADMIN — LUBIPROSTONE 24 MCG: 24 CAPSULE, GELATIN COATED ORAL at 17:25

## 2017-03-19 NOTE — PLAN OF CARE
Problem: Patient Care Overview (Adult)  Goal: Plan of Care Review  Outcome: Ongoing (interventions implemented as appropriate)    03/19/17 1408   Coping/Psychosocial Response Interventions   Plan Of Care Reviewed With patient   Patient Care Overview   Progress improving   Outcome Evaluation   Outcome Summary/Follow up Plan Safety maintained. BS monitored and insulin given as needed. Continues to be slow to move and walk, working with therapy. Will go to [facility] rehab when available.          Problem: Perioperative Period (Adult)  Goal: Signs and Symptoms of Listed Potential Problems Will be Absent or Manageable (Perioperative Period)  Outcome: Ongoing (interventions implemented as appropriate)    Problem: Diabetes, Type 2 (Adult)  Goal: Signs and Symptoms of Listed Potential Problems Will be Absent or Manageable (Diabetes, Type 2)  Outcome: Ongoing (interventions implemented as appropriate)    Problem: Fall Risk (Adult)  Goal: Absence of Falls  Outcome: Ongoing (interventions implemented as appropriate)

## 2017-03-19 NOTE — DISCHARGE PLACEMENT REQUEST
"To:  [facility]  From:  Priya Sepulveda, MANNW.  [phone].    Alisia Velez (71 y.o. Female)     Date of Birth Social Security Number Address Home Phone MRN    1945  [address]  [place] [phone] [id number]    Rastafari Marital Status          None        Admission Date Admission Type Admitting Provider Attending Provider Department, Room/Bed    3/13/17 Elective MIGDALIA Horton MD Strenge, K Brandon, MD [facility] 3A, 328/1    Discharge Date Discharge Disposition Discharge Destination                      Attending Provider: MIGDALIA Horton MD     Allergies:  Humulin N [Insulin Isophane], Mold Extract [Trichophyton], Sudafed [Pseudoephedrine], Sulfa Antibiotics, Other, Erythromycin    Isolation:  None   Infection:  None   Code Status:  FULL    Ht:  63\" (160 cm)   Wt:  162 lb (73.5 kg)    Admission Cmt:  None   Principal Problem:  None                Active Insurance as of 3/13/2017     Primary Coverage     Payor Plan Insurance Group Employer/Plan Group    MEDICARE MEDICARE A & B      Payor Plan Address Payor Plan Phone Number Effective From Effective To    [address] [phone] 5/1/2012     [place] [address]       Subscriber Name Subscriber Birth Date Member ID       ALISIA VELEZ 1945 [id number]           Secondary Coverage     Payor Plan Insurance Group Employer/Plan Group    AAR MED SUPP AAR HEALTH CARE OPTIONS      Payor Plan Address Payor Plan Phone Number Effective From Effective To    [facility] [phone] 1/1/2016     [address]       [place] [address]       Subscriber Name Subscriber Birth Date Member ID       ALISIA VELEZ 1945 [id number]                 Emergency Contacts      (Rel.) Home Phone Work Phone Mobile Phone    Karsten Velez (Spouse) [phone] -- --        Consult [facility] [[id number]] (Order [id number])   Nursing   Date: 3/18/2017 Department: [address] Released By/Authorizing: Clarence" DENISA Julian (auto-released)   Order History   Inpatient   Date/Time Action Taken User Additional Information   03/18/17 0931 Release DENISA Mari (auto-released) From Order: [id number]   03/18/17 0943 Complete Laila Moseley RN    Order Details   Frequency Duration Priority Order Class   Once 1  occurrence Routine Hospital Performed   Start Date/Time   Start Date Start Time   03/18/17 0931   Comments   Consult [facility] rehab   Cosign Order Info   Action Created on Responsible Provider Signed by Signed on   Ordering 03/18/17 0930 MD MIGDALIA Bergman MD 03/19/17 0700   Completion Info   User Date/Time   Laila Moseley RN 03/18/17 0943   Acknowledgement Info   For At Acknowledged By Acknowledged On   Placing Order 03/18/17 0930 Laila Moseley RN 03/18/17 1025   Reprint Order Requisition   Consult [facility] (Order #[id number]) on 3/18/17   Encounter-Level Cardiology Documents:   There are no encounter-level cardiology documents.   Encounter   View Encounter   Order Provider Info       Office phone Pager E-mail   Ordering User DENISA Mari [phone] -- --   Authorizing Provider DENISA Mari [phone] -- --   Attending Provider MIGDALIA Horton [phone] -- --   Linked Charges   No charges linked to this procedure   Order Transmittal Tracking   Consult [facility] rehab (Order #[id number]) on 3/18/17   Authorized by: DENISA Mari (NPI: [id number])             History & Physical      H&P filed by René Castro MD at 3/10/2017  8:53 AM      Scan on 3/13/2017 : DELMI - 3/9/2017 (below)              Electronically signed by Saleem, Mere Incoming at 3/10/2017  8:53 AM      MIGDALIA Horton MD at 3/13/2017  6:37 AM          H&P reviewed. The patient was examined and there are no changes to the H&P.     Electronically signed by MIGDALIA Horton MD at 3/13/2017  6:37 AM        Prior to Admission Medications     Prescriptions Last Dose  Informant Patient Reported? Taking?    atorvastatin (LIPITOR) 20 MG tablet 3/12/2017 Self Yes Yes    Take 20 mg by mouth daily.    cetirizine (ZyrTEC) 10 MG tablet 3/12/2017  Yes Yes    Take 10 mg by mouth daily.    Coenzyme Q10 (COQ10) 200 MG capsule 3/12/2017 Self Yes Yes    Take 1 tablet/day by mouth.    Insulin Glargine (TOUJEO SOLOSTAR) 300 UNIT/ML solution pen-injector 3/12/2017  No Yes    Inject 80 Units under the skin Every Night.    Patient taking differently:  Inject 62 Units under the skin Every Night.    insulin lispro (humaLOG) 100 UNIT/ML injection 3/12/2017  Yes Yes    Inject 50 Units under the skin 3 (Three) Times a Day With Meals. Up to 50 units scale as follows: 1 unit per every 1.5 carbs at breakfast, 1u per every 2 grams at lunch, and 1u for every 3 grams at supper    metoprolol succinate XL (TOPROL-XL) 25 MG 24 hr tablet 3/13/2017 Self Yes Yes    Take 25 mg by mouth 2 (Two) Times a Day.    Probiotic Product (PROBIOTIC MULTI-ENZYME) tablet 3/12/2017 Self Yes Yes    Take 1 tablet by mouth 2 (Two) Times a Day.    ranitidine (ZANTAC) 150 MG tablet 3/12/2017  Yes Yes    Take 150 mg by mouth 2 (two) times a day.    sucralfate (CARAFATE) 1 G tablet 3/12/2017 Self Yes Yes    Take 1 g by mouth 3 (three) times a day.    Vitamin D, Cholecalciferol, 1000 UNITS capsule 3/12/2017 Self Yes Yes    3 tablets Daily.    aspirin 81 MG EC tablet 3/3/2017 Self Yes No    Take 81 mg by mouth Daily.    glucose blood (JEROME CONTOUR TEST) test strip   No No    Use as instructed 5 x daily , ICD-10 code is E11.42    Insulin Pen Needle (B-D UF III MINI PEN NEEDLES) 31G X 5 MM misc   No No    Use 5 times daily    meloxicam (MOBIC) 7.5 MG tablet 3/3/2017 Self Yes No    Take 7.5 mg by mouth Daily.    OMEGA-3 FATTY ACIDS-VITAMIN E PO 3/3/2017  Yes No    Take 1 capsule by mouth. omega-3 fatty acids-vitamin E 1,000 mg-5 unit capsule.           Hospital Medications (active)       Dose Frequency Start End    acetaminophen (TYLENOL)  "tablet 650 mg 650 mg Every 6 Hours PRN 3/14/2017     Sig - Route: Take 2 tablets by mouth Every 6 (Six) Hours As Needed for Fever (Fever above 100.0). - Oral    bisacodyl (DULCOLAX) EC tablet 10 mg 10 mg Daily 3/16/2017     Sig - Route: Take 2 tablets by mouth Daily. - Oral    bisacodyl (DULCOLAX) suppository 10 mg 10 mg Daily PRN 3/16/2017     Sig - Route: Insert 1 suppository into the rectum Daily As Needed for Constipation. - Rectal    diphenhydrAMINE (BENADRYL) capsule 25 mg 25 mg Nightly PRN 3/13/2017     Sig - Route: Take 1 capsule by mouth At Night As Needed for Sleep. - Oral    famotidine (PEPCID) injection 20 mg 20 mg 2 Times Daily 3/13/2017     Sig - Route: Infuse 2 mL into a venous catheter 2 (Two) Times a Day. - Intravenous    Linked Group 1:  \"Or\" Linked Group Details        famotidine (PEPCID) tablet 20 mg 20 mg 2 Times Daily 3/13/2017     Sig - Route: Take 1 tablet by mouth 2 (Two) Times a Day. - Oral    Linked Group 1:  \"Or\" Linked Group Details        HYDROmorphone (DILAUDID) injection 1 mg 1 mg Every 3 Hours PRN 3/13/2017 3/23/2017    Sig - Route: Infuse 1 mg into a venous catheter Every 3 (Three) Hours As Needed for Severe Pain (7-10). - Intravenous    HYDROmorphone (DILAUDID) injection 1 mg 1 mg Every 3 Hours PRN 3/15/2017 3/25/2017    Sig - Route: Infuse 1 mg into a venous catheter Every 3 (Three) Hours As Needed for Severe Pain (7-10). - Intravenous    insulin detemir (LEVEMIR) injection 62 Units 62 Units Nightly 3/13/2017     Sig - Route: Inject 62 Units under the skin Every Night. - Subcutaneous    insulin lispro (humaLOG) injection 50 Units 50 Units 3 Times Daily With Meals 3/15/2017     Sig - Route: Inject 50 Units under the skin 3 (Three) Times a Day With Meals. - Subcutaneous    lactated ringers infusion 20 mL/hr Continuous 3/13/2017     Sig - Route: Infuse 20 mL/hr into a venous catheter Continuous. - Intravenous    Cosign for Ordering: Accepted by Mike Ann MD on " "3/14/2017  6:50 AM    lubiprostone (AMITIZA) capsule 24 mcg 24 mcg 2 Times Daily With Meals 3/13/2017     Sig - Route: Take 1 capsule by mouth 2 (Two) Times a Day With Meals. - Oral    metoprolol succinate XL (TOPROL-XL) 24 hr tablet 25 mg 25 mg 2 Times Daily 3/13/2017     Sig - Route: Take 1 tablet by mouth 2 (Two) Times a Day. - Oral    ondansetron (ZOFRAN) injection 4 mg 4 mg Every 6 Hours PRN 3/13/2017     Sig - Route: Infuse 2 mL into a venous catheter Every 6 (Six) Hours As Needed for Nausea or Vomiting. - Intravenous    Linked Group 2:  \"Or\" Linked Group Details        ondansetron (ZOFRAN) tablet 4 mg 4 mg Every 6 Hours PRN 3/13/2017     Sig - Route: Take 1 tablet by mouth Every 6 (Six) Hours As Needed for Nausea or Vomiting. - Oral    Linked Group 2:  \"Or\" Linked Group Details        ondansetron ODT (ZOFRAN-ODT) disintegrating tablet 4 mg 4 mg Every 6 Hours PRN 3/13/2017     Sig - Route: Take 1 tablet by mouth Every 6 (Six) Hours As Needed for Nausea or Vomiting. - Oral    Linked Group 2:  \"Or\" Linked Group Details        oxyCODONE-acetaminophen (PERCOCET)  MG per tablet 2 tablet 2 tablet Every 4 Hours PRN 3/13/2017 3/23/2017    Sig - Route: Take 2 tablets by mouth Every 4 (Four) Hours As Needed for Severe Pain (7-10). - Oral    oxyCODONE-acetaminophen (PERCOCET)  MG per tablet 2 tablet 2 tablet Every 4 Hours PRN 3/15/2017 3/25/2017    Sig - Route: Take 2 tablets by mouth Every 4 (Four) Hours As Needed for Severe Pain (7-10). - Oral    sodium chloride 0.9 % flush 1-10 mL 1-10 mL As Needed 3/13/2017     Sig - Route: Infuse 1-10 mL into a venous catheter As Needed for Line Care. - Intravenous    Cosign for Ordering: Accepted by Mike Ann MD on 3/14/2017  6:50 AM    sodium chloride 0.9 % flush 1-10 mL 1-10 mL As Needed 3/13/2017     Sig - Route: Infuse 1-10 mL into a venous catheter As Needed for Line Care. - Intravenous    sodium chloride 0.9 % flush 1-10 mL 1-10 mL As Needed " "3/15/2017     Sig - Route: Infuse 1-10 mL into a venous catheter As Needed for Line Care. - Intravenous    sucralfate (CARAFATE) tablet 1 g 1 g 3 Times Daily 3/13/2017     Sig - Route: Take 1 tablet by mouth 3 (Three) Times a Day. - Oral    dextrose (D50W) solution 12.5 g (Discontinued) 12.5 g As Needed 3/13/2017 3/18/2017    Sig - Route: Infuse 25 mL into a venous catheter As Needed for Low Blood Sugar (for blood sugar less than 60). - Intravenous    Reason for Discontinue: Patient Transfer    famotidine (PEPCID) injection 20 mg (Discontinued) 20 mg 60 Minutes Pre-Op 3/13/2017 3/18/2017    Sig - Route: Infuse 2 mL into a venous catheter 60 Minutes Prior to Surgery. - Intravenous    Reason for Discontinue: Patient Transfer    Linked Group 3:  \"Or\" Linked Group Details        famotidine (PEPCID) tablet 20 mg (Discontinued) 20 mg 60 Minutes Pre-Op 3/13/2017 3/18/2017    Sig - Route: Take 1 tablet by mouth 60 Minutes Prior to Surgery. - Oral    Reason for Discontinue: Patient Transfer    Linked Group 3:  \"Or\" Linked Group Details        FentaNYL Citrate (PF) (SUBLIMAZE) injection 25 mcg (Discontinued) 25 mcg Every 5 Minutes PRN 3/13/2017 3/18/2017    Sig - Route: Infuse 0.5 mL into a venous catheter Every 5 (Five) Minutes As Needed for Severe Pain (7-10). - Intravenous    Reason for Discontinue: Patient Transfer    lactated ringers infusion (Discontinued) 9 mL/hr Continuous 3/13/2017 3/18/2017    Sig - Route: Infuse 9 mL/hr into a venous catheter Continuous. - Intravenous    Reason for Discontinue: Patient Transfer    lactated ringers infusion (Discontinued) 100 mL/hr Continuous 3/15/2017 3/18/2017    Sig - Route: Infuse 100 mL/hr into a venous catheter Continuous. - Intravenous    Reason for Discontinue: Patient Transfer    midazolam (VERSED) injection 1 mg (Discontinued) 1 mg Every 5 Minutes PRN 3/13/2017 3/18/2017    Sig - Route: Infuse 1 mL into a venous catheter Every 5 (Five) Minutes As Needed for Anxiety (Max " "4mg midazolam TOTAL). - Intravenous    Reason for Discontinue: Patient Transfer    Linked Group 4:  \"Or\" Linked Group Details        midazolam (VERSED) injection 2 mg (Discontinued) 2 mg Every 5 Minutes PRN 3/13/2017 3/18/2017    Sig - Route: Infuse 2 mL into a venous catheter Every 5 (Five) Minutes As Needed for Anxiety (Max 4mg midazolam TOTAL). - Intravenous    Reason for Discontinue: Patient Transfer    Linked Group 4:  \"Or\" Linked Group Details        sodium chloride 0.9 % flush 1-10 mL (Discontinued) 1-10 mL As Needed 3/13/2017 3/18/2017    Sig - Route: Infuse 1-10 mL into a venous catheter As Needed for Line Care. - Intravenous    Reason for Discontinue: Patient Transfer             Operative/Procedure Notes (all)      MIGDALIA Horton MD at 3/13/2017  6:38 AM  Version 1 of 1         LUMBAR LATERAL INTERBODY FUSION  Procedure Note    Alisia Velez  3/13/2017    Pre-op Diagnosis:     1.  Status post previous left L5-S1 decompression, , October 2002  2.  Increasing chronic back pain  3.  Bilateral buttock, thigh, and leg radiculopathy, left worse than right  4.  Neurogenic claudication  5.  Multilevel thoracolumbar degenerative disc disease  6.  Degenerative lumbar scoliosis, worse L3 to S1  7.  Multilevel lumbar facet arthropathy, worse L3 to S1  8.  Central and bilateral foraminal stenosis L3 to S1    Post-op Diagnosis:    same    Procedure/CPT® Codes:    1.  Left LLIF L3-4, L4-5  2.  Anterior spinal instrumentation L3-4 (Lanx lateral plate and screws)  3.  Use of PEEK interbody biomechanical device for fusion L3-4, L4-5 (Lanx PEEK spacer x 2)  4.  Use of bone marrow aspirate obtained through separate incision for fusion  5.  Use of allograft bone chips for fusion  6.  Use of fluoroscopy for confirmation of surgical level, placement of PEEK spacers and instrumentation  7.  Intraoperative neural monitoring    Anesthesia: General    Surgeon: DENG Horton MD    Assistant: Clarence Morin " ARVIND    Estimated Blood Loss: Minimal    Complications: None    Condition: Stable to PACU.    Indications:    The patient is a 71-year-old who sees Dr. Mike Erazo for medical issues.  She presented to the office with a history of a previous left L5-S1 decompression done in October 2002.  She did well with this procedure but unfortunately, she has had worsening complaints of back pain for the last several years along with symptoms that radiated down her buttocks, thighs, and into her legs with the left side worse than the right.  Her symptoms were very consistent with neurogenic claudication.  Imaging studies revealed multilevel degenerative disc disease with a degenerative scoliosis and facet arthropathy worse from L3 to S1 causing central and bilateral foraminal stenosis.  This was felt to be contributing to the vast majority of her symptoms.     After failing conservative measures, it was mutually decided that surgery would be the best option. Risks, benefits, and complications of surgery were discussed with the patient. The patient appeared well informed and wished to proceed. We specifically discussed the risk of infection, blood loss, nerve root injury, CSF leak, and the possibility of incomplete resolution of symptoms. We also discussed the possible risk of a nonunion and the potential need for additional surgery in the event of a pseudoarthrosis or hardware failure.     We elected proceed with a staged operation.  It is planned that we will be returning to surgery for a second posterior procedure at a later date.    Operative Procedure:    After obtaining informed consent and verifying the correct operative site, the patient was brought to the operating room and placed supine on operating table.  A general anesthetic was provided by the anesthesia service with the assistance of an endotracheal tube.  Once this was appropriately positioned and secured, the patient was carefully rotated into the lateral  decubitus position with the left side up.  All bony prominences were well-padded.  3 inch wide cloth tape was used to maintain the patient's position.  It was also used to tip open the pelvis slightly allowing better access to the lumbar spine through a lateral approach.  Fluoroscopy was then used to identify the L3-4 and L4-5 levels, and the skin was marked for our planned incision.  The left flank was then prepped and draped in the usual sterile fashion.  A surgical timeout was taken to confirm this was the correct patient, we were working at the correct level, and that preoperative antibiotics were given in a timely fashion.    A small stab incision was created over the left anterior iliac crest using a 15 blade scalpel.  Through this stab incision, a Jamshidi needle was advanced into the iliac crest.  Through the needle we aspirated approximately 50-60 mL of bone marrow from the iliac crest.  This bone marrow aspirate was then passed off in a sterile fashion for processing and concentration to be later mixed with allograft bone chips to assist with obtaining a fusion.    An oblique incision was created of the left flank using a 10 blade scalpel directly centered between the L3-4 and L4-5 segments. Dissection was carried bluntly through subcutaneous tissues. Blunt dissection was also carried between the external oblique and internal oblique musculature. The transversalis fascia was pierced allowing access to the retro-peroneal space. At this point, a series of neuro monitoring probes were used to safely access the L4-5 level. We used stimulated and free run EMG for this purpose. Once nerve roots were confirmed to be out of harm's way, self retaining retractors were placed for continuous exposure.     An annulotomy was then created at the L4-5 area using a 15 blade scalpel on a long handle. A Ross elevator was used to remove disc material off of the endplates. Disc material was removed using Kerrisons,  pituitaries, and forward angled curettes. Once all disc material had been retrieved, I used the Ross elevator to divide the contralateral annulus. This assisted with mobilization of the vertebral body. We then used a series of endplate scrapers to prepare the endplates for interbody fusion. Trial spacers were malleted into position and for the disc space it was felt that a 12 mm x 50 mm implant would be the best fit to restore disc height. The disc space was then thoroughly irrigated with saline solution.     A PEEK spacer from the [employer] instrumentation set measuring 12 mm x 50 mm x 22 mm was then packed as tightly as possible with a combination of the previously obtained bone marrow aspirate and allograft bone chips. This PEEK spacer was then malleted into the L4-5 disc space under fluoroscopic guidance. It was placed as a PEEK interbody biomechanical device to assist with interbody fusion.  After confirming the spacer was properly positioned in both the AP and lateral projections, next attention was turned to the L3-4 segment.    The neuro monitoring probes were once again used this time to access L3-4.  Once nerve roots were confirmed to be out of harm's way, self retaining retractors were placed for continuous exposure of L3-4.  This disc space was prepared in an identical fashion as L4-5.  We used first the 15 blade scalpel to create an annulotomy.  A Ross elevators were used to remove disc material off of the endplates.  Disc material was removed using Kerrisons, pituitaries, and forward angled curettes.  Endplate scrapers were used to prepare the endplates for interbody fusion and the contralateral annulus was divided with the Ross elevator.  Trial spacers were then malleted into position across L3-4.  A second PEEK spacer was then chosen matching the final trial.  In this case we used a 10 mm x 45 mm implant.  This spacer was packed as tightly as possible with a combination of allograft bone and bone marrow  aspirate.  This spacer was malleted into the L3-4 disc space under fluoroscopic guidance as a PEEK interbody biomechanical device to assist with interbody fusion.    Once this spacer was confirmed to be properly positioned, I chose a 4-hole plate to help augment the fusion of L3-4. This plate was held into position using 4 screws. I placed 45 mm screws into both L3 and L4.  A cover plate was then screwed into position to prevent screw backout once all 4 screws were properly positioned.     The wound was then irrigated thoroughly. A thorough inspection was then undertaken to ensure that we had adequate hemostasis. Bleeding at this point was controlled using FloSeal and bipolar cautery. Closure was then accomplished with a #1 Vicryl reapproximating the transversalis fascia as well as the internal and external oblique musculature. Immediate subcutaneous tissues were closed with a 3-0 Vicryl and skin closure was accomplished using Mastisol and Steri-Strips. The wound was then sterilely dressed. The patient was then carefully rotated supine onto a hospital gurney, extubated, and sent to the recovery room in good stable condition.     The patient tolerated the procedure well. There were no complications. We estimated blood loss to be minimal. The patient remained hemodynamically stable.     Clarence Morin PA-C provided critical assistance during the procedure. His assistance was medically necessary in order to allow the procedure to occur in the most safe and efficient manner.     DENG Horton MD     Date: 3/13/2017  Time: 6:38 AM       Electronically signed by MIGDALIA Horton MD at 3/13/2017  9:11 AM      MIGDALIA Horton MD at 3/15/2017  6:57 AM  Version 1 of 1           Procedure Note    Alisia Velez  3/15/2017    Pre-op Diagnosis:      1. Status post previous left L5-S1 decompression, , October 2002  2. Increasing chronic back pain  3. Bilateral buttock, thigh, and leg radiculopathy, left worse than  right  4. Neurogenic claudication  5. Multilevel thoracolumbar degenerative disc disease  6. Degenerative lumbar scoliosis, worse L3 to S1  7. Multilevel lumbar facet arthropathy, worse L3 to S1  8. Central and bilateral foraminal stenosis L3 to S1  9.  Status post left LLIF L3 to 5 with instrumentation L3-4, 3/13/17     Post-op Diagnosis:     same     Procedure/CPT® Codes:    1. Right L3-S1 hemilaminectomy decompression with complete facetectomy and neural foraminotomy  2. Posterior lumbar interbody fusion L5-S1  3. Posterior spinal fusion L3-4, L4-5, L5-S1  4. Posterior spinal instrumentation L3 to S1 (Lanx pedicle screws and rods)  5. Use of locally obtained autograft bone for fusion  6. Use of allograft bone chips for fusion  7. Use of bone marrow aspirate obtained through separate incision for fusion  8. Use of operating room microscope for decompression and microdissection  9. Use of fluoroscopy for confirmation of surgical level, placement of PEEK spacer, and instrumentation    Anesthesia: General     Surgeon: DENG Horton MD     Assistant: Clarence Morin PA-C     Estimated Blood Loss: 300 mL     Complications: None     Condition: Stable to PACU.     Indications:     The patient is a 71-year-old who sees Dr. Mike Erazo for medical issues. She presented to the office with a history of a previous left L5-S1 decompression done in October 2002. She did well with this procedure but unfortunately, she has had worsening complaints of back pain for the last several years along with symptoms that radiated down her buttocks, thighs, and into her legs with the left side worse than the right. Her symptoms were very consistent with neurogenic claudication. Imaging studies revealed multilevel degenerative disc disease with a degenerative scoliosis and facet arthropathy worse from L3 to S1 causing central and bilateral foraminal stenosis. This was felt to be contributing to the vast majority of her  symptoms.    After failing conservative measures, it was mutually decided that surgery would be his best option. Risks, benefits, and complications of surgery were discussed with the patient. The patient appeared well informed and wished to proceed. We specifically discussed the risk of infection, blood loss, nerve root injury, CSF leak, and the possibility of incomplete resolution of symptoms. We also discussed the possible risk of a nonunion and the potential need for additional surgery in the event of a pseudoarthrosis or hardware failure.      We elected proceed with a staged operation. Previously on 3/13/17, the patient underwent a left lateral fusion of L3-5 with instrumentation at L3-4. Today we return to surgery for the second posterior stage the procedure.     Operative Procedure:     After obtaining informed consent and verifying the correct operative levels, the patient was brought to the operating room. A general anesthetic was provided by the anesthesia service with the assistance of an endotracheal tube. Once this was appropriately positioned and secured, the patient was carefully rotated into the prone position on a Mikey frame. All bony prominences were well-padded. The lumbar region was prepped and draped in the usual sterile fashion. A surgical timeout was taken to confirm this was the correct patient, we were working at the correct levels, and that preoperative antibiotics were given in a timely fashion.      A small stab incision was created over the right posterior iliac crest using a 15 blade scalpel. Through this stab incision, a Jamshidi needle was advanced into the iliac crest. Through the needle we aspirated approximately 50-60 mL of bone marrow from the iliac crest. This bone marrow aspirate was then passed off in a sterile fashion for processing and concentration to be later mixed with allograft bone chips to assist with obtaining a fusion.      Next using fluoroscopy for guidance a  right sided Wiltsey incision was created spanning L3 to S1 with a 10 blade scalpel. Dissection was carried to subcutaneous cutaneous tissues using Bovie cautery. The lumbar fascia was divided in line with the incision and blunt dissection was carried between the multifidus and the longissimus. Dissection was carried laterally exposing the transverse processes from L3 to the sacral ala. Medial dissection was then done from approximately L3 to S1 exposing the facet joints as well as the inner laminar spaces. Self retaining retractors were placed for continuous exposure. A forward angled curet was placed under the lamina of L5 to confirm we were indeed at the correct level and then the microscope was positioned for the remaining decompression portion of the procedure.      After clearly identifying the facet joints from L3-S1, a rongeur was used to remove some of the lamina of L3, L4 and L5 and as much of the facets joint of L3-4, L4-5 and L5-S1 as possible. All locally obtained autograft bone was saved for later assisting with obtaining a fusion. This bone was cleaned of soft tissues and morselized. A forward angled curette was used to free up the undersurface of the remaining lamina. A right sided hemilaminectomy decompression was then performed at L3, L4 and L5 using Kerrisons. Hypertrophied ligamentum flavum was dissected free of the underlying dural sac using forward angled curettes. This was then resected using Kerrisons. The facetectomy decompression was completed using Kerrisons removing the complete L5-S1 facet joint including the inferior articular process of L5 and the superior articular process of S1. This allowed complete exposure of the exiting L5 nerve root. This nerve root was highly compressed due to the facet arthropathy as well as a chronic calcified disc herniation from L5-S1. After clearly delineating the border of L5 inferiorly, a 15 blade scalpel was used to cut into the disc material in the  foramen of L5-S1 and disc material was removed using backdown curettes and pituitaries.    A similar complete facetectomy was then completed at both L3-4 and L4-5 using forward angled curettes and kerrisons.  After clearly identifying the central dural sac, I was able to dissect laterally on the contralateral left side, which allowed better exposure of the contralateral facets medially and the hypertrophied ligamentum flavum.  This also was resected using Kerrisons completing the decompression.      The decompression performed at L5-S1 was much more involved than what is usually required for a lumbar interbody fusion by itself due to the high-grade foraminal stenosis of the exiting L5 nerve root from a combination of foraminal disc material as well as the severe facet arthropathy.      Next with the decompression completed at L3-4, L4-5, and L5-S1, attention was turned to the L5-S1 interbody fusion. A nerve root retractor was used to gently retract the S1 nerve root and the central dural sac medially allowing more visualization of the L5-S1 disc space. The annulotomy which was done in the foramen was extended medially using a 15 blade scalpel and disc material was removed using backdown curettes pituitaries and Kerrisons. The disc space was markedly degenerated. I attempted to distract the disc space with a paddle distractor but due to the advanced degeneration this was not effective.  I then removed as much disc material as possible from the L5-S1 disc space itself using backdown curettes and pituitaries as well as Kerrisons.  The disc space was thoroughly irrigated with saline solution.      The L5-S1 disc space was then packed with a combination of locally obtained autograft bone and the bone marrow aspirate allograft bone chips mixture.  Again due to the advanced disc space degeneration and the lack of mobility with attempts at distraction, I did not attempt placing an interbody device but instead used only bone  graft into the disc space.    Next attention was turned to obtaining a posterior lateral fusion. I reexposed the transverse processes of L3, L4, L5 and the sacral ala. The high-speed bur was used to decorticate these areas and the lateral gutter was then filled with the remaining bone graft mixture. This constituted a posterolateral fusion from L3 to S1.      Now with the decompression and fusion completed completed from L3 to S1 including the interbody fusion of L5-S1, we turned our attention to placing posterior instrumentation. Under fluoroscopic guidance, I created starting holes for pedicle screws on the right side at L3, L4, L5 and S1. Once the starting holes were created, a pedicle probe was used to gain access through the pedicle to the anterior vertebral body. Each pedicle tract was palpated with a ball-tipped feeler to ensure that there is no medial or lateral breach. I placed a 6.5 mm x 50 mm screw into each of the pedicles on the right at L3 and L4.  I placed shorter 45 mm screws into the right pedicles of L5 and S1.  An appropriate-sized tom was then chosen to span the pedicle screw saddles and this was held into position using set screws. The setscrews were tightened using the appropriate antitorque wrench and torque limiting screwdriver provided by [employer].      This right sided Wiltsey incision was then irrigated and inspected to ensure that we had adequate hemostasis. Bleeding at this point was controlled using FloSeal and bipolar cautery. The wound was then packed while attention was turned to the contralateral side after placing a drain below the fascia exiting through a small poke hole just superior of the incision.      Under fluoroscopic guidance, I created a Wiltsey incision spanning approximately L5 to S1 with a 10 blade scalpel. Through this incision, I divided the fascia, and bluntly dissected between the multifidus and the longissimus.  I then used Jamsheedi needles to place pedicle screws on  the left side at L5 and S1.  Once the Jamshidi needles were properly positioned, guidewires were placed to maintain position. I then placed 6.5 mm x 45 mm Lanx pedicle screws into the pedicles on the left of L5 and S1.  A separate stab incision was then created using a 10 blade scalpel overlying the left L3 pedicle.  The Jamshidi needle was used to cannulate this pedicle and a third pedicle screw was placed measuring 6.5 mm x 50 mm from the Lanx set into the L3 pedicle on the left.  A second tom was chosen to span these pedicle screws which was then held into position using set screws.  Once again the setscrews were tightened using the appropriate antitorque wrench and torque limiting screwdriver provided by [employer].     All incisions were then copiously irrigated with saline solution and again an inspection was done to ensure that we had adequate hemostasis. Final fluoroscopy imaging confirmed adequate position of the instrumentation spanning L3 to S1.      Closure was then accomplished by reapproximating the fascia with a #1 Vicryl. Immediate subcutaneous tissues were closed with a 2-0 Vicryl. Skin closure was then augmented using Mastisol and Steri-Strips. All incisions were then washed and covered with Bioclusive dressings. The patient was then carefully rotated supine onto a hospital gurney, extubated, and sent to the recovery room in good stable condition. We estimated blood loss to be approximately 300 mL. The patient remained hemodynamically stable and there were no complications.      Clarence Morin PA-C provided critical assistance during the procedure. His assistance was medically necessary in order to allow the procedure to occur in the most safe and efficient manner. He assisted not only with patient positioning and wound closure, but he also assisted with retraction of delicate neurovascular structures during the decompression from L3-S1, the interbody fusion of L5-S1, and assistance during the placement of  the bone graft and instrumentation from L3 to S1 to obtain a fusion.    DENG Horton MD     Date: 3/15/2017  Time: 6:57 AM     Electronically signed by MIGDALIA Horton MD at 3/15/2017  1:38 PM           Physician Progress Notes (most recent note)      DENISA Batres at 3/19/2017 11:49 AM  Version 1 of 1             Chief Complaint:  Denies new complaints      Interval History:     Pt. Doing well.  Plan for transfer to [facility] tomorrow if approved.  BS stable.  .      Review of Systems:   General ROS: negative for - chills or fever  Respiratory ROS: no cough, shortness of breath, or wheezing  Cardiovascular ROS: no chest pain or dyspnea on exertion  Gastrointestinal ROS: negative for - abdominal pain, diarrhea or nausea/vomiting       Vital Signs  Temp:  [98.3 °F (36.8 °C)-98.5 °F (36.9 °C)] 98.3 °F (36.8 °C)  Heart Rate:  [80-83] 80  Resp:  [18] 18  BP: (132-134)/(47-65) 132/65    Intake/Output Summary (Last 24 hours) at 03/19/17 1149  Last data filed at 03/19/17 1100   Gross per 24 hour   Intake    720 ml   Output      0 ml   Net    720 ml       Physical Exam:     General Appearance:    Alert, cooperative, in no acute distress   Head:    N/A   Throat:   N/A   Neck:   N/A   Lungs:     Clear to auscultation,respirations regular, even and                  unlabored    Heart:    Regular rhythm and normal rate, normal S1 and S2, no            murmur, no gallop, no rub   Abdomen:     Normal bowel sounds, no masses, no organomegaly, soft        non-tender, non-distended, no guarding, no rebound                tenderness   Extremities:   Trace edema BLE   Pulses:   N/A   Skin:   N/A   Lymph nodes:   N/A   Neurologic:   N/A          Results Review:       Lab Results (last 24 hours)     Procedure Component Value Units Date/Time    POC Glucose Fingerstick [[id number]]  (Abnormal) Collected:  03/18/17 1144    Specimen:  Blood Updated:  03/18/17 1155     Glucose 257 (H) mg/dL       : [id number]  Moseley ShannonMeter ID: [id number]       POC Glucose Fingerstick [[id number]]  (Normal) Collected:  03/18/17 1640    Specimen:  Blood Updated:  03/18/17 1652     Glucose 84 mg/dL       : [id number] Moseley ShannonMeter ID: [id number]       POC Glucose Fingerstick [[id number]]  (Abnormal) Collected:  03/18/17 2043    Specimen:  Blood Updated:  03/18/17 2113     Glucose 183 (H) mg/dL       : [id number] Zackary Teran ID: [id number]       Blood Culture [[id number]]  (Normal) Collected:  03/14/17 2148    Specimen:  Blood from Arm, Right Updated:  03/18/17 2302     Blood Culture No growth at 4 days     POC Glucose Fingerstick [[id number]]  (Abnormal) Collected:  03/19/17 0726    Specimen:  Blood Updated:  03/19/17 0737     Glucose 226 (H) mg/dL       : [id number] Lorelei ShannonMeter ID: [id number]       POC Glucose Fingerstick [[id number]]  (Abnormal) Collected:  03/19/17 1113    Specimen:  Blood Updated:  03/19/17 1125     Glucose 252 (H) mg/dL       : [id number] Lorelei ShannonMeter ID: [id number]                        Imaging Results (last 24 hours)     ** No results found for the last 24 hours. **            ASSESSMENT:    Active Problems:    Mixed hyperlipidemia    Essential hypertension    Type 2 diabetes mellitus without complication, without long-term current use of insulin    Spinal stenosis, lumbar    Gastroesophageal reflux disease without esophagitis      PLAN:    [facility] blood [facility]      DENISA Batres  03/19/17  11:49 AM             Electronically signed by DENISA Batres at 3/19/2017 11:51 AM           Consult Notes (most recent note)      Enrico Ureña MD at 3/13/2017  5:42 PM  Version 2 of 2             Referring Provider: Sol  Reason for Consultation: Medical management    Patient Care Team:  Mike Erazo MD as PCP - General  Mkie Erazo MD as PCP - Family Medicine  Serina Mendoza as Technician    Chief complaint back  pain    Subjective .     History of present illness:  Patient is a 71-year-old white female followed by Dr. Padilla for primary care as well as Dr. Jules in [place] is an endocrinologist.  She presents to Dr. Horton service after failing conservative management of her chronic back pain.  She has been through anti-inflammatories muscle relaxers and opiate pain medication and continues to have worsening back pain is now affecting her activities of daily living.  She is admitted postoperatively after step 1 of 2 decompressive surgery.  Pain is as expected no other precipitating or relieving factors.      REVIEW OF SYSTEMS:    CONSTITUTIONAL:  Negative for anorexia, chills, fevers, night sweats and weight loss  EYES:  negative for eye dryness, icterus and redness  HEENT:   negative for dental problems, epistaxis, facial trauma and thrush  RESPIRATORY:  negative for chest tightness, cough, dyspnea on exertion, pneumonia and sputum  CARDIOVASCULAR: negative for chest pain, dyspnea, exertional chest pressure/discomfort, irregular heart beat, palpitations, paroxysmal nocturnal dyspnea and syncope  GASTROINTESTINAL:  negative for abdominal pain, hematemesis, jaundice, melena and rectal bleeding  MUSCULOSKELETAL:  negative for muscle weakness, myalgias and neck pain  NEUROLOGICAL:   negative for dizziness, headaches, seizures, speech problems, tremors and vertigo  INTEGUMENT: negative for pruritus, rash, skin color change and skin lesion(s)         History    Past Medical History   Diagnosis Date   • Arthritis    • Back pain    • Blister of great toe of left foot      Nonthermal, initial encounter   • Cancer      skin    • Dyslipidemia    • Elevated blood pressure    • Essential hypertension 9/13/2016   • GERD (gastroesophageal reflux disease)    • History of cerebrovascular accident      1999   • Hypo-osmolality and hyponatremia    • Ingrowing nail    • Onychomycosis    • PONV (postoperative nausea and vomiting)    •  Snores    • Type 2 diabetes mellitus    • 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   • Urinary incontinence    • Vitamin D deficiency      Past Surgical History   Procedure Laterality Date   • Blepharoplasty     • Carpal tunnel release     • Nail bed removal/revision  03/11/2016   • Lumbar disc surgery     • Neck surgery     • Laparoscopic nissen fundoplication     • Cervical spine surgery       cydney-laminotomy c7-t1   • Other surgical history       had left and right big toenials removed mar 2016   • Colonoscopy     • Endoscopy     • Cardiac catheterization     • Lumbar fusion Left 3/13/2017     Procedure: LEFT LUMBAR LATERAL INTERBODY FUSION L 3-5 WITH INSTRUMENTATION;  Surgeon: MIGDALIA Horton MD;  Location: [facility] OR;  Service:      Family History   Problem Relation Age of Onset   • Cancer Other    • Diabetes Other    • Heart disease Other    • Hypertension Other    • Cancer Mother    • Heart disease Father      Social History   Substance Use Topics   • Smoking status: Never Smoker   • Smokeless tobacco: Never Used   • Alcohol use No     Prescriptions Prior to Admission   Medication Sig Dispense Refill Last Dose   • atorvastatin (LIPITOR) 20 MG tablet Take 20 mg by mouth daily.   3/12/2017 at 0800   • cetirizine (ZyrTEC) 10 MG tablet Take 10 mg by mouth daily.   3/12/2017 at 0800   • Coenzyme Q10 (COQ10) 200 MG capsule Take 1 tablet/day by mouth.   3/12/2017 at 1600   • Insulin Glargine (TOUJEO SOLOSTAR) 300 UNIT/ML solution pen-injector Inject 80 Units under the skin Every Night. (Patient taking differently: Inject 62 Units under the skin Every Night.) 16 pen 11 3/12/2017 at 2100   • insulin lispro (humaLOG) 100 UNIT/ML injection Inject 50 Units under the skin 3 (Three) Times a Day With Meals. Up to 50 units scale as follows: 1 unit per every 1.5 carbs at breakfast, 1u per every 2 grams at lunch, and 1u for every 3 grams at supper   3/12/2017 at 1600   • metoprolol succinate XL  (TOPROL-XL) 25 MG 24 hr tablet Take 25 mg by mouth 2 (Two) Times a Day.   3/13/2017 at 0315   • Probiotic Product (PROBIOTIC MULTI-ENZYME) tablet Take 1 tablet by mouth 2 (Two) Times a Day.   3/12/2017 at 1600   • ranitidine (ZANTAC) 150 MG tablet Take 150 mg by mouth 2 (two) times a day.   3/12/2017 at 1600   • sucralfate (CARAFATE) 1 G tablet Take 1 g by mouth 3 (three) times a day.   3/12/2017 at 1500   • Vitamin D, Cholecalciferol, 1000 UNITS capsule 3 tablets Daily.   3/12/2017 at 1600   • aspirin 81 MG EC tablet Take 81 mg by mouth Daily.   3/3/2017   • glucose blood (JEROME CONTOUR TEST) test strip Use as instructed 5 x daily , ICD-10 code is E11.42 150 each 11    • Insulin Pen Needle (B-D UF III MINI PEN NEEDLES) 31G X 5 MM misc Use 5 times daily 450 each 3    • meloxicam (MOBIC) 7.5 MG tablet Take 7.5 mg by mouth Daily.   3/3/2017   • OMEGA-3 FATTY ACIDS-VITAMIN E PO Take 1 capsule by mouth. omega-3 fatty acids-vitamin E 1,000 mg-5 unit capsule.    3/3/2017     Humulin n [insulin isophane]; Mold extract [trichophyton]; Sudafed [pseudoephedrine]; Sulfa antibiotics; Other; and Erythromycin  Objective     Vital Signs   Temp:  [97.1 °F (36.2 °C)-97.9 °F (36.6 °C)] 97.3 °F (36.3 °C)  Heart Rate:  [71-98] 84  Resp:  [8-18] 16  BP: (116-168)/(47-87) 116/55          Physical Exam:  Constitutional: oriented to person, place, and time. appears well-developed.   HEENT:   Head: Normocephalic and atraumatic.   Eyes: Pupils are equal, round, and reactive to light.   Neck: Neck supple.   Cardiovascular: Regular rhythm and normal heart sounds.    Pulmonary/Chest: Effort normal and breath sounds normal. CTAB  Abdominal: Soft. Bowel sounds are normal. He exhibits no distension. There is no tenderness. There is no rebound and no guarding.   Musculoskeletal: Normal range of motion. He exhibits no edema or tenderness.   Neurological: He is alert and oriented to person, place, and time. He has normal reflexes.   Skin: Skin is  warm and dry.     Results Review:   I reviewed the patient's new imaging results and agree with the interpretation.      Assessment&#47;Plan     Active Problems:    Mixed hyperlipidemia    Essential hypertension    Type 2 diabetes mellitus without complication, without long-term current use of insulin    Spinal stenosis, lumbar    Gastroesophageal reflux disease without esophagitis    Constipation-start with Miralax 1 capful BID until BM, then decrease to 1 x a day,then can step up to Amitizia 24 mcg po BID which can be used for opiod induced constipation,and ultimately end up with Relistor 12 mcg sub Q q 48 hours to block the effect of narcotics on the gut.review pre and post BP's,will restart home BP meds when BP allows. Add Clonidine 0.1 mg q 4 hours prn if bp> 140/90,monitor BP and adjust meds as necessary.  Explained to patient and staff that we were consulted for medical management during their acute care hospitalization. They need to f/u with their regular primary care provider concerning any further treatment and review of abnormalities found during their hospitalization at [facility] and they agree with that treatment plan.     She states she has had constipation since childhood therefore we will go ahead and add Amitiza 24 mg bid, give her a dose of Relistor to block the narcotics effect on the gut.  Labs ordered this evening.  If she is nothing by mouth for surgical intervention would recommend half dose of her basal insulin.  She can continue with sliding scale coverage otherwise    I discussed the patients findings and my recommendations with patient and nursing staff    Enrico Ureña MD  03/13/17  5:42 PM             Electronically signed by Enrico Ureña MD at 3/13/2017  5:49 PM      Enrico Ureña MD at 3/13/2017  5:42 PM  Version 1 of 2             Referring Provider: Sol  Reason for Consultation: Medical management    Patient Care Team:  Mike Erazo  MD as PCP - General  Mike Erazo MD as PCP - Family Medicine  Serina Mendoza as Technician    Chief complaint back pain    Subjective .     History of present illness:  Patient is a 71-year-old white female followed by Dr. Padilla for primary care as well as Dr. Jules in [place] is an endocrinologist.  She presents to Dr. Horton service after failing conservative management of her chronic back pain.  She has been through anti-inflammatories muscle relaxers and opiate pain medication and continues to have worsening back pain is now affecting her activities of daily living.  She is admitted postoperatively after step 1 of 2 decompressive surgery.  Pain is as expected no other precipitating or relieving factors.      REVIEW OF SYSTEMS:    CONSTITUTIONAL:  Negative for anorexia, chills, fevers, night sweats and weight loss  EYES:  negative for eye dryness, icterus and redness  HEENT:   negative for dental problems, epistaxis, facial trauma and thrush  RESPIRATORY:  negative for chest tightness, cough, dyspnea on exertion, pneumonia and sputum  CARDIOVASCULAR: negative for chest pain, dyspnea, exertional chest pressure/discomfort, irregular heart beat, palpitations, paroxysmal nocturnal dyspnea and syncope  GASTROINTESTINAL:  negative for abdominal pain, hematemesis, jaundice, melena and rectal bleeding  MUSCULOSKELETAL:  negative for muscle weakness, myalgias and neck pain  NEUROLOGICAL:   negative for dizziness, headaches, seizures, speech problems, tremors and vertigo  INTEGUMENT: negative for pruritus, rash, skin color change and skin lesion(s)         History    Past Medical History   Diagnosis Date   • Arthritis    • Back pain    • Blister of great toe of left foot      Nonthermal, initial encounter   • Cancer      skin    • Dyslipidemia    • Elevated blood pressure    • Essential hypertension 9/13/2016   • GERD (gastroesophageal reflux disease)    • History of cerebrovascular accident      1999    • Hypo-osmolality and hyponatremia    • Ingrowing nail    • Onychomycosis    • PONV (postoperative nausea and vomiting)    • Snores    • Type 2 diabetes mellitus    • 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   • Urinary incontinence    • Vitamin D deficiency      Past Surgical History   Procedure Laterality Date   • Blepharoplasty     • Carpal tunnel release     • Nail bed removal/revision  03/11/2016   • Lumbar disc surgery     • Neck surgery     • Laparoscopic nissen fundoplication     • Cervical spine surgery       cydney-laminotomy c7-t1   • Other surgical history       had left and right big toenials removed mar 2016   • Colonoscopy     • Endoscopy     • Cardiac catheterization     • Lumbar fusion Left 3/13/2017     Procedure: LEFT LUMBAR LATERAL INTERBODY FUSION L 3-5 WITH INSTRUMENTATION;  Surgeon: MIGDALIA Horton MD;  Location: [facility] OR;  Service:      Family History   Problem Relation Age of Onset   • Cancer Other    • Diabetes Other    • Heart disease Other    • Hypertension Other    • Cancer Mother    • Heart disease Father      Social History   Substance Use Topics   • Smoking status: Never Smoker   • Smokeless tobacco: Never Used   • Alcohol use No     Prescriptions Prior to Admission   Medication Sig Dispense Refill Last Dose   • atorvastatin (LIPITOR) 20 MG tablet Take 20 mg by mouth daily.   3/12/2017 at 0800   • cetirizine (ZyrTEC) 10 MG tablet Take 10 mg by mouth daily.   3/12/2017 at 0800   • Coenzyme Q10 (COQ10) 200 MG capsule Take 1 tablet/day by mouth.   3/12/2017 at 1600   • Insulin Glargine (TOUJEO SOLOSTAR) 300 UNIT/ML solution pen-injector Inject 80 Units under the skin Every Night. (Patient taking differently: Inject 62 Units under the skin Every Night.) 16 pen 11 3/12/2017 at 2100   • insulin lispro (humaLOG) 100 UNIT/ML injection Inject 50 Units under the skin 3 (Three) Times a Day With Meals. Up to 50 units scale as follows: 1 unit per every 1.5 carbs at  breakfast, 1u per every 2 grams at lunch, and 1u for every 3 grams at supper   3/12/2017 at 1600   • metoprolol succinate XL (TOPROL-XL) 25 MG 24 hr tablet Take 25 mg by mouth 2 (Two) Times a Day.   3/13/2017 at 0315   • Probiotic Product (PROBIOTIC MULTI-ENZYME) tablet Take 1 tablet by mouth 2 (Two) Times a Day.   3/12/2017 at 1600   • ranitidine (ZANTAC) 150 MG tablet Take 150 mg by mouth 2 (two) times a day.   3/12/2017 at 1600   • sucralfate (CARAFATE) 1 G tablet Take 1 g by mouth 3 (three) times a day.   3/12/2017 at 1500   • Vitamin D, Cholecalciferol, 1000 UNITS capsule 3 tablets Daily.   3/12/2017 at 1600   • aspirin 81 MG EC tablet Take 81 mg by mouth Daily.   3/3/2017   • glucose blood (JEROME CONTOUR TEST) test strip Use as instructed 5 x daily , ICD-10 code is E11.42 150 each 11    • Insulin Pen Needle (B-D UF III MINI PEN NEEDLES) 31G X 5 MM misc Use 5 times daily 450 each 3    • meloxicam (MOBIC) 7.5 MG tablet Take 7.5 mg by mouth Daily.   3/3/2017   • OMEGA-3 FATTY ACIDS-VITAMIN E PO Take 1 capsule by mouth. omega-3 fatty acids-vitamin E 1,000 mg-5 unit capsule.    3/3/2017     Humulin n [insulin isophane]; Mold extract [trichophyton]; Sudafed [pseudoephedrine]; Sulfa antibiotics; Other; and Erythromycin  Objective     Vital Signs   Temp:  [97.1 °F (36.2 °C)-97.9 °F (36.6 °C)] 97.3 °F (36.3 °C)  Heart Rate:  [71-98] 84  Resp:  [8-18] 16  BP: (116-168)/(47-87) 116/55          Physical Exam:  Constitutional: oriented to person, place, and time. appears well-developed.   HEENT:   Head: Normocephalic and atraumatic.   Eyes: Pupils are equal, round, and reactive to light.   Neck: Neck supple.   Cardiovascular: Regular rhythm and normal heart sounds.    Pulmonary/Chest: Effort normal and breath sounds normal. CTAB  Abdominal: Soft. Bowel sounds are normal. He exhibits no distension. There is no tenderness. There is no rebound and no guarding.   Musculoskeletal: Normal range of motion. He exhibits no edema or  tenderness.   Neurological: He is alert and oriented to person, place, and time. He has normal reflexes.   Skin: Skin is warm and dry.     Results Review:   I reviewed the patient's new imaging results and agree with the interpretation.      Assessment&#47;Plan     Active Problems:    Mixed hyperlipidemia    Essential hypertension    Type 2 diabetes mellitus without complication, without long-term current use of insulin    Spinal stenosis, lumbar    Gastroesophageal reflux disease without esophagitis      She states she has had constipation since childhood therefore we will go ahead and add Amitiza 24 mg bid, give her a dose of Relistor to block the narcotics effect on the gut.  Labs ordered this evening.  If she is nothing by mouth for surgical intervention would recommend half dose of her basal insulin.  She can continue with sliding scale coverage otherwise    I discussed the patients findings and my recommendations with patient and nursing staff    Enrico Ureña MD  03/13/17  5:42 PM             Electronically signed by Enrico Ureña MD at 3/13/2017  5:48 PM        Nutrition Notes (most recent note)     No notes of this type exist for this encounter.           Physical Therapy Notes (most recent note)      Majo Hector PTA at 3/19/2017 10:40 AM  Version 1 of 1         Problem: Patient Care Overview (Adult)  Goal: Plan of Care Review  Outcome: Ongoing (interventions implemented as appropriate)    03/19/17 1038   Coping/Psychosocial Response Interventions   Plan Of Care Reviewed With patient   Patient Care Overview   Progress improving   Outcome Evaluation   Outcome Summary/Follow up Plan Still having difficulty w/ bed mobility and sit-stand due to weakness. requiring min/mod x1 but able to amb 60'x2 rwx and able to tolerated BLE ex's AROM 10-15. Pt would benefit from rehab              Electronically signed by Majo Hector PTA at 3/19/2017 10:40 AM           Occupational Therapy Notes (most  recent note)      Terrell Dockery OTR/L at 3/17/2017 10:03 AM  Version 1 of 1         Problem: Patient Care Overview (Adult)  Goal: Plan of Care Review  Outcome: Ongoing (interventions implemented as appropriate)    03/17/17 1000   Coping/Psychosocial Response Interventions   Plan Of Care Reviewed With patient   Patient Care Overview   Progress improving   Outcome Evaluation   Outcome Summary/Follow up Plan Pt maxA for LB dressing without AE. MaxA for toilet hygiene. Pt issued LH bath sponge and toilet aid for increased ADL independence. Pt CGA for transfers from chair and commode. Pt educated on safe transfer technique, home safety, and AE. Cont OT POC. Recommend HH at discharge. Next tx pt needs to practice tub transfers.              Electronically signed by Terrell Dockery OTR/L at 3/17/2017 10:03 AM        Speech Language Pathology Notes (most recent note)     No notes of this type exist for this encounter.        Respiratory Therapy Notes (most recent note)     No notes of this type exist for this encounter.

## 2017-03-19 NOTE — PROGRESS NOTES
Continued Stay Note   [place]     Patient Name: Alisia Velez  MRN: [id number]  Today's Date: 3/19/2017    Admit Date: 3/13/2017          Discharge Plan       03/19/17 1356    Case Management/Social Work Plan    Plan Spoke to pt re: dc plan.  She stated that now [facility]  Referral has been faxed to them.  Will follow for bed offer.  SARAH Corbett.     Patient/Family In Agreement With Plan yes      03/19/17 1337    Case Management/Social Work Plan    Patient/Family In Agreement With Plan yes              Discharge Codes     None        Expected Discharge Date and Time     Expected Discharge Date Expected Discharge Time    Mar 16, 2017             SARAH Kumar

## 2017-03-19 NOTE — PROGRESS NOTES
Chief Complaint:  Denies new complaints      Interval History:     Pt. Doing well.  Plan for transfer to [facility] tomorrow if approved.  BS stable.  .      Review of Systems:   General ROS: negative for - chills or fever  Respiratory ROS: no cough, shortness of breath, or wheezing  Cardiovascular ROS: no chest pain or dyspnea on exertion  Gastrointestinal ROS: negative for - abdominal pain, diarrhea or nausea/vomiting       Vital Signs  Temp:  [98.3 °F (36.8 °C)-98.5 °F (36.9 °C)] 98.3 °F (36.8 °C)  Heart Rate:  [80-83] 80  Resp:  [18] 18  BP: (132-134)/(47-65) 132/65    Intake/Output Summary (Last 24 hours) at 03/19/17 1149  Last data filed at 03/19/17 1100   Gross per 24 hour   Intake    720 ml   Output      0 ml   Net    720 ml       Physical Exam:     General Appearance:    Alert, cooperative, in no acute distress   Head:    N/A   Throat:   N/A   Neck:   N/A   Lungs:     Clear to auscultation,respirations regular, even and                  unlabored    Heart:    Regular rhythm and normal rate, normal S1 and S2, no            murmur, no gallop, no rub   Abdomen:     Normal bowel sounds, no masses, no organomegaly, soft        non-tender, non-distended, no guarding, no rebound                tenderness   Extremities:   Trace edema BLE   Pulses:   N/A   Skin:   N/A   Lymph nodes:   N/A   Neurologic:   N/A          Results Review:       Lab Results (last 24 hours)     Procedure Component Value Units Date/Time    POC Glucose Fingerstick [[id number]]  (Abnormal) Collected:  03/18/17 1144    Specimen:  Blood Updated:  03/18/17 1155     Glucose 257 (H) mg/dL       : [id number] Lorelei ShannonMeter ID: [id number]       POC Glucose Fingerstick [[id number]]  (Normal) Collected:  03/18/17 1640    Specimen:  Blood Updated:  03/18/17 1652     Glucose 84 mg/dL       : [id number] Lorelei OffSite VISIONMeter ID: [id number]       POC Glucose Fingerstick [[id number]]  (Abnormal) Collected:  03/18/17 2043    Specimen:   Blood Updated:  03/18/17 2113     Glucose 183 (H) mg/dL       : [id number] Zackary Teran ID: [id number]       Blood Culture [[id number]]  (Normal) Collected:  03/14/17 2148    Specimen:  Blood from Arm, Right Updated:  03/18/17 2302     Blood Culture No growth at 4 days     POC Glucose Fingerstick [[id number]]  (Abnormal) Collected:  03/19/17 0726    Specimen:  Blood Updated:  03/19/17 0737     Glucose 226 (H) mg/dL       : [id number] Moseley ShannonMeter ID: [id number]       POC Glucose Fingerstick [[id number]]  (Abnormal) Collected:  03/19/17 1113    Specimen:  Blood Updated:  03/19/17 1125     Glucose 252 (H) mg/dL       : [id number] Moseley ShannonMeter ID: [id number]                        Imaging Results (last 24 hours)     ** No results found for the last 24 hours. **            ASSESSMENT:    Active Problems:    Mixed hyperlipidemia    Essential hypertension    Type 2 diabetes mellitus without complication, without long-term current use of insulin    Spinal stenosis, lumbar    Gastroesophageal reflux disease without esophagitis      PLAN:    [facility] blood sugar  [facility]      DENISA Batres  03/19/17  11:49 AM      I have reviewed the notes, assessments, and/or procedures performed by Ivory Friedman PA-C, I concur with her documentation of Alisia Velez.    I have personally seen and examined the patient.  She is overall doing fairly well.  Blood sugars are elevated today.  The patient is managing her own insulin and has not been taking any basal insulin.  I encouraged her to restart basal insulin tonight.    Carlos Eduardo Friedman MD  3/19/2017  3:15 PM      Please note that portions of this note may have been completed with a voice recognition program. Efforts were made to edit the dictations, but occasionally words are mistranscribed.

## 2017-03-19 NOTE — PROGRESS NOTES
Orthopedic Spine Progress Note    Alisia Velez  71 y.o.  female  Subjective     Post-Operative Day: 6,4    Systemic or Specific Complaints:   Weakness in right leg especially anterior tibialis.  Difficulty ambulating.   not capable of caring for her at home, requesting transfer to [facility].  Awaiting bed.    Objective     Vital signs in last 24 hours:  Temp:  [98.3 °F (36.8 °C)-98.5 °F (36.9 °C)] 98.3 °F (36.8 °C)  Heart Rate:  [80-83] 80  Resp:  [18] 18  BP: (132-134)/(47-65) 132/65    Physical Exam:  Dressings clean dry and intact.  Weakness right anterior tibialis, strong EHL good hip flexor.  Sensation diminished L5 nerve distribution.    Diagnostic Data:  CBC:  Results from last 7 days  Lab Units 03/16/17  0454   WBC 10*3/mm3 9.07   RBC 10*6/mm3 3.00*   HEMOGLOBIN g/dL 9.3*   HEMATOCRIT % 27.7*   PLATELETS 10*3/mm3 160      BMP:@LABCNTIP(BMP)@    Assessment/Plan     1. Status Post left LLIF L3 to 5 with instrumentation L3-4, 3/13/17  2. Status Post right L3 to S1 decompression, PLIF L5-S1, PSF with instrumentation L3 to S1       Plan:  1. Continue PTOT with brace  2. Awaiting bed at [facility]  3. may need AFO if right foot continues to be weak     DENG Horton MD    Date: 3/19/2017  Time: 7:58 AM

## 2017-03-19 NOTE — PLAN OF CARE
Problem: Patient Care Overview (Adult)  Goal: Plan of Care Review  Outcome: Ongoing (interventions implemented as appropriate)    03/19/17 0655   Coping/Psychosocial Response Interventions   Plan Of Care Reviewed With patient   Patient Care Overview   Progress progress toward functional goals is gradual   Outcome Evaluation   Outcome Summary/Follow up Plan Pt. requires min assist to stand. CGAto ammb 140'. Strength decreased. Decreased dorsiflexion n R. Will benefit from strengthening, increase mobility nd ambulation.

## 2017-03-19 NOTE — PLAN OF CARE
Problem: Patient Care Overview (Adult)  Goal: Plan of Care Review  Outcome: Ongoing (interventions implemented as appropriate)    03/19/17 1038   Coping/Psychosocial Response Interventions   Plan Of Care Reviewed With patient   Patient Care Overview   Progress improving   Outcome Evaluation   Outcome Summary/Follow up Plan Still having difficulty w/ bed mobility and sit-stand due to weakness. requiring min/mod x1 but able to amb 60'x2 rwx and able to tolerated BLE ex's AROM 10-15. Pt would benefit from rehab

## 2017-03-19 NOTE — PLAN OF CARE
Problem: Patient Care Overview (Adult)  Goal: Plan of Care Review  Outcome: Ongoing (interventions implemented as appropriate)    03/19/17 0349   Coping/Psychosocial Response Interventions   Plan Of Care Reviewed With patient   Patient Care Overview   Progress no change   Outcome Evaluation   Outcome Summary/Follow up Plan Patient c/o pain prn pain meds given as ordered. No neuro changes, A&O x4. Safety maintained.          Problem: Perioperative Period (Adult)  Goal: Signs and Symptoms of Listed Potential Problems Will be Absent or Manageable (Perioperative Period)  Outcome: Ongoing (interventions implemented as appropriate)    Problem: Diabetes, Type 2 (Adult)  Goal: Signs and Symptoms of Listed Potential Problems Will be Absent or Manageable (Diabetes, Type 2)  Outcome: Ongoing (interventions implemented as appropriate)    Problem: Fall Risk (Adult)  Goal: Absence of Falls  Outcome: Ongoing (interventions implemented as appropriate)

## 2017-03-20 ENCOUNTER — HOSPITAL ENCOUNTER (INPATIENT)
Age: 72
LOS: 10 days | Discharge: HOME HEALTH | DRG: 560 | End: 2017-03-30
Attending: PSYCHIATRY & NEUROLOGY | Admitting: PSYCHIATRY & NEUROLOGY
Payer: MEDICARE

## 2017-03-20 VITALS
BODY MASS INDEX: 28.7 KG/M2 | OXYGEN SATURATION: 96 % | WEIGHT: 162 LBS | HEART RATE: 69 BPM | TEMPERATURE: 98.1 F | SYSTOLIC BLOOD PRESSURE: 127 MMHG | HEIGHT: 63 IN | DIASTOLIC BLOOD PRESSURE: 55 MMHG | RESPIRATION RATE: 20 BRPM

## 2017-03-20 DIAGNOSIS — M54.16 LUMBAR RADICULOPATHY: Primary | ICD-10-CM

## 2017-03-20 DIAGNOSIS — E11.65 UNCONTROLLED TYPE 2 DIABETES MELLITUS WITH HYPERGLYCEMIA, UNSPECIFIED LONG TERM INSULIN USE STATUS: ICD-10-CM

## 2017-03-20 DIAGNOSIS — I10 ESSENTIAL HYPERTENSION: ICD-10-CM

## 2017-03-20 DIAGNOSIS — M48.061 LUMBAR SPINAL STENOSIS: ICD-10-CM

## 2017-03-20 LAB
ANION GAP SERPL CALCULATED.3IONS-SCNC: 13 MMOL/L (ref 7–19)
BASOPHILS ABSOLUTE: 0 K/UL (ref 0–0.2)
BASOPHILS RELATIVE PERCENT: 0.2 % (ref 0–1)
BUN BLDV-MCNC: 15 MG/DL (ref 8–23)
CALCIUM SERPL-MCNC: 9.2 MG/DL (ref 8.8–10.2)
CHLORIDE BLD-SCNC: 96 MMOL/L (ref 98–111)
CO2: 29 MMOL/L (ref 22–29)
CREAT SERPL-MCNC: 0.7 MG/DL (ref 0.5–0.9)
EOSINOPHILS ABSOLUTE: 0.1 K/UL (ref 0–0.6)
EOSINOPHILS RELATIVE PERCENT: 0.9 % (ref 0–5)
GFR NON-AFRICAN AMERICAN: >60
GLUCOSE BLD-MCNC: 204 MG/DL (ref 70–99)
GLUCOSE BLD-MCNC: 219 MG/DL (ref 74–109)
GLUCOSE BLDC GLUCOMTR-MCNC: 181 MG/DL (ref 70–130)
GLUCOSE BLDC GLUCOMTR-MCNC: 234 MG/DL (ref 70–130)
HCT VFR BLD CALC: 29.5 % (ref 37–47)
HEMOGLOBIN: 9.9 G/DL (ref 12–16)
LYMPHOCYTES ABSOLUTE: 2.4 K/UL (ref 1.1–4.5)
LYMPHOCYTES RELATIVE PERCENT: 27.2 % (ref 20–40)
MCH RBC QN AUTO: 31.8 PG (ref 27–31)
MCHC RBC AUTO-ENTMCNC: 33.6 G/DL (ref 33–37)
MCV RBC AUTO: 94.9 FL (ref 81–99)
MONOCYTES ABSOLUTE: 1 K/UL (ref 0–0.9)
MONOCYTES RELATIVE PERCENT: 11 % (ref 0–10)
NEUTROPHILS ABSOLUTE: 5.3 K/UL (ref 1.5–7.5)
NEUTROPHILS RELATIVE PERCENT: 60.7 % (ref 50–65)
PDW BLD-RTO: 12.8 % (ref 11.5–14.5)
PERFORMED ON: ABNORMAL
PLATELET # BLD: 312 K/UL (ref 130–400)
PMV BLD AUTO: 9.6 FL (ref 7.4–10.4)
POTASSIUM SERPL-SCNC: 4 MMOL/L (ref 3.5–5)
PREALBUMIN: 12 MG/DL (ref 20–40)
RBC # BLD: 3.11 M/UL (ref 4.2–5.4)
SODIUM BLD-SCNC: 138 MMOL/L (ref 136–145)
WBC # BLD: 8.7 K/UL (ref 4.8–10.8)

## 2017-03-20 PROCEDURE — 82962 GLUCOSE BLOOD TEST: CPT

## 2017-03-20 PROCEDURE — 85025 COMPLETE CBC W/AUTO DIFF WBC: CPT

## 2017-03-20 PROCEDURE — 97535 SELF CARE MNGMENT TRAINING: CPT

## 2017-03-20 PROCEDURE — 82948 REAGENT STRIP/BLOOD GLUCOSE: CPT

## 2017-03-20 PROCEDURE — 84134 ASSAY OF PREALBUMIN: CPT

## 2017-03-20 PROCEDURE — 97110 THERAPEUTIC EXERCISES: CPT

## 2017-03-20 PROCEDURE — 1180000000 HC REHAB R&B

## 2017-03-20 PROCEDURE — 36415 COLL VENOUS BLD VENIPUNCTURE: CPT

## 2017-03-20 PROCEDURE — 80048 BASIC METABOLIC PNL TOTAL CA: CPT

## 2017-03-20 PROCEDURE — 97116 GAIT TRAINING THERAPY: CPT

## 2017-03-20 PROCEDURE — 6370000000 HC RX 637 (ALT 250 FOR IP): Performed by: PSYCHIATRY & NEUROLOGY

## 2017-03-20 PROCEDURE — 94664 DEMO&/EVAL PT USE INHALER: CPT

## 2017-03-20 RX ORDER — DOCUSATE SODIUM 100 MG/1
100 CAPSULE, LIQUID FILLED ORAL 2 TIMES DAILY
Status: DISCONTINUED | OUTPATIENT
Start: 2017-03-20 | End: 2017-03-24

## 2017-03-20 RX ORDER — SACCHAROMYCES BOULARDII 250 MG
250 CAPSULE ORAL 2 TIMES DAILY
Status: DISCONTINUED | OUTPATIENT
Start: 2017-03-20 | End: 2017-03-30 | Stop reason: HOSPADM

## 2017-03-20 RX ORDER — BISACODYL 10 MG
10 SUPPOSITORY, RECTAL RECTAL DAILY PRN
Status: DISCONTINUED | OUTPATIENT
Start: 2017-03-20 | End: 2017-03-30 | Stop reason: HOSPADM

## 2017-03-20 RX ORDER — CETIRIZINE HYDROCHLORIDE 10 MG/1
10 TABLET ORAL DAILY
Status: DISCONTINUED | OUTPATIENT
Start: 2017-03-21 | End: 2017-03-30 | Stop reason: HOSPADM

## 2017-03-20 RX ORDER — MELOXICAM 7.5 MG/1
7.5 TABLET ORAL DAILY
Status: ON HOLD | COMMUNITY
Start: 2017-03-21 | End: 2017-03-30 | Stop reason: HOSPADM

## 2017-03-20 RX ORDER — POLYETHYLENE GLYCOL 3350 17 G/17G
17 POWDER, FOR SOLUTION ORAL DAILY
Status: DISCONTINUED | OUTPATIENT
Start: 2017-03-20 | End: 2017-03-24

## 2017-03-20 RX ORDER — ASPIRIN 81 MG/1
81 TABLET ORAL DAILY
COMMUNITY
Start: 2017-03-21

## 2017-03-20 RX ORDER — OXYCODONE AND ACETAMINOPHEN 10; 325 MG/1; MG/1
1 TABLET ORAL EVERY 4 HOURS PRN
Status: DISCONTINUED | OUTPATIENT
Start: 2017-03-20 | End: 2017-03-30 | Stop reason: HOSPADM

## 2017-03-20 RX ORDER — METOPROLOL SUCCINATE 25 MG/1
25 TABLET, EXTENDED RELEASE ORAL 2 TIMES DAILY
Status: ON HOLD | COMMUNITY
Start: 2017-03-20 | End: 2017-03-30 | Stop reason: HOSPADM

## 2017-03-20 RX ORDER — INSULIN GLARGINE 100 [IU]/ML
80 INJECTION, SOLUTION SUBCUTANEOUS NIGHTLY
Status: DISCONTINUED | OUTPATIENT
Start: 2017-03-20 | End: 2017-03-21

## 2017-03-20 RX ORDER — RANITIDINE 150 MG/1
150 TABLET ORAL 2 TIMES DAILY
Status: ON HOLD | COMMUNITY
Start: 2017-03-20 | End: 2017-03-30 | Stop reason: HOSPADM

## 2017-03-20 RX ORDER — MELOXICAM 7.5 MG/1
7.5 TABLET ORAL DAILY
Status: DISCONTINUED | OUTPATIENT
Start: 2017-03-21 | End: 2017-03-21

## 2017-03-20 RX ORDER — ATORVASTATIN CALCIUM 20 MG/1
20 TABLET, FILM COATED ORAL DAILY
COMMUNITY
Start: 2017-03-21

## 2017-03-20 RX ORDER — SUCRALFATE 1 G/1
1 TABLET ORAL 3 TIMES DAILY
Status: ON HOLD | COMMUNITY
Start: 2017-03-20 | End: 2017-03-30 | Stop reason: HOSPADM

## 2017-03-20 RX ORDER — OXYCODONE AND ACETAMINOPHEN 10; 325 MG/1; MG/1
1 TABLET ORAL EVERY 4 HOURS PRN
Qty: 120 TABLET | Refills: 0
Start: 2017-03-20 | End: 2018-02-27

## 2017-03-20 RX ORDER — FAMOTIDINE 20 MG/1
20 TABLET, FILM COATED ORAL DAILY
Status: DISCONTINUED | OUTPATIENT
Start: 2017-03-21 | End: 2017-03-30 | Stop reason: HOSPADM

## 2017-03-20 RX ORDER — METOPROLOL SUCCINATE 50 MG/1
25 TABLET, EXTENDED RELEASE ORAL 2 TIMES DAILY
Status: DISCONTINUED | OUTPATIENT
Start: 2017-03-20 | End: 2017-03-27

## 2017-03-20 RX ORDER — CETIRIZINE HYDROCHLORIDE 10 MG/1
10 TABLET ORAL DAILY
COMMUNITY
Start: 2017-03-21

## 2017-03-20 RX ORDER — ATORVASTATIN CALCIUM 20 MG/1
20 TABLET, FILM COATED ORAL DAILY
Status: DISCONTINUED | OUTPATIENT
Start: 2017-03-21 | End: 2017-03-30 | Stop reason: HOSPADM

## 2017-03-20 RX ORDER — UBIDECARENONE 200 MG
200 CAPSULE ORAL DAILY
COMMUNITY
Start: 2017-03-21

## 2017-03-20 RX ORDER — INSULIN GLARGINE 100 [IU]/ML
80 INJECTION, SOLUTION SUBCUTANEOUS NIGHTLY
Status: ON HOLD | COMMUNITY
Start: 2017-03-20 | End: 2017-03-30 | Stop reason: HOSPADM

## 2017-03-20 RX ORDER — ASPIRIN 81 MG/1
81 TABLET ORAL DAILY
Status: DISCONTINUED | OUTPATIENT
Start: 2017-03-21 | End: 2017-03-30 | Stop reason: HOSPADM

## 2017-03-20 RX ORDER — SUCRALFATE 1 G/1
1 TABLET ORAL 3 TIMES DAILY
Status: DISCONTINUED | OUTPATIENT
Start: 2017-03-20 | End: 2017-03-30 | Stop reason: HOSPADM

## 2017-03-20 RX ORDER — SODIUM PHOSPHATE, DIBASIC AND SODIUM PHOSPHATE, MONOBASIC 7; 19 G/133ML; G/133ML
1 ENEMA RECTAL
Status: ACTIVE | OUTPATIENT
Start: 2017-03-20 | End: 2017-03-20

## 2017-03-20 RX ORDER — UBIDECARENONE 100 MG
200 CAPSULE ORAL DAILY
Status: DISCONTINUED | OUTPATIENT
Start: 2017-03-21 | End: 2017-03-30 | Stop reason: HOSPADM

## 2017-03-20 RX ORDER — ACETAMINOPHEN 325 MG/1
650 TABLET ORAL EVERY 4 HOURS PRN
Status: DISCONTINUED | OUTPATIENT
Start: 2017-03-20 | End: 2017-03-30 | Stop reason: HOSPADM

## 2017-03-20 RX ORDER — OXYCODONE AND ACETAMINOPHEN 10; 325 MG/1; MG/1
1 TABLET ORAL EVERY 4 HOURS PRN
COMMUNITY
End: 2017-11-15

## 2017-03-20 RX ADMIN — OXYCODONE HYDROCHLORIDE AND ACETAMINOPHEN 1 TABLET: 10; 325 TABLET ORAL at 05:39

## 2017-03-20 RX ADMIN — OXYCODONE HYDROCHLORIDE AND ACETAMINOPHEN 1 TABLET: 10; 325 TABLET ORAL at 21:13

## 2017-03-20 RX ADMIN — METOPROLOL SUCCINATE 25 MG: 25 TABLET, FILM COATED, EXTENDED RELEASE ORAL at 08:06

## 2017-03-20 RX ADMIN — SUCRALFATE 1 G: 1 TABLET ORAL at 16:55

## 2017-03-20 RX ADMIN — LUBIPROSTONE 24 MCG: 24 CAPSULE, GELATIN COATED ORAL at 08:05

## 2017-03-20 RX ADMIN — SUCRALFATE 1 G: 1 TABLET ORAL at 08:06

## 2017-03-20 RX ADMIN — INSULIN LISPRO 33 UNITS: 100 INJECTION, SOLUTION INTRAVENOUS; SUBCUTANEOUS at 09:55

## 2017-03-20 RX ADMIN — DOCUSATE SODIUM 100 MG: 100 CAPSULE, LIQUID FILLED ORAL at 21:12

## 2017-03-20 RX ADMIN — Medication 250 MG: at 21:12

## 2017-03-20 RX ADMIN — OXYCODONE HYDROCHLORIDE AND ACETAMINOPHEN 1 TABLET: 10; 325 TABLET ORAL at 11:51

## 2017-03-20 RX ADMIN — OXYCODONE HYDROCHLORIDE AND ACETAMINOPHEN 1 TABLET: 10; 325 TABLET ORAL at 16:55

## 2017-03-20 RX ADMIN — BISACODYL 10 MG: 5 TABLET, COATED ORAL at 08:05

## 2017-03-20 RX ADMIN — METOPROLOL SUCCINATE 25 MG: 50 TABLET, EXTENDED RELEASE ORAL at 21:13

## 2017-03-20 RX ADMIN — FAMOTIDINE 20 MG: 20 TABLET, FILM COATED ORAL at 08:06

## 2017-03-20 RX ADMIN — INSULIN LISPRO 33 UNITS: 100 INJECTION, SOLUTION INTRAVENOUS; SUBCUTANEOUS at 12:30

## 2017-03-20 RX ADMIN — SUCRALFATE 1 G: 1 TABLET ORAL at 21:12

## 2017-03-20 ASSESSMENT — PAIN SCALES - GENERAL
PAINLEVEL_OUTOF10: 0
PAINLEVEL_OUTOF10: 5

## 2017-03-20 ASSESSMENT — PAIN DESCRIPTION - FREQUENCY: FREQUENCY: INTERMITTENT

## 2017-03-20 ASSESSMENT — PAIN DESCRIPTION - PAIN TYPE: TYPE: SURGICAL PAIN

## 2017-03-20 ASSESSMENT — PAIN DESCRIPTION - DESCRIPTORS: DESCRIPTORS: ACHING;DULL

## 2017-03-20 ASSESSMENT — PAIN DESCRIPTION - LOCATION: LOCATION: BACK

## 2017-03-20 NOTE — PROGRESS NOTES
[facility] After Hours Info if needed:  Number for report: [phone]  Fax number for discharge summary: [phone]

## 2017-03-20 NOTE — PLAN OF CARE
Problem: Patient Care Overview (Adult)  Goal: Plan of Care Review  Outcome: Outcome(s) achieved Date Met:  03/20/17 03/20/17 2955   Coping/Psychosocial Response Interventions   Plan Of Care Reviewed With patient;spouse   Patient Care Overview   Progress improving   Outcome Evaluation   Outcome Summary/Follow up Plan [facility]. Medicated prn for pain with relief noted. Dressings CDI, steristrips intact. Right foot flexion weak. Patient up with one assist and a walker, safety maintained.       Goal: Adult Individualization and Mutuality  Outcome: Outcome(s) achieved Date Met:  03/20/17  Goal: Discharge Needs Assessment  Outcome: Outcome(s) achieved Date Met:  03/20/17    Problem: Perioperative Period (Adult)  Goal: Signs and Symptoms of Listed Potential Problems Will be Absent or Manageable (Perioperative Period)  Outcome: Outcome(s) achieved Date Met:  03/20/17    Problem: Diabetes, Type 2 (Adult)  Goal: Signs and Symptoms of Listed Potential Problems Will be Absent or Manageable (Diabetes, Type 2)  Outcome: Outcome(s) achieved Date Met:  03/20/17    Problem: Fall Risk (Adult)  Goal: Absence of Falls  Outcome: Outcome(s) achieved Date Met:  03/20/17

## 2017-03-20 NOTE — PLAN OF CARE
Problem: Patient Care Overview (Adult)  Goal: Plan of Care Review  Outcome: Ongoing (interventions implemented as appropriate)    03/20/17 1033   Coping/Psychosocial Response Interventions   Plan Of Care Reviewed With patient   Patient Care Overview   Progress progress toward functional goals as expected   Outcome Evaluation   Outcome Summary/Follow up Plan Pt requires min assist to stand. Ambulated 100' x 2 CGA with rolling walker - Limited by decreased DF RLE. Pt will benefit from continued therapy to increase strength and mobility.. Recommend discharge to [facility] for therapy.

## 2017-03-20 NOTE — DISCHARGE SUMMARY
Date of Discharge:  3/20/2017    Admission Diagnosis: M54.5    Discharge Diagnosis:   1. Status post previous left L5-S1 decompression, , October 2002  2. Increasing chronic back pain  3. Bilateral buttock, thigh, and leg radiculopathy, left worse than right  4. Neurogenic claudication  5. Multilevel thoracolumbar degenerative disc disease  6. Degenerative lumbar scoliosis, worse L3 to S1  7. Multilevel lumbar facet arthropathy, worse L3 to S1  8. Central and bilateral foraminal stenosis L3 to S1  9.  Status post left LLIF L3 to 5 with instrumentation L3-4, 3/13/17  10. Status post Right L3-S1 hemilaminectomy decompression with complete facetectomy and neural foraminotom, Posterior lumbar interbody fusion L5-S1, Posterior spinal fusion L3-4, L4-5, L5-S1  11. Right anterior tibialis weakness    Consults During Admission: [facility]  Patient is a 71 y.o. female known to our practice.  Admitted for the above plan staged intervention.  She is tolerated as well she has some residual weakness in the right anterior tibialis.  She will be discharged today in stable condition to [facility] for further rehabilitation needs.    Condition on Discharge:  STABLE    Vital Signs  Temp:  [97.9 °F (36.6 °C)-98.1 °F (36.7 °C)] 98.1 °F (36.7 °C)  Heart Rate:  [69-85] 69  Resp:  [20] 20  BP: (127)/(48-55) 127/55    Physical Exam:   Alert and oriented ×3, no acute distress, grossly neurovascular intact with right anterior tibialis weakness dressing clean dry and intact    Discharge Disposition  Rehab Facility or Unit (DC - External)    Discharge Medications   Alisia Velez   Home Medication Instructions NICHOLAS:[id number]    Printed on:03/20/17 8858   Medication Information                      aspirin 81 MG EC tablet  Take 81 mg by mouth Daily.             atorvastatin (LIPITOR) 20 MG tablet  Take 20 mg by mouth daily.             cetirizine (ZyrTEC) 10 MG tablet  Take 10 mg by mouth daily.              Coenzyme Q10 (COQ10) 200 MG capsule  Take 1 tablet/day by mouth.             glucose blood (JEROME CONTOUR TEST) test strip  Use as instructed 5 x daily , ICD-10 code is E11.42             Insulin Glargine (TOUJEO SOLOSTAR) 300 UNIT/ML solution pen-injector  Inject 80 Units under the skin Every Night.             insulin lispro (humaLOG) 100 UNIT/ML injection  Inject 50 Units under the skin 3 (Three) Times a Day With Meals. Up to 50 units scale as follows: 1 unit per every 1.5 carbs at breakfast, 1u per every 2 grams at lunch, and 1u for every 3 grams at supper             Insulin Pen Needle (B-D UF III MINI PEN NEEDLES) 31G X 5 MM misc  Use 5 times daily             metoprolol succinate XL (TOPROL-XL) 25 MG 24 hr tablet  Take 25 mg by mouth 2 (Two) Times a Day.             OMEGA-3 FATTY ACIDS-VITAMIN E PO  Take 1 capsule by mouth. omega-3 fatty acids-vitamin E 1,000 mg-5 unit capsule.              oxyCODONE-acetaminophen (PERCOCET)  MG per tablet  Take 1 tablet by mouth Every 4 (Four) Hours As Needed for Moderate Pain (4-6) or Severe Pain (7-10). Take 1-2 by mouth             Probiotic Product (PROBIOTIC MULTI-ENZYME) tablet  Take 1 tablet by mouth 2 (Two) Times a Day.             ranitidine (ZANTAC) 150 MG tablet  Take 150 mg by mouth 2 (two) times a day.             sucralfate (CARAFATE) 1 G tablet  Take 1 g by mouth 3 (three) times a day.             Vitamin D, Cholecalciferol, 1000 UNITS capsule  3 tablets Daily.                 Discharge Diet: Resume Home diet, advance as tolerated    Activity at Discharge: Resume home activity advace as tolerated, no lifting, no twisting, no bending, brace as directed, no driving until directed.     Follow-up Appointments  Followup with PCP within one week  Followup [facility] at 2weeks post-op         DENISA Mari  03/20/17  2:55 PM    Time: 30min

## 2017-03-20 NOTE — PROGRESS NOTES
Alisia Velez is a 71 y.o. female patient.  Anxious to go rehab  Current Facility-Administered Medications   Medication Dose Route Frequency Provider Last Rate Last Dose   • acetaminophen (TYLENOL) tablet 650 mg  650 mg Oral Q6H PRN DENISA Smith   650 mg at 03/14/17 2140   • bisacodyl (DULCOLAX) EC tablet 10 mg  10 mg Oral Daily DENISA Mari   10 mg at 03/19/17 0856   • bisacodyl (DULCOLAX) suppository 10 mg  10 mg Rectal Daily PRN DENISA Mari       • diphenhydrAMINE (BENADRYL) capsule 25 mg  25 mg Oral Nightly PRN K Jose Horton MD       • famotidine (PEPCID) injection 20 mg  20 mg Intravenous BID K Jose Horton MD   20 mg at 03/15/17 0910    Or   • famotidine (PEPCID) tablet 20 mg  20 mg Oral BID K Jose Horton MD   20 mg at 03/19/17 1725   • HYDROmorphone (DILAUDID) injection 1 mg  1 mg Intravenous Q3H PRN K Jose Horton MD   1 mg at 03/17/17 0005   • HYDROmorphone (DILAUDID) injection 1 mg  1 mg Intravenous Q3H PRN K Jose Horton MD       • insulin detemir (LEVEMIR) injection 62 Units  62 Units Subcutaneous Nightly K Jose Horton MD   62 Units at 03/13/17 2015   • insulin lispro (humaLOG) injection 50 Units  50 Units Subcutaneous TID With Meals MIGDALIA Horton MD   23 Units at 03/19/17 1725   • lactated ringers infusion  20 mL/hr Intravenous Continuous Mike Ann MD 20 mL/hr at 03/15/17 0813     • lubiprostone (AMITIZA) capsule 24 mcg  24 mcg Oral BID With Meals Enrico Ureña MD   24 mcg at 03/19/17 1725   • metoprolol succinate XL (TOPROL-XL) 24 hr tablet 25 mg  25 mg Oral BID MIGDALIA Horton MD   25 mg at 03/19/17 1943   • ondansetron (ZOFRAN) tablet 4 mg  4 mg Oral Q6H PRN MIGDALIA Horton MD   4 mg at 03/18/17 0311    Or   • ondansetron ODT (ZOFRAN-ODT) disintegrating tablet 4 mg  4 mg Oral Q6H PRN K Jose Horton MD        Or   • ondansetron (ZOFRAN) injection 4 mg  4 mg Intravenous Q6H PRN K Jose Horton MD   4 mg at  "03/13/17 2143   • oxyCODONE-acetaminophen (PERCOCET)  MG per tablet 2 tablet  2 tablet Oral Q4H PRN K Jose Horton MD   1 tablet at 03/19/17 0459   • oxyCODONE-acetaminophen (PERCOCET)  MG per tablet 2 tablet  2 tablet Oral Q4H PRN K Jose Horton MD   1 tablet at 03/20/17 0539   • sodium chloride 0.9 % flush 1-10 mL  1-10 mL Intravenous PRN Mike Ann MD       • sodium chloride 0.9 % flush 1-10 mL  1-10 mL Intravenous PRN K Jose Horton MD       • sodium chloride 0.9 % flush 1-10 mL  1-10 mL Intravenous PRN K Jose Horton MD       • sucralfate (CARAFATE) tablet 1 g  1 g Oral TID K Jose Horton MD   1 g at 03/19/17 1943     Allergies   Allergen Reactions   • Humulin N [Insulin Isophane] Shortness Of Breath   • Mold Extract [Trichophyton] Shortness Of Breath   • Sudafed [Pseudoephedrine] Shortness Of Breath   • Sulfa Antibiotics Shortness Of Breath   • Other Itching     Silk Tape   • Erythromycin Other (See Comments)     Eyes see big black spots     Active Problems:    Mixed hyperlipidemia    Essential hypertension    Type 2 diabetes mellitus without complication, without long-term current use of insulin    Spinal stenosis, lumbar    Gastroesophageal reflux disease without esophagitis    Blood pressure 127/48, pulse 85, temperature 97.9 °F (36.6 °C), temperature source Oral, resp. rate 20, height 63\" (160 cm), weight 162 lb (73.5 kg), SpO2 100 %.      Subjective:  Symptoms:  Stable.  She reports anxiety.  No shortness of breath or chest pain.    Diet:  Adequate intake.  No nausea or vomiting.    Activity level: Returning to normal.    Pain:  She complains of pain that is mild.  She reports pain is improving.  Pain is well controlled.      Review of Systems   Constitutional: Negative for activity change, appetite change, chills and fever.   HENT: Negative for congestion, ear pain, trouble swallowing and voice change.    Eyes: Negative for pain, discharge and visual " "disturbance.   Respiratory: Negative for apnea, chest tightness, shortness of breath and wheezing.    Cardiovascular: Negative for chest pain and palpitations.   Gastrointestinal: Negative for abdominal distention, blood in stool, nausea and vomiting.   Endocrine: Negative for cold intolerance, heat intolerance, polydipsia, polyphagia and polyuria.   Genitourinary: Negative for difficulty urinating, frequency and hematuria.   Musculoskeletal: Negative for joint swelling and myalgias.   Skin: Negative for color change, pallor and rash.   Neurological: Negative for tremors, seizures, syncope, speech difficulty and headaches.   Hematological: Negative for adenopathy. Does not bruise/bleed easily.   Psychiatric/Behavioral: Negative for behavioral problems, confusion and hallucinations.     Objective:  General Appearance:  Comfortable.    Vital signs: (most recent): Blood pressure 127/48, pulse 85, temperature 97.9 °F (36.6 °C), temperature source Oral, resp. rate 20, height 63\" (160 cm), weight 162 lb (73.5 kg), SpO2 100 %.  Vital signs are normal.  No fever.    HEENT: Normal HEENT exam.    Lungs:  Normal respiratory rate and normal effort.    Heart: Normal rate.  Regular rhythm.    Abdomen: Abdomen is soft.    Extremities: Normal range of motion.    Neurological: Patient is alert and oriented to person, place and time.    Skin:  Warm and dry.              Labs:  Lab Results (last 72 hours)     Procedure Component Value Units Date/Time    POC Glucose Fingerstick [[id number]]  (Abnormal) Collected:  03/17/17 0841    Specimen:  Blood Updated:  03/17/17 0852     Glucose 195 (H) mg/dL       : [id number] Rustoria MeganMeter ID: [id number]       POC Glucose Fingerstick [[id number]]  (Abnormal) Collected:  03/17/17 1219    Specimen:  Blood Updated:  03/17/17 1230     Glucose 148 (H) mg/dL       : [id number] Tessa MeganMeter ID: [id number]       POC Glucose Fingerstick [[id number]]  (Abnormal) Collected:  03/17/17 1650    " Specimen:  Blood Updated:  03/17/17 1701     Glucose 145 (H) mg/dL       : [id number] Tessa GarcianMeter ID: [id number]       POC Glucose Fingerstick [[id number]]  (Normal) Collected:  03/17/17 2226    Specimen:  Blood Updated:  03/17/17 2256     Glucose 100 mg/dL       : [id number] Randy KristaMeter ID: [id number]       POC Glucose Fingerstick [[id number]]  (Abnormal) Collected:  03/18/17 0749    Specimen:  Blood Updated:  03/18/17 0809     Glucose 215 (H) mg/dL       : [id number] Lorelei ShannonMeter ID: [id number]       POC Glucose Fingerstick [[id number]]  (Abnormal) Collected:  03/18/17 1144    Specimen:  Blood Updated:  03/18/17 1155     Glucose 257 (H) mg/dL       : [id number] Lorelei ShannonMeter ID: [id number]       POC Glucose Fingerstick [[id number]]  (Normal) Collected:  03/18/17 1640    Specimen:  Blood Updated:  03/18/17 1652     Glucose 84 mg/dL       : [id number] Lorelei ShannonMeter ID: [id number]       POC Glucose Fingerstick [[id number]]  (Abnormal) Collected:  03/18/17 2043    Specimen:  Blood Updated:  03/18/17 2113     Glucose 183 (H) mg/dL       : [id number] Zackary Dubois LMeter ID: [id number]       POC Glucose Fingerstick [[id number]]  (Abnormal) Collected:  03/19/17 0726    Specimen:  Blood Updated:  03/19/17 0737     Glucose 226 (H) mg/dL       : [id number] Lorelei ShannonMeter ID: [id number]       POC Glucose Fingerstick [[id number]]  (Abnormal) Collected:  03/19/17 1113    Specimen:  Blood Updated:  03/19/17 1125     Glucose 252 (H) mg/dL       : [id number] Lorelei ShannonMeter ID: [id number]       POC Glucose Fingerstick [[id number]]  (Normal) Collected:  03/19/17 1652    Specimen:  Blood Updated:  03/19/17 1703     Glucose 99 mg/dL       : [id number] Lorelei ShannonMeter ID: [id number]       POC Glucose Fingerstick [[id number]]  (Normal) Collected:  03/19/17 1942    Specimen:  Blood Updated:  03/19/17 2010     Glucose 127 mg/dL       : [id number] Zackary Teran ID:  [id number]       Blood Culture [[id number]]  (Normal) Collected:  03/14/17 2148    Specimen:  Blood from Arm, Right Updated:  03/19/17 2302     Blood Culture No growth at 5 days           Imaging Results (last 72 hours)     ** No results found for the last 72 hours. **                Assessment:    Condition: In stable condition.  Improving.   (Patient Active Problem List:     Osteopenia     Mixed hyperlipidemia     Essential hypertension     Type 2 diabetes mellitus without complication, without long-term current use of insulin     Spinal stenosis, lumbar     Gastroesophageal reflux disease without esophagitis  ).     Plan:   Transfer Plan: medically stable for d/c to [facility].  Encourage ambulation.  Regular diet.  Administer medications as ordered.   ([facility]  Type 2 DM- review labs and home medication. Discuss with patient importance of blood sugar control in the healing process. Review diet, medical,and lifestyle modifications for optimal medical treatment.).     Patient Active Problem List   Diagnosis   • Osteopenia   • Mixed hyperlipidemia   • Essential hypertension   • Type 2 diabetes mellitus without complication, without long-term current use of insulin   • Spinal stenosis, lumbar   • Gastroesophageal reflux disease without esophagitis     Enrico Ureña MD  3/20/2017

## 2017-03-20 NOTE — PROGRESS NOTES
Continued Stay Note   [facility]     Patient Name: Alisia Velez  MRN: [id number]  Today's Date: 3/20/2017    Admit Date: 3/13/2017          Discharge Plan       03/20/17 1023    Case Management/Social Work Plan    Plan [facility]    Final Note    Final Note Julio with [facility] has called to inform that patient has been accepted and can admit to [facility] after 1400 today. Discharge summary to be faxed to [phone]. Patient's nurse will call report to [phone] (or [phone] if after 8250).               Discharge Codes     None        Expected Discharge Date and Time     Expected Discharge Date Expected Discharge Time    Mar 16, 2017             SARAH Wells

## 2017-03-20 NOTE — PLAN OF CARE
Problem: Patient Care Overview (Adult)  Goal: Plan of Care Review  Outcome: Ongoing (interventions implemented as appropriate)    03/20/17 1319   Coping/Psychosocial Response Interventions   Plan Of Care Reviewed With patient   Patient Care Overview   Progress progress towards functional goals is fair   Outcome Evaluation   Outcome Summary/Follow up Plan Pt transfers with SBA with RW, tub bench and grab bar with increased time. Pt ambulates in room and bathroom with SBA with RW. Pt requires max A for LB dressing, min/mod A for TB shower and set up for UB dressing including LSO. Pt would benefit from acute rehab at discharge for increased strengthening and independence with ADL's prior to pt returning home. Continue OT POC

## 2017-03-20 NOTE — PLAN OF CARE
Problem: Patient Care Overview (Adult)  Goal: Plan of Care Review  Outcome: Ongoing (interventions implemented as appropriate)    03/20/17 0327   Coping/Psychosocial Response Interventions   Plan Of Care Reviewed With patient   Patient Care Overview   Progress no change   Outcome Evaluation   Outcome Summary/Follow up Plan Patient C/O pain prnpain meds given as ordered. Possible DC to [facility] rehab.safety maintained, VSS.         Problem: Perioperative Period (Adult)  Goal: Signs and Symptoms of Listed Potential Problems Will be Absent or Manageable (Perioperative Period)  Outcome: Ongoing (interventions implemented as appropriate)    Problem: Diabetes, Type 2 (Adult)  Goal: Signs and Symptoms of Listed Potential Problems Will be Absent or Manageable (Diabetes, Type 2)  Outcome: Ongoing (interventions implemented as appropriate)    Problem: Fall Risk (Adult)  Goal: Absence of Falls  Outcome: Ongoing (interventions implemented as appropriate)

## 2017-03-21 PROBLEM — K21.9 GASTROESOPHAGEAL REFLUX DISEASE WITHOUT ESOPHAGITIS: Status: ACTIVE | Noted: 2017-03-21

## 2017-03-21 PROBLEM — M54.16 LUMBAR RADICULOPATHY: Status: ACTIVE | Noted: 2017-03-21

## 2017-03-21 PROBLEM — I10 ESSENTIAL HYPERTENSION: Status: ACTIVE | Noted: 2017-03-21

## 2017-03-21 PROBLEM — M48.061 LUMBAR SPINAL STENOSIS: Status: ACTIVE | Noted: 2017-03-21

## 2017-03-21 LAB
BILIRUBIN URINE: NEGATIVE
BLOOD, URINE: NEGATIVE
CLARITY: CLEAR
COLOR: YELLOW
GLUCOSE BLD-MCNC: 133 MG/DL (ref 70–99)
GLUCOSE BLD-MCNC: 175 MG/DL (ref 70–99)
GLUCOSE BLD-MCNC: 222 MG/DL (ref 70–99)
GLUCOSE BLD-MCNC: 222 MG/DL (ref 70–99)
GLUCOSE URINE: 100 MG/DL
KETONES, URINE: NEGATIVE MG/DL
LEUKOCYTE ESTERASE, URINE: NEGATIVE
NITRITE, URINE: NEGATIVE
PERFORMED ON: ABNORMAL
PH UA: 7
PROTEIN UA: NEGATIVE MG/DL
SPECIFIC GRAVITY UA: 1.01
UROBILINOGEN, URINE: 0.2 E.U./DL

## 2017-03-21 PROCEDURE — 97110 THERAPEUTIC EXERCISES: CPT

## 2017-03-21 PROCEDURE — 87086 URINE CULTURE/COLONY COUNT: CPT

## 2017-03-21 PROCEDURE — 99223 1ST HOSP IP/OBS HIGH 75: CPT | Performed by: PSYCHIATRY & NEUROLOGY

## 2017-03-21 PROCEDURE — 81003 URINALYSIS AUTO W/O SCOPE: CPT

## 2017-03-21 PROCEDURE — 1180000000 HC REHAB R&B

## 2017-03-21 PROCEDURE — 6370000000 HC RX 637 (ALT 250 FOR IP): Performed by: PSYCHIATRY & NEUROLOGY

## 2017-03-21 PROCEDURE — 82948 REAGENT STRIP/BLOOD GLUCOSE: CPT

## 2017-03-21 PROCEDURE — 97166 OT EVAL MOD COMPLEX 45 MIN: CPT

## 2017-03-21 PROCEDURE — 97162 PT EVAL MOD COMPLEX 30 MIN: CPT

## 2017-03-21 PROCEDURE — 97535 SELF CARE MNGMENT TRAINING: CPT

## 2017-03-21 PROCEDURE — 97116 GAIT TRAINING THERAPY: CPT

## 2017-03-21 RX ORDER — DEXTROSE MONOHYDRATE 25 G/50ML
12.5 INJECTION, SOLUTION INTRAVENOUS PRN
Status: DISCONTINUED | OUTPATIENT
Start: 2017-03-21 | End: 2017-03-30 | Stop reason: HOSPADM

## 2017-03-21 RX ORDER — DEXTROSE MONOHYDRATE 50 MG/ML
100 INJECTION, SOLUTION INTRAVENOUS PRN
Status: DISCONTINUED | OUTPATIENT
Start: 2017-03-21 | End: 2017-03-30 | Stop reason: HOSPADM

## 2017-03-21 RX ORDER — NICOTINE POLACRILEX 4 MG
15 LOZENGE BUCCAL PRN
Status: DISCONTINUED | OUTPATIENT
Start: 2017-03-21 | End: 2017-03-30 | Stop reason: HOSPADM

## 2017-03-21 RX ADMIN — OXYCODONE HYDROCHLORIDE AND ACETAMINOPHEN 1 TABLET: 10; 325 TABLET ORAL at 03:47

## 2017-03-21 RX ADMIN — DOCUSATE SODIUM 100 MG: 100 CAPSULE, LIQUID FILLED ORAL at 08:24

## 2017-03-21 RX ADMIN — SUCRALFATE 1 G: 1 TABLET ORAL at 20:21

## 2017-03-21 RX ADMIN — ATORVASTATIN CALCIUM 20 MG: 20 TABLET, FILM COATED ORAL at 08:24

## 2017-03-21 RX ADMIN — DOCUSATE SODIUM 100 MG: 100 CAPSULE, LIQUID FILLED ORAL at 20:21

## 2017-03-21 RX ADMIN — Medication 250 MG: at 08:24

## 2017-03-21 RX ADMIN — SUCRALFATE 1 G: 1 TABLET ORAL at 08:24

## 2017-03-21 RX ADMIN — VITAMIN D, TAB 1000IU (100/BT) 3000 UNITS: 25 TAB at 08:26

## 2017-03-21 RX ADMIN — POLYETHYLENE GLYCOL 3350 17 G: 17 POWDER, FOR SOLUTION ORAL at 08:24

## 2017-03-21 RX ADMIN — METOPROLOL SUCCINATE 25 MG: 50 TABLET, EXTENDED RELEASE ORAL at 08:24

## 2017-03-21 RX ADMIN — Medication 250 MG: at 20:20

## 2017-03-21 RX ADMIN — Medication 200 MG: at 08:23

## 2017-03-21 RX ADMIN — SUCRALFATE 1 G: 1 TABLET ORAL at 15:16

## 2017-03-21 RX ADMIN — OXYCODONE HYDROCHLORIDE AND ACETAMINOPHEN 1 TABLET: 10; 325 TABLET ORAL at 10:38

## 2017-03-21 RX ADMIN — FAMOTIDINE 20 MG: 20 TABLET ORAL at 08:24

## 2017-03-21 RX ADMIN — OXYCODONE HYDROCHLORIDE AND ACETAMINOPHEN 1 TABLET: 10; 325 TABLET ORAL at 17:14

## 2017-03-21 RX ADMIN — ASPIRIN 81 MG: 81 TABLET, COATED ORAL at 08:24

## 2017-03-21 RX ADMIN — CETIRIZINE HYDROCHLORIDE 10 MG: 10 TABLET ORAL at 08:24

## 2017-03-21 RX ADMIN — METOPROLOL SUCCINATE 25 MG: 50 TABLET, EXTENDED RELEASE ORAL at 20:20

## 2017-03-21 ASSESSMENT — PAIN DESCRIPTION - PROGRESSION
CLINICAL_PROGRESSION: GRADUALLY IMPROVING
CLINICAL_PROGRESSION: GRADUALLY IMPROVING

## 2017-03-21 ASSESSMENT — PAIN DESCRIPTION - ONSET: ONSET: ON-GOING

## 2017-03-21 ASSESSMENT — PAIN SCALES - GENERAL
PAINLEVEL_OUTOF10: 0
PAINLEVEL_OUTOF10: 7
PAINLEVEL_OUTOF10: 3
PAINLEVEL_OUTOF10: 0
PAINLEVEL_OUTOF10: 6
PAINLEVEL_OUTOF10: 4
PAINLEVEL_OUTOF10: 7
PAINLEVEL_OUTOF10: 0

## 2017-03-21 ASSESSMENT — PAIN DESCRIPTION - ORIENTATION
ORIENTATION: RIGHT;LEFT;INNER
ORIENTATION: RIGHT
ORIENTATION: RIGHT

## 2017-03-21 ASSESSMENT — PAIN DESCRIPTION - LOCATION
LOCATION: BACK;LEG
LOCATION: GROIN;HIP
LOCATION: BACK;HIP
LOCATION: BACK

## 2017-03-21 ASSESSMENT — PAIN DESCRIPTION - DESCRIPTORS: DESCRIPTORS: BURNING;DULL

## 2017-03-21 ASSESSMENT — PAIN DESCRIPTION - PAIN TYPE
TYPE: SURGICAL PAIN
TYPE: SURGICAL PAIN;ACUTE PAIN
TYPE: ACUTE PAIN

## 2017-03-21 ASSESSMENT — PAIN DESCRIPTION - DIRECTION: RADIATING_TOWARDS: RLE

## 2017-03-21 ASSESSMENT — PAIN SCALES - WONG BAKER
WONGBAKER_NUMERICALRESPONSE: 4
WONGBAKER_NUMERICALRESPONSE: 4

## 2017-03-21 ASSESSMENT — PAIN DESCRIPTION - FREQUENCY: FREQUENCY: INTERMITTENT

## 2017-03-21 NOTE — THERAPY DISCHARGE NOTE
Acute Care - Physical Therapy Discharge Summary  [facility]       Patient Name: Alisia Velez  : 1945  MRN: [id number]    Today's Date: 3/21/2017  Onset of Illness/Injury or Date of Surgery Date: 17    Date of Referral to PT: 03/15/17  Referring Physician: MIGDALIA Horton MD      Admit Date: 3/13/2017      PT Recommendation and Plan    Visit Dx:    ICD-10-CM ICD-9-CM   1. Impaired functional mobility and activity tolerance Z74.09 V49.89   2. Decreased activities of daily living (ADL) Z78.9 V49.89             Outcome Measures       17 1300 17 1000 17 1000    How much help from another person do you currently need...    Turning from your back to your side while in flat bed without using bedrails?  3  -CW 3  -NW    Moving from lying on back to sitting on the side of a flat bed without bedrails?  3  -CW 3  -NW    Moving to and from a bed to a chair (including a wheelchair)?  3  -CW 3  -NW    Standing up from a chair using your arms (e.g., wheelchair, bedside chair)?  3  -CW 3  -NW    Climbing 3-5 steps with a railing?  3  -CW 3  -NW    To walk in hospital room?  3  -CW 3  -NW    AM-PAC 6 Clicks Score  18  -CW 18  -NW    How much help from another is currently needed...    Putting on and taking off regular lower body clothing? 2  -TS      Bathing (including washing, rinsing, and drying) 3  -TS      Toileting (which includes using toilet bed pan or urinal) 3  -TS      Putting on and taking off regular upper body clothing 3  -TS      Taking care of personal grooming (such as brushing teeth) 4  -TS      Eating meals 4  -TS      Score 19  -TS      Functional Assessment    Outcome Measure Options AM-PAC 6 Clicks Daily Activity (OT)  -TS AM-PAC 6 Clicks Basic Mobility (PT)  -CW AM-PAC 6 Clicks Basic Mobility (PT)  -NW      17 0900          How much help from another person do you currently need...    Turning from your back to your side while in flat bed without using bedrails? 3  -NW       Moving from lying on back to sitting on the side of a flat bed without bedrails? 3  -NW      Moving to and from a bed to a chair (including a wheelchair)? 3  -NW      Standing up from a chair using your arms (e.g., wheelchair, bedside chair)? 3  -NW      Climbing 3-5 steps with a railing? 3  -NW      To walk in hospital room? 3  -NW      AM-PAC 6 Clicks Score 18  -NW      Functional Assessment    Outcome Measure Options AM-PAC 6 Clicks Basic Mobility (PT)  -NW        User Key  (r) = Recorded By, (t) = Taken By, (c) = Cosigned By    Initials Name Provider Type     ANAND Roman/L Occupational Therapy Assistant    CW Graciela Castanon, ANTOLIN Physical Therapy Assistant    NW Majo Hector, ANTOLIN Physical Therapy Assistant                      IP PT Goals       03/21/17 0707 03/16/17 1120 03/13/17 1531    Bed Mobility PT LTG    Bed Mobility PT LTG, Date Established   03/13/17  -PB (r) KD (t) PB (c)    Bed Mobility PT LTG, Time to Achieve   by discharge  -PB (r) KD (t) PB (c)    Bed Mobility PT LTG, Activity Type   all bed mobility  -PB (r) KD (t) PB (c)    Bed Mobility PT LTG, Eugene Level   independent  -PB (r) KD (t) PB (c)    Bed Mobility PT LTG, Additional Goal   while maintaining spinal precautions  -PB (r) KD (t) PB (c)    Bed Mobility PT LTG, Date Goal Reviewed 03/21/17  -CW 03/16/17  -MS (r) EK (t) MS (c)     Bed Mobility PT LTG, Outcome goal not met  -CW goal ongoing  -MS (r) EK (t) MS (c)     Bed Mobility PT LTG, Reason Goal Not Met discharged from facility  -CW other (see comments)   second surgery  -MS (r) EK (t) MS (c)     Transfer Training PT LTG    Transfer Training PT LTG, Date Established   03/13/17  -PB (r) KD (t) PB (c)    Transfer Training PT LTG, Time to Achieve   by discharge  -PB (r) KD (t) PB (c)    Transfer Training PT LTG, Activity Type   bed to chair /chair to bed;sit to stand/stand to sit  -PB (r) KD (t) PB (c)    Transfer Training PT LTG, Eugene Level    conditional independence  -PB (r) KD (t) PB (c)    Transfer Training PT LTG, Assist Device   walker, rolling  -PB (r) KD (t) PB (c)    Transfer Training PT  LTG, Date Goal Reviewed 03/21/17  -CW 03/16/17  -MS (r) EK (t) MS (c)     Transfer Training PT LTG, Outcome goal not met  -CW goal ongoing  -MS (r) EK (t) MS (c)     Transfer Training PT LTG, Reason Goal Not Met discharged from facility  -CW other (see comments)   second surgery  -MS (r) EK (t) MS (c)     Gait Training PT LTG    Gait Training Goal PT LTG, Date Established   03/13/17  -PB (r) KD (t) PB (c)    Gait Training Goal PT LTG, Time to Achieve   by discharge  -PB (r) KD (t) PB (c)    Gait Training Goal PT LTG, [place] Level   conditional independence  -PB (r) KD (t) PB (c)    Gait Training Goal PT LTG, Assist Device   walker, rolling  -PB (r) KD (t) PB (c)    Gait Training Goal PT LTG, Distance to Achieve  50 feet while maintaining heel-toe strike pattern and L foot clearance  -MS (r) EK (t) MS (c) 150  -PB (r) KD (t) PB (c)    Gait Training Goal PT LTG, Date Goal Reviewed 03/21/17  -CW 03/16/17  -MS (r) EK (t) MS (c)     Gait Training Goal PT LTG, Outcome goal not met  -CW goal revised  -MS (r) EK (t) MS (c)     Gait Training Goal PT LTG, Reason Goal Not Met discharged from facility  -CW goals revised this date  -MS (r) EK (t) MS (c)     Stair Training PT LTG    Stair Training Goal PT LTG, Date Established   03/13/17  -PB (r) KD (t) PB (c)    Stair Training Goal PT LTG, Time to Achieve   by discharge  -PB (r) KD (t) PB (c)    Stair Training Goal PT LTG, Number of Steps   4  -PB (r) KD (t) PB (c)    Stair Training Goal PT LTG, [place] Level   conditional independence  -PB (r) KD (t) PB (c)    Stair Training Goal PT LTG, Assist Device   1 handrail   on R  -PB (r) KD (t) PB (c)    Stair Training Goal PT LTG, Date Goal Reviewed  03/16/17  -MS (r) EK (t) MS (c)     Stair Training Goal PT LTG, Outcome goal not met  -CW goal ongoing  -MS (r) EK (t) MS  (c)     Stair Training Goal PT LTG, Reason Goal Not Met discharged from facility  -CW other (see comments)   second surgery  -MS (r) EK (t) MS (c)     Patient Education PT LTG    Patient Education PT LTG, Date Established   03/13/17  -PB (r) KD (t) PB (c)    Patient Education PT LTG, Time to Achieve   by discharge  -PB (r) KD (t) PB (c)    Patient Education PT LTG, Education Type   precaution per surgeon  -PB (r) KD (t) PB (c)    Patient Education PT LTG, Education Understanding   demonstrate adequately;verbalize understanding  -PB (r) KD (t) PB (c)    Patient Education PT LTG, Date Goal Reviewed 03/21/17  -CW 03/16/17  -MS (r) EK (t) MS (c)     Patient Education PT LTG Outcome goal not met  -CW goal ongoing  -MS (r) EK (t) MS (c)     Patient Education PT LTG, Reason Goal Not Met discharged from facility  -CW other (see comments)   second surgery  -MS (r) EK (t) MS (c)       User Key  (r) = Recorded By, (t) = Taken By, (c) = Cosigned By    Initials Name Provider Type    MS Helena NYASIA Mora, PT DPT Physical Therapist    CW Graciela Castanon, PTA Physical Therapy Assistant    PB Dilshad Hicks, PT DPT Physical Therapist    KD Yarelis Hampton, PT Student PT Student    EK Kathi Johnson, PT Student PT Student          Therapy Charges for Today     Code Description Service Date Service Provider Modifiers Qty    25583615217 HC GAIT TRAINING EA 15 MIN 3/20/2017 Graciela Castanon PTA GP, KX 1    15427045574 HC PT THER PROC EA 15 MIN 3/20/2017 Graciela Castanon PTA GP, KX 1    86006826659 HC PT THER PROC EA 15 MIN 3/20/2017 Graciela Castanon PTA GP, KX 1    41950764533 HC GAIT TRAINING EA 15 MIN 3/20/2017 Graciela Castanon PTA GP, KX 1          PT Discharge Summary  Reason for Discharge: Discharge from facility  Outcomes Achieved: Refer to plan of care for updates on goals achieved  Discharge Destination: Inpatient rehabilitation facility      Graciela Castanon PTA   3/21/2017

## 2017-03-21 NOTE — PLAN OF CARE
Problem: Inpatient Physical Therapy  Goal: Bed Mobility Goal LTG- PT  Outcome: Unable to achieve outcome(s) by discharge Date Met:  03/21/17 03/21/17 0707   Bed Mobility PT LTG   Bed Mobility PT LTG, Date Goal Reviewed 03/21/17   Bed Mobility PT LTG, Outcome goal not met   Bed Mobility PT LTG, Reason Goal Not Met discharged from facility       Goal: Transfer Training Goal 1 LTG- PT  Outcome: Unable to achieve outcome(s) by discharge Date Met:  03/21/17 03/21/17 0707   Transfer Training PT LTG   Transfer Training PT LTG, Date Goal Reviewed 03/21/17   Transfer Training PT LTG, Outcome goal not met   Transfer Training PT LTG, Reason Goal Not Met discharged from facility       Goal: Gait Training Goal LTG- PT  Outcome: Unable to achieve outcome(s) by discharge Date Met:  03/21/17 03/21/17 0707   Gait Training PT LTG   Gait Training Goal PT LTG, Date Goal Reviewed 03/21/17   Gait Training Goal PT LTG, Outcome goal not met   Gait Training Goal PT LTG, Reason Goal Not Met discharged from facility       Goal: Stair Training Goal LTG- PT  Outcome: Unable to achieve outcome(s) by discharge Date Met:  03/21/17 03/21/17 0707   Stair Training PT LTG   Stair Training Goal PT LTG, Outcome goal not met   Stair Training Goal PT LTG, Reason Goal Not Met discharged from facility       Goal: Patient Education Goal LTG- PT  Outcome: Ongoing (interventions implemented as appropriate)    03/21/17 0707   Patient Education PT LTG   Patient Education PT LTG, Date Goal Reviewed 03/21/17   Patient Education PT LTG Outcome goal not met   Patient Education PT LTG, Reason Goal Not Met discharged from facility

## 2017-03-21 NOTE — THERAPY DISCHARGE NOTE
Acute Care - Occupational Therapy Discharge Summary  [facility]     Patient Name: Alisia Velez  : 1945  MRN: [id number]    Today's Date: 3/21/2017  Onset of Illness/Injury or Date of Surgery Date: 17    Date of Referral to OT: 17  Referring Physician: MIGDALIA Horton MD      Admit Date: 3/13/2017        OT Recommendation and Plan    Visit Dx:    ICD-10-CM ICD-9-CM   1. Impaired functional mobility and activity tolerance Z74.09 V49.89   2. Decreased activities of daily living (ADL) Z78.9 V49.89                     OT Goals       17 0711 17 0947 17 1551    Transfer Training OT LTG    Transfer Training OT LTG, Date Established   17  -ND    Transfer Training OT LTG, Time to Achieve   by discharge  -ND    Transfer Training OT LTG, Activity Type   all transfers  -ND    Transfer Training OT LTG, [place] Level   conditional independence  -ND    Transfer Training OT LTG, Assist Device   walker, rolling;commode, bedside  -ND    Transfer Training OT LTG, Date Goal Reviewed 17  -TS 17  -ND     Transfer Training OT LTG, Outcome goal not met  -TS goal ongoing  -ND     Transfer Training OT LTG, Reason Goal Not Met discharged from facility  -TS      Toileting OT LTG    Toileting Goal OT LTG, Date Established   17  -ND    Toileting Goal OT LTG, Time to Achieve   by discharge  -ND    Toileting Goal OT LTG, [place] Level   supervision required  -ND    Toileting Goal OT LTG, Additional Goal   Practice wiping after bowel movement.   -ND    Toileting Goal OT LTG, Date Goal Reviewed 17  -TS 17  -ND     Toileting Goal OT LTG, Outcome goal not met  -TS goal ongoing  -ND     Toileting Goal OT LTG, Reason Goal Not Met discharged from facility  -TS      LB Dressing OT LTG    LB Dressing Goal OT LTG, Date Established   17  -ND    LB Dressing Goal OT LTG, Time to Achieve   by discharge  -ND    LB Dressing Goal OT LTG, [place] Level   conditional  independence  -ND    LB Dressing Goal OT LTG, Additional Goal   AE PRN  -ND    LB Dressing Goal OT LTG, Date Goal Reviewed 03/21/17  -TS 03/16/17  -ND     LB Dressing Goal OT LTG, Outcome goal not met  -TS goal ongoing  -ND     LB Dressing Goal OT LTG, Reason Goal Not Met discharged from facility  -TS        User Key  (r) = Recorded By, (t) = Taken By, (c) = Cosigned By    Initials Name Provider Type    TS Jocelyn De La Cruz, MICHEL/L Occupational Therapy Assistant    MATT Leyva, OTR/L Occupational Therapist                Outcome Measures       03/20/17 1300 03/20/17 1000 03/19/17 1000    How much help from another person do you currently need...    Turning from your back to your side while in flat bed without using bedrails?  3  -CW 3  -NW    Moving from lying on back to sitting on the side of a flat bed without bedrails?  3  -CW 3  -NW    Moving to and from a bed to a chair (including a wheelchair)?  3  -CW 3  -NW    Standing up from a chair using your arms (e.g., wheelchair, bedside chair)?  3  -CW 3  -NW    Climbing 3-5 steps with a railing?  3  -CW 3  -NW    To walk in hospital room?  3  -CW 3  -NW    AM-PAC 6 Clicks Score  18  -CW 18  -NW    How much help from another is currently needed...    Putting on and taking off regular lower body clothing? 2  -TS      Bathing (including washing, rinsing, and drying) 3  -TS      Toileting (which includes using toilet bed pan or urinal) 3  -TS      Putting on and taking off regular upper body clothing 3  -TS      Taking care of personal grooming (such as brushing teeth) 4  -TS      Eating meals 4  -TS      Score 19  -TS      Functional Assessment    Outcome Measure Options AM-PAC 6 Clicks Daily Activity (OT)  -TS AM-PAC 6 Clicks Basic Mobility (PT)  -CW AM-PAC 6 Clicks Basic Mobility (PT)  -NW      03/18/17 0900          How much help from another person do you currently need...    Turning from your back to your side while in flat bed without using bedrails? 3   -NW      Moving from lying on back to sitting on the side of a flat bed without bedrails? 3  -NW      Moving to and from a bed to a chair (including a wheelchair)? 3  -NW      Standing up from a chair using your arms (e.g., wheelchair, bedside chair)? 3  -NW      Climbing 3-5 steps with a railing? 3  -NW      To walk in hospital room? 3  -NW      AM-PAC 6 Clicks Score 18  -NW      Functional Assessment    Outcome Measure Options AM-PAC 6 Clicks Basic Mobility (PT)  -NW        User Key  (r) = Recorded By, (t) = Taken By, (c) = Cosigned By    Initials Name Provider Type    TS ANAND Roman/L Occupational Therapy Assistant    GALEN Castanon PTA Physical Therapy Assistant    NW Majo Hector PTA Physical Therapy Assistant          Therapy Charges for Today     Code Description Service Date Service Provider Modifiers Qty    83086678448 HC OT SELF CARE/MGMT/TRAIN EA 15 MIN 3/20/2017 ALISON Roman KX 3          OT Discharge Summary  Reason for Discharge: Discharge from facility  Outcomes Achieved: Refer to plan of care for updates on goals achieved  Discharge Destination: Inpatient rehabilitation facility      ALISON Pedro  3/21/2017

## 2017-03-21 NOTE — PLAN OF CARE
Problem: Inpatient Occupational Therapy  Goal: Transfer Training Goal 1 LTG- OT  Outcome: Unable to achieve outcome(s) by discharge Date Met:  03/21/17 03/13/17 1551 03/21/17 0711   Transfer Training OT LTG   Transfer Training OT LTG, Date Established 03/13/17 --    Transfer Training OT LTG, Time to Achieve by discharge --    Transfer Training OT LTG, Activity Type all transfers --    Transfer Training OT LTG, [place] Level conditional independence --    Transfer Training OT LTG, Assist Device walker, rolling;commode, bedside --    Transfer Training OT LTG, Date Goal Reviewed --  03/21/17   Transfer Training OT LTG, Outcome --  goal not met   Transfer Training OT LTG, Reason Goal Not Met --  discharged from facility       Goal: Toileting Goal LTG- OT  Outcome: Unable to achieve outcome(s) by discharge Date Met:  03/21/17 03/13/17 1551 03/21/17 0711   Toileting OT LTG   Toileting Goal OT LTG, Date Established 03/13/17 --    Toileting Goal OT LTG, Time to Achieve by discharge --    Toileting Goal OT LTG, [place] Level supervision required --    Toileting Goal OT LTG, Additional Goal Practice wiping after bowel movement.  --    Toileting Goal OT LTG, Date Goal Reviewed --  03/21/17   Toileting Goal OT LTG, Outcome --  goal not met   Toileting Goal OT LTG, Reason Goal Not Met --  discharged from facility       Goal: LB Dressing Goal LTG- OT  Outcome: Unable to achieve outcome(s) by discharge Date Met:  03/21/17 03/13/17 1551 03/21/17 0711   LB Dressing OT LTG   LB Dressing Goal OT LTG, Date Established 03/13/17 --    LB Dressing Goal OT LTG, Time to Achieve by discharge --    LB Dressing Goal OT LTG, [place] Level conditional independence --    LB Dressing Goal OT LTG, Additional Goal AE PRN --    LB Dressing Goal OT LTG, Date Goal Reviewed --  03/21/17   LB Dressing Goal OT LTG, Outcome --  goal not met   LB Dressing Goal OT LTG, Reason Goal Not Met --  discharged from facility

## 2017-03-22 LAB
GLUCOSE BLD-MCNC: 143 MG/DL (ref 70–99)
GLUCOSE BLD-MCNC: 182 MG/DL (ref 70–99)
GLUCOSE BLD-MCNC: 230 MG/DL (ref 70–99)
GLUCOSE BLD-MCNC: 76 MG/DL (ref 70–99)
PERFORMED ON: ABNORMAL
PERFORMED ON: NORMAL

## 2017-03-22 PROCEDURE — 97530 THERAPEUTIC ACTIVITIES: CPT

## 2017-03-22 PROCEDURE — 6370000000 HC RX 637 (ALT 250 FOR IP): Performed by: PSYCHIATRY & NEUROLOGY

## 2017-03-22 PROCEDURE — 97110 THERAPEUTIC EXERCISES: CPT

## 2017-03-22 PROCEDURE — 99233 SBSQ HOSP IP/OBS HIGH 50: CPT | Performed by: PSYCHIATRY & NEUROLOGY

## 2017-03-22 PROCEDURE — 1180000000 HC REHAB R&B

## 2017-03-22 PROCEDURE — 82948 REAGENT STRIP/BLOOD GLUCOSE: CPT

## 2017-03-22 PROCEDURE — 97535 SELF CARE MNGMENT TRAINING: CPT

## 2017-03-22 PROCEDURE — 97116 GAIT TRAINING THERAPY: CPT

## 2017-03-22 RX ORDER — CYCLOBENZAPRINE HCL 10 MG
10 TABLET ORAL 3 TIMES DAILY PRN
Status: DISCONTINUED | OUTPATIENT
Start: 2017-03-22 | End: 2017-03-29

## 2017-03-22 RX ORDER — DEXTROSE AND SODIUM CHLORIDE 5; .45 G/100ML; G/100ML
INJECTION, SOLUTION INTRAVENOUS CONTINUOUS
Status: DISCONTINUED | OUTPATIENT
Start: 2017-03-22 | End: 2017-03-30 | Stop reason: HOSPADM

## 2017-03-22 RX ADMIN — ASPIRIN 81 MG: 81 TABLET, COATED ORAL at 07:56

## 2017-03-22 RX ADMIN — VITAMIN D, TAB 1000IU (100/BT) 3000 UNITS: 25 TAB at 07:55

## 2017-03-22 RX ADMIN — CETIRIZINE HYDROCHLORIDE 10 MG: 10 TABLET ORAL at 07:56

## 2017-03-22 RX ADMIN — SUCRALFATE 1 G: 1 TABLET ORAL at 07:56

## 2017-03-22 RX ADMIN — DOCUSATE SODIUM 100 MG: 100 CAPSULE, LIQUID FILLED ORAL at 07:56

## 2017-03-22 RX ADMIN — SUCRALFATE 1 G: 1 TABLET ORAL at 14:36

## 2017-03-22 RX ADMIN — METOPROLOL SUCCINATE 25 MG: 50 TABLET, EXTENDED RELEASE ORAL at 20:47

## 2017-03-22 RX ADMIN — DOCUSATE SODIUM 100 MG: 100 CAPSULE, LIQUID FILLED ORAL at 20:47

## 2017-03-22 RX ADMIN — SUCRALFATE 1 G: 1 TABLET ORAL at 20:48

## 2017-03-22 RX ADMIN — Medication 250 MG: at 07:55

## 2017-03-22 RX ADMIN — FAMOTIDINE 20 MG: 20 TABLET ORAL at 07:56

## 2017-03-22 RX ADMIN — OXYCODONE HYDROCHLORIDE AND ACETAMINOPHEN 1 TABLET: 10; 325 TABLET ORAL at 10:58

## 2017-03-22 RX ADMIN — POLYETHYLENE GLYCOL 3350 17 G: 17 POWDER, FOR SOLUTION ORAL at 07:58

## 2017-03-22 RX ADMIN — ATORVASTATIN CALCIUM 20 MG: 20 TABLET, FILM COATED ORAL at 07:56

## 2017-03-22 RX ADMIN — Medication 200 MG: at 07:56

## 2017-03-22 RX ADMIN — Medication 250 MG: at 20:47

## 2017-03-22 RX ADMIN — METOPROLOL SUCCINATE 25 MG: 50 TABLET, EXTENDED RELEASE ORAL at 07:56

## 2017-03-22 ASSESSMENT — PAIN SCALES - GENERAL
PAINLEVEL_OUTOF10: 4
PAINLEVEL_OUTOF10: 3
PAINLEVEL_OUTOF10: 4
PAINLEVEL_OUTOF10: 3
PAINLEVEL_OUTOF10: 6

## 2017-03-22 ASSESSMENT — PAIN SCALES - WONG BAKER: WONGBAKER_NUMERICALRESPONSE: 4

## 2017-03-22 ASSESSMENT — PAIN DESCRIPTION - ORIENTATION
ORIENTATION: LEFT
ORIENTATION: RIGHT;LEFT;INNER
ORIENTATION: LEFT

## 2017-03-22 ASSESSMENT — PAIN DESCRIPTION - LOCATION
LOCATION: BUTTOCKS
LOCATION: HIP;GROIN
LOCATION: BUTTOCKS

## 2017-03-22 ASSESSMENT — PAIN DESCRIPTION - PAIN TYPE
TYPE: ACUTE PAIN

## 2017-03-22 ASSESSMENT — PAIN DESCRIPTION - PROGRESSION: CLINICAL_PROGRESSION: GRADUALLY IMPROVING

## 2017-03-22 ASSESSMENT — PAIN DESCRIPTION - DESCRIPTORS
DESCRIPTORS: PATIENT UNABLE TO DESCRIBE
DESCRIPTORS: ACHING

## 2017-03-22 ASSESSMENT — PAIN DESCRIPTION - FREQUENCY: FREQUENCY: INTERMITTENT

## 2017-03-23 LAB
ANION GAP SERPL CALCULATED.3IONS-SCNC: 10 MMOL/L (ref 7–19)
BASOPHILS ABSOLUTE: 0 K/UL (ref 0–0.2)
BASOPHILS RELATIVE PERCENT: 0.1 % (ref 0–1)
BUN BLDV-MCNC: 15 MG/DL (ref 8–23)
CALCIUM SERPL-MCNC: 9.2 MG/DL (ref 8.8–10.2)
CHLORIDE BLD-SCNC: 101 MMOL/L (ref 98–111)
CO2: 28 MMOL/L (ref 22–29)
CREAT SERPL-MCNC: 0.5 MG/DL (ref 0.5–0.9)
EOSINOPHILS ABSOLUTE: 0.1 K/UL (ref 0–0.6)
EOSINOPHILS RELATIVE PERCENT: 1 % (ref 0–5)
GFR NON-AFRICAN AMERICAN: >60
GLUCOSE BLD-MCNC: 143 MG/DL (ref 70–99)
GLUCOSE BLD-MCNC: 145 MG/DL (ref 70–99)
GLUCOSE BLD-MCNC: 149 MG/DL (ref 70–99)
GLUCOSE BLD-MCNC: 158 MG/DL (ref 70–99)
GLUCOSE BLD-MCNC: 179 MG/DL (ref 70–99)
GLUCOSE BLD-MCNC: 207 MG/DL (ref 70–99)
GLUCOSE BLD-MCNC: 211 MG/DL (ref 74–109)
HCT VFR BLD CALC: 32.4 % (ref 37–47)
HEMOGLOBIN: 10.2 G/DL (ref 12–16)
LYMPHOCYTES ABSOLUTE: 2.4 K/UL (ref 1.1–4.5)
LYMPHOCYTES RELATIVE PERCENT: 24.9 % (ref 20–40)
MCH RBC QN AUTO: 30.4 PG (ref 27–31)
MCHC RBC AUTO-ENTMCNC: 31.5 G/DL (ref 33–37)
MCV RBC AUTO: 96.4 FL (ref 81–99)
MONOCYTES ABSOLUTE: 0.8 K/UL (ref 0–0.9)
MONOCYTES RELATIVE PERCENT: 8.5 % (ref 0–10)
NEUTROPHILS ABSOLUTE: 6.3 K/UL (ref 1.5–7.5)
NEUTROPHILS RELATIVE PERCENT: 65.1 % (ref 50–65)
PDW BLD-RTO: 13.6 % (ref 11.5–14.5)
PERFORMED ON: ABNORMAL
PLATELET # BLD: 413 K/UL (ref 130–400)
PMV BLD AUTO: 9.4 FL (ref 7.4–10.4)
POTASSIUM SERPL-SCNC: 4 MMOL/L (ref 3.5–5)
RBC # BLD: 3.36 M/UL (ref 4.2–5.4)
SODIUM BLD-SCNC: 139 MMOL/L (ref 136–145)
URINE CULTURE, ROUTINE: NORMAL
WBC # BLD: 9.7 K/UL (ref 4.8–10.8)

## 2017-03-23 PROCEDURE — 85025 COMPLETE CBC W/AUTO DIFF WBC: CPT

## 2017-03-23 PROCEDURE — 1180000000 HC REHAB R&B

## 2017-03-23 PROCEDURE — 97110 THERAPEUTIC EXERCISES: CPT

## 2017-03-23 PROCEDURE — 82948 REAGENT STRIP/BLOOD GLUCOSE: CPT

## 2017-03-23 PROCEDURE — 80048 BASIC METABOLIC PNL TOTAL CA: CPT

## 2017-03-23 PROCEDURE — 6370000000 HC RX 637 (ALT 250 FOR IP): Performed by: PSYCHIATRY & NEUROLOGY

## 2017-03-23 PROCEDURE — 97116 GAIT TRAINING THERAPY: CPT

## 2017-03-23 PROCEDURE — 97530 THERAPEUTIC ACTIVITIES: CPT

## 2017-03-23 PROCEDURE — 36415 COLL VENOUS BLD VENIPUNCTURE: CPT

## 2017-03-23 RX ADMIN — VITAMIN D, TAB 1000IU (100/BT) 3000 UNITS: 25 TAB at 07:50

## 2017-03-23 RX ADMIN — CETIRIZINE HYDROCHLORIDE 10 MG: 10 TABLET ORAL at 07:51

## 2017-03-23 RX ADMIN — FAMOTIDINE 20 MG: 20 TABLET ORAL at 07:51

## 2017-03-23 RX ADMIN — METOPROLOL SUCCINATE 25 MG: 50 TABLET, EXTENDED RELEASE ORAL at 07:51

## 2017-03-23 RX ADMIN — SUCRALFATE 1 G: 1 TABLET ORAL at 14:03

## 2017-03-23 RX ADMIN — ATORVASTATIN CALCIUM 20 MG: 20 TABLET, FILM COATED ORAL at 07:51

## 2017-03-23 RX ADMIN — SUCRALFATE 1 G: 1 TABLET ORAL at 07:51

## 2017-03-23 RX ADMIN — OXYCODONE HYDROCHLORIDE AND ACETAMINOPHEN 1 TABLET: 10; 325 TABLET ORAL at 11:20

## 2017-03-23 RX ADMIN — Medication 250 MG: at 07:51

## 2017-03-23 RX ADMIN — Medication 200 MG: at 07:51

## 2017-03-23 RX ADMIN — DOCUSATE SODIUM 100 MG: 100 CAPSULE, LIQUID FILLED ORAL at 07:50

## 2017-03-23 RX ADMIN — ASPIRIN 81 MG: 81 TABLET, COATED ORAL at 07:51

## 2017-03-23 RX ADMIN — POLYETHYLENE GLYCOL 3350 17 G: 17 POWDER, FOR SOLUTION ORAL at 07:49

## 2017-03-23 RX ADMIN — DOCUSATE SODIUM 100 MG: 100 CAPSULE, LIQUID FILLED ORAL at 21:58

## 2017-03-23 RX ADMIN — Medication 250 MG: at 21:58

## 2017-03-23 RX ADMIN — SUCRALFATE 1 G: 1 TABLET ORAL at 21:58

## 2017-03-23 ASSESSMENT — PAIN SCALES - WONG BAKER: WONGBAKER_NUMERICALRESPONSE: 0

## 2017-03-23 ASSESSMENT — PAIN DESCRIPTION - PAIN TYPE: TYPE: ACUTE PAIN

## 2017-03-23 ASSESSMENT — PAIN SCALES - GENERAL
PAINLEVEL_OUTOF10: 0
PAINLEVEL_OUTOF10: 2
PAINLEVEL_OUTOF10: 6

## 2017-03-23 ASSESSMENT — PAIN DESCRIPTION - ORIENTATION: ORIENTATION: LEFT

## 2017-03-23 ASSESSMENT — PAIN DESCRIPTION - LOCATION: LOCATION: BUTTOCKS

## 2017-03-24 LAB
GLUCOSE BLD-MCNC: 128 MG/DL (ref 70–99)
GLUCOSE BLD-MCNC: 171 MG/DL (ref 70–99)
GLUCOSE BLD-MCNC: 190 MG/DL (ref 70–99)
GLUCOSE BLD-MCNC: 196 MG/DL (ref 70–99)
PERFORMED ON: ABNORMAL

## 2017-03-24 PROCEDURE — 97110 THERAPEUTIC EXERCISES: CPT

## 2017-03-24 PROCEDURE — 99232 SBSQ HOSP IP/OBS MODERATE 35: CPT | Performed by: PSYCHIATRY & NEUROLOGY

## 2017-03-24 PROCEDURE — 97530 THERAPEUTIC ACTIVITIES: CPT

## 2017-03-24 PROCEDURE — 97116 GAIT TRAINING THERAPY: CPT

## 2017-03-24 PROCEDURE — 97535 SELF CARE MNGMENT TRAINING: CPT

## 2017-03-24 PROCEDURE — 1180000000 HC REHAB R&B

## 2017-03-24 PROCEDURE — 82948 REAGENT STRIP/BLOOD GLUCOSE: CPT

## 2017-03-24 PROCEDURE — 6370000000 HC RX 637 (ALT 250 FOR IP): Performed by: PSYCHIATRY & NEUROLOGY

## 2017-03-24 RX ADMIN — Medication 200 MG: at 07:33

## 2017-03-24 RX ADMIN — SUCRALFATE 1 G: 1 TABLET ORAL at 14:29

## 2017-03-24 RX ADMIN — METOPROLOL SUCCINATE 25 MG: 50 TABLET, EXTENDED RELEASE ORAL at 07:33

## 2017-03-24 RX ADMIN — Medication 250 MG: at 07:34

## 2017-03-24 RX ADMIN — SUCRALFATE 1 G: 1 TABLET ORAL at 07:34

## 2017-03-24 RX ADMIN — ASPIRIN 81 MG: 81 TABLET, COATED ORAL at 07:34

## 2017-03-24 RX ADMIN — SUCRALFATE 1 G: 1 TABLET ORAL at 21:21

## 2017-03-24 RX ADMIN — CETIRIZINE HYDROCHLORIDE 10 MG: 10 TABLET ORAL at 07:33

## 2017-03-24 RX ADMIN — Medication 250 MG: at 21:22

## 2017-03-24 RX ADMIN — OXYCODONE HYDROCHLORIDE AND ACETAMINOPHEN 1 TABLET: 10; 325 TABLET ORAL at 15:18

## 2017-03-24 RX ADMIN — METOPROLOL SUCCINATE 25 MG: 50 TABLET, EXTENDED RELEASE ORAL at 21:22

## 2017-03-24 RX ADMIN — VITAMIN D, TAB 1000IU (100/BT) 3000 UNITS: 25 TAB at 07:33

## 2017-03-24 RX ADMIN — FAMOTIDINE 20 MG: 20 TABLET ORAL at 07:34

## 2017-03-24 RX ADMIN — ATORVASTATIN CALCIUM 20 MG: 20 TABLET, FILM COATED ORAL at 07:34

## 2017-03-24 ASSESSMENT — PAIN SCALES - GENERAL
PAINLEVEL_OUTOF10: 0
PAINLEVEL_OUTOF10: 7

## 2017-03-25 LAB
GLUCOSE BLD-MCNC: 109 MG/DL (ref 70–99)
GLUCOSE BLD-MCNC: 160 MG/DL (ref 70–99)
GLUCOSE BLD-MCNC: 177 MG/DL (ref 70–99)
GLUCOSE BLD-MCNC: 192 MG/DL (ref 70–99)
PERFORMED ON: ABNORMAL

## 2017-03-25 PROCEDURE — 99232 SBSQ HOSP IP/OBS MODERATE 35: CPT | Performed by: PSYCHIATRY & NEUROLOGY

## 2017-03-25 PROCEDURE — 1180000000 HC REHAB R&B

## 2017-03-25 PROCEDURE — 97110 THERAPEUTIC EXERCISES: CPT

## 2017-03-25 PROCEDURE — 82948 REAGENT STRIP/BLOOD GLUCOSE: CPT

## 2017-03-25 PROCEDURE — 97530 THERAPEUTIC ACTIVITIES: CPT

## 2017-03-25 PROCEDURE — 97116 GAIT TRAINING THERAPY: CPT

## 2017-03-25 PROCEDURE — 6370000000 HC RX 637 (ALT 250 FOR IP): Performed by: PSYCHIATRY & NEUROLOGY

## 2017-03-25 RX ORDER — DOCUSATE SODIUM 100 MG/1
100 CAPSULE, LIQUID FILLED ORAL 2 TIMES DAILY
Status: DISCONTINUED | OUTPATIENT
Start: 2017-03-25 | End: 2017-03-30 | Stop reason: HOSPADM

## 2017-03-25 RX ADMIN — ASPIRIN 81 MG: 81 TABLET, COATED ORAL at 07:52

## 2017-03-25 RX ADMIN — CETIRIZINE HYDROCHLORIDE 10 MG: 10 TABLET ORAL at 07:51

## 2017-03-25 RX ADMIN — Medication 250 MG: at 20:32

## 2017-03-25 RX ADMIN — VITAMIN D, TAB 1000IU (100/BT) 3000 UNITS: 25 TAB at 07:52

## 2017-03-25 RX ADMIN — SUCRALFATE 1 G: 1 TABLET ORAL at 07:52

## 2017-03-25 RX ADMIN — DOCUSATE SODIUM 100 MG: 100 CAPSULE, LIQUID FILLED ORAL at 20:32

## 2017-03-25 RX ADMIN — FAMOTIDINE 20 MG: 20 TABLET ORAL at 07:51

## 2017-03-25 RX ADMIN — OXYCODONE HYDROCHLORIDE AND ACETAMINOPHEN 1 TABLET: 10; 325 TABLET ORAL at 09:38

## 2017-03-25 RX ADMIN — CYCLOBENZAPRINE HYDROCHLORIDE 10 MG: 10 TABLET, FILM COATED ORAL at 14:25

## 2017-03-25 RX ADMIN — SUCRALFATE 1 G: 1 TABLET ORAL at 14:25

## 2017-03-25 RX ADMIN — Medication 250 MG: at 07:51

## 2017-03-25 RX ADMIN — ATORVASTATIN CALCIUM 20 MG: 20 TABLET, FILM COATED ORAL at 07:52

## 2017-03-25 RX ADMIN — Medication 200 MG: at 07:51

## 2017-03-25 RX ADMIN — OXYCODONE HYDROCHLORIDE AND ACETAMINOPHEN 1 TABLET: 10; 325 TABLET ORAL at 01:35

## 2017-03-25 RX ADMIN — OXYCODONE HYDROCHLORIDE AND ACETAMINOPHEN 1 TABLET: 10; 325 TABLET ORAL at 20:35

## 2017-03-25 RX ADMIN — DOCUSATE SODIUM 100 MG: 100 CAPSULE, LIQUID FILLED ORAL at 12:07

## 2017-03-25 RX ADMIN — SUCRALFATE 1 G: 1 TABLET ORAL at 20:32

## 2017-03-25 ASSESSMENT — PAIN SCALES - GENERAL
PAINLEVEL_OUTOF10: 7
PAINLEVEL_OUTOF10: 3
PAINLEVEL_OUTOF10: 9
PAINLEVEL_OUTOF10: 6
PAINLEVEL_OUTOF10: 5
PAINLEVEL_OUTOF10: 0

## 2017-03-26 LAB
GLUCOSE BLD-MCNC: 133 MG/DL (ref 70–99)
GLUCOSE BLD-MCNC: 147 MG/DL (ref 70–99)
GLUCOSE BLD-MCNC: 150 MG/DL (ref 70–99)
GLUCOSE BLD-MCNC: 212 MG/DL (ref 70–99)
PERFORMED ON: ABNORMAL

## 2017-03-26 PROCEDURE — 1180000000 HC REHAB R&B

## 2017-03-26 PROCEDURE — 6370000000 HC RX 637 (ALT 250 FOR IP): Performed by: PSYCHIATRY & NEUROLOGY

## 2017-03-26 PROCEDURE — 82948 REAGENT STRIP/BLOOD GLUCOSE: CPT

## 2017-03-26 RX ADMIN — Medication 250 MG: at 21:25

## 2017-03-26 RX ADMIN — DOCUSATE SODIUM 100 MG: 100 CAPSULE, LIQUID FILLED ORAL at 21:25

## 2017-03-26 RX ADMIN — SUCRALFATE 1 G: 1 TABLET ORAL at 21:24

## 2017-03-26 RX ADMIN — METOPROLOL SUCCINATE 25 MG: 50 TABLET, EXTENDED RELEASE ORAL at 21:25

## 2017-03-26 RX ADMIN — SUCRALFATE 1 G: 1 TABLET ORAL at 07:57

## 2017-03-26 RX ADMIN — VITAMIN D, TAB 1000IU (100/BT) 3000 UNITS: 25 TAB at 07:57

## 2017-03-26 RX ADMIN — CYCLOBENZAPRINE HYDROCHLORIDE 10 MG: 10 TABLET, FILM COATED ORAL at 23:30

## 2017-03-26 RX ADMIN — FAMOTIDINE 20 MG: 20 TABLET ORAL at 07:57

## 2017-03-26 RX ADMIN — ASPIRIN 81 MG: 81 TABLET, COATED ORAL at 07:55

## 2017-03-26 RX ADMIN — CETIRIZINE HYDROCHLORIDE 10 MG: 10 TABLET ORAL at 07:54

## 2017-03-26 RX ADMIN — Medication 250 MG: at 07:54

## 2017-03-26 RX ADMIN — Medication 200 MG: at 07:56

## 2017-03-26 RX ADMIN — SUCRALFATE 1 G: 1 TABLET ORAL at 14:01

## 2017-03-26 RX ADMIN — ATORVASTATIN CALCIUM 20 MG: 20 TABLET, FILM COATED ORAL at 07:55

## 2017-03-26 RX ADMIN — DOCUSATE SODIUM 100 MG: 100 CAPSULE, LIQUID FILLED ORAL at 07:54

## 2017-03-26 RX ADMIN — METOPROLOL SUCCINATE 25 MG: 50 TABLET, EXTENDED RELEASE ORAL at 07:55

## 2017-03-27 LAB
ANION GAP SERPL CALCULATED.3IONS-SCNC: 12 MMOL/L (ref 7–19)
BASOPHILS ABSOLUTE: 0 K/UL (ref 0–0.2)
BASOPHILS RELATIVE PERCENT: 0.4 % (ref 0–1)
BUN BLDV-MCNC: 13 MG/DL (ref 8–23)
CALCIUM SERPL-MCNC: 9 MG/DL (ref 8.8–10.2)
CHLORIDE BLD-SCNC: 101 MMOL/L (ref 98–111)
CO2: 26 MMOL/L (ref 22–29)
CREAT SERPL-MCNC: 0.6 MG/DL (ref 0.5–0.9)
EOSINOPHILS ABSOLUTE: 0.1 K/UL (ref 0–0.6)
EOSINOPHILS RELATIVE PERCENT: 1.7 % (ref 0–5)
GFR NON-AFRICAN AMERICAN: >60
GLUCOSE BLD-MCNC: 173 MG/DL (ref 70–99)
GLUCOSE BLD-MCNC: 176 MG/DL (ref 70–99)
GLUCOSE BLD-MCNC: 177 MG/DL (ref 70–99)
GLUCOSE BLD-MCNC: 192 MG/DL (ref 74–109)
HCT VFR BLD CALC: 32.3 % (ref 37–47)
HEMOGLOBIN: 10 G/DL (ref 12–16)
LYMPHOCYTES ABSOLUTE: 2.7 K/UL (ref 1.1–4.5)
LYMPHOCYTES RELATIVE PERCENT: 33.3 % (ref 20–40)
MCH RBC QN AUTO: 30.1 PG (ref 27–31)
MCHC RBC AUTO-ENTMCNC: 31 G/DL (ref 33–37)
MCV RBC AUTO: 97.3 FL (ref 81–99)
MONOCYTES ABSOLUTE: 0.6 K/UL (ref 0–0.9)
MONOCYTES RELATIVE PERCENT: 7.5 % (ref 0–10)
NEUTROPHILS ABSOLUTE: 4.6 K/UL (ref 1.5–7.5)
NEUTROPHILS RELATIVE PERCENT: 56.7 % (ref 50–65)
PDW BLD-RTO: 13.8 % (ref 11.5–14.5)
PERFORMED ON: ABNORMAL
PLATELET # BLD: 500 K/UL (ref 130–400)
PMV BLD AUTO: 9.4 FL (ref 7.4–10.4)
POTASSIUM SERPL-SCNC: 4.3 MMOL/L (ref 3.5–5)
RBC # BLD: 3.32 M/UL (ref 4.2–5.4)
SODIUM BLD-SCNC: 139 MMOL/L (ref 136–145)
WBC # BLD: 8.2 K/UL (ref 4.8–10.8)

## 2017-03-27 PROCEDURE — 97116 GAIT TRAINING THERAPY: CPT

## 2017-03-27 PROCEDURE — 97535 SELF CARE MNGMENT TRAINING: CPT

## 2017-03-27 PROCEDURE — 1180000000 HC REHAB R&B

## 2017-03-27 PROCEDURE — 97530 THERAPEUTIC ACTIVITIES: CPT

## 2017-03-27 PROCEDURE — 80048 BASIC METABOLIC PNL TOTAL CA: CPT

## 2017-03-27 PROCEDURE — 99232 SBSQ HOSP IP/OBS MODERATE 35: CPT | Performed by: PSYCHIATRY & NEUROLOGY

## 2017-03-27 PROCEDURE — 6370000000 HC RX 637 (ALT 250 FOR IP): Performed by: PSYCHIATRY & NEUROLOGY

## 2017-03-27 PROCEDURE — 36415 COLL VENOUS BLD VENIPUNCTURE: CPT

## 2017-03-27 PROCEDURE — 85025 COMPLETE CBC W/AUTO DIFF WBC: CPT

## 2017-03-27 PROCEDURE — 82948 REAGENT STRIP/BLOOD GLUCOSE: CPT

## 2017-03-27 PROCEDURE — 97110 THERAPEUTIC EXERCISES: CPT

## 2017-03-27 RX ORDER — IRON POLYSACCHARIDE COMPLEX 150 MG
150 CAPSULE ORAL DAILY
Status: DISCONTINUED | OUTPATIENT
Start: 2017-03-27 | End: 2017-03-30 | Stop reason: HOSPADM

## 2017-03-27 RX ADMIN — SUCRALFATE 1 G: 1 TABLET ORAL at 15:04

## 2017-03-27 RX ADMIN — OXYCODONE HYDROCHLORIDE AND ACETAMINOPHEN 1 TABLET: 10; 325 TABLET ORAL at 02:02

## 2017-03-27 RX ADMIN — DOCUSATE SODIUM 100 MG: 100 CAPSULE, LIQUID FILLED ORAL at 07:39

## 2017-03-27 RX ADMIN — Medication 200 MG: at 07:38

## 2017-03-27 RX ADMIN — VITAMIN D, TAB 1000IU (100/BT) 3000 UNITS: 25 TAB at 07:39

## 2017-03-27 RX ADMIN — DOCUSATE SODIUM 100 MG: 100 CAPSULE, LIQUID FILLED ORAL at 20:52

## 2017-03-27 RX ADMIN — CETIRIZINE HYDROCHLORIDE 10 MG: 10 TABLET ORAL at 07:38

## 2017-03-27 RX ADMIN — ASPIRIN 81 MG: 81 TABLET, COATED ORAL at 07:39

## 2017-03-27 RX ADMIN — CYCLOBENZAPRINE HYDROCHLORIDE 10 MG: 10 TABLET, FILM COATED ORAL at 23:46

## 2017-03-27 RX ADMIN — SUCRALFATE 1 G: 1 TABLET ORAL at 20:52

## 2017-03-27 RX ADMIN — Medication 250 MG: at 07:39

## 2017-03-27 RX ADMIN — Medication 250 MG: at 20:52

## 2017-03-27 RX ADMIN — ATORVASTATIN CALCIUM 20 MG: 20 TABLET, FILM COATED ORAL at 07:39

## 2017-03-27 RX ADMIN — OXYCODONE HYDROCHLORIDE AND ACETAMINOPHEN 1 TABLET: 10; 325 TABLET ORAL at 20:52

## 2017-03-27 RX ADMIN — SUCRALFATE 1 G: 1 TABLET ORAL at 07:38

## 2017-03-27 RX ADMIN — FAMOTIDINE 20 MG: 20 TABLET ORAL at 07:39

## 2017-03-27 RX ADMIN — Medication 150 MG: at 07:46

## 2017-03-27 ASSESSMENT — PAIN SCALES - GENERAL
PAINLEVEL_OUTOF10: 0
PAINLEVEL_OUTOF10: 4
PAINLEVEL_OUTOF10: 0
PAINLEVEL_OUTOF10: 2
PAINLEVEL_OUTOF10: 4
PAINLEVEL_OUTOF10: 0
PAINLEVEL_OUTOF10: 3
PAINLEVEL_OUTOF10: 5
PAINLEVEL_OUTOF10: 7

## 2017-03-27 ASSESSMENT — PAIN DESCRIPTION - ONSET: ONSET: ON-GOING

## 2017-03-27 ASSESSMENT — PAIN DESCRIPTION - LOCATION: LOCATION: BUTTOCKS

## 2017-03-27 ASSESSMENT — PAIN DESCRIPTION - DESCRIPTORS: DESCRIPTORS: ACHING

## 2017-03-27 ASSESSMENT — PAIN DESCRIPTION - FREQUENCY: FREQUENCY: INTERMITTENT

## 2017-03-27 ASSESSMENT — PAIN DESCRIPTION - PROGRESSION
CLINICAL_PROGRESSION: GRADUALLY IMPROVING
CLINICAL_PROGRESSION: GRADUALLY IMPROVING

## 2017-03-27 ASSESSMENT — PAIN DESCRIPTION - DIRECTION: RADIATING_TOWARDS: RLE

## 2017-03-27 ASSESSMENT — PAIN DESCRIPTION - ORIENTATION: ORIENTATION: LEFT

## 2017-03-27 ASSESSMENT — PAIN DESCRIPTION - PAIN TYPE: TYPE: ACUTE PAIN

## 2017-03-28 LAB
GLUCOSE BLD-MCNC: 150 MG/DL (ref 70–99)
GLUCOSE BLD-MCNC: 152 MG/DL (ref 70–99)
GLUCOSE BLD-MCNC: 168 MG/DL (ref 70–99)
PERFORMED ON: ABNORMAL

## 2017-03-28 PROCEDURE — 6370000000 HC RX 637 (ALT 250 FOR IP): Performed by: PSYCHIATRY & NEUROLOGY

## 2017-03-28 PROCEDURE — 97530 THERAPEUTIC ACTIVITIES: CPT

## 2017-03-28 PROCEDURE — 82948 REAGENT STRIP/BLOOD GLUCOSE: CPT

## 2017-03-28 PROCEDURE — 97110 THERAPEUTIC EXERCISES: CPT

## 2017-03-28 PROCEDURE — 97116 GAIT TRAINING THERAPY: CPT

## 2017-03-28 PROCEDURE — 1180000000 HC REHAB R&B

## 2017-03-28 RX ADMIN — FAMOTIDINE 20 MG: 20 TABLET ORAL at 07:48

## 2017-03-28 RX ADMIN — CYCLOBENZAPRINE HYDROCHLORIDE 10 MG: 10 TABLET, FILM COATED ORAL at 15:17

## 2017-03-28 RX ADMIN — Medication 250 MG: at 21:36

## 2017-03-28 RX ADMIN — ASPIRIN 81 MG: 81 TABLET, COATED ORAL at 07:48

## 2017-03-28 RX ADMIN — SUCRALFATE 1 G: 1 TABLET ORAL at 15:17

## 2017-03-28 RX ADMIN — CETIRIZINE HYDROCHLORIDE 10 MG: 10 TABLET ORAL at 07:48

## 2017-03-28 RX ADMIN — SUCRALFATE 1 G: 1 TABLET ORAL at 07:46

## 2017-03-28 RX ADMIN — Medication 250 MG: at 07:48

## 2017-03-28 RX ADMIN — SUCRALFATE 1 G: 1 TABLET ORAL at 21:36

## 2017-03-28 RX ADMIN — VITAMIN D, TAB 1000IU (100/BT) 3000 UNITS: 25 TAB at 07:54

## 2017-03-28 RX ADMIN — DOCUSATE SODIUM 100 MG: 100 CAPSULE, LIQUID FILLED ORAL at 07:47

## 2017-03-28 RX ADMIN — OXYCODONE HYDROCHLORIDE AND ACETAMINOPHEN 1 TABLET: 10; 325 TABLET ORAL at 21:36

## 2017-03-28 RX ADMIN — Medication 150 MG: at 07:47

## 2017-03-28 RX ADMIN — DOCUSATE SODIUM 100 MG: 100 CAPSULE, LIQUID FILLED ORAL at 21:36

## 2017-03-28 RX ADMIN — ATORVASTATIN CALCIUM 20 MG: 20 TABLET, FILM COATED ORAL at 07:48

## 2017-03-28 RX ADMIN — Medication 200 MG: at 07:47

## 2017-03-28 RX ADMIN — OXYCODONE HYDROCHLORIDE AND ACETAMINOPHEN 1 TABLET: 10; 325 TABLET ORAL at 07:48

## 2017-03-28 ASSESSMENT — PAIN SCALES - GENERAL
PAINLEVEL_OUTOF10: 7
PAINLEVEL_OUTOF10: 5
PAINLEVEL_OUTOF10: 3
PAINLEVEL_OUTOF10: 6

## 2017-03-28 ASSESSMENT — PAIN DESCRIPTION - PROGRESSION: CLINICAL_PROGRESSION: GRADUALLY IMPROVING

## 2017-03-28 ASSESSMENT — PAIN DESCRIPTION - ONSET: ONSET: ON-GOING

## 2017-03-28 ASSESSMENT — PAIN DESCRIPTION - DESCRIPTORS: DESCRIPTORS: CONSTANT;ACHING;RADIATING

## 2017-03-28 ASSESSMENT — PAIN DESCRIPTION - FREQUENCY: FREQUENCY: INTERMITTENT

## 2017-03-28 ASSESSMENT — PAIN DESCRIPTION - DIRECTION: RADIATING_TOWARDS: RLE

## 2017-03-28 ASSESSMENT — PAIN DESCRIPTION - ORIENTATION: ORIENTATION: RIGHT

## 2017-03-28 ASSESSMENT — PAIN DESCRIPTION - LOCATION: LOCATION: BACK;HIP

## 2017-03-28 ASSESSMENT — PAIN DESCRIPTION - PAIN TYPE: TYPE: ACUTE PAIN;SURGICAL PAIN

## 2017-03-29 LAB
GLUCOSE BLD-MCNC: 167 MG/DL (ref 70–99)
GLUCOSE BLD-MCNC: 193 MG/DL (ref 70–99)
GLUCOSE BLD-MCNC: 195 MG/DL (ref 70–99)
GLUCOSE BLD-MCNC: 221 MG/DL (ref 70–99)
PERFORMED ON: ABNORMAL

## 2017-03-29 PROCEDURE — 97116 GAIT TRAINING THERAPY: CPT

## 2017-03-29 PROCEDURE — 6370000000 HC RX 637 (ALT 250 FOR IP): Performed by: PSYCHIATRY & NEUROLOGY

## 2017-03-29 PROCEDURE — 99233 SBSQ HOSP IP/OBS HIGH 50: CPT | Performed by: PSYCHIATRY & NEUROLOGY

## 2017-03-29 PROCEDURE — 97110 THERAPEUTIC EXERCISES: CPT

## 2017-03-29 PROCEDURE — 97535 SELF CARE MNGMENT TRAINING: CPT

## 2017-03-29 PROCEDURE — 97530 THERAPEUTIC ACTIVITIES: CPT

## 2017-03-29 PROCEDURE — 1180000000 HC REHAB R&B

## 2017-03-29 PROCEDURE — 82948 REAGENT STRIP/BLOOD GLUCOSE: CPT

## 2017-03-29 RX ORDER — CYCLOBENZAPRINE HCL 10 MG
10 TABLET ORAL 2 TIMES DAILY
Status: DISCONTINUED | OUTPATIENT
Start: 2017-03-29 | End: 2017-03-30 | Stop reason: HOSPADM

## 2017-03-29 RX ADMIN — SUCRALFATE 1 G: 1 TABLET ORAL at 07:35

## 2017-03-29 RX ADMIN — CYCLOBENZAPRINE HYDROCHLORIDE 10 MG: 10 TABLET, FILM COATED ORAL at 21:13

## 2017-03-29 RX ADMIN — FAMOTIDINE 20 MG: 20 TABLET ORAL at 07:36

## 2017-03-29 RX ADMIN — ASPIRIN 81 MG: 81 TABLET, COATED ORAL at 07:36

## 2017-03-29 RX ADMIN — OXYCODONE HYDROCHLORIDE AND ACETAMINOPHEN 1 TABLET: 10; 325 TABLET ORAL at 16:00

## 2017-03-29 RX ADMIN — VITAMIN D, TAB 1000IU (100/BT) 3000 UNITS: 25 TAB at 07:36

## 2017-03-29 RX ADMIN — OXYCODONE HYDROCHLORIDE AND ACETAMINOPHEN 1 TABLET: 10; 325 TABLET ORAL at 21:13

## 2017-03-29 RX ADMIN — DOCUSATE SODIUM 100 MG: 100 CAPSULE, LIQUID FILLED ORAL at 21:14

## 2017-03-29 RX ADMIN — CETIRIZINE HYDROCHLORIDE 10 MG: 10 TABLET ORAL at 07:35

## 2017-03-29 RX ADMIN — Medication 250 MG: at 07:36

## 2017-03-29 RX ADMIN — CYCLOBENZAPRINE HYDROCHLORIDE 10 MG: 10 TABLET, FILM COATED ORAL at 07:35

## 2017-03-29 RX ADMIN — Medication 250 MG: at 21:14

## 2017-03-29 RX ADMIN — ATORVASTATIN CALCIUM 20 MG: 20 TABLET, FILM COATED ORAL at 07:35

## 2017-03-29 RX ADMIN — Medication 200 MG: at 07:35

## 2017-03-29 RX ADMIN — SUCRALFATE 1 G: 1 TABLET ORAL at 21:14

## 2017-03-29 RX ADMIN — SUCRALFATE 1 G: 1 TABLET ORAL at 13:19

## 2017-03-29 RX ADMIN — Medication 150 MG: at 07:35

## 2017-03-29 RX ADMIN — DOCUSATE SODIUM 100 MG: 100 CAPSULE, LIQUID FILLED ORAL at 07:35

## 2017-03-29 ASSESSMENT — PAIN DESCRIPTION - PAIN TYPE: TYPE: ACUTE PAIN

## 2017-03-29 ASSESSMENT — PAIN SCALES - GENERAL
PAINLEVEL_OUTOF10: 4
PAINLEVEL_OUTOF10: 7
PAINLEVEL_OUTOF10: 5
PAINLEVEL_OUTOF10: 6
PAINLEVEL_OUTOF10: 3

## 2017-03-29 ASSESSMENT — PAIN DESCRIPTION - LOCATION
LOCATION: BACK

## 2017-03-30 VITALS
WEIGHT: 169.8 LBS | OXYGEN SATURATION: 97 % | DIASTOLIC BLOOD PRESSURE: 76 MMHG | SYSTOLIC BLOOD PRESSURE: 118 MMHG | RESPIRATION RATE: 18 BRPM | TEMPERATURE: 99.1 F | HEART RATE: 94 BPM

## 2017-03-30 LAB
ANION GAP SERPL CALCULATED.3IONS-SCNC: 10 MMOL/L (ref 7–19)
BASOPHILS ABSOLUTE: 0 K/UL (ref 0–0.2)
BASOPHILS RELATIVE PERCENT: 0.6 % (ref 0–1)
BUN BLDV-MCNC: 11 MG/DL (ref 8–23)
CALCIUM SERPL-MCNC: 8.8 MG/DL (ref 8.8–10.2)
CHLORIDE BLD-SCNC: 101 MMOL/L (ref 98–111)
CO2: 28 MMOL/L (ref 22–29)
CREAT SERPL-MCNC: 0.5 MG/DL (ref 0.5–0.9)
EOSINOPHILS ABSOLUTE: 0.2 K/UL (ref 0–0.6)
EOSINOPHILS RELATIVE PERCENT: 2.3 % (ref 0–5)
GFR NON-AFRICAN AMERICAN: >60
GLUCOSE BLD-MCNC: 153 MG/DL (ref 70–99)
GLUCOSE BLD-MCNC: 166 MG/DL (ref 74–109)
HCT VFR BLD CALC: 31.1 % (ref 37–47)
HEMOGLOBIN: 10 G/DL (ref 12–16)
LYMPHOCYTES ABSOLUTE: 2.2 K/UL (ref 1.1–4.5)
LYMPHOCYTES RELATIVE PERCENT: 33.1 % (ref 20–40)
MCH RBC QN AUTO: 31.3 PG (ref 27–31)
MCHC RBC AUTO-ENTMCNC: 32.2 G/DL (ref 33–37)
MCV RBC AUTO: 97.2 FL (ref 81–99)
MONOCYTES ABSOLUTE: 0.8 K/UL (ref 0–0.9)
MONOCYTES RELATIVE PERCENT: 12.8 % (ref 0–10)
NEUTROPHILS ABSOLUTE: 3.3 K/UL (ref 1.5–7.5)
NEUTROPHILS RELATIVE PERCENT: 51.2 % (ref 50–65)
PDW BLD-RTO: 13.3 % (ref 11.5–14.5)
PERFORMED ON: ABNORMAL
PLATELET # BLD: 478 K/UL (ref 130–400)
PMV BLD AUTO: 9.6 FL (ref 7.4–10.4)
POTASSIUM SERPL-SCNC: 4 MMOL/L (ref 3.5–5)
RBC # BLD: 3.2 M/UL (ref 4.2–5.4)
SODIUM BLD-SCNC: 139 MMOL/L (ref 136–145)
WBC # BLD: 6.5 K/UL (ref 4.8–10.8)

## 2017-03-30 PROCEDURE — 99239 HOSP IP/OBS DSCHRG MGMT >30: CPT | Performed by: PSYCHIATRY & NEUROLOGY

## 2017-03-30 PROCEDURE — 85025 COMPLETE CBC W/AUTO DIFF WBC: CPT

## 2017-03-30 PROCEDURE — 36415 COLL VENOUS BLD VENIPUNCTURE: CPT

## 2017-03-30 PROCEDURE — 82948 REAGENT STRIP/BLOOD GLUCOSE: CPT

## 2017-03-30 PROCEDURE — 80048 BASIC METABOLIC PNL TOTAL CA: CPT

## 2017-03-30 PROCEDURE — 6370000000 HC RX 637 (ALT 250 FOR IP): Performed by: PSYCHIATRY & NEUROLOGY

## 2017-03-30 RX ORDER — CYCLOBENZAPRINE HCL 10 MG
10 TABLET ORAL 2 TIMES DAILY
Qty: 20 TABLET | Refills: 0 | Status: SHIPPED | OUTPATIENT
Start: 2017-03-30 | End: 2017-04-09

## 2017-03-30 RX ORDER — IRON POLYSACCHARIDE COMPLEX 150 MG
150 CAPSULE ORAL DAILY
Qty: 60 CAPSULE | Refills: 3 | Status: SHIPPED | OUTPATIENT
Start: 2017-03-30 | End: 2017-11-15

## 2017-03-30 RX ORDER — SUCRALFATE 1 G/1
1 TABLET ORAL 3 TIMES DAILY
Qty: 120 TABLET | Refills: 3 | Status: SHIPPED | OUTPATIENT
Start: 2017-03-30 | End: 2020-12-08

## 2017-03-30 RX ORDER — FAMOTIDINE 20 MG/1
20 TABLET, FILM COATED ORAL DAILY
Qty: 60 TABLET | Refills: 3 | Status: SHIPPED | OUTPATIENT
Start: 2017-03-30 | End: 2017-11-15

## 2017-03-30 RX ADMIN — ATORVASTATIN CALCIUM 20 MG: 20 TABLET, FILM COATED ORAL at 08:10

## 2017-03-30 RX ADMIN — CYCLOBENZAPRINE HYDROCHLORIDE 10 MG: 10 TABLET, FILM COATED ORAL at 08:11

## 2017-03-30 RX ADMIN — SUCRALFATE 1 G: 1 TABLET ORAL at 08:10

## 2017-03-30 RX ADMIN — ASPIRIN 81 MG: 81 TABLET, COATED ORAL at 08:10

## 2017-03-30 RX ADMIN — Medication 150 MG: at 08:10

## 2017-03-30 RX ADMIN — CETIRIZINE HYDROCHLORIDE 10 MG: 10 TABLET ORAL at 08:10

## 2017-03-30 RX ADMIN — FAMOTIDINE 20 MG: 20 TABLET ORAL at 08:10

## 2017-03-30 RX ADMIN — Medication 250 MG: at 08:10

## 2017-03-30 RX ADMIN — Medication 200 MG: at 08:10

## 2017-03-30 RX ADMIN — VITAMIN D, TAB 1000IU (100/BT) 3000 UNITS: 25 TAB at 08:09

## 2017-03-30 RX ADMIN — OXYCODONE HYDROCHLORIDE AND ACETAMINOPHEN 1 TABLET: 10; 325 TABLET ORAL at 09:56

## 2017-03-30 RX ADMIN — DOCUSATE SODIUM 100 MG: 100 CAPSULE, LIQUID FILLED ORAL at 08:11

## 2017-03-30 ASSESSMENT — PAIN SCALES - GENERAL: PAINLEVEL_OUTOF10: 7

## 2017-08-02 ENCOUNTER — OFFICE VISIT (OUTPATIENT)
Dept: ENDOCRINOLOGY | Facility: CLINIC | Age: 72
End: 2017-08-02

## 2017-08-02 VITALS
BODY MASS INDEX: 28.65 KG/M2 | WEIGHT: 161.7 LBS | DIASTOLIC BLOOD PRESSURE: 80 MMHG | HEART RATE: 92 BPM | HEIGHT: 63 IN | SYSTOLIC BLOOD PRESSURE: 142 MMHG

## 2017-08-02 DIAGNOSIS — M81.0 OSTEOPOROSIS: ICD-10-CM

## 2017-08-02 DIAGNOSIS — E55.9 VITAMIN D DEFICIENCY: ICD-10-CM

## 2017-08-02 DIAGNOSIS — E53.8 B12 DEFICIENCY: ICD-10-CM

## 2017-08-02 DIAGNOSIS — E78.2 MIXED HYPERLIPIDEMIA: ICD-10-CM

## 2017-08-02 DIAGNOSIS — E11.42 TYPE 2 DIABETES MELLITUS WITH DIABETIC POLYNEUROPATHY, WITH LONG-TERM CURRENT USE OF INSULIN (HCC): Primary | ICD-10-CM

## 2017-08-02 DIAGNOSIS — Z79.4 TYPE 2 DIABETES MELLITUS WITH DIABETIC POLYNEUROPATHY, WITH LONG-TERM CURRENT USE OF INSULIN (HCC): Primary | ICD-10-CM

## 2017-08-02 DIAGNOSIS — I10 ESSENTIAL HYPERTENSION: ICD-10-CM

## 2017-08-02 LAB
GLUCOSE BLDC GLUCOMTR-MCNC: 252 MG/DL (ref 70–130)
HBA1C MFR BLD: 7.9 %

## 2017-08-02 PROCEDURE — 82962 GLUCOSE BLOOD TEST: CPT | Performed by: INTERNAL MEDICINE

## 2017-08-02 PROCEDURE — 99214 OFFICE O/P EST MOD 30 MIN: CPT | Performed by: INTERNAL MEDICINE

## 2017-08-02 NOTE — PROGRESS NOTES
Alisia Velez is a 72 y.o. female who presents for  evaluation of   Chief Complaint   Patient presents with   • Diabetes         Primary Care / Referring Provider  Mike Erazo MD    Duration/Timing:  Diabetes mellitus type 2, Age at onset of diabetes: 42 years  timing, constant    quality , nearly well controlled    severity , moderate    Severity (Complications/Hospitalizations)  Secondary Macrovascular Complications:  No CAD, CVA, No PAD  cva in 1999  Secondary Microvascular Complications:  No Diabetic Nephropathy, No proteinuria, No Diabetic Retinopathy, No Diabetic Neuropathy    Context  Diabetes Regimen:  Insulin, Oral Medications, Compliant with regimen,    Lab Results   Component Value Date    HGBA1C 7.9 07/25/2017       Blood Glucose Readings  at goal m checking 4 x daily for the past 90 days   Diet  counts carbs  Exercise:  Exercises    Associated Signs/Symptoms  Hyperglycemic Symptoms:  Polyuria, Polydipsia, Polyphagia  Modifying Factors/Comorbidities      Past Medical History:   Diagnosis Date   • Arthritis    • Back pain    • Blister of great toe of left foot     Nonthermal, initial encounter   • Cancer     skin    • Dyslipidemia    • Elevated blood pressure    • Essential hypertension 9/13/2016   • GERD (gastroesophageal reflux disease)    • History of cerebrovascular accident     1999   • Hypo-osmolality and hyponatremia    • Ingrowing nail    • Onychomycosis    • PONV (postoperative nausea and vomiting)    • Snores    • Type 2 diabetes mellitus    • 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   • Urinary incontinence    • Vitamin D deficiency      Family History   Problem Relation Age of Onset   • Cancer Other    • Diabetes Other    • Heart disease Other    • Hypertension Other    • Cancer Mother    • Heart disease Father      Social History   Substance Use Topics   • Smoking status: Never Smoker   • Smokeless tobacco: Never Used   • Alcohol use No         Current  Outpatient Prescriptions:   •  aspirin 81 MG EC tablet, Take 81 mg by mouth Daily., Disp: , Rfl:   •  atorvastatin (LIPITOR) 20 MG tablet, Take 20 mg by mouth daily., Disp: , Rfl:   •  cetirizine (ZyrTEC) 10 MG tablet, Take 10 mg by mouth daily., Disp: , Rfl:   •  Coenzyme Q10 (COQ10) 200 MG capsule, Take 1 tablet/day by mouth., Disp: , Rfl:   •  glucose blood (JEROME CONTOUR TEST) test strip, Use as instructed 5 x daily , ICD-10 code is E11.42, Disp: 150 each, Rfl: 11  •  insulin lispro (humaLOG) 100 UNIT/ML injection, Inject 50 Units under the skin 3 (Three) Times a Day With Meals. Up to 50 units scale as follows: 1 unit per every 1.5 carbs at breakfast, 1u per every 2 grams at lunch, and 1u for every 3 grams at supper, Disp: , Rfl:   •  Insulin Pen Needle (B-D UF III MINI PEN NEEDLES) 31G X 5 MM misc, Use 5 times daily, Disp: 450 each, Rfl: 3  •  metoprolol succinate XL (TOPROL-XL) 25 MG 24 hr tablet, Take 25 mg by mouth 2 (Two) Times a Day., Disp: , Rfl:   •  OMEGA-3 FATTY ACIDS-VITAMIN E PO, Take 1 capsule by mouth. omega-3 fatty acids-vitamin E 1,000 mg-5 unit capsule. , Disp: , Rfl:   •  oxyCODONE-acetaminophen (PERCOCET)  MG per tablet, Take 1 tablet by mouth Every 4 (Four) Hours As Needed for Moderate Pain (4-6) or Severe Pain (7-10). Take 1-2 by mouth, Disp: 120 tablet, Rfl: 0  •  Probiotic Product (PROBIOTIC MULTI-ENZYME) tablet, Take 1 tablet by mouth 2 (Two) Times a Day., Disp: , Rfl:   •  ranitidine (ZANTAC) 150 MG tablet, Take 150 mg by mouth 2 (two) times a day., Disp: , Rfl:   •  sucralfate (CARAFATE) 1 G tablet, Take 1 g by mouth 3 (three) times a day., Disp: , Rfl:   •  Vitamin D, Cholecalciferol, 1000 UNITS capsule, 3 tablets Daily., Disp: , Rfl:   •  Insulin Glargine (TOUJEO SOLOSTAR) 300 UNIT/ML solution pen-injector, Inject 80 Units under the skin Every Night. (Patient taking differently: Inject 62 Units under the skin Every Night.), Disp: 16 pen, Rfl: 11    Review of Systems    Review of  "Systems   Constitutional: Negative for activity change, appetite change, chills, diaphoresis, fatigue, fever and unexpected weight change.   HENT: Negative for congestion, drooling, ear discharge, ear pain, facial swelling, mouth sores, nosebleeds, postnasal drip, sinus pressure, sneezing, sore throat, tinnitus, trouble swallowing and voice change.    Eyes: Negative.  Negative for photophobia, pain, discharge, redness and itching.   Respiratory: Negative.  Negative for apnea, cough, choking, chest tightness, shortness of breath, wheezing and stridor.    Cardiovascular: Negative.  Negative for chest pain, palpitations and leg swelling.   Gastrointestinal: Negative.  Negative for abdominal distention, abdominal pain, constipation, diarrhea, nausea and vomiting.   Endocrine: Negative.  Negative for cold intolerance, heat intolerance, polydipsia, polyphagia and polyuria.   Genitourinary: Negative for difficulty urinating, dysuria, flank pain and frequency.   Musculoskeletal: Negative for arthralgias, back pain, gait problem, joint swelling, myalgias, neck pain and neck stiffness.   Skin: Negative for color change, pallor, rash and wound.   Allergic/Immunologic: Negative for environmental allergies, food allergies and immunocompromised state.   Neurological: Negative for dizziness, tremors, seizures, syncope, facial asymmetry, speech difficulty, weakness, light-headedness, numbness and headaches.   Hematological: Negative for adenopathy. Does not bruise/bleed easily.   Psychiatric/Behavioral: Negative for agitation, behavioral problems, confusion, decreased concentration, dysphoric mood, hallucinations, self-injury, sleep disturbance and suicidal ideas. The patient is not nervous/anxious and is not hyperactive.         Objective:     /80 (BP Location: Right arm, Patient Position: Sitting, Cuff Size: Adult)  Pulse 92  Ht 63\" (160 cm)  Wt 161 lb 11.2 oz (73.3 kg)  BMI 28.64 kg/m2    Physical Exam "   Constitutional: She is oriented to person, place, and time. She appears well-developed.   HENT:   Head: Normocephalic.   Right Ear: External ear normal.   Left Ear: External ear normal.   Nose: Nose normal.   Eyes: Conjunctivae and EOM are normal. No scleral icterus.   Neck: Normal range of motion. Neck supple. No tracheal deviation present. No thyromegaly present.   Cardiovascular: Normal rate, regular rhythm, normal heart sounds and intact distal pulses.  Exam reveals no gallop and no friction rub.    No murmur heard.  Pulmonary/Chest: Effort normal and breath sounds normal. No stridor. No respiratory distress. She has no wheezes. She has no rales. She exhibits no tenderness.   Abdominal: Soft. Bowel sounds are normal. She exhibits no distension and no mass. There is no tenderness. There is no rebound and no guarding.   Musculoskeletal: Normal range of motion. She exhibits no tenderness or deformity.   Lymphadenopathy:     She has no cervical adenopathy.   Neurological: She is alert and oriented to person, place, and time. She displays normal reflexes. She exhibits normal muscle tone. Coordination normal.   Skin: No rash noted. No erythema. No pallor.   Psychiatric: She has a normal mood and affect. Her behavior is normal. Judgment and thought content normal.       Lab Review    Assessment/Plan        ICD-10-CM ICD-9-CM   1. Type 2 diabetes mellitus with diabetic polyneuropathy, with long-term current use of insulin E11.42 250.60    Z79.4 357.2     V58.67   2. Essential hypertension I10 401.9   3. Mixed hyperlipidemia E78.2 272.2   4. Vitamin D deficiency E55.9 268.9   5. B12 deficiency E53.8 266.2              Type 2 diabetes mellitus with circulatory disorder  [address]    Lab Results   Component Value Date    HGBA1C 7.9 07/25/2017          ------------      Humalog ,  Change to 1.5-2-3  Change to 1.3-1.8-3  But for cereal do 0.9       Correction , 10              during steroids  typically the carb ratio and  correction need to be doubled and the basal( Lantus ) needs to be increased by 30%         We will add orals     Suggest      Can't tolerate metformin , diarrhea  Can't tolerate glyburide, developed allergy       jardiance and tradjenta, too expensive and afraid of side effects       Mixed hyperlipidemia  On atorvastatin 20 mg qhs      Essential hypertension  On metoprolol ho cva and svt     longterm nsaid, on diclofenac, unfortunately can't use longterm   Takes carafte                      I reviewed and summarized records from Mike Erazo MD from 2016 and I reviewed / ordered labs.     Orders Placed This Encounter   Procedures   • Hemoglobin A1c     This order was created through External Result Entry   • CBC Auto Differential   • Comprehensive Metabolic Panel   • Hemoglobin A1c   • Lipid Panel   • Microalbumin / Creatinine Urine Ratio   • TSH   • Vitamin B12   • Vitamin D 25 Hydroxy   • POC Glucose Fingerstick         A copy of my note was sent to Mike Erazo MD    Please see my above opinion and suggestions.

## 2017-11-15 ENCOUNTER — OFFICE VISIT (OUTPATIENT)
Dept: OBGYN | Age: 72
End: 2017-11-15
Payer: MEDICARE

## 2017-11-15 VITALS
HEART RATE: 69 BPM | BODY MASS INDEX: 29.06 KG/M2 | TEMPERATURE: 98.2 F | DIASTOLIC BLOOD PRESSURE: 62 MMHG | WEIGHT: 164 LBS | HEIGHT: 63 IN | SYSTOLIC BLOOD PRESSURE: 138 MMHG

## 2017-11-15 DIAGNOSIS — N90.89 LABIAL LESION: Primary | ICD-10-CM

## 2017-11-15 DIAGNOSIS — Z12.31 ENCOUNTER FOR SCREENING MAMMOGRAM FOR BREAST CANCER: ICD-10-CM

## 2017-11-15 DIAGNOSIS — Z12.4 SCREENING FOR CERVICAL CANCER: ICD-10-CM

## 2017-11-15 PROCEDURE — G8484 FLU IMMUNIZE NO ADMIN: HCPCS | Performed by: OBSTETRICS & GYNECOLOGY

## 2017-11-15 PROCEDURE — 99203 OFFICE O/P NEW LOW 30 MIN: CPT | Performed by: OBSTETRICS & GYNECOLOGY

## 2017-11-15 PROCEDURE — 1036F TOBACCO NON-USER: CPT | Performed by: OBSTETRICS & GYNECOLOGY

## 2017-11-15 PROCEDURE — 56605 BIOPSY OF VULVA/PERINEUM: CPT | Performed by: OBSTETRICS & GYNECOLOGY

## 2017-11-15 PROCEDURE — 3017F COLORECTAL CA SCREEN DOC REV: CPT | Performed by: OBSTETRICS & GYNECOLOGY

## 2017-11-15 PROCEDURE — G8419 CALC BMI OUT NRM PARAM NOF/U: HCPCS | Performed by: OBSTETRICS & GYNECOLOGY

## 2017-11-15 PROCEDURE — 3014F SCREEN MAMMO DOC REV: CPT | Performed by: OBSTETRICS & GYNECOLOGY

## 2017-11-15 PROCEDURE — 1123F ACP DISCUSS/DSCN MKR DOCD: CPT | Performed by: OBSTETRICS & GYNECOLOGY

## 2017-11-15 PROCEDURE — 4040F PNEUMOC VAC/ADMIN/RCVD: CPT | Performed by: OBSTETRICS & GYNECOLOGY

## 2017-11-15 PROCEDURE — G8427 DOCREV CUR MEDS BY ELIG CLIN: HCPCS | Performed by: OBSTETRICS & GYNECOLOGY

## 2017-11-15 PROCEDURE — G8400 PT W/DXA NO RESULTS DOC: HCPCS | Performed by: OBSTETRICS & GYNECOLOGY

## 2017-11-15 PROCEDURE — 1090F PRES/ABSN URINE INCON ASSESS: CPT | Performed by: OBSTETRICS & GYNECOLOGY

## 2017-11-15 RX ORDER — SENNOSIDES 8.6 MG
650 CAPSULE ORAL EVERY 8 HOURS PRN
COMMUNITY

## 2017-11-15 RX ORDER — RANITIDINE 150 MG/1
150 TABLET ORAL
COMMUNITY
End: 2020-12-08

## 2017-11-15 ASSESSMENT — ENCOUNTER SYMPTOMS
GASTROINTESTINAL NEGATIVE: 1
RESPIRATORY NEGATIVE: 1
EYES NEGATIVE: 1

## 2017-11-15 NOTE — PROGRESS NOTES
Subjective:      Patient ID: Willow Owens is a 81 y.o. female. New patient presents today for a pap smear and breast exam.  Patient states she is over due for a mammogram and would like a order to take to [facility]  [facility] [place]  Pt is here for annual exam. Pt is also c/o itching to outside of vagina. Willow Owens is a 81 y.o. female with the following history as recorded in [facility]:  Patient Active Problem List    Diagnosis Date Noted    Uncontrolled type 2 diabetes mellitus with hyperglycemia ([address]     Lumbar radiculopathy 03/21/2017    Essential hypertension 03/21/2017    Type II diabetes mellitus, uncontrolled ([address] 03/21/2017    Gastroesophageal reflux disease without esophagitis 03/21/2017    Lumbar spinal stenosis 03/21/2017     Current Outpatient Prescriptions   Medication Sig Dispense Refill    acetaminophen (TYLENOL ARTHRITIS PAIN) 650 MG extended release tablet Take 650 mg by mouth every 8 hours as needed for Pain      ranitidine (ZANTAC) 150 MG tablet Take 150 mg by mouth      insulin glargine (TOUJEO SOLOSTAR) 300 UNIT/ML injection pen Inject 20 Units into the skin nightly 3 Pen 3    sucralfate (CARAFATE) 1 GM tablet Take 1 tablet by mouth three times daily 120 tablet 3    aspirin EC 81 MG EC tablet Take 81 mg by mouth daily      atorvastatin (LIPITOR) 20 MG tablet Take 20 mg by mouth daily      cetirizine (ZYRTEC) 10 MG tablet Take 10 mg by mouth daily      Coenzyme Q-10 200 MG CAPS Take 200 mg by mouth daily      insulin lispro (HUMALOG) 100 UNIT/ML injection vial Inject 50 Units into the skin 3 times daily (before meals) Indications: Up to 50 units scale as follows:  1 unit per every 1.5 carbs at breakfast, 1 unit per every 2 grams at lunch, and 1 unit for every 3 grams at supper.       OMEGA-3 FATTY ACIDS-VITAMIN E PO Take 1 capsule by mouth daily      vitamin D (CHOLECALCIFEROL) 1000 UNIT TABS tablet Take 3,000 Units by mouth daily       No current facility-administered medications for this visit. Allergies: Erythromycin; Humulin n [insulin isophane]; Mold extract [molds & smuts]; Pseudophed-chlophedianol-gg; and Sulfa antibiotics  Past Medical History:   Diagnosis Date    Cataract     bilateral    [facility] (cerebrum) [facility] 1999    Type 2 diabetes mellitus without complication ([facility] [facility]      Past Surgical History:   Procedure Laterality Date    BACK SURGERY  03/2017    L3, 4, 5 Fusion    BLEPHAROPLASTY  10/2003    CARPAL TUNNEL RELEASE      02/2003 (left) 10/2003 (right)    CERVICAL LAMINECTOMY  12/2004    C7, T1    GASTRIC FUNDOPLICATION  19/3156    LUMBAR DISC SURGERY  10/2002    L5, 1/2 disc removed    TOENAIL EXCISION Bilateral 03/2016     Family History   Problem Relation Age of Onset    Cancer Mother      Lung and throat ca    Heart Disease Father     Heart Disease Brother     Diabetes Brother      Social History   Substance Use Topics    Smoking status: Never Smoker    Smokeless tobacco: Never Used    Alcohol use No       Review of Systems   Constitutional: Negative. HENT: Negative. Eyes: Negative. Respiratory: Negative. Cardiovascular: Negative. Gastrointestinal: Negative. Genitourinary: Negative. Musculoskeletal: Negative. Skin: Negative. Neurological: Negative. Psychiatric/Behavioral: Negative. Objective:   Physical Exam   Constitutional: She is oriented to person, place, and time. She appears well-developed and well-nourished. HENT:   Head: Normocephalic and atraumatic. Eyes: Pupils are equal, round, and reactive to light. Neck: Normal range of motion. Neck supple. Cardiovascular: Normal rate and regular rhythm. Exam reveals no gallop and no friction rub. No murmur heard. Pulmonary/Chest: Effort normal and breath sounds normal. Right breast exhibits no inverted nipple, no mass, no nipple discharge, no skin change and no tenderness.  Left breast exhibits no inverted nipple, no mass, no nipple discharge, no skin change and along with need for additional calcium and vitamin D. A colonoscopy is recommended at age 50 unless otherwise indicated based on symptoms or family history. The risks vs. benefits of HRT were discussed with patient and all questions answered. Mammogram ordered and pt is to [facility]. Pt will keep areas clean and dry. Will inform pt of pathology results.

## 2017-11-21 ENCOUNTER — TELEPHONE (OUTPATIENT)
Dept: OBGYN | Age: 72
End: 2017-11-21

## 2017-11-21 RX ORDER — CLOBETASOL PROPIONATE 0.5 MG/G
CREAM TOPICAL
Qty: 1 TUBE | Refills: 5 | Status: SHIPPED | OUTPATIENT
Start: 2017-11-21

## 2017-12-01 ENCOUNTER — TELEPHONE (OUTPATIENT)
Dept: OBGYN | Age: 72
End: 2017-12-01

## 2018-01-29 RX ORDER — INSULIN LISPRO 100 [IU]/ML
INJECTION, SOLUTION INTRAVENOUS; SUBCUTANEOUS
Qty: 135 ML | Refills: 5 | Status: ON HOLD | OUTPATIENT
Start: 2018-01-29 | End: 2019-04-19

## 2018-02-27 ENCOUNTER — OFFICE VISIT (OUTPATIENT)
Dept: ENDOCRINOLOGY | Facility: CLINIC | Age: 73
End: 2018-02-27

## 2018-02-27 VITALS
WEIGHT: 167.06 LBS | DIASTOLIC BLOOD PRESSURE: 62 MMHG | HEART RATE: 86 BPM | HEIGHT: 63 IN | BODY MASS INDEX: 29.6 KG/M2 | SYSTOLIC BLOOD PRESSURE: 128 MMHG

## 2018-02-27 DIAGNOSIS — E55.9 VITAMIN D DEFICIENCY: ICD-10-CM

## 2018-02-27 DIAGNOSIS — E53.8 B12 DEFICIENCY: ICD-10-CM

## 2018-02-27 DIAGNOSIS — E78.2 MIXED HYPERLIPIDEMIA: ICD-10-CM

## 2018-02-27 DIAGNOSIS — Z79.4 TYPE 2 DIABETES MELLITUS WITHOUT COMPLICATION, WITH LONG-TERM CURRENT USE OF INSULIN (HCC): Primary | ICD-10-CM

## 2018-02-27 DIAGNOSIS — M81.0 AGE-RELATED OSTEOPOROSIS WITHOUT CURRENT PATHOLOGICAL FRACTURE: ICD-10-CM

## 2018-02-27 DIAGNOSIS — Z79.4 TYPE 2 DIABETES MELLITUS WITH DIABETIC POLYNEUROPATHY, WITH LONG-TERM CURRENT USE OF INSULIN (HCC): Primary | ICD-10-CM

## 2018-02-27 DIAGNOSIS — E11.42 TYPE 2 DIABETES MELLITUS WITH DIABETIC POLYNEUROPATHY, WITH LONG-TERM CURRENT USE OF INSULIN (HCC): Primary | ICD-10-CM

## 2018-02-27 DIAGNOSIS — E11.9 TYPE 2 DIABETES MELLITUS WITHOUT COMPLICATION, WITH LONG-TERM CURRENT USE OF INSULIN (HCC): Primary | ICD-10-CM

## 2018-02-27 DIAGNOSIS — I10 ESSENTIAL HYPERTENSION: ICD-10-CM

## 2018-02-27 LAB — HBA1C MFR BLD: 7.4 %

## 2018-02-27 PROCEDURE — 99214 OFFICE O/P EST MOD 30 MIN: CPT | Performed by: INTERNAL MEDICINE

## 2018-02-27 PROCEDURE — PTNOCHG PR CUSTOM PT NO CHARGE VISIT: Performed by: INTERNAL MEDICINE

## 2018-02-27 RX ORDER — SENNOSIDES 8.6 MG
650 CAPSULE ORAL EVERY 8 HOURS PRN
COMMUNITY

## 2018-02-27 NOTE — PROGRESS NOTES
Alisia Velez is a 72 y.o. female seen by diabetes educator 02/27/2018 to discuss Karan sensor. Patient decided she would like to try it. Referral faxed to [facility] 02/27/2018.     Mela Smith MS, RD, LD, CDE

## 2018-02-28 PROBLEM — E53.8 B12 DEFICIENCY: Status: ACTIVE | Noted: 2018-02-28

## 2018-02-28 PROBLEM — E11.42 TYPE 2 DIABETES MELLITUS WITH DIABETIC POLYNEUROPATHY, WITH LONG-TERM CURRENT USE OF INSULIN (HCC): Status: ACTIVE | Noted: 2018-02-28

## 2018-02-28 PROBLEM — M81.0 AGE-RELATED OSTEOPOROSIS WITHOUT CURRENT PATHOLOGICAL FRACTURE: Status: ACTIVE | Noted: 2018-02-28

## 2018-02-28 PROBLEM — Z79.4 TYPE 2 DIABETES MELLITUS WITH DIABETIC POLYNEUROPATHY, WITH LONG-TERM CURRENT USE OF INSULIN: Status: ACTIVE | Noted: 2018-02-28

## 2018-02-28 PROBLEM — E55.9 VITAMIN D DEFICIENCY: Status: ACTIVE | Noted: 2018-02-28

## 2018-05-17 ENCOUNTER — TELEPHONE (OUTPATIENT)
Dept: OBGYN | Age: 73
End: 2018-05-17

## 2018-06-05 ENCOUNTER — TELEPHONE (OUTPATIENT)
Dept: ENDOCRINOLOGY | Facility: CLINIC | Age: 73
End: 2018-06-05

## 2018-06-26 ENCOUNTER — OFFICE VISIT (OUTPATIENT)
Dept: ENDOCRINOLOGY | Facility: CLINIC | Age: 73
End: 2018-06-26

## 2018-06-26 VITALS
BODY MASS INDEX: 29.3 KG/M2 | HEIGHT: 63 IN | OXYGEN SATURATION: 95 % | DIASTOLIC BLOOD PRESSURE: 72 MMHG | HEART RATE: 74 BPM | SYSTOLIC BLOOD PRESSURE: 118 MMHG | WEIGHT: 165.4 LBS

## 2018-06-26 DIAGNOSIS — E11.42 TYPE 2 DIABETES MELLITUS WITH DIABETIC POLYNEUROPATHY, WITH LONG-TERM CURRENT USE OF INSULIN (HCC): Primary | ICD-10-CM

## 2018-06-26 DIAGNOSIS — E55.9 VITAMIN D DEFICIENCY: ICD-10-CM

## 2018-06-26 DIAGNOSIS — I10 ESSENTIAL HYPERTENSION: ICD-10-CM

## 2018-06-26 DIAGNOSIS — E53.8 B12 DEFICIENCY: ICD-10-CM

## 2018-06-26 DIAGNOSIS — Z79.4 TYPE 2 DIABETES MELLITUS WITH DIABETIC POLYNEUROPATHY, WITH LONG-TERM CURRENT USE OF INSULIN (HCC): Primary | ICD-10-CM

## 2018-06-26 DIAGNOSIS — E78.2 MIXED HYPERLIPIDEMIA: ICD-10-CM

## 2018-06-26 LAB — GLUCOSE BLDC GLUCOMTR-MCNC: 101 MG/DL (ref 70–130)

## 2018-06-26 PROCEDURE — 99214 OFFICE O/P EST MOD 30 MIN: CPT | Performed by: INTERNAL MEDICINE

## 2018-06-26 PROCEDURE — 82962 GLUCOSE BLOOD TEST: CPT | Performed by: INTERNAL MEDICINE

## 2018-06-26 NOTE — PROGRESS NOTES
Alisia Velez is a 73 y.o. female who presents for  evaluation of   Chief Complaint   Patient presents with   • Diabetes         Primary Care / Referring Provider  Mike Erazo MD    Duration/Timing:  Diabetes mellitus type 2, Age at onset of diabetes: 42 years  timing, constant    quality , nearly well controlled    severity , moderate    Severity (Complications/Hospitalizations)  Secondary Macrovascular Complications:  No CAD, CVA, No PAD  cva in 1999  Secondary Microvascular Complications:  No Diabetic Nephropathy, No proteinuria, No Diabetic Retinopathy, No Diabetic Neuropathy    Context  Diabetes Regimen:  Insulin, Oral Medications, Compliant with regimen,    Lab Results   Component Value Date    HGBA1C 7.4 01/09/2018       Blood Glucose Readings  at goal m checking 4 x daily for the past 90 days   Diet  counts carbs  Exercise:  Exercises    Associated Signs/Symptoms  Hyperglycemic Symptoms:  Polyuria, Polydipsia, Polyphagia resolved   Modifying Factors/Comorbidities      Past Medical History:   Diagnosis Date   • Arthritis    • Back pain    • Blister of great toe of left foot     Nonthermal, initial encounter   • Cancer     skin    • Dyslipidemia    • Elevated blood pressure    • Essential hypertension 9/13/2016   • GERD (gastroesophageal reflux disease)    • History of cerebrovascular accident     1999   • Hypo-osmolality and hyponatremia    • Ingrowing nail    • Onychomycosis    • PONV (postoperative nausea and vomiting)    • Snores    • Type 2 diabetes mellitus    • 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   • Urinary incontinence    • Vitamin D deficiency      Family History   Problem Relation Age of Onset   • Cancer Other    • Diabetes Other    • Heart disease Other    • Hypertension Other    • Cancer Mother    • Heart disease Father      Social History   Substance Use Topics   • Smoking status: Never Smoker   • Smokeless tobacco: Never Used   • Alcohol use No          Current Outpatient Prescriptions:   •  acetaminophen (TYLENOL) 650 MG 8 hr tablet, Take 650 mg by mouth Every 8 (Eight) Hours As Needed., Disp: , Rfl:   •  aspirin 81 MG EC tablet, Take 81 mg by mouth Daily., Disp: , Rfl:   •  atorvastatin (LIPITOR) 20 MG tablet, Take 20 mg by mouth daily., Disp: , Rfl:   •  JEROME CONTOUR TEST test strip, USE AS DIRECTED 5 TIMES A DAY. E11.42, Disp: 200 each, Rfl: 4  •  cetirizine (ZyrTEC) 10 MG tablet, Take 10 mg by mouth daily., Disp: , Rfl:   •  Coenzyme Q10 (COQ10) 200 MG capsule, Take 1 tablet/day by mouth., Disp: , Rfl:   •  HUMALOG KWIKPEN 100 UNIT/ML solution pen-injector, INJECT SUBCUTANEOUSLY UP TO 50 UNITS WITH MEALS, Disp: 135 mL, Rfl: 5  •  Insulin Glargine (TOUJEO SOLOSTAR) 300 UNIT/ML solution pen-injector, Inject 64 Units under the skin every night at bedtime., Disp: 13 pen, Rfl: 3  •  Insulin Pen Needle (B-D UF III MINI PEN NEEDLES) 31G X 5 MM misc, Use 5 times daily, Disp: 450 each, Rfl: 3  •  metoprolol succinate XL (TOPROL-XL) 25 MG 24 hr tablet, Take 25 mg by mouth 2 (Two) Times a Day., Disp: , Rfl:   •  OMEGA-3 FATTY ACIDS-VITAMIN E PO, Take 1 capsule by mouth. omega-3 fatty acids-vitamin E 1,000 mg-5 unit capsule. , Disp: , Rfl:   •  ranitidine (ZANTAC) 150 MG tablet, Take 150 mg by mouth 2 (two) times a day., Disp: , Rfl:   •  sucralfate (CARAFATE) 1 G tablet, Take 1 g by mouth 3 (three) times a day., Disp: , Rfl:   •  Vitamin D, Cholecalciferol, 1000 UNITS capsule, 3 tablets Daily., Disp: , Rfl:     Review of Systems    Review of Systems   Constitutional: Negative for activity change, appetite change, chills, diaphoresis, fatigue, fever and unexpected weight change.   HENT: Negative for congestion, drooling, ear discharge, ear pain, facial swelling, mouth sores, nosebleeds, postnasal drip, sinus pressure, sneezing, sore throat, tinnitus, trouble swallowing and voice change.    Eyes: Negative.  Negative for photophobia, pain, discharge, redness and  "itching.   Respiratory: Negative.  Negative for apnea, cough, choking, chest tightness, shortness of breath, wheezing and stridor.    Cardiovascular: Negative.  Negative for chest pain, palpitations and leg swelling.   Gastrointestinal: Negative.  Negative for abdominal distention, abdominal pain, constipation, diarrhea, nausea and vomiting.   Endocrine: Negative.  Negative for cold intolerance, heat intolerance, polydipsia, polyphagia and polyuria.   Genitourinary: Negative for difficulty urinating, dysuria, flank pain and frequency.   Musculoskeletal: Negative for arthralgias, back pain, gait problem, joint swelling, myalgias, neck pain and neck stiffness.   Skin: Negative for color change, pallor, rash and wound.   Allergic/Immunologic: Negative for environmental allergies, food allergies and immunocompromised state.   Neurological: Negative for dizziness, tremors, seizures, syncope, facial asymmetry, speech difficulty, weakness, light-headedness, numbness and headaches.   Hematological: Negative for adenopathy. Does not bruise/bleed easily.   Psychiatric/Behavioral: Negative for agitation, behavioral problems, confusion, decreased concentration, dysphoric mood, hallucinations, self-injury, sleep disturbance and suicidal ideas. The patient is not nervous/anxious and is not hyperactive.         Objective:     /72 (BP Location: Left arm, Patient Position: Sitting, Cuff Size: Large Adult)   Pulse 74   Ht 160 cm (63\")   Wt 75 kg (165 lb 6.4 oz)   LMP 02/27/2000 (Within Months) Comment: Postmenopausal  SpO2 95%   BMI 29.30 kg/m²     Physical Exam   Constitutional: She is oriented to person, place, and time. She appears well-developed.   HENT:   Head: Normocephalic.   Right Ear: External ear normal.   Left Ear: External ear normal.   Nose: Nose normal.   Eyes: Conjunctivae and EOM are normal. No scleral icterus.   Neck: Normal range of motion. Neck supple. No tracheal deviation present. No thyromegaly " present.   Cardiovascular: Normal rate, regular rhythm, normal heart sounds and intact distal pulses.  Exam reveals no gallop and no friction rub.    No murmur heard.  Pulmonary/Chest: Effort normal and breath sounds normal. No stridor. No respiratory distress. She has no wheezes. She has no rales. She exhibits no tenderness.   Abdominal: Soft. Bowel sounds are normal. She exhibits no distension and no mass. There is no tenderness. There is no rebound and no guarding.   Musculoskeletal: Normal range of motion. She exhibits no tenderness or deformity.   Lymphadenopathy:     She has no cervical adenopathy.   Neurological: She is alert and oriented to person, place, and time. She displays normal reflexes. She exhibits normal muscle tone. Coordination normal.   Skin: No rash noted. No erythema. No pallor.   Psychiatric: She has a normal mood and affect. Her behavior is normal. Judgment and thought content normal.       Lab Review    Assessment/Plan        ICD-10-CM ICD-9-CM   1. Type 2 diabetes mellitus with diabetic polyneuropathy, with long-term current use of insulin E11.42 250.60    Z79.4 357.2     V58.67   2. Essential hypertension I10 401.9   3. Mixed hyperlipidemia E78.2 272.2   4. Vitamin D deficiency E55.9 268.9   5. B12 deficiency E53.8 266.2              Type 2 diabetes mellitus with circulatory disorder    Lab Results   Component Value Date    HGBA1C 7.4 01/09/2018         Toujeo 66       ------------      Humalog ,  Change to 1.5-2-3  Change to 1.3-1.8-2  But for cereal do 0.9       Correction , 10              during steroids  typically the carb ratio and correction need to be doubled and the basal( Lantus ) needs to be increased by 30%         We will add orals     Suggest      Can't tolerate metformin , diarrhea  Can't tolerate glyburide, developed allergy       jardiance and tradjenta, too expensive and afraid of side effects   Using freestyle     Mixed hyperlipidemia  On atorvastatin 20 mg qhs       Essential hypertension  On metoprolol ho cva and svt     longterm nsaid, on diclofenac, unfortunately can't use longterm   Takes carafte                      I reviewed and summarized records from Mike Erazo MD from 2016 and I reviewed / ordered labs.     Orders Placed This Encounter   Procedures   • POC Glucose         A copy of my note was sent to Mike Erazo MD    Please see my above opinion and suggestions.

## 2018-09-11 ENCOUNTER — LAB REQUISITION (OUTPATIENT)
Dept: LAB | Facility: HOSPITAL | Age: 73
End: 2018-09-11

## 2018-09-11 DIAGNOSIS — Z00.00 ENCOUNTER FOR GENERAL ADULT MEDICAL EXAMINATION WITHOUT ABNORMAL FINDINGS: ICD-10-CM

## 2018-09-11 PROCEDURE — 88304 TISSUE EXAM BY PATHOLOGIST: CPT | Performed by: GENERAL PRACTICE

## 2018-09-11 PROCEDURE — 88307 TISSUE EXAM BY PATHOLOGIST: CPT | Performed by: GENERAL PRACTICE

## 2018-09-15 LAB
CYTO UR: NORMAL
LAB AP CASE REPORT: NORMAL
LAB AP CLINICAL INFORMATION: NORMAL
PATH REPORT.FINAL DX SPEC: NORMAL
PATH REPORT.GROSS SPEC: NORMAL

## 2018-11-30 ENCOUNTER — OFFICE VISIT (OUTPATIENT)
Dept: OBGYN | Age: 73
End: 2018-11-30
Payer: MEDICARE

## 2018-11-30 VITALS
HEIGHT: 63 IN | DIASTOLIC BLOOD PRESSURE: 64 MMHG | WEIGHT: 167 LBS | SYSTOLIC BLOOD PRESSURE: 144 MMHG | HEART RATE: 74 BPM | BODY MASS INDEX: 29.59 KG/M2 | TEMPERATURE: 98 F

## 2018-11-30 DIAGNOSIS — Z12.31 ENCOUNTER FOR SCREENING MAMMOGRAM FOR BREAST CANCER: ICD-10-CM

## 2018-11-30 DIAGNOSIS — Z01.419 VISIT FOR PELVIC EXAM: ICD-10-CM

## 2018-11-30 DIAGNOSIS — Z12.39 ENCOUNTER FOR SCREENING BREAST EXAMINATION: Primary | ICD-10-CM

## 2018-11-30 DIAGNOSIS — R10.31 RLQ ABDOMINAL PAIN: ICD-10-CM

## 2018-11-30 PROCEDURE — G8400 PT W/DXA NO RESULTS DOC: HCPCS | Performed by: OBSTETRICS & GYNECOLOGY

## 2018-11-30 PROCEDURE — G8484 FLU IMMUNIZE NO ADMIN: HCPCS | Performed by: OBSTETRICS & GYNECOLOGY

## 2018-11-30 PROCEDURE — 1123F ACP DISCUSS/DSCN MKR DOCD: CPT | Performed by: OBSTETRICS & GYNECOLOGY

## 2018-11-30 PROCEDURE — 1090F PRES/ABSN URINE INCON ASSESS: CPT | Performed by: OBSTETRICS & GYNECOLOGY

## 2018-11-30 PROCEDURE — G0101 CA SCREEN;PELVIC/BREAST EXAM: HCPCS | Performed by: OBSTETRICS & GYNECOLOGY

## 2018-11-30 PROCEDURE — G8427 DOCREV CUR MEDS BY ELIG CLIN: HCPCS | Performed by: OBSTETRICS & GYNECOLOGY

## 2018-11-30 PROCEDURE — 4040F PNEUMOC VAC/ADMIN/RCVD: CPT | Performed by: OBSTETRICS & GYNECOLOGY

## 2018-11-30 PROCEDURE — G8419 CALC BMI OUT NRM PARAM NOF/U: HCPCS | Performed by: OBSTETRICS & GYNECOLOGY

## 2018-11-30 PROCEDURE — 3017F COLORECTAL CA SCREEN DOC REV: CPT | Performed by: OBSTETRICS & GYNECOLOGY

## 2018-11-30 PROCEDURE — 1101F PT FALLS ASSESS-DOCD LE1/YR: CPT | Performed by: OBSTETRICS & GYNECOLOGY

## 2018-11-30 PROCEDURE — 1036F TOBACCO NON-USER: CPT | Performed by: OBSTETRICS & GYNECOLOGY

## 2018-11-30 PROCEDURE — 99213 OFFICE O/P EST LOW 20 MIN: CPT | Performed by: OBSTETRICS & GYNECOLOGY

## 2018-11-30 ASSESSMENT — ENCOUNTER SYMPTOMS
GASTROINTESTINAL NEGATIVE: 1
EYES NEGATIVE: 1
RESPIRATORY NEGATIVE: 1

## 2018-12-12 ENCOUNTER — OFFICE VISIT (OUTPATIENT)
Dept: ENDOCRINOLOGY | Facility: CLINIC | Age: 73
End: 2018-12-12

## 2018-12-12 VITALS
OXYGEN SATURATION: 94 % | BODY MASS INDEX: 29.54 KG/M2 | HEART RATE: 73 BPM | SYSTOLIC BLOOD PRESSURE: 124 MMHG | DIASTOLIC BLOOD PRESSURE: 76 MMHG | HEIGHT: 63 IN | WEIGHT: 166.7 LBS

## 2018-12-12 DIAGNOSIS — E55.9 VITAMIN D DEFICIENCY: ICD-10-CM

## 2018-12-12 DIAGNOSIS — E53.8 B12 DEFICIENCY: ICD-10-CM

## 2018-12-12 DIAGNOSIS — E78.2 MIXED HYPERLIPIDEMIA: ICD-10-CM

## 2018-12-12 DIAGNOSIS — I10 ESSENTIAL HYPERTENSION: ICD-10-CM

## 2018-12-12 DIAGNOSIS — Z79.4 TYPE 2 DIABETES MELLITUS WITH DIABETIC POLYNEUROPATHY, WITH LONG-TERM CURRENT USE OF INSULIN (HCC): Primary | ICD-10-CM

## 2018-12-12 DIAGNOSIS — E11.42 TYPE 2 DIABETES MELLITUS WITH DIABETIC POLYNEUROPATHY, WITH LONG-TERM CURRENT USE OF INSULIN (HCC): Primary | ICD-10-CM

## 2018-12-12 DIAGNOSIS — M81.0 AGE-RELATED OSTEOPOROSIS WITHOUT CURRENT PATHOLOGICAL FRACTURE: ICD-10-CM

## 2018-12-12 LAB — HBA1C MFR BLD: 7.5 %

## 2018-12-12 PROCEDURE — 99214 OFFICE O/P EST MOD 30 MIN: CPT | Performed by: INTERNAL MEDICINE

## 2018-12-12 PROCEDURE — 95249 CONT GLUC MNTR PT PROV EQP: CPT | Performed by: INTERNAL MEDICINE

## 2018-12-12 NOTE — PROGRESS NOTES
Alisia Velez is a 73 y.o. female who presents for  evaluation of   Chief Complaint   Patient presents with   • Diabetes     bs 167         Primary Care / Referring Provider  Mike Erazo MD    Duration/Timing:  Diabetes mellitus type 2, Age at onset of diabetes: 42 years  timing, constant    quality , nearly well controlled    severity , moderate    Severity (Complications/Hospitalizations)  Secondary Macrovascular Complications:  No CAD, CVA, No PAD  cva in 1999  Secondary Microvascular Complications:  No Diabetic Nephropathy, No proteinuria, No Diabetic Retinopathy, No Diabetic Neuropathy    Context  Diabetes Regimen:  Insulin, Oral Medications, Compliant with regimen,    Lab Results   Component Value Date    HGBA1C 7.4 01/09/2018       Blood Glucose Readings  at goal m checking 4 x daily for the past 90 days   Diet  counts carbs  Exercise:  Exercises    Associated Signs/Symptoms  Hyperglycemic Symptoms:  Polyuria, Polydipsia, Polyphagia resolved   Modifying Factors/Comorbidities      Past Medical History:   Diagnosis Date   • Arthritis    • Back pain    • Blister of great toe of left foot     Nonthermal, initial encounter   • Cancer (CMS/[facility])     skin    • Dyslipidemia    • Elevated blood pressure    • Essential hypertension 9/13/2016   • GERD (gastroesophageal reflux disease)    • History of cerebrovascular accident     1999   • Hypo-osmolality and hyponatremia    • Ingrowing nail    • Onychomycosis    • PONV (postoperative nausea and vomiting)    • Snores    • Type 2 diabetes mellitus (CMS/[facility])    • 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/[facility]) 9/13/2016   • Urinary incontinence    • Vitamin D deficiency      Family History   Problem Relation Age of Onset   • Cancer Other    • Diabetes Other    • Heart disease Other    • Hypertension Other    • Cancer Mother    • Heart disease Father      Social History     Tobacco Use   • Smoking status: Never Smoker   • Smokeless tobacco: Never  Used   Substance Use Topics   • Alcohol use: No   • Drug use: No         Current Outpatient Medications:   •  acetaminophen (TYLENOL) 650 MG 8 hr tablet, Take 650 mg by mouth Every 8 (Eight) Hours As Needed., Disp: , Rfl:   •  aspirin 81 MG EC tablet, Take 81 mg by mouth Daily., Disp: , Rfl:   •  atorvastatin (LIPITOR) 20 MG tablet, Take 20 mg by mouth daily., Disp: , Rfl:   •  JEROME CONTOUR TEST test strip, USE AS DIRECTED 5 TIMES A DAY. E11.42, Disp: 200 each, Rfl: 4  •  cetirizine (ZyrTEC) 10 MG tablet, Take 10 mg by mouth daily., Disp: , Rfl:   •  Coenzyme Q10 (COQ10) 200 MG capsule, Take 1 tablet/day by mouth., Disp: , Rfl:   •  HUMALOG KWIKPEN 100 UNIT/ML solution pen-injector, INJECT SUBCUTANEOUSLY UP TO 50 UNITS WITH MEALS, Disp: 135 mL, Rfl: 5  •  Insulin Glargine (TOUJEO SOLOSTAR) 300 UNIT/ML solution pen-injector, Inject 64 Units under the skin every night at bedtime., Disp: 13 pen, Rfl: 3  •  Insulin Pen Needle (B-D UF III MINI PEN NEEDLES) 31G X 5 MM misc, Use 5 times daily, Disp: 450 each, Rfl: 3  •  metoprolol succinate XL (TOPROL-XL) 25 MG 24 hr tablet, Take 25 mg by mouth 2 (Two) Times a Day., Disp: , Rfl:   •  OMEGA-3 FATTY ACIDS-VITAMIN E PO, Take 1 capsule by mouth. omega-3 fatty acids-vitamin E 1,000 mg-5 unit capsule. , Disp: , Rfl:   •  ranitidine (ZANTAC) 150 MG tablet, Take 150 mg by mouth 2 (two) times a day., Disp: , Rfl:   •  sucralfate (CARAFATE) 1 G tablet, Take 1 g by mouth 3 (three) times a day., Disp: , Rfl:   •  Vitamin D, Cholecalciferol, 1000 UNITS capsule, 3 tablets Daily., Disp: , Rfl:     Review of Systems    Review of Systems   Constitutional: Negative for activity change, appetite change, chills, diaphoresis, fatigue, fever and unexpected weight change.   HENT: Negative for congestion, drooling, ear discharge, ear pain, facial swelling, mouth sores, nosebleeds, postnasal drip, sinus pressure, sneezing, sore throat, tinnitus, trouble swallowing and voice change.    Eyes:  "Negative.  Negative for photophobia, pain, discharge, redness and itching.   Respiratory: Negative.  Negative for apnea, cough, choking, chest tightness, shortness of breath, wheezing and stridor.    Cardiovascular: Negative.  Negative for chest pain, palpitations and leg swelling.   Gastrointestinal: Negative.  Negative for abdominal distention, abdominal pain, constipation, diarrhea, nausea and vomiting.   Endocrine: Negative.  Negative for cold intolerance, heat intolerance, polydipsia, polyphagia and polyuria.   Genitourinary: Negative for difficulty urinating, dysuria, flank pain and frequency.   Musculoskeletal: Negative for arthralgias, back pain, gait problem, joint swelling, myalgias, neck pain and neck stiffness.   Skin: Negative for color change, pallor, rash and wound.   Allergic/Immunologic: Negative for environmental allergies, food allergies and immunocompromised state.   Neurological: Negative for dizziness, tremors, seizures, syncope, facial asymmetry, speech difficulty, weakness, light-headedness, numbness and headaches.   Hematological: Negative for adenopathy. Does not bruise/bleed easily.   Psychiatric/Behavioral: Negative for agitation, behavioral problems, confusion, decreased concentration, dysphoric mood, hallucinations, self-injury, sleep disturbance and suicidal ideas. The patient is not nervous/anxious and is not hyperactive.         Objective:     /76   Pulse 73   Ht 160 cm (63\")   Wt 75.6 kg (166 lb 11.2 oz)   LMP 02/27/2000 (Within Months) Comment: Postmenopausal  SpO2 94%   BMI 29.53 kg/m²     Physical Exam   Constitutional: She is oriented to person, place, and time. She appears well-developed.   HENT:   Head: Normocephalic.   Right Ear: External ear normal.   Left Ear: External ear normal.   Nose: Nose normal.   Eyes: Conjunctivae and EOM are normal. No scleral icterus.   Neck: Normal range of motion. Neck supple. No tracheal deviation present. No thyromegaly present. "   Cardiovascular: Normal rate, regular rhythm, normal heart sounds and intact distal pulses. Exam reveals no gallop and no friction rub.   No murmur heard.  Pulmonary/Chest: Effort normal and breath sounds normal. No stridor. No respiratory distress. She has no wheezes. She has no rales. She exhibits no tenderness.   Abdominal: Soft. Bowel sounds are normal. She exhibits no distension and no mass. There is no tenderness. There is no rebound and no guarding.   Musculoskeletal: Normal range of motion. She exhibits no tenderness or deformity.   Lymphadenopathy:     She has no cervical adenopathy.   Neurological: She is alert and oriented to person, place, and time. She displays normal reflexes. She exhibits normal muscle tone. Coordination normal.   Skin: No rash noted. No erythema. No pallor.   Psychiatric: She has a normal mood and affect. Her behavior is normal. Judgment and thought content normal.       Lab Review    Assessment/Plan        ICD-10-CM ICD-9-CM   1. Type 2 diabetes mellitus with diabetic polyneuropathy, with long-term current use of insulin (CMS/[facility]) E11.42 250.60    Z79.4 357.2     V58.67   2. Essential hypertension I10 401.9   3. Mixed hyperlipidemia E78.2 272.2   4. Vitamin D deficiency E55.9 268.9   5. B12 deficiency E53.8 266.2   6. Age-related osteoporosis without current pathological fracture M81.0 733.01              Type 2 diabetes mellitus with circulatory disorder    Lab Results   Component Value Date    HGBA1C 7.4 01/09/2018         Toujeo [age]       ------------      Humalog ,  Change to 1.5-2-3  Change to 1.3-1.8-2  Spears eto 1.2-1.7-1.9  But for cereal do 0.9       Correction , 10              during steroids  typically the carb ratio and correction need to be doubled and the basal( Lantus ) needs to be increased by 30%         We will add orals     Suggest      Can't tolerate metformin , diarrhea  Can't tolerate glyburide, developed allergy       jardiance and tradjenta, too expensive  and afraid of side effects   Using freestyle , personal report from Nov 29 to Dec 12 reviewed  avg 161  0% low  Ppg rise hence 0.1 decrease on carb ratio     Mixed hyperlipidemia  On atorvastatin 20 mg qhs and Dec 2018 LDL is 74     Essential hypertension  On metoprolol ho cva and svt     longterm nsaid, on diclofenac, unfortunately can't use longterm   Takes carafte                      I reviewed and summarized records from Mike Erazo MD from 2016 and I reviewed / ordered labs.     No orders of the defined types were placed in this encounter.        A copy of my note was sent to Mike Erazo MD    Please see my above opinion and suggestions.

## 2019-04-04 ENCOUNTER — HOSPITAL ENCOUNTER (OUTPATIENT)
Dept: GENERAL RADIOLOGY | Facility: HOSPITAL | Age: 74
Discharge: HOME | End: 2019-04-04
Admitting: ORTHOPAEDIC SURGERY

## 2019-04-04 ENCOUNTER — APPOINTMENT (OUTPATIENT)
Dept: PREADMISSION TESTING | Facility: HOSPITAL | Age: 74
End: 2019-04-04

## 2019-04-04 VITALS
BODY MASS INDEX: 28.6 KG/M2 | HEART RATE: 71 BPM | SYSTOLIC BLOOD PRESSURE: 131 MMHG | OXYGEN SATURATION: 96 % | WEIGHT: 167.55 LBS | HEIGHT: 64 IN | RESPIRATION RATE: 18 BRPM | DIASTOLIC BLOOD PRESSURE: 83 MMHG

## 2019-04-04 LAB
ALBUMIN SERPL-MCNC: 4.5 G/DL (ref 3.5–5)
ALBUMIN/GLOB SERPL: 1.6 G/DL (ref 1.1–2.5)
ALP SERPL-CCNC: 46 U/L (ref 24–120)
ALT SERPL W P-5'-P-CCNC: 41 U/L (ref 0–54)
ANION GAP SERPL CALCULATED.3IONS-SCNC: 12 MMOL/L (ref 4–13)
APTT PPP: 27.7 SECONDS (ref 24.1–35)
AST SERPL-CCNC: 59 U/L (ref 7–45)
BASOPHILS # BLD AUTO: 0.03 10*3/MM3 (ref 0–0.2)
BASOPHILS NFR BLD AUTO: 0.4 % (ref 0–2)
BILIRUB SERPL-MCNC: 0.5 MG/DL (ref 0.1–1)
BILIRUB UR QL STRIP: NEGATIVE
BUN BLD-MCNC: 10 MG/DL (ref 5–21)
BUN/CREAT SERPL: 16.1 (ref 7–25)
CALCIUM SPEC-SCNC: 9.7 MG/DL (ref 8.4–10.4)
CHLORIDE SERPL-SCNC: 99 MMOL/L (ref 98–110)
CLARITY UR: CLEAR
CO2 SERPL-SCNC: 30 MMOL/L (ref 24–31)
COLOR UR: YELLOW
CREAT BLD-MCNC: 0.62 MG/DL (ref 0.5–1.4)
DEPRECATED RDW RBC AUTO: 45 FL (ref 40–54)
EOSINOPHIL # BLD AUTO: 0.09 10*3/MM3 (ref 0–0.7)
EOSINOPHIL NFR BLD AUTO: 1.2 % (ref 0–4)
ERYTHROCYTE [DISTWIDTH] IN BLOOD BY AUTOMATED COUNT: 13.2 % (ref 12–15)
GFR SERPL CREATININE-BSD FRML MDRD: 95 ML/MIN/1.73
GLOBULIN UR ELPH-MCNC: 2.9 GM/DL
GLUCOSE BLD-MCNC: 169 MG/DL (ref 70–100)
GLUCOSE UR STRIP-MCNC: NEGATIVE MG/DL
HCT VFR BLD AUTO: 40.6 % (ref 37–47)
HGB BLD-MCNC: 13.6 G/DL (ref 12–16)
HGB UR QL STRIP.AUTO: NEGATIVE
IMM GRANULOCYTES # BLD AUTO: 0.01 10*3/MM3 (ref 0–0.05)
IMM GRANULOCYTES NFR BLD AUTO: 0.1 % (ref 0–5)
INR PPP: 1.03 (ref 0.91–1.09)
KETONES UR QL STRIP: NEGATIVE
LEUKOCYTE ESTERASE UR QL STRIP.AUTO: NEGATIVE
LYMPHOCYTES # BLD AUTO: 2.97 10*3/MM3 (ref 0.72–4.86)
LYMPHOCYTES NFR BLD AUTO: 39.5 % (ref 15–45)
MCH RBC QN AUTO: 31.3 PG (ref 28–32)
MCHC RBC AUTO-ENTMCNC: 33.5 G/DL (ref 33–36)
MCV RBC AUTO: 93.5 FL (ref 82–98)
MONOCYTES # BLD AUTO: 0.75 10*3/MM3 (ref 0.19–1.3)
MONOCYTES NFR BLD AUTO: 10 % (ref 4–12)
NEUTROPHILS # BLD AUTO: 3.67 10*3/MM3 (ref 1.87–8.4)
NEUTROPHILS NFR BLD AUTO: 48.8 % (ref 39–78)
NITRITE UR QL STRIP: NEGATIVE
NRBC BLD AUTO-RTO: 0 /100 WBC (ref 0–0)
PH UR STRIP.AUTO: 5.5 [PH] (ref 5–8)
PLATELET # BLD AUTO: 238 10*3/MM3 (ref 130–400)
PMV BLD AUTO: 10.4 FL (ref 6–12)
POTASSIUM BLD-SCNC: 3.6 MMOL/L (ref 3.5–5.3)
PROT SERPL-MCNC: 7.4 G/DL (ref 6.3–8.7)
PROT UR QL STRIP: NEGATIVE
PROTHROMBIN TIME: 13.8 SECONDS (ref 11.9–14.6)
RBC # BLD AUTO: 4.34 10*6/MM3 (ref 4.2–5.4)
SODIUM BLD-SCNC: 141 MMOL/L (ref 135–145)
SP GR UR STRIP: 1.01 (ref 1–1.03)
UROBILINOGEN UR QL STRIP: NORMAL
WBC NRBC COR # BLD: 7.52 10*3/MM3 (ref 4.8–10.8)

## 2019-04-04 PROCEDURE — 93005 ELECTROCARDIOGRAM TRACING: CPT

## 2019-04-04 PROCEDURE — 93010 ELECTROCARDIOGRAM REPORT: CPT | Performed by: INTERNAL MEDICINE

## 2019-04-04 PROCEDURE — 81003 URINALYSIS AUTO W/O SCOPE: CPT | Performed by: ORTHOPAEDIC SURGERY

## 2019-04-04 PROCEDURE — 71046 X-RAY EXAM CHEST 2 VIEWS: CPT

## 2019-04-04 PROCEDURE — 85025 COMPLETE CBC W/AUTO DIFF WBC: CPT | Performed by: ORTHOPAEDIC SURGERY

## 2019-04-04 PROCEDURE — 80053 COMPREHEN METABOLIC PANEL: CPT | Performed by: ORTHOPAEDIC SURGERY

## 2019-04-04 PROCEDURE — 36415 COLL VENOUS BLD VENIPUNCTURE: CPT

## 2019-04-04 PROCEDURE — 85730 THROMBOPLASTIN TIME PARTIAL: CPT | Performed by: ORTHOPAEDIC SURGERY

## 2019-04-04 PROCEDURE — 85610 PROTHROMBIN TIME: CPT | Performed by: ORTHOPAEDIC SURGERY

## 2019-04-04 NOTE — DISCHARGE INSTRUCTIONS
DAY OF SURGERY INSTRUCTIONS        YOUR SURGEON: Jose Horton     PROCEDURE: Anterior Cervical Discectomy Fusion Cervical 5-7    DATE OF SURGERY: April 19, 2019     ARRIVAL TIME: AS DIRECTED BY OFFICE    YOU MAY TAKE THE FOLLOWING MEDICATION(S) THE MORNING OF SURGERY WITH A SIP OF WATER: Metoprolol      ALL OTHER HOME MEDICATION CHECK WITH YOUR PHYSICIAN                MANAGING PAIN AFTER SURGERY    We know you are probably wondering what your pain will be like after surgery.  Following surgery it is unrealistic to expect you will not have pain.   Pain is how our bodies let us know that something is wrong or cautions us to be careful.  That said, our goal is to make your pain tolerable.    Methods we may use to treat your pain include (oral or IV medications, PCAs, epidurals, nerve blocks, etc.)   While some procedures require IV pain medications for a short time after surgery, transitioning to pain medications by mouth allows for better management of pain.   Your nurse will encourage you to take oral pain medications whenever possible.  IV medications work almost immediately, but only last a short while.  Taking medications by mouth allows for a more constant level of medication in your blood stream for a longer period of time.      Once your pain is out of control it is harder to get back under control.  It is important you are aware when your next dose of pain medication is due.  If you are admitted, your nurse may write the time of your next dose on the white board in your room to help you remember.      We are interested in your pain and encourage you to inform us about aggravating factors during your visit.   Many times a simple repositioning every few hours can make a big difference.    If your physician says it is okay, do not let your pain prevent you from getting out of bed. Be sure to call your nurse for assistance prior to getting up so you do not fall.      Before surgery, please decide your  tolerable pain goal.  These faces help describe the pain ratings we use on a 0-10 scale.   Be prepared to tell us your goal and whether or not you take pain or anxiety medications at home.          BEFORE YOU COME TO THE HOSPITAL  (Pre-op instructions)  • Do not eat, drink, smoke or chew gum after midnight the night before surgery.  This also includes no mints.  • Morning of surgery take only the medicines you have been instructed with a sip of water unless otherwise instructed  by your physician.  • Do not shave, wear makeup or dark nail polish.  • Remove all jewelry including rings.  • Leave anything you consider valuable at home.  • Leave your suitcase in the car until after your surgery.  • Bring the following with you if applicable:  o Picture ID and insurance, Medicare or Medicaid cards  o Co-pay/deductible required by insurance (cash, check, credit card)  o Copy of advance directive, living will or power-of- documents if not brought to PAT  o CPAP or BIPAP mask and tubing  o Relaxation aids (MP3 player, book, magazine)  • On the day of surgery check in at registration located at the main entrance of the hospital.       Outpatient Surgery Guidelines, Adult  Outpatient procedures are those for which the person having the procedure is allowed to go home the same day as the procedure. Various procedures are done on an outpatient basis. You should follow some general guidelines if you will be having an outpatient procedure.  LET YOUR HEALTH CARE PROVIDER KNOW ABOUT:  · Any allergies you have.  · All medicines you are taking, including vitamins, herbs, eye drops, creams, and over-the-counter medicines.  · Previous problems you or members of your family have had with the use of anesthetics.  · Any blood disorders you have.  · Previous surgeries you have had.  · Medical conditions you have.  RISKS AND COMPLICATIONS  Your health care provider will discuss possible risks and complications with you before  surgery. Common risks and complications include:    · Problems due to the use of anesthetics.  · Blood loss and replacement (does not apply to minor surgical procedures).  · Temporary increase in pain due to surgery.  · Uncorrected pain or problems that the surgery was meant to correct.  · Infection.  · New damage.  BEFORE THE PROCEDURE  · Ask your health care provider about changing or stopping your regular medicines. You may need to stop taking certain medicines in the days or weeks before the procedure.  · Stop smoking at least 2 weeks before surgery. This lowers your risk for complications during and after surgery. Ask your health care provider for help with this if needed.  · Eat your usual meals and a light supper the day before surgery. Continue fluid intake. Do not drink alcohol.  · Do not eat or drink after midnight the night before your surgery.   · Arrange for someone to take you home and to stay with you for 24 hours after the procedure. Medicine given for your procedure may affect your ability to drive or to care for yourself.  · Call your health care provider's office if you develop an illness or problem that may prevent you from safely having your procedure.  AFTER THE PROCEDURE  After surgery, you will be taken to a recovery area, where your progress will be monitored. If there are no complications, you will be allowed to go home when you are awake, stable, and taking fluids well. You may have numbness around the surgical site. Healing will take some time. You will have tenderness at the surgical site and may have some swelling and bruising. You may also have some nausea.  HOME CARE INSTRUCTIONS  · Do not drive for 24 hours, or as directed by your health care provider. Do not drive while taking prescription pain medicines.  · Do not drink alcohol for 24 hours.  · Do not make important decisions or sign legal documents for 24 hours.  · You may resume a normal diet and activities as directed.  · Do not  lift anything heavier than 10 pounds (4.5 kg) or play contact sports until your health care provider says it is okay.  · Change your bandages (dressings) as directed.  · Only take over-the-counter or prescription medicines as directed by your health care provider.  · Follow up with your health care provider as directed.  SEEK MEDICAL CARE IF:  · You have increased bleeding (more than a small spot) from the surgical site.  · You have redness, swelling, or increasing pain in the wound.  · You see pus coming from the wound.  · You have a fever.  · You notice a bad smell coming from the wound or dressing.  · You feel lightheaded or faint.  · You develop a rash.  · You have trouble breathing.  · You develop allergies.  MAKE SURE YOU:  · Understand these instructions.  · Will watch your condition.  · Will get help right away if you are not doing well or get worse.     This information is not intended to replace advice given to you by your health care provider. Make sure you discuss any questions you have with your health care provider.     Document Released: 09/12/2002 Document Revised: 05/03/2016 Document Reviewed: 05/22/2014  [employer] Interactive Patient Education ©2016 [employer]       Fall Prevention in Hospitals, Adult  As a hospital patient, your condition and the treatments you receive can increase your risk for falls. Some additional risk factors for falls in a hospital include:  · Being in an unfamiliar environment.  · Being on bed rest.  · Your surgery.  · Taking certain medicines.  · Your tubing requirements, such as intravenous (IV) therapy or catheters.  It is important that you learn how to decrease fall risks while at the hospital. Below are important tips that can help prevent falls.  SAFETY TIPS FOR PREVENTING FALLS  Talk about your risk of falling.  · Ask your health care provider why you are at risk for falling. Is it your medicine, illness, tubing placement, or something else?  · Make a plan with  your health care provider to keep you safe from falls.  · Ask your health care provider or pharmacist about side effects of your medicines. Some medicines can make you dizzy or affect your coordination.  Ask for help.  · Ask for help before getting out of bed. You may need to press your call button.  · Ask for assistance in getting safely to the toilet.  · Ask for a walker or cane to be put at your bedside. Ask that most of the side rails on your bed be placed up before your health care provider leaves the room.  · Ask family or friends to sit with you.  · Ask for things that are out of your reach, such as your glasses, hearing aids, telephone, bedside table, or call button.  Follow these tips to avoid falling:  · Stay lying or seated, rather than standing, while waiting for help.  · Wear rubber-soled slippers or shoes whenever you walk in the hospital.  · Avoid quick, sudden movements.  ¨ Change positions slowly.  ¨ Sit on the side of your bed before standing.  ¨ Stand up slowly and wait before you start to walk.  · Let your health care provider know if there is a spill on the floor.  · Pay careful attention to the medical equipment, electrical cords, and tubes around you.  · When you need help, use your call button by your bed or in the bathroom. Wait for one of your health care providers to help you.  · If you feel dizzy or unsure of your footing, return to bed and wait for assistance.  · Avoid being distracted by the TV, telephone, or another person in your room.  · Do not lean or support yourself on rolling objects, such as IV poles or bedside tables.     This information is not intended to replace advice given to you by your health care provider. Make sure you discuss any questions you have with your health care provider.     Document Released: 12/15/2001 Document Revised: 01/08/2016 Document Reviewed: 08/25/2013  [employer] Interactive Patient Education ©2016 [employer]       Surgical Site Infections  FAQs  What is a Surgical Site Infection (SSI)?  A surgical site infection is an infection that occurs after surgery in the part of the body where the surgery took place. Most patients who have surgery do not develop an infection. However, infections develop in about 1 to 3 out of every 100 patients who have surgery.  Some of the common symptoms of a surgical site infection are:  · Redness and pain around the area where you had surgery  · Drainage of cloudy fluid from your surgical wound  · Fever  Can SSIs be treated?  Yes. Most surgical site infections can be treated with antibiotics. The antibiotic given to you depends on the bacteria (germs) causing the infection. Sometimes patients with SSIs also need another surgery to treat the infection.  What are some of the things that hospitals are doing to prevent SSIs?  To prevent SSIs, doctors, nurses, and other healthcare providers:  · Clean their hands and arms up to their elbows with an antiseptic agent just before the surgery.  · Clean their hands with soap and water or an alcohol-based hand rub before and after caring for each patient.  · May remove some of your hair immediately before your surgery using electric clippers if the hair is in the same area where the procedure will occur. They should not shave you with a razor.  · Wear special hair covers, masks, gowns, and gloves during surgery to keep the surgery area clean.  · Give you antibiotics before your surgery starts. In most cases, you should get antibiotics within 60 minutes before the surgery starts and the antibiotics should be stopped within 24 hours after surgery.  · Clean the skin at the site of your surgery with a special soap that kills germs.  What can I do to help prevent SSIs?  Before your surgery:  · Tell your doctor about other medical problems you may have. Health problems such as allergies, diabetes, and obesity could affect your surgery and your treatment.  · Quit smoking. Patients who smoke  get more infections. Talk to your doctor about how you can quit before your surgery.  · Do not shave near where you will have surgery. Shaving with a razor can irritate your skin and make it easier to develop an infection.  At the time of your surgery:  · Speak up if someone tries to shave you with a razor before surgery. Ask why you need to be shaved and talk with your surgeon if you have any concerns.  · Ask if you will get antibiotics before surgery.  After your surgery:  · Make sure that your healthcare providers clean their hands before examining you, either with soap and water or an alcohol-based hand rub.  · If you do not see your providers clean their hands, please ask them to do so.  · Family and friends who visit you should not touch the surgical wound or dressings.  · Family and friends should clean their hands with soap and water or an alcohol-based hand rub before and after visiting you. If you do not see them clean their hands, ask them to clean their hands.  What do I need to do when I go home from the hospital?  · Before you go home, your doctor or nurse should explain everything you need to know about taking care of your wound. Make sure you understand how to care for your wound before you leave the hospital.  · Always clean your hands before and after caring for your wound.  · Before you go home, make sure you know who to contact if you have questions or problems after you get home.  · If you have any symptoms of an infection, such as redness and pain at the surgery site, drainage, or fever, call your doctor immediately.  If you have additional questions, please ask your doctor or nurse.  Developed and co-sponsored by The Society for Healthcare Epidemiology of [place] (SHEA); Infectious Diseases Society of [place] (IDSA); American Hospital Association; Association for Professionals in Infection Control and Epidemiology (APIC); Centers for Disease Control and Prevention (CDC); and The Joint  Commission.     This information is not intended to replace advice given to you by your health care provider. Make sure you discuss any questions you have with your health care provider.     Document Released: 12/23/2014 Document Revised: 01/08/2016 Document Reviewed: 03/02/2016  [employer] Interactive Patient Education ©2016 [employer]       [facility]  CHG 4% Patient Instruction Sheet    Preparing the Skin Before Surgery  Preparing or “prepping” skin before surgery can reduce the risk of infection at the surgical site. To make the process easier,[facility] has chosen 4% Chlorhexidine Gluconate (CHG) antiseptic solution.   The steps below outline the prepping process and should be carefully followed.                                                                                                                                                      Prep the skin at the following time(s):                                                      We recommend you shower the night before surgery, and again the morning of surgery with the 4% CHG antiseptic solution using half of the bottle and a cloth each time.  Dress in clean clothes/sleepwear after showering.  See instructions below for application.          Do not apply any lotions or moisturizers.       Do not shave the area to be prepped for at least 2 days prior to surgery.    Clipping the hair may be done immediately prior to your surgery at the hospital    if needed.    Directions:  Thoroughly rinse your body with water.  Apply 4% CHG to a cloth and wash skin gently, paying special attention to the operative site.  Rinse again thoroughly.  Once you have begun using this product do not apply anything else to your skin. If itching or redness persists, rinse affected areas and discontinue use.    When using this product:  • Keep out of eyes, ears, and mouth.  • If solution should contact these areas, rinse out promptly and thoroughly with water.  • For external  use only.  • Do not use in genital area, on your face or head.      PATIENT/FAMILY/RESPONSIBLE PARTY VERBALIZES UNDERSTANDING OF ABOVE EDUCATION.  COPY OF PAIN SCALE GIVEN AND REVIEWED WITH VERBALIZED UNDERSTANDING.

## 2019-04-19 ENCOUNTER — HOSPITAL ENCOUNTER (INPATIENT)
Facility: HOSPITAL | Age: 74
LOS: 1 days | Discharge: HOME | End: 2019-04-20
Attending: ORTHOPAEDIC SURGERY | Admitting: ORTHOPAEDIC SURGERY

## 2019-04-19 ENCOUNTER — ANESTHESIA (OUTPATIENT)
Dept: PERIOP | Facility: HOSPITAL | Age: 74
End: 2019-04-19

## 2019-04-19 ENCOUNTER — APPOINTMENT (OUTPATIENT)
Dept: GENERAL RADIOLOGY | Facility: HOSPITAL | Age: 74
End: 2019-04-19

## 2019-04-19 ENCOUNTER — ANESTHESIA EVENT (OUTPATIENT)
Dept: PERIOP | Facility: HOSPITAL | Age: 74
End: 2019-04-19

## 2019-04-19 DIAGNOSIS — Z78.9 IMPAIRED MOBILITY AND ADLS: ICD-10-CM

## 2019-04-19 DIAGNOSIS — Z74.09 IMPAIRED MOBILITY: ICD-10-CM

## 2019-04-19 DIAGNOSIS — Z74.09 IMPAIRED MOBILITY AND ADLS: ICD-10-CM

## 2019-04-19 PROBLEM — M48.02 STENOSIS, CERVICAL SPINE: Status: ACTIVE | Noted: 2019-04-19

## 2019-04-19 LAB
ABO GROUP BLD: NORMAL
BLD GP AB SCN SERPL QL: NEGATIVE
GLUCOSE BLDC GLUCOMTR-MCNC: 149 MG/DL (ref 70–130)
GLUCOSE BLDC GLUCOMTR-MCNC: 162 MG/DL (ref 70–130)
GLUCOSE BLDC GLUCOMTR-MCNC: 184 MG/DL (ref 70–130)
GLUCOSE BLDC GLUCOMTR-MCNC: 212 MG/DL (ref 70–130)
GLUCOSE BLDC GLUCOMTR-MCNC: 218 MG/DL (ref 70–130)
GLUCOSE BLDC GLUCOMTR-MCNC: 231 MG/DL (ref 70–130)
RH BLD: POSITIVE
T&S EXPIRATION DATE: NORMAL

## 2019-04-19 PROCEDURE — C1713 ANCHOR/SCREW BN/BN,TIS/BN: HCPCS | Performed by: ORTHOPAEDIC SURGERY

## 2019-04-19 PROCEDURE — 25010000002 FENTANYL CITRATE (PF) 100 MCG/2ML SOLUTION: Performed by: ANESTHESIOLOGY

## 2019-04-19 PROCEDURE — 25010000002 ONDANSETRON PER 1 MG: Performed by: ANESTHESIOLOGY

## 2019-04-19 PROCEDURE — 94799 UNLISTED PULMONARY SVC/PX: CPT

## 2019-04-19 PROCEDURE — 25010000002 SUCCINYLCHOLINE PER 20 MG: Performed by: NURSE ANESTHETIST, CERTIFIED REGISTERED

## 2019-04-19 PROCEDURE — 25010000002 PROPOFOL 1000 MG/ML EMULSION: Performed by: NURSE ANESTHETIST, CERTIFIED REGISTERED

## 2019-04-19 PROCEDURE — 25010000002 MIDAZOLAM PER 1 MG: Performed by: NURSE ANESTHETIST, CERTIFIED REGISTERED

## 2019-04-19 PROCEDURE — 76000 FLUOROSCOPY <1 HR PHYS/QHP: CPT

## 2019-04-19 PROCEDURE — 25010000002 ONDANSETRON PER 1 MG: Performed by: NURSE ANESTHETIST, CERTIFIED REGISTERED

## 2019-04-19 PROCEDURE — 25010000002 DEXAMETHASONE PER 1 MG: Performed by: ANESTHESIOLOGY

## 2019-04-19 PROCEDURE — 0RG2070 FUSION OF 2 OR MORE CERVICAL VERTEBRAL JOINTS WITH AUTOLOGOUS TISSUE SUBSTITUTE, ANTERIOR APPROACH, ANTERIOR COLUMN, OPEN APPROACH: ICD-10-PCS | Performed by: ORTHOPAEDIC SURGERY

## 2019-04-19 PROCEDURE — 86850 RBC ANTIBODY SCREEN: CPT | Performed by: ORTHOPAEDIC SURGERY

## 2019-04-19 PROCEDURE — 0RT30ZZ RESECTION OF CERVICAL VERTEBRAL DISC, OPEN APPROACH: ICD-10-PCS | Performed by: ORTHOPAEDIC SURGERY

## 2019-04-19 PROCEDURE — 97166 OT EVAL MOD COMPLEX 45 MIN: CPT

## 2019-04-19 PROCEDURE — 25010000002 MIDAZOLAM PER 1 MG: Performed by: ANESTHESIOLOGY

## 2019-04-19 PROCEDURE — 25010000002 PROPOFOL 10 MG/ML EMULSION: Performed by: NURSE ANESTHETIST, CERTIFIED REGISTERED

## 2019-04-19 PROCEDURE — 0RG20A0 FUSION OF 2 OR MORE CERVICAL VERTEBRAL JOINTS WITH INTERBODY FUSION DEVICE, ANTERIOR APPROACH, ANTERIOR COLUMN, OPEN APPROACH: ICD-10-PCS | Performed by: ORTHOPAEDIC SURGERY

## 2019-04-19 PROCEDURE — 72040 X-RAY EXAM NECK SPINE 2-3 VW: CPT

## 2019-04-19 PROCEDURE — L0174 CERV SR 2PC THOR EXT PRE OTS: HCPCS | Performed by: ORTHOPAEDIC SURGERY

## 2019-04-19 PROCEDURE — 25010000003 CEFAZOLIN 1-4 GM/50ML-% SOLUTION: Performed by: ORTHOPAEDIC SURGERY

## 2019-04-19 PROCEDURE — 82962 GLUCOSE BLOOD TEST: CPT

## 2019-04-19 PROCEDURE — 97161 PT EVAL LOW COMPLEX 20 MIN: CPT | Performed by: PHYSICAL THERAPIST

## 2019-04-19 PROCEDURE — 86901 BLOOD TYPING SEROLOGIC RH(D): CPT | Performed by: ORTHOPAEDIC SURGERY

## 2019-04-19 PROCEDURE — 86900 BLOOD TYPING SEROLOGIC ABO: CPT | Performed by: ORTHOPAEDIC SURGERY

## 2019-04-19 DEVICE — INTERBODY FUSION DEVICE
Type: IMPLANTABLE DEVICE | Status: FUNCTIONAL
Brand: FORTILINK-C IBF SYSTEM

## 2019-04-19 DEVICE — ANTERIOR CERVICAL PLATE ASSEMBLY, 2-LEVEL, 028MM
Type: IMPLANTABLE DEVICE | Status: FUNCTIONAL
Brand: TRESTLE LUXE

## 2019-04-19 DEVICE — 4.0MM VARIABLE ANGLE, SELF-DRILLING HEXALOBE SCREW, 14MM
Type: IMPLANTABLE DEVICE | Status: FUNCTIONAL
Brand: TRESTLE LUXE

## 2019-04-19 DEVICE — MATRX BONE VIBONE 1CC: Type: IMPLANTABLE DEVICE | Status: FUNCTIONAL

## 2019-04-19 DEVICE — 4.0MM VARIABLE ANGLE, SELF-DRILLING HEXALOBE SCREW, 12MM
Type: IMPLANTABLE DEVICE | Status: FUNCTIONAL
Brand: TRESTLE LUXE

## 2019-04-19 RX ORDER — SUCRALFATE 1 G/1
1 TABLET ORAL
Status: DISCONTINUED | OUTPATIENT
Start: 2019-04-19 | End: 2019-04-20 | Stop reason: HOSPADM

## 2019-04-19 RX ORDER — SODIUM CHLORIDE 9 MG/ML
75 INJECTION, SOLUTION INTRAVENOUS CONTINUOUS
Status: DISCONTINUED | OUTPATIENT
Start: 2019-04-19 | End: 2019-04-20 | Stop reason: HOSPADM

## 2019-04-19 RX ORDER — MIDAZOLAM HYDROCHLORIDE 1 MG/ML
INJECTION INTRAMUSCULAR; INTRAVENOUS AS NEEDED
Status: DISCONTINUED | OUTPATIENT
Start: 2019-04-19 | End: 2019-04-19 | Stop reason: SURG

## 2019-04-19 RX ORDER — CETIRIZINE HYDROCHLORIDE 10 MG/1
10 TABLET ORAL DAILY
Status: DISCONTINUED | OUTPATIENT
Start: 2019-04-19 | End: 2019-04-20 | Stop reason: HOSPADM

## 2019-04-19 RX ORDER — NALOXONE HCL 0.4 MG/ML
0.4 VIAL (ML) INJECTION AS NEEDED
Status: DISCONTINUED | OUTPATIENT
Start: 2019-04-19 | End: 2019-04-19 | Stop reason: HOSPADM

## 2019-04-19 RX ORDER — ACETAMINOPHEN 500 MG
1000 TABLET ORAL ONCE
Status: COMPLETED | OUTPATIENT
Start: 2019-04-19 | End: 2019-04-19

## 2019-04-19 RX ORDER — MIDAZOLAM HYDROCHLORIDE 1 MG/ML
1 INJECTION INTRAMUSCULAR; INTRAVENOUS
Status: DISCONTINUED | OUTPATIENT
Start: 2019-04-19 | End: 2019-04-19

## 2019-04-19 RX ORDER — SCOLOPAMINE TRANSDERMAL SYSTEM 1 MG/1
1 PATCH, EXTENDED RELEASE TRANSDERMAL CONTINUOUS
Status: DISCONTINUED | OUTPATIENT
Start: 2019-04-19 | End: 2019-04-19

## 2019-04-19 RX ORDER — DEXAMETHASONE SODIUM PHOSPHATE 4 MG/ML
4 INJECTION, SOLUTION INTRA-ARTICULAR; INTRALESIONAL; INTRAMUSCULAR; INTRAVENOUS; SOFT TISSUE ONCE AS NEEDED
Status: COMPLETED | OUTPATIENT
Start: 2019-04-19 | End: 2019-04-19

## 2019-04-19 RX ORDER — LIDOCAINE HYDROCHLORIDE 40 MG/ML
SOLUTION TOPICAL AS NEEDED
Status: DISCONTINUED | OUTPATIENT
Start: 2019-04-19 | End: 2019-04-19 | Stop reason: SURG

## 2019-04-19 RX ORDER — ATORVASTATIN CALCIUM 10 MG/1
20 TABLET, FILM COATED ORAL NIGHTLY
Status: DISCONTINUED | OUTPATIENT
Start: 2019-04-19 | End: 2019-04-20 | Stop reason: HOSPADM

## 2019-04-19 RX ORDER — IBUPROFEN 600 MG/1
600 TABLET ORAL ONCE AS NEEDED
Status: DISCONTINUED | OUTPATIENT
Start: 2019-04-19 | End: 2019-04-19 | Stop reason: HOSPADM

## 2019-04-19 RX ORDER — LIDOCAINE HYDROCHLORIDE 20 MG/ML
INJECTION, SOLUTION INFILTRATION; PERINEURAL AS NEEDED
Status: DISCONTINUED | OUTPATIENT
Start: 2019-04-19 | End: 2019-04-19 | Stop reason: SURG

## 2019-04-19 RX ORDER — OXYCODONE AND ACETAMINOPHEN 10; 325 MG/1; MG/1
1 TABLET ORAL ONCE AS NEEDED
Status: COMPLETED | OUTPATIENT
Start: 2019-04-19 | End: 2019-04-19

## 2019-04-19 RX ORDER — SODIUM CHLORIDE 0.9 % (FLUSH) 0.9 %
3-10 SYRINGE (ML) INJECTION AS NEEDED
Status: DISCONTINUED | OUTPATIENT
Start: 2019-04-19 | End: 2019-04-19

## 2019-04-19 RX ORDER — SODIUM CHLORIDE 0.9 % (FLUSH) 0.9 %
3 SYRINGE (ML) INJECTION AS NEEDED
Status: DISCONTINUED | OUTPATIENT
Start: 2019-04-19 | End: 2019-04-19

## 2019-04-19 RX ORDER — ROCURONIUM BROMIDE 10 MG/ML
INJECTION, SOLUTION INTRAVENOUS AS NEEDED
Status: DISCONTINUED | OUTPATIENT
Start: 2019-04-19 | End: 2019-04-19 | Stop reason: SURG

## 2019-04-19 RX ORDER — SUCCINYLCHOLINE CHLORIDE 20 MG/ML
INJECTION INTRAMUSCULAR; INTRAVENOUS AS NEEDED
Status: DISCONTINUED | OUTPATIENT
Start: 2019-04-19 | End: 2019-04-19 | Stop reason: SURG

## 2019-04-19 RX ORDER — ONDANSETRON 2 MG/ML
INJECTION INTRAMUSCULAR; INTRAVENOUS AS NEEDED
Status: DISCONTINUED | OUTPATIENT
Start: 2019-04-19 | End: 2019-04-19 | Stop reason: SURG

## 2019-04-19 RX ORDER — ONDANSETRON 2 MG/ML
4 INJECTION INTRAMUSCULAR; INTRAVENOUS EVERY 6 HOURS PRN
Status: DISCONTINUED | OUTPATIENT
Start: 2019-04-19 | End: 2019-04-19 | Stop reason: SDUPTHER

## 2019-04-19 RX ORDER — OXYCODONE HCL 20 MG/1
20 TABLET, FILM COATED, EXTENDED RELEASE ORAL ONCE
Status: COMPLETED | OUTPATIENT
Start: 2019-04-19 | End: 2019-04-19

## 2019-04-19 RX ORDER — MELATONIN
3000 DAILY
Status: DISCONTINUED | OUTPATIENT
Start: 2019-04-20 | End: 2019-04-20 | Stop reason: HOSPADM

## 2019-04-19 RX ORDER — OXYCODONE AND ACETAMINOPHEN 10; 325 MG/1; MG/1
2 TABLET ORAL EVERY 4 HOURS PRN
Status: DISCONTINUED | OUTPATIENT
Start: 2019-04-19 | End: 2019-04-20 | Stop reason: HOSPADM

## 2019-04-19 RX ORDER — FAMOTIDINE 20 MG/1
20 TABLET, FILM COATED ORAL EVERY 12 HOURS SCHEDULED
Status: DISCONTINUED | OUTPATIENT
Start: 2019-04-19 | End: 2019-04-20 | Stop reason: HOSPADM

## 2019-04-19 RX ORDER — TIZANIDINE 4 MG/1
4 TABLET ORAL EVERY 8 HOURS PRN
Status: DISCONTINUED | OUTPATIENT
Start: 2019-04-19 | End: 2019-04-20 | Stop reason: HOSPADM

## 2019-04-19 RX ORDER — SODIUM CHLORIDE 0.9 % (FLUSH) 0.9 %
3 SYRINGE (ML) INJECTION EVERY 12 HOURS SCHEDULED
Status: DISCONTINUED | OUTPATIENT
Start: 2019-04-19 | End: 2019-04-20 | Stop reason: HOSPADM

## 2019-04-19 RX ORDER — METOCLOPRAMIDE HYDROCHLORIDE 5 MG/ML
5 INJECTION INTRAMUSCULAR; INTRAVENOUS
Status: DISCONTINUED | OUTPATIENT
Start: 2019-04-19 | End: 2019-04-19 | Stop reason: HOSPADM

## 2019-04-19 RX ORDER — PROPOFOL 10 MG/ML
VIAL (ML) INTRAVENOUS AS NEEDED
Status: DISCONTINUED | OUTPATIENT
Start: 2019-04-19 | End: 2019-04-19 | Stop reason: SURG

## 2019-04-19 RX ORDER — LABETALOL HYDROCHLORIDE 5 MG/ML
5 INJECTION, SOLUTION INTRAVENOUS
Status: DISCONTINUED | OUTPATIENT
Start: 2019-04-19 | End: 2019-04-19 | Stop reason: HOSPADM

## 2019-04-19 RX ORDER — MIDAZOLAM HYDROCHLORIDE 1 MG/ML
2 INJECTION INTRAMUSCULAR; INTRAVENOUS
Status: DISCONTINUED | OUTPATIENT
Start: 2019-04-19 | End: 2019-04-19

## 2019-04-19 RX ORDER — SODIUM CHLORIDE, SODIUM LACTATE, POTASSIUM CHLORIDE, CALCIUM CHLORIDE 600; 310; 30; 20 MG/100ML; MG/100ML; MG/100ML; MG/100ML
1000 INJECTION, SOLUTION INTRAVENOUS CONTINUOUS
Status: DISCONTINUED | OUTPATIENT
Start: 2019-04-19 | End: 2019-04-19

## 2019-04-19 RX ORDER — METOPROLOL SUCCINATE 25 MG/1
25 TABLET, EXTENDED RELEASE ORAL EVERY 12 HOURS SCHEDULED
Status: DISCONTINUED | OUTPATIENT
Start: 2019-04-19 | End: 2019-04-20 | Stop reason: HOSPADM

## 2019-04-19 RX ORDER — ONDANSETRON 2 MG/ML
4 INJECTION INTRAMUSCULAR; INTRAVENOUS ONCE AS NEEDED
Status: COMPLETED | OUTPATIENT
Start: 2019-04-19 | End: 2019-04-19

## 2019-04-19 RX ORDER — SODIUM CHLORIDE 0.9 % (FLUSH) 0.9 %
3 SYRINGE (ML) INJECTION EVERY 12 HOURS SCHEDULED
Status: DISCONTINUED | OUTPATIENT
Start: 2019-04-19 | End: 2019-04-19

## 2019-04-19 RX ORDER — SODIUM CHLORIDE, SODIUM LACTATE, POTASSIUM CHLORIDE, CALCIUM CHLORIDE 600; 310; 30; 20 MG/100ML; MG/100ML; MG/100ML; MG/100ML
100 INJECTION, SOLUTION INTRAVENOUS CONTINUOUS PRN
Status: DISCONTINUED | OUTPATIENT
Start: 2019-04-19 | End: 2019-04-19

## 2019-04-19 RX ORDER — OXYCODONE AND ACETAMINOPHEN 7.5; 325 MG/1; MG/1
2 TABLET ORAL EVERY 4 HOURS PRN
Status: DISCONTINUED | OUTPATIENT
Start: 2019-04-19 | End: 2019-04-19 | Stop reason: HOSPADM

## 2019-04-19 RX ORDER — FENTANYL CITRATE 50 UG/ML
25 INJECTION, SOLUTION INTRAMUSCULAR; INTRAVENOUS AS NEEDED
Status: COMPLETED | OUTPATIENT
Start: 2019-04-19 | End: 2019-04-19

## 2019-04-19 RX ORDER — SODIUM CHLORIDE, SODIUM LACTATE, POTASSIUM CHLORIDE, CALCIUM CHLORIDE 600; 310; 30; 20 MG/100ML; MG/100ML; MG/100ML; MG/100ML
100 INJECTION, SOLUTION INTRAVENOUS CONTINUOUS
Status: DISCONTINUED | OUTPATIENT
Start: 2019-04-19 | End: 2019-04-19

## 2019-04-19 RX ORDER — CEFAZOLIN SODIUM 1 G/50ML
1 INJECTION, SOLUTION INTRAVENOUS EVERY 8 HOURS
Status: COMPLETED | OUTPATIENT
Start: 2019-04-19 | End: 2019-04-20

## 2019-04-19 RX ORDER — FAMOTIDINE 10 MG/ML
20 INJECTION, SOLUTION INTRAVENOUS EVERY 12 HOURS SCHEDULED
Status: DISCONTINUED | OUTPATIENT
Start: 2019-04-19 | End: 2019-04-20 | Stop reason: HOSPADM

## 2019-04-19 RX ORDER — ONDANSETRON 4 MG/1
4 TABLET, FILM COATED ORAL EVERY 6 HOURS PRN
Status: DISCONTINUED | OUTPATIENT
Start: 2019-04-19 | End: 2019-04-20 | Stop reason: HOSPADM

## 2019-04-19 RX ORDER — SUFENTANIL CITRATE 50 UG/ML
INJECTION EPIDURAL; INTRAVENOUS AS NEEDED
Status: DISCONTINUED | OUTPATIENT
Start: 2019-04-19 | End: 2019-04-19 | Stop reason: SURG

## 2019-04-19 RX ORDER — MAGNESIUM HYDROXIDE 1200 MG/15ML
LIQUID ORAL AS NEEDED
Status: DISCONTINUED | OUTPATIENT
Start: 2019-04-19 | End: 2019-04-19 | Stop reason: HOSPADM

## 2019-04-19 RX ORDER — SODIUM CHLORIDE 0.9 % (FLUSH) 0.9 %
3-10 SYRINGE (ML) INJECTION AS NEEDED
Status: DISCONTINUED | OUTPATIENT
Start: 2019-04-19 | End: 2019-04-20 | Stop reason: HOSPADM

## 2019-04-19 RX ORDER — MEPERIDINE HYDROCHLORIDE 25 MG/ML
12.5 INJECTION INTRAMUSCULAR; INTRAVENOUS; SUBCUTANEOUS
Status: DISCONTINUED | OUTPATIENT
Start: 2019-04-19 | End: 2019-04-19 | Stop reason: HOSPADM

## 2019-04-19 RX ORDER — DIPHENHYDRAMINE HCL 25 MG
25 CAPSULE ORAL NIGHTLY PRN
Status: DISCONTINUED | OUTPATIENT
Start: 2019-04-19 | End: 2019-04-20 | Stop reason: HOSPADM

## 2019-04-19 RX ORDER — SODIUM CHLORIDE 0.9 % (FLUSH) 0.9 %
1-10 SYRINGE (ML) INJECTION AS NEEDED
Status: DISCONTINUED | OUTPATIENT
Start: 2019-04-19 | End: 2019-04-19

## 2019-04-19 RX ORDER — ONDANSETRON 2 MG/ML
4 INJECTION INTRAMUSCULAR; INTRAVENOUS EVERY 6 HOURS PRN
Status: DISCONTINUED | OUTPATIENT
Start: 2019-04-19 | End: 2019-04-20 | Stop reason: HOSPADM

## 2019-04-19 RX ADMIN — PROPOFOL 150 MCG/KG/MIN: 10 INJECTION, EMULSION INTRAVENOUS at 09:54

## 2019-04-19 RX ADMIN — SODIUM CHLORIDE, POTASSIUM CHLORIDE, SODIUM LACTATE AND CALCIUM CHLORIDE 100 ML/HR: 600; 310; 30; 20 INJECTION, SOLUTION INTRAVENOUS at 07:50

## 2019-04-19 RX ADMIN — CEFAZOLIN 1 G: 1 INJECTION, POWDER, FOR SOLUTION INTRAMUSCULAR; INTRAVENOUS; PARENTERAL at 09:55

## 2019-04-19 RX ADMIN — FENTANYL CITRATE 25 MCG: 50 INJECTION, SOLUTION INTRAMUSCULAR; INTRAVENOUS at 13:07

## 2019-04-19 RX ADMIN — SODIUM CHLORIDE, PRESERVATIVE FREE 3 ML: 5 INJECTION INTRAVENOUS at 20:21

## 2019-04-19 RX ADMIN — ONDANSETRON 4 MG: 2 INJECTION INTRAMUSCULAR; INTRAVENOUS at 13:02

## 2019-04-19 RX ADMIN — METOPROLOL SUCCINATE 25 MG: 25 TABLET, FILM COATED, EXTENDED RELEASE ORAL at 20:18

## 2019-04-19 RX ADMIN — LIDOCAINE HYDROCHLORIDE 1 EACH: 40 SOLUTION TOPICAL at 09:51

## 2019-04-19 RX ADMIN — SCOPALAMINE 1 PATCH: 1 PATCH, EXTENDED RELEASE TRANSDERMAL at 09:16

## 2019-04-19 RX ADMIN — DEXAMETHASONE SODIUM PHOSPHATE 4 MG: 4 INJECTION, SOLUTION INTRAMUSCULAR; INTRAVENOUS at 09:17

## 2019-04-19 RX ADMIN — SODIUM CHLORIDE, POTASSIUM CHLORIDE, SODIUM LACTATE AND CALCIUM CHLORIDE 1000 ML: 600; 310; 30; 20 INJECTION, SOLUTION INTRAVENOUS at 07:50

## 2019-04-19 RX ADMIN — FENTANYL CITRATE 25 MCG: 50 INJECTION, SOLUTION INTRAMUSCULAR; INTRAVENOUS at 13:15

## 2019-04-19 RX ADMIN — OXYCODONE HYDROCHLORIDE AND ACETAMINOPHEN 2 TABLET: 10; 325 TABLET ORAL at 20:18

## 2019-04-19 RX ADMIN — MIDAZOLAM HYDROCHLORIDE 3 MG: 1 INJECTION, SOLUTION INTRAMUSCULAR; INTRAVENOUS at 09:49

## 2019-04-19 RX ADMIN — MIDAZOLAM HYDROCHLORIDE 2 MG: 1 INJECTION, SOLUTION INTRAMUSCULAR; INTRAVENOUS at 09:17

## 2019-04-19 RX ADMIN — ACETAMINOPHEN 1000 MG: 500 TABLET, FILM COATED ORAL at 09:19

## 2019-04-19 RX ADMIN — ROCURONIUM BROMIDE 10 MG: 10 INJECTION INTRAVENOUS at 09:49

## 2019-04-19 RX ADMIN — SUFENTANIL CITRATE 20 MCG: 50 INJECTION, SOLUTION EPIDURAL; INTRAVENOUS at 10:54

## 2019-04-19 RX ADMIN — ONDANSETRON HYDROCHLORIDE 4 MG: 2 SOLUTION INTRAMUSCULAR; INTRAVENOUS at 11:55

## 2019-04-19 RX ADMIN — PROPOFOL 150 MG: 10 INJECTION, EMULSION INTRAVENOUS at 09:49

## 2019-04-19 RX ADMIN — CEFAZOLIN SODIUM 1 G: 1 INJECTION, SOLUTION INTRAVENOUS at 17:35

## 2019-04-19 RX ADMIN — FENTANYL CITRATE 25 MCG: 50 INJECTION, SOLUTION INTRAMUSCULAR; INTRAVENOUS at 13:10

## 2019-04-19 RX ADMIN — SUCCINYLCHOLINE CHLORIDE 120 MG: 20 INJECTION, SOLUTION INTRAMUSCULAR; INTRAVENOUS at 09:50

## 2019-04-19 RX ADMIN — OXYCODONE HYDROCHLORIDE AND ACETAMINOPHEN 1 TABLET: 10; 325 TABLET ORAL at 12:59

## 2019-04-19 RX ADMIN — FENTANYL CITRATE 25 MCG: 50 INJECTION, SOLUTION INTRAMUSCULAR; INTRAVENOUS at 13:12

## 2019-04-19 RX ADMIN — OXYCODONE HYDROCHLORIDE 20 MG: 20 TABLET, FILM COATED, EXTENDED RELEASE ORAL at 07:30

## 2019-04-19 RX ADMIN — SODIUM CHLORIDE 75 ML/HR: 9 INJECTION, SOLUTION INTRAVENOUS at 14:20

## 2019-04-19 RX ADMIN — SUCRALFATE 1 G: 1 TABLET ORAL at 17:35

## 2019-04-19 RX ADMIN — CETIRIZINE HYDROCHLORIDE 10 MG: 10 TABLET, FILM COATED ORAL at 16:07

## 2019-04-19 RX ADMIN — LIDOCAINE HYDROCHLORIDE 100 MG: 20 INJECTION, SOLUTION INFILTRATION; PERINEURAL at 09:49

## 2019-04-19 RX ADMIN — SUFENTANIL CITRATE 25 MCG: 50 INJECTION, SOLUTION EPIDURAL; INTRAVENOUS at 09:49

## 2019-04-19 RX ADMIN — ATORVASTATIN CALCIUM 20 MG: 10 TABLET, FILM COATED ORAL at 20:18

## 2019-04-19 RX ADMIN — SUFENTANIL CITRATE 25 MCG: 50 INJECTION, SOLUTION EPIDURAL; INTRAVENOUS at 10:20

## 2019-04-19 RX ADMIN — HYDROMORPHONE HYDROCHLORIDE 1 MG: 1 INJECTION, SOLUTION INTRAMUSCULAR; INTRAVENOUS; SUBCUTANEOUS at 22:43

## 2019-04-19 RX ADMIN — EPHEDRINE SULFATE 10 MG: 50 INJECTION INTRAMUSCULAR; INTRAVENOUS; SUBCUTANEOUS at 10:00

## 2019-04-19 RX ADMIN — PROPOFOL: 10 INJECTION, EMULSION INTRAVENOUS at 11:18

## 2019-04-19 RX ADMIN — FAMOTIDINE 20 MG: 10 INJECTION, SOLUTION INTRAVENOUS at 20:18

## 2019-04-19 NOTE — ANESTHESIA PREPROCEDURE EVALUATION
Anesthesia Evaluation     Patient summary reviewed   history of anesthetic complications: PONV  NPO Solid Status: > 8 hours  NPO Liquid Status: > 8 hours           Airway   Mallampati: II  TM distance: >3 FB  Neck ROM: full  Dental - normal exam     Pulmonary    (-) COPD, asthma, sleep apnea, not a smoker  Cardiovascular   Exercise tolerance: good (4-7 METS)    ECG reviewed  Patient on routine beta blocker and Beta blocker given within 24 hours of surgery    (+) hypertension, dysrhythmias Tachycardia,   (-) pacemaker, past MI, angina, cardiac stents      Neuro/Psych  (+) TIA (1999),     (-) seizures, CVA  GI/Hepatic/Renal/Endo    (+)  GERD,  diabetes mellitus (continuous glucose monitor) type 2 using insulin,   (-) liver disease    Musculoskeletal     (+) back pain,   Abdominal    Substance History      OB/GYN          Other                          Anesthesia Plan    ASA 3     general     intravenous induction   Anesthetic plan, all risks, benefits, and alternatives have been provided, discussed and informed consent has been obtained with: patient.

## 2019-04-19 NOTE — PLAN OF CARE
Problem: Patient Care Overview  Goal: Plan of Care Review  Outcome: Ongoing (interventions implemented as appropriate)   04/19/19 5477   Coping/Psychosocial   Plan of Care Reviewed With patient   Plan of Care Review   Progress no change   OTHER   Outcome Summary Recieved pt from PACU, report from [place]. Pt alert and orientedx4. Assist pt when out of bed. Dressing on neck dry and intact. Family at bedside.       Problem: Fall Risk (Adult)  Goal: Identify Related Risk Factors and Signs and Symptoms  Outcome: Ongoing (interventions implemented as appropriate)    Goal: Absence of Fall  Outcome: Ongoing (interventions implemented as appropriate)    Goal: Absence of Fall  Outcome: Ongoing (interventions implemented as appropriate)      Problem: Surgery Nonspecified (Adult)  Goal: Signs and Symptoms of Listed Potential Problems Will be Absent, Minimized or Managed (Surgery Nonspecified)  Outcome: Ongoing (interventions implemented as appropriate)

## 2019-04-19 NOTE — OP NOTE
Anterior cervical discectomy with fusion procedure Note    Alisia Velez  4/19/2019    Pre-op Diagnosis:     1.  Status post previous right C7-T1 foraminotomy, discectomy, Dr. Garcia, December 2004  2.  Increasing chronic neck pain  3.  Bilateral shoulder and arm radiculopathy, left much worse than right  4.  Multilevel cervical degenerative disc disease C3-T1, severe C5-7  5.  Multilevel cervical facet arthropathy  6.  Severe central and bilateral foraminal stenosis C5-7  7.  Mild central stenosis C3-5    Post-op Diagnosis:    same    Procedure/CPT® Codes:    1.  Anterior cervical discectomy with interbody fusion C5-6, C6-7  2.  Anterior spinal instrumentation C5 to 7 (ATEC anterior plate and screws)  3.  Use of PEKK interbody biomechanical device for fusion C5-6, C6-7 (RTI PEKK spacers × 2)  4.  Use of locally obtained autograft bone for fusion C5 to 7  5.  Use of allograft bone matrix for fusion C5 to 7  6.  Use of operating microscope for decompression and microdissection  7.  Use of fluoroscopy for confirmation of surgical level, placement of instrumentation and PEKK spacers  8.  Intraoperative neuromonitoring    Anesthesia: General    Surgeon: DENG Horton MD    Assistant: Daryl Galan PA-C    Estimated Blood Loss: 25 mL    Complications: None    Condition: Stable to PACU.    Indications:    The patient is a 73-year-old who sees Dr. Mike Ramirez for medical issues.  She presented to the office with increasing chronic neck pain as well as symptoms down both shoulders and arms, with the left side worse than the right.  She had a history of a previous right C7-T1 posterior foraminotomy discectomy procedure done in 2004.  Imaging studies revealed multilevel cervical degenerative disc disease from C3-T1, that was severe at C5-7 where there was also severe central and bilateral foraminal stenosis.  She had mild central stenosis at C3-4 and C4-5, but it was felt that the majority of her symptoms were coming  from C5-7.    After failing all conservative measures it was mutually decided that surgery would be the best option.  Risks, benefits, and complications of surgery were discussed with the patient.  The patient appeared well informed and wished to proceed.  We specifically discussed the risks of infection, blood loss, nerve root injury, CSF leak, spinal cord injury, and the possibility of incomplete resolution of symptoms.  We also discussed the possible risk of a nonunion and the potential need for additional surgery in the event of a pseudoarthrosis or hardware failure.    Operative Procedure:    After obtaining informed consent and verifying the correct operative levels, the patient was brought to the operating room and placed supine on an operating table.  A general anesthetic was provided by the anesthesia service with the assistance of an endotracheal tube.  Once this was properly positioned and secured, a bump was placed under the patient's shoulders placing the neck into a gentle extended position.  Head halter traction was then used with 10 pounds weight to maintain head position.  The shoulders were taped using 3 inch wide cloth tape to assist with radiographic visualization.  Fluoroscopy was used to identify the disc spaces from C5 to 7, and the skin was marked for our planned incision.  The anterior neck region was then prepped and draped in usual sterile fashion.  A surgical timeout was taken to confirm this was the correct patient, we are working at the correct levels, and that preoperative antibiotics were given in a timely fashion.    A transverse incision was then created over the anterior cervical region using a 10 blade scalpel directly centered over the levels of interest.  Dissection was carried sharply through subcutaneous tissues.  The platysma was divided in line with the incision and blunt dissection was carried along the medial border of the sternocleidomastoid muscle, lateral to the strap  musculature the neck.  The carotid pulse was then palpated, and dissection was carried medial to the carotid sheath and lateral to the trachea and esophagus.  Handheld Cloward retractors along with Kitners were used to dissect through pretracheal and prevertebral fascia.  A radiographic marker was placed into one of the disc spaces and fluoroscopy was used to confirm that we are indeed at the correct levels.  The longus coli muscles were then elevated from either side of the spine using Bovie cautery.    An initial discectomy was started by creating an annulotomy with Bovie cautery.  A Ross elevator was used to remove some of the disc material off of the endplates.  Disc material was initially removed using forward angled curettes and pituitaries.  Some massive anterior osteophytes were removed using a rongeur.  All retrieved bone was cleaned, morcellized and saved for later packing into the disc spaces to assist with obtaining a fusion.  Eaton pins were then placed to allow gentle distraction across the disc spaces.  The microscope was in position for the microdissection and decompression portion of the procedure.    The disc spaces of C5-6 and C6-7 were prepared in an identical fashion.  I used Kerrisons to remove remaining anterior osteophytes off of the endplates.  Straight curettes were used to remove the cartilaginous endplates and all remaining disc material.  The high-speed bur was then used to remove posterior osteophytes and to contour the endplates in preparation for fusion.  A forward angled curette was used to free up the posterior margins of the vertebral bodies, releasing the posterior longitudinal ligament.  Posterior osteophytes, posterior disc material, and the PLL were all resected using Kerrisons.  Kerrisons were also used to perform a bilateral neural foraminotomy.  At the end of the discectomy decompression, the central spinal canal and the exiting nerve roots at each level appeared to be free  of compression.  Bleeding in the epidural space was controlled using thrombin with Gelfoam powder.  The wound was then copiously irrigated with saline solution.    Next, trial spacers were tapped into position into each of the disc spaces.  Once the appropriate size was determined for each disc space, actual PEKK spacers from RTI matching the same size as the trials were delivered to the sterile field.  The spacers were packed as tightly as possible with a combination of locally obtained autograft bone and allograft bone matrix.  The spacers were then malleted into position into each of the disc spaces under fluoroscopic guidance.  They were placed as PEKK interbody biomechanical devices to assist with fusion.    After confirming the PEKK spacers were properly positioned with fluoroscopy, the [place] pins were removed.  Remaining anterior osteophytes were contoured off of the vertebral bodies using a combination of the high-speed bur and the Rongeur to allow for the best possible plate fixation.  An anterior plate from the [employer] instrumentation set was then chosen to span C5 to 7.  This was held into position using a series of screws.    Final fluoroscopy imaging confirmed adequate position of the plate and screws as well as the PEKK spacers.  All implants appeared to be properly positioned with good maintenance of cervical alignment.  A final inspection of the operative field was then undertaken to ensure that we had adequate hemostasis.  Bleeding at this point was controlled with thrombin with Gelfoam powder and bipolar cautery.    Closure was accomplished by reapproximating the platysma with a 3-0 Vicryl.  Immediate subcutaneous tissues were reapproximated with a 4-0 Vicryl in a buried fashion.  Final skin closure was accomplished with Mastisol and Steri-Strips.  The wound was then washed and a sterile Bioclusive dressing was applied.  The patient was then placed into a hard cervical collar, extubated, and sent to  the recovery room in good stable condition.    The patient tolerated the procedure well.  There were no complications.  We estimated blood loss to be 25 mL.  Intraoperative neuromonitoring was used during the procedure.  We used motor evoked potentials, free run EMG, and SSEPs.  There were no neuro monitoring signal changes during the procedure.    Daryl Galan PA-C provided critical assistance during the procedure.  His assistance was medically necessary in order to allow the procedure to occur in the most safe and efficient manner.  He assisted with not only patient positioning and wound closure, but more importantly with retraction of delicate neurovascular structures, assistance during the discectomy decompression, as well as the placement of the PEKK spacers and instrumentation to obtain a fusion.    DENG Horton MD     Date: 4/19/2019  Time: 1:22 PM

## 2019-04-19 NOTE — ANESTHESIA PROCEDURE NOTES
Airway  Urgency: elective    Airway not difficult    General Information and Staff    Patient location during procedure: OR  CRNA: Ian Delacruz CRNA    Indications and Patient Condition  Indications for airway management: airway protection    Preoxygenated: yes  MILS maintained throughout  Mask difficulty assessment: 2 - vent by mask + OA or adjuvant +/- NMBA    Final Airway Details  Final airway type: endotracheal airway      Successful airway: ETT  Cuffed: yes   Successful intubation technique: direct laryngoscopy  Endotracheal tube insertion site: oral  Blade: Moreno  Blade size: 2  ETT size (mm): 7.0  Cormack-Lehane Classification: grade I - full view of glottis  Placement verified by: chest auscultation and capnometry   Cuff volume (mL): 6  Measured from: lips  ETT to lips (cm): 21  Number of attempts at approach: 1

## 2019-04-19 NOTE — THERAPY EVALUATION
Acute Care - Physical Therapy Initial Evaluation  [facility]     Patient Name: Alisia Velez  : 1945  MRN: [id number]  Today's Date: 2019   Onset of Illness/Injury or Date of Surgery: 19  Date of Referral to PT: 19  Referring Physician: Dr. Horton      Admit Date: 2019    Visit Dx:     ICD-10-CM ICD-9-CM   1. Impaired mobility and ADLs Z74.09 799.89   2. Impaired mobility Z74.09 799.89     Patient Active Problem List   Diagnosis   • Osteopenia   • Mixed hyperlipidemia   • Essential hypertension   • Type 2 diabetes mellitus without complication, without long-term current use of insulin (CMS/[facility])   • Spinal stenosis, lumbar   • Gastroesophageal reflux disease without esophagitis   • Type 2 diabetes mellitus with diabetic polyneuropathy, with long-term current use of insulin (CMS/[facility])   • Vitamin D deficiency   • B12 deficiency   • Age-related osteoporosis without current pathological fracture   • Stenosis, cervical spine     Past Medical History:   Diagnosis Date   • Arthritis    • Back pain    • Basal cell carcinoma    • Blister of great toe of left foot     Nonthermal, initial encounter   • Cancer (CMS/[facility])     skin    • Dyslipidemia    • Elevated blood pressure    • Essential hypertension 2016   • GERD (gastroesophageal reflux disease)    • History of cerebrovascular accident        • Hyperlipidemia    • Hypo-osmolality and hyponatremia    • Ingrowing nail    • Melanoma (CMS/[facility])    • Onychomycosis    • PONV (postoperative nausea and vomiting)    • Snores    • Stroke (CMS/[facility])    • Type 2 diabetes mellitus (CMS/[facility])    • 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/[facility]) 2016   • Urinary incontinence    • Vitamin D deficiency      Past Surgical History:   Procedure Laterality Date   • BACK SURGERY     • BLEPHAROPLASTY     • CARDIAC CATHETERIZATION     • CARPAL TUNNEL RELEASE     • CERVICAL SPINE SURGERY      cydney-laminotomy c7-t1   • COLONOSCOPY     • ENDOSCOPY      • EYE SURGERY Bilateral     cataracts    • LAPAROSCOPIC CHOLECYSTECTOMY     • LUMBAR DISC SURGERY     • LUMBAR FUSION Left 3/13/2017    Procedure: LEFT LUMBAR LATERAL INTERBODY FUSION L 3-5 WITH INSTRUMENTATION;  Surgeon: MIGDALIA Horton MD;  Location:  [facility] OR;  Service:    • LUMBAR LAMINECTOMY WITH FUSION Right 3/15/2017    Procedure: RIGHT L3-S1 POSSIBLE RIGHT L 3-4 HEMILAMINECTOMY FACETECTOMY DECOMPRESSION TRANSFORAMINAL LUMBAR INTERBODY FUSION L4-S1 POSTERIOR SPINAL FUSION WITH INSTRUMENTATION L3-S1;  Surgeon: MIGDALIA Horton MD;  Location:  [facility] OR;  Service:    • MYRINGOTOMY W/ TUBES     • NAIL BED REMOVAL/REVISION  03/11/2016   • NECK SURGERY     • NISSEN FUNDOPLICATION LAPAROSCOPIC     • OTHER SURGICAL HISTORY      had left and right big toenials removed mar 2016        PT ASSESSMENT (last 12 hours)      Physical Therapy Evaluation     Row Name 04/19/19 1540          PT Evaluation Time/Intention    Subjective Information  complains of;pain  -SB     Document Type  evaluation  -SB     Mode of Treatment  physical therapy  -SB     Patient Effort  good  -SB     Symptoms Noted During/After Treatment  none  -SB     Row Name 04/19/19 1540          General Information    Patient Profile Reviewed?  yes  -SB     Onset of Illness/Injury or Date of Surgery  04/19/19  -SB     Referring Physician  Dr. Horton  -SB     Patient Observations  alert;cooperative;agree to therapy  -SB     Patient/Family Observations   present  -SB     General Observations of Patient  pt fowlers in bed, O2 via NC, IV, cervical collar in place  -SB     Prior Level of Function  independent:;all household mobility  -SB     Equipment Currently Used at Home  cane, straight;rollator;grab bar  -SB     Pertinent History of Current Functional Problem  Pt present with neck pain, glory shoulder and arm radiculopathy DX: cervical DDD C3-T1, central stenosis, cevere central and glory foraminal stenosis C5-7 s/p 4/19/19 ACDF C5-7  -SB      Existing Precautions/Restrictions  spinal  -SB     Risks Reviewed  patient:;LOB;nausea/vomiting;dizziness;increased discomfort;change in vital signs;increased drainage;lines disloged  -SB     Benefits Reviewed  patient:;improve function;increase independence;increase strength;increase balance;decrease pain;decrease risk of DVT;improve skin integrity;increase knowledge  -SB     Row Name 04/19/19 1540          Relationship/Environment    Primary Source of Support/Comfort  spouse  -SB     Lives With  spouse  -SB     Row Name 04/19/19 1540          Resource/Environmental Concerns    Current Living Arrangements  home/apartment/condo  -SB     Resource/Environmental Concerns  none  -SB     Row Name 04/19/19 1540          Home Main Entrance    Number of Stairs, Main Entrance  four  -SB     Stair Railings, Main Entrance  railings on both sides of stairs  -SB     Row Name 04/19/19 1540          Cognitive Assessment/Interventions    Additional Documentation  Cognitive Assessment/Intervention (Group)  -SB     Row Name 04/19/19 1540          Cognitive Assessment/Intervention- PT/OT    Affect/Mental Status (Cognitive)  WFL  -SB     Orientation Status (Cognition)  oriented x 4  -SB     Follows Commands (Cognition)  WFL  -SB     Cognitive Function (Cognitive)  WFL  -SB     Personal Safety Interventions  fall prevention program maintained;gait belt;muscle strengthening facilitated;nonskid shoes/slippers when out of bed;supervised activity  -SB     Row Name 04/19/19 1540          Bed Mobility Assessment/Treatment    Bed Mobility Assessment/Treatment  supine-sit;sit-supine  -SB     Supine-Sit [place] (Bed Mobility)  supervision;verbal cues  -SB     Sit-Supine [place] (Bed Mobility)  supervision;verbal cues  -SB     Assistive Device (Bed Mobility)  head of bed elevated  -SB     Row Name 04/19/19 1540          Transfer Assessment/Treatment    Transfer Assessment/Treatment  sit-stand transfer;stand-sit transfer  -SB      Sit-Stand [place] (Transfers)  contact guard  -SB     Stand-Sit [place] (Transfers)  contact guard  -SB     Row Name 04/19/19 1540          Gait/Stairs Assessment/Training    [place] Level (Gait)  contact guard  -SB     Distance in Feet (Gait)  20  -SB     Pattern (Gait)  step-through  -SB     Deviations/Abnormal Patterns (Gait)  antalgic  -SB     Row Name 04/19/19 1540          General ROM    GENERAL ROM COMMENTS  BLE WFL  -SB     Row Name 04/19/19 1540          MMT (Manual Muscle Testing)    General MMT Comments  BLE 4+/5  -SB     Row Name 04/19/19 1540          Sensory Assessment/Intervention    Sensory General Assessment  no sensation deficits identified  -SB     Row Name 04/19/19 1540          Pain Scale: Numbers Pre/Post-Treatment    Pain Scale: Numbers, Pretreatment  6/10  -SB     Pain Location - Side  Bilateral  -SB     Pain Location  -- shouler blades  -SB     Pain Intervention(s)  Repositioned;Ambulation/increased activity  -SB     Row Name 04/19/19 1540          Orthotics & Prosthetics Management    Orthosis Location  spinal orthosis  -SB     Row Name 04/19/19 1540          Spinal Orthosis Management    Type (Spinal Orthosis)  cervical collar, hard  -SB     Functional Design (Spinal Orthosis)  static orthosis  -SB     Therapeutic Indications (Spinal Orthosis)  post-op positioning/protection;rest/inflammation reduction;pain management  -SB     Wearing Schedule (Spinal Orthosis)  wear full time  -SB     Orthosis Training (Spinal Orthosis)  patient;all orthosis skills  -SB     Compliance/Wearing Issues (Spinal Orthosis)  patient/caregiver comprehend strategies;patient/caregiver comprehend rationale for orthosis  -SB     Row Name             Wound 04/19/19 1054 Other (See comments) neck incision    Wound - Properties Group Date first assessed: 04/19/19  - Time first assessed: 1054  -LH Side: Other (See comments)  - Location: neck  -LH Type: incision  -LH    Row Name 04/19/19 154           Plan of Care Review    Plan of Care Reviewed With  patient  -SB     Row Name 04/19/19 1540          Physical Therapy Clinical Impression    Date of Referral to PT  04/19/19  -SB     Patient/Family Goals Statement (PT Clinical Impression)  return home  -SB     Criteria for Skilled Interventions Met (PT Clinical Impression)  yes  -SB     Rehab Potential (PT Clinical Summary)  good, to achieve stated therapy goals  -SB     Predicted Duration of Therapy (PT)  until d/.c  -SB     Care Plan Review (PT)  evaluation/treatment results reviewed;care plan/treatment goals reviewed;risks/benefits reviewed;current/potential barriers reviewed;patient/other agree to care plan  -SB     Row Name 04/19/19 1543          Physical Therapy Goals    Bed Mobility Goal Selection (PT)  bed mobility, PT goal 1  -SB     Transfer Goal Selection (PT)  transfer, PT goal 1  -SB     Gait Training Goal Selection (PT)  gait training, PT goal 1  -SB     Row Name 04/19/19 1548          Bed Mobility Goal 1 (PT)    Activity/Assistive Device (Bed Mobility Goal 1, PT)  sit to supine;supine to sit  -SB     [place] Level/Cues Needed (Bed Mobility Goal 1, PT)  conditional independence  -SB     Time Frame (Bed Mobility Goal 1, PT)  by discharge  -SB     Progress/Outcomes (Bed Mobility Goal 1, PT)  goal ongoing  -SB     Row Name 04/19/19 1540          Transfer Goal 1 (PT)    Activity/Assistive Device (Transfer Goal 1, PT)  sit-to-stand/stand-to-sit  -SB     [place] Level/Cues Needed (Transfer Goal 1, PT)  supervision required  -SB     Time Frame (Transfer Goal 1, PT)  by discharge  -SB     Progress/Outcome (Transfer Goal 1, PT)  goal ongoing  -SB     Row Name 04/19/19 1540          Gait Training Goal 1 (PT)    Activity/Assistive Device (Gait Training Goal 1, PT)  gait (walking locomotion)  -SB     [place] Level (Gait Training Goal 1, PT)  supervision required  -SB     Distance (Gait Goal 1, PT)  100  -SB     Time Frame (Gait Training Goal 1, PT)   by discharge  -SB     Progress/Outcome (Gait Training Goal 1, PT)  goal ongoing  -SB     Row Name 04/19/19 1542          Positioning and Restraints    Pre-Treatment Position  in bed  -SB     Post Treatment Position  chair  -SB     In Chair  sitting;encouraged to call for assist;call light within reach;with nsg  -SB     Row Name 04/19/19 1542          Living Environment    Home Accessibility  stairs to enter home;tub/shower is not walk in  -SB       User Key  (r) = Recorded By, (t) = Taken By, (c) = Cosigned By    Initials Name Provider Type    SB Teena Daniels, PT Physical Therapist    Helga Thornton RN Registered Nurse        Physical Therapy Education     Title: PT OT SLP Therapies (Done)     Topic: Physical Therapy (Done)     Point: Mobility training (Done)     Learning Progress Summary           Patient Acceptance, E, VU by SB at 4/19/2019  4:16 PM    Comment:  pt educated on POC, benefits of activity and d/c plans                   Point: Home exercise program (Done)     Learning Progress Summary           Patient Acceptance, E, VU by SB at 4/19/2019  4:16 PM    Comment:  pt educated on POC, benefits of activity and d/c plans                   Point: Body mechanics (Done)     Learning Progress Summary           Patient Acceptance, E, VU by SB at 4/19/2019  4:16 PM    Comment:  pt educated on POC, benefits of activity and d/c plans                   Point: Precautions (Done)     Learning Progress Summary           Patient Acceptance, E, VU by SB at 4/19/2019  4:16 PM    Comment:  pt educated on POC, benefits of activity and d/c plans                               User Key     Initials Effective Dates Name Provider Type Discipline    SB 08/31/18 -  Teena Daniels PT Physical Therapist PT              PT Recommendation and Plan  Anticipated Discharge Disposition (PT): home with home health, home with assist  Planned Therapy Interventions (PT Eval): balance training, bed mobility training, gait training, home  exercise program, patient/family education, ROM (range of motion), stair training, strengthening, transfer training  Therapy Frequency (PT Clinical Impression): 2 times/day  Outcome Summary/Treatment Plan (PT)  Anticipated Discharge Disposition (PT): home with home health, home with assist  Plan of Care Reviewed With: patient  Progress: no change  Outcome Summary: PT eval completed. Pt alert and oriented x4. Pt performed bed mob with sup and HOB elevated. Pt with good strength in BLE. Pt performed sit to stand with CGA and ambulated to BR with CGA. Pt did display unsteady gait, most likely due to anesthesia. Pt will benefit from skilled PT to improve functional mobility and safety. Recommend d/c home with assist and may benefit from home health if balance deficits are still present.   Outcome Measures     Row Name 04/19/19 1600 04/19/19 1420          How much help from another person do you currently need...    Turning from your back to your side while in flat bed without using bedrails?  3  -SB  --     Moving from lying on back to sitting on the side of a flat bed without bedrails?  3  -SB  --     Moving to and from a bed to a chair (including a wheelchair)?  3  -SB  --     Standing up from a chair using your arms (e.g., wheelchair, bedside chair)?  3  -SB  --     Climbing 3-5 steps with a railing?  3  -SB  --     To walk in hospital room?  3  -SB  --     AM-PAC 6 Clicks Score  18  -SB  --        How much help from another is currently needed...    Putting on and taking off regular lower body clothing?  --  4  -CS     Bathing (including washing, rinsing, and drying)  --  4  -CS     Toileting (which includes using toilet bed pan or urinal)  --  4  -CS     Putting on and taking off regular upper body clothing  --  4  -CS     Taking care of personal grooming (such as brushing teeth)  --  4  -CS     Eating meals  --  4  -CS     Score  --  24  -CS        Functional Assessment    Outcome Measure Options  AM-PAC 6 Clicks  Basic Mobility (PT)  -SB  AM-PAC 6 Clicks Daily Activity (OT)  -CS       User Key  (r) = Recorded By, (t) = Taken By, (c) = Cosigned By    Initials Name Provider Type    CS Marce Smith, OTR/L, CNT Occupational Therapist    Teena Nolen, PT Physical Therapist         Time Calculation:   PT Charges     Row Name 04/19/19 1618             Time Calculation    Start Time  1540  -SB      Stop Time  1612  -SB      Time Calculation (min)  32 min  -SB      PT Received On  04/19/19  -SB      PT Goal Re-Cert Due Date  04/29/19  -SB        User Key  (r) = Recorded By, (t) = Taken By, (c) = Cosigned By    Initials Name Provider Type    Teena Nolen, PT Physical Therapist        Therapy Charges for Today     Code Description Service Date Service Provider Modifiers Qty    [id number] HC PT EVAL LOW COMPLEXITY 2 4/19/2019 Teena Daniels, PT  1          PT G-Codes  Outcome Measure Options: AM-PAC 6 Clicks Basic Mobility (PT)  AM-PAC 6 Clicks Score: 18  Score: 24      Teena Daniels PT  4/19/2019

## 2019-04-19 NOTE — PLAN OF CARE
Problem: Patient Care Overview  Goal: Plan of Care Review  Outcome: Ongoing (interventions implemented as appropriate)   04/19/19 8215   Coping/Psychosocial   Plan of Care Reviewed With patient   Plan of Care Review   Progress no change   OTHER   Outcome Summary PT eval completed. Pt alert and oriented x4. Pt performed bed mob with sup and HOB elevated. Pt with good strength in BLE. Pt performed sit to stand with CGA and ambulated to BR with CGA. Pt did display unsteady gait, most likely due to anesthesia. Pt will benefit from skilled PT to improve functional mobility and safety. Recommend d/c home with assist and may benefit from home health if balance deficits are still present.

## 2019-04-19 NOTE — PLAN OF CARE
Problem: Patient Care Overview  Goal: Plan of Care Review  Outcome: Ongoing (interventions implemented as appropriate)   04/19/19 4544   Coping/Psychosocial   Plan of Care Reviewed With patient   Plan of Care Review   Progress improving   OTHER   Outcome Summary OT evaluation completed. PT demo no further need for OT at this time. Pt demo I with ADLs and required S/CGA for functional mobility to hallway however pt is most limited due to effects of surgical anesthesia. OT provided pt with neck brace pads, edu regarding precautions and wear/care of neck brace, and benefits of frequent mobility while in the hospita and at home. OT also provided same edu to her spouse. It is recommended pt frequently be out of bed over the next 24 hours with assist of nursing however there is no further need for skilled OT at this time. OT will sign off with recommendation for pt to discharge home with her spouse.

## 2019-04-19 NOTE — ANESTHESIA POSTPROCEDURE EVALUATION
Patient: Alisia Velez    Procedure Summary     Date:  04/19/19 Room / Location:   [facility]  [facility]    Anesthesia Start:  0944 Anesthesia Stop:  1226    Procedure:  ANTERIOR CERVICAL DISCECTOMY FUSION C5-7 (N/A Spine Cervical) Diagnosis:  (M54.12)    Surgeon:  MIGDALIA Horton MD Provider:  Ian Delacruz CRNA    Anesthesia Type:  general ASA Status:  3          Anesthesia Type: general  Last vitals  BP   145/63 (04/19/19 1409)   Temp   98.1 °F (36.7 °C) (04/19/19 1409)   Pulse   96 (04/19/19 1409)   Resp   16 (04/19/19 1409)     SpO2   98 % (04/19/19 1409)     Post Anesthesia Care and Evaluation    Patient location during evaluation: PACU  Patient participation: complete - patient participated  Level of consciousness: awake and alert  Pain management: adequate  Airway patency: patent  Anesthetic complications: No anesthetic complications  PONV Status: none  Cardiovascular status: acceptable and hemodynamically stable  Respiratory status: acceptable  Hydration status: acceptable    Comments: Blood pressure 145/63, pulse 96, temperature 98.1 °F (36.7 °C), temperature source Oral, resp. rate 16, last menstrual period 02/27/2000, SpO2 98 %, not currently breastfeeding.    Patient discharged from PACU based upon [place] score. Please see RN notes for further details

## 2019-04-19 NOTE — THERAPY DISCHARGE NOTE
Acute Care - Occupational Therapy Initial Eval/Discharge  [facility]     Patient Name: Alisia Velez  : 1945  MRN: [id number]  Today's Date: 2019  Onset of Illness/Injury or Date of Surgery: 19  Date of Referral to OT: 19  Referring Physician: Dr. Horton      Admit Date: 2019       ICD-10-CM ICD-9-CM   1. Impaired mobility and ADLs Z74.09 799.89     Patient Active Problem List   Diagnosis   • Osteopenia   • Mixed hyperlipidemia   • Essential hypertension   • Type 2 diabetes mellitus without complication, without long-term current use of insulin (CMS/Formerly [facility])   • Spinal stenosis, lumbar   • Gastroesophageal reflux disease without esophagitis   • Type 2 diabetes mellitus with diabetic polyneuropathy, with long-term current use of insulin (CMS/Formerly [facility])   • Vitamin D deficiency   • B12 deficiency   • Age-related osteoporosis without current pathological fracture   • Stenosis, cervical spine     Past Medical History:   Diagnosis Date   • Arthritis    • Back pain    • Basal cell carcinoma    • Blister of great toe of left foot     Nonthermal, initial encounter   • Cancer (CMS/Formerly [facility])     skin    • Dyslipidemia    • Elevated blood pressure    • Essential hypertension 2016   • GERD (gastroesophageal reflux disease)    • History of cerebrovascular accident        • Hyperlipidemia    • Hypo-osmolality and hyponatremia    • Ingrowing nail    • Melanoma (CMS/[facility]ly [facility])    • Onychomycosis    • PONV (postoperative nausea and vomiting)    • Snores    • Stroke (CMS/[facility]ly [facility])    • Type 2 diabetes mellitus (CMS/[facility]ly [facility])    • 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/Formerly [facility]) 2016   • Urinary incontinence    • Vitamin D deficiency      Past Surgical History:   Procedure Laterality Date   • BACK SURGERY     • BLEPHAROPLASTY     • CARDIAC CATHETERIZATION     • CARPAL TUNNEL RELEASE     • CERVICAL SPINE SURGERY      cydney-laminotomy c7-t1   • COLONOSCOPY     • ENDOSCOPY     • EYE SURGERY Bilateral     cataracts     • LAPAROSCOPIC CHOLECYSTECTOMY     • LUMBAR DISC SURGERY     • LUMBAR FUSION Left 3/13/2017    Procedure: LEFT LUMBAR LATERAL INTERBODY FUSION L 3-5 WITH INSTRUMENTATION;  Surgeon: MIGDALIA Horton MD;  Location:  [facility] OR;  Service:    • LUMBAR LAMINECTOMY WITH FUSION Right 3/15/2017    Procedure: RIGHT L3-S1 POSSIBLE RIGHT L 3-4 HEMILAMINECTOMY FACETECTOMY DECOMPRESSION TRANSFORAMINAL LUMBAR INTERBODY FUSION L4-S1 POSTERIOR SPINAL FUSION WITH INSTRUMENTATION L3-S1;  Surgeon: MIGDALIA Horton MD;  Location:  [facility] OR;  Service:    • MYRINGOTOMY W/ TUBES     • NAIL BED REMOVAL/REVISION  03/11/2016   • NECK SURGERY     • NISSEN FUNDOPLICATION LAPAROSCOPIC     • OTHER SURGICAL HISTORY      had left and right big toenials removed mar 2016          OT ASSESSMENT FLOWSHEET (last 12 hours)      Occupational Therapy Evaluation     Row Name 04/19/19 1420                   OT Evaluation Time/Intention    Subjective Information  complains of;pain  -CS        Document Type  evaluation  -CS        Mode of Treatment  occupational therapy  -CS        Patient Effort  excellent  -CS           General Information    Patient Profile Reviewed?  yes  -CS        Onset of Illness/Injury or Date of Surgery  04/19/19  -CS        Referring Physician  Dr. Horton  -CS        Patient Observations  alert;cooperative;agree to therapy  -CS        Patient/Family Observations  no family preseent  -CS        General Observations of Patient  pt fowlers in bed, O2 via NC, IV, cervical collar in place  -CS        Prior Level of Function  independent:;all household mobility;dressing;bathing;driving  -CS        Equipment Currently Used at Home  cane, straight;rollator;grab bar sock aid  -CS        Pertinent History of Current Functional Problem  Pt present with neck pain, glory shoulder and arm radiculopathy DX: cervical DDD C3-T1, central stenosis, cevere central and glory foraminal stenosis C5-7 s/p 4/19/19 ACDF C5-7  -CS        Existing  Precautions/Restrictions  spinal brace to be worn at all times  -CS        Risks Reviewed  patient:;LOB;nausea/vomiting;dizziness;increased discomfort  -CS        Benefits Reviewed  patient:;improve function;increase independence;increase strength;increase balance  -CS           Relationship/Environment    Lives With  spouse  -CS           Resource/Environmental Concerns    Current Living Arrangements  home/apartment/condo  -CS           Home Main Entrance    Number of Stairs, Main Entrance  four  -CS        Stair Railings, Main Entrance  railings on both sides of stairs  -CS           Cognitive Assessment/Interventions    Additional Documentation  Cognitive Assessment/Intervention (Group)  -CS           Cognitive Assessment/Intervention- PT/OT    Affect/Mental Status (Cognitive)  WNL  -CS        Orientation Status (Cognition)  oriented x 4  -CS        Follows Commands (Cognition)  WNL  -CS        Personal Safety Interventions  gait belt;fall prevention program maintained;nonskid shoes/slippers when out of bed;supervised activity  -CS           Bed Mobility Assessment/Treatment    Bed Mobility Assessment/Treatment  supine-sit;sit-supine  -CS        Supine-Sit Elsie (Bed Mobility)  supervision;verbal cues  -CS        Sit-Supine Elsie (Bed Mobility)  supervision;verbal cues  -CS           Functional Mobility    Functional Mobility- Ind. Level  contact guard assist;verbal cues required  -CS        Functional Mobility- Comment  Pt walked to hallway and back to bedside chair  -CS           Transfer Assessment/Treatment    Transfer Assessment/Treatment  sit-stand transfer;stand-sit transfer  -CS           Sit-Stand Transfer    Sit-Stand Elsie (Transfers)  supervision;contact guard;verbal cues  -CS           Stand-Sit Transfer    Stand-Sit Elsie (Transfers)  supervision;contact guard;verbal cues  -CS           ADL Assessment/Intervention    BADL Assessment/Intervention  lower body dressing  -CS            Lower Body Dressing Assessment/Training    Comment (Lower Body Dressing)  Pt reports she is I with use of sock aid at home  -CS           BADL Safety/Performance    Impairments, BADL Safety/Performance  pain  -CS           General ROM    GENERAL ROM COMMENTS  BUE AROM WFL  -CS           MMT (Manual Muscle Testing)    General MMT Comments  BUE strength WFL  -CS           Sensory Assessment/Intervention    Sensory General Assessment  no sensation deficits identified  -CS           Positioning and Restraints    Pre-Treatment Position  in bed  -CS        Post Treatment Position  bed  -CS        In Bed  fowlers;call light within reach;encouraged to call for assist;with family/caregiver;notified nsg;side rails up x2;with brace;RUE elevated;LUE elevated  -CS           Pain Assessment    Additional Documentation  Pain Scale: Numbers Pre/Post-Treatment (Group)  -CS           Pain Scale: Numbers Pre/Post-Treatment    Pain Scale: Numbers, Pretreatment  6/10  -CS        Pain Location - Side  Bilateral  -CS        Pain Location  neck shoulder blades  -CS        Pain Intervention(s)  Medication (See MAR);Repositioned;Ambulation/increased activity  -CS           Orthotics & Prosthetics Management    Orthosis Location  spinal orthosis  -CS        Additional Documentation  Orthosis Location (Row)  -CS           Spinal Orthosis Management    Type (Spinal Orthosis)  cervical collar, hard  -CS        Functional Design (Spinal Orthosis)  static orthosis  -CS        Therapeutic Indications (Spinal Orthosis)  pain management;post-op positioning/protection;stabilization and support  -        Wearing Schedule (Spinal Orthosis)  wear full time  -        Orthosis Training (Spinal Orthosis)  patient and caregiver;activity limitations/precautions;cleaning/care of orthosis;donning/doffing orthosis;orthosis adjustment;orthosis maintenance;purpose/goals of orthosis;wearing schedule;sleeping precautions;able to verbalize training  -         Compliance/Wearing Issues (Spinal Orthosis)  patient/caregiver comprehend strategies;patient/caregiver comprehend rationale for orthosis  -CS           Wound 04/19/19 1054 Other (See comments) neck incision    Wound - Properties Group Date first assessed: 04/19/19  - Time first assessed: 1054  - Side: Other (See comments)  - Location: neck  -LH Type: incision  -LH       Plan of Care Review    Plan of Care Reviewed With  patient;spouse  -CS           Clinical Impression (OT)    Date of Referral to OT  04/19/19  -CS        OT Diagnosis  Impaired ADLs and functional mobility  -CS        Patient/Family Goals Statement (OT Eval)  to go home  -CS        Criteria for Skilled Therapeutic Interventions Met (OT Eval)  no;no problems identified which require skilled intervention  -CS        Therapy Frequency (OT Eval)  evaluation only  -CS        Care Plan Review (OT)  evaluation/treatment results reviewed  -CS        Care Plan Review, Other Participant (OT Eval)  spouse  -CS        Anticipated Discharge Disposition (OT)  home with assist  -CS           Vital Signs    Pre SpO2 (%)  99  -CS        O2 Delivery Pre Treatment  supplemental O2  -CS        Intra SpO2 (%)  84  -CS        O2 Delivery Intra Treatment  room air  -CS        Post SpO2 (%)  92  -CS        O2 Delivery Post Treatment  supplemental O2  -CS           Living Environment    Home Accessibility  stairs to enter home;tub/shower is not walk in  -CS          User Key  (r) = Recorded By, (t) = Taken By, (c) = Cosigned By    Initials Name Effective Dates     Marce Smith, OTR/L, CNT 04/02/19 -      Helga De Leon RN 11/23/18 -               OT Recommendation and Plan  Outcome Summary/Treatment Plan (OT)  Anticipated Discharge Disposition (OT): home with assist  Therapy Frequency (OT Eval): evaluation only  Plan of Care Review  Plan of Care Reviewed With: patient  Plan of Care Reviewed With: patient  Outcome Summary: OT evaluation completed. PT link  no further need for OT at this time.  Pt demo I with ADLs and required S/CGA for functional mobility to hallway however pt is most limited due to effects of surgical anesthesia.  OT provided pt with neck brace pads, edu regarding precautions and wear/care of neck brace, and benefits of frequent mobility while in the hospita and at home.  OT also provided same edu to her spouse.  It is recommended pt frequently be out of bed over the next 24 hours with assist of nursing however there is no further need for skilled OT at this time.  OT will sign off with recommendation for pt to discharge home with her spouse.         Outcome Measures     Row Name 04/19/19 1420             How much help from another is currently needed...    Putting on and taking off regular lower body clothing?  4  -CS      Bathing (including washing, rinsing, and drying)  4  -CS      Toileting (which includes using toilet bed pan or urinal)  4  -CS      Putting on and taking off regular upper body clothing  4  -CS      Taking care of personal grooming (such as brushing teeth)  4  -CS      Eating meals  4  -CS      Score  24  -CS         Functional Assessment    Outcome Measure Options  AM-PAC 6 Clicks Daily Activity (OT)  -CS        User Key  (r) = Recorded By, (t) = Taken By, (c) = Cosigned By    Initials Name Provider Type    Marce Calles OTR/L, INDERJIT Occupational Therapist          Time Calculation:   Time Calculation- OT     Row Name 04/19/19 1522             Time Calculation- OT    OT Start Time  1420  -CS      OT Stop Time  1514  -CS      OT Time Calculation (min)  54 min  -CS      OT Received On  04/19/19  -CS        User Key  (r) = Recorded By, (t) = Taken By, (c) = Cosigned By    Initials Name Provider Type    Marce Calles OTR/L, INDERJIT Occupational Therapist        Therapy Suggested Charges     Code   Minutes Charges    None           Therapy Charges for Today     Code Description Service Date Service Provider Modifiers Qty     [id number]  OT EVAL MOD COMPLEXITY 4 4/19/2019 Marce Smith OTR/L, CNT GO 1               OT Discharge Summary  Anticipated Discharge Disposition (OT): home with assist  Reason for Discharge: Maximum functional potential achieved  Outcomes Achieved: Other(1x OT evaluation)  Discharge Destination: Home with assist    SOFI Mariano/L, INDERJIT  4/19/2019

## 2019-04-20 VITALS
WEIGHT: 165 LBS | BODY MASS INDEX: 28.17 KG/M2 | HEIGHT: 64 IN | SYSTOLIC BLOOD PRESSURE: 113 MMHG | OXYGEN SATURATION: 93 % | TEMPERATURE: 98.3 F | DIASTOLIC BLOOD PRESSURE: 52 MMHG | HEART RATE: 79 BPM | RESPIRATION RATE: 18 BRPM

## 2019-04-20 LAB
ANION GAP SERPL CALCULATED.3IONS-SCNC: 10 MMOL/L (ref 4–13)
BASOPHILS # BLD AUTO: 0.02 10*3/MM3 (ref 0–0.2)
BASOPHILS NFR BLD AUTO: 0.2 % (ref 0–2)
BUN BLD-MCNC: 13 MG/DL (ref 5–21)
BUN/CREAT SERPL: 19.1 (ref 7–25)
CALCIUM SPEC-SCNC: 9.2 MG/DL (ref 8.4–10.4)
CHLORIDE SERPL-SCNC: 100 MMOL/L (ref 98–110)
CO2 SERPL-SCNC: 26 MMOL/L (ref 24–31)
CREAT BLD-MCNC: 0.68 MG/DL (ref 0.5–1.4)
DEPRECATED RDW RBC AUTO: 43.5 FL (ref 40–54)
EOSINOPHIL # BLD AUTO: 0 10*3/MM3 (ref 0–0.7)
EOSINOPHIL NFR BLD AUTO: 0 % (ref 0–4)
ERYTHROCYTE [DISTWIDTH] IN BLOOD BY AUTOMATED COUNT: 12.9 % (ref 12–15)
GFR SERPL CREATININE-BSD FRML MDRD: 85 ML/MIN/1.73
GLUCOSE BLD-MCNC: 210 MG/DL (ref 70–100)
GLUCOSE BLDC GLUCOMTR-MCNC: 198 MG/DL (ref 70–130)
GLUCOSE BLDC GLUCOMTR-MCNC: 199 MG/DL (ref 70–130)
GLUCOSE BLDC GLUCOMTR-MCNC: 201 MG/DL (ref 70–130)
HCT VFR BLD AUTO: 35.9 % (ref 37–47)
HGB BLD-MCNC: 12.2 G/DL (ref 12–16)
IMM GRANULOCYTES # BLD AUTO: 0.03 10*3/MM3 (ref 0–0.05)
IMM GRANULOCYTES NFR BLD AUTO: 0.3 % (ref 0–5)
LYMPHOCYTES # BLD AUTO: 1.55 10*3/MM3 (ref 0.72–4.86)
LYMPHOCYTES NFR BLD AUTO: 13.4 % (ref 15–45)
MCH RBC QN AUTO: 31.4 PG (ref 28–32)
MCHC RBC AUTO-ENTMCNC: 34 G/DL (ref 33–36)
MCV RBC AUTO: 92.5 FL (ref 82–98)
MONOCYTES # BLD AUTO: 1.23 10*3/MM3 (ref 0.19–1.3)
MONOCYTES NFR BLD AUTO: 10.7 % (ref 4–12)
NEUTROPHILS # BLD AUTO: 8.71 10*3/MM3 (ref 1.87–8.4)
NEUTROPHILS NFR BLD AUTO: 75.4 % (ref 39–78)
NRBC BLD AUTO-RTO: 0 /100 WBC (ref 0–0.2)
PLATELET # BLD AUTO: 194 10*3/MM3 (ref 130–400)
PMV BLD AUTO: 10.4 FL (ref 6–12)
POTASSIUM BLD-SCNC: 4.7 MMOL/L (ref 3.5–5.3)
RBC # BLD AUTO: 3.88 10*6/MM3 (ref 4.2–5.4)
SODIUM BLD-SCNC: 136 MMOL/L (ref 135–145)
WBC NRBC COR # BLD: 11.54 10*3/MM3 (ref 4.8–10.8)

## 2019-04-20 PROCEDURE — 94799 UNLISTED PULMONARY SVC/PX: CPT

## 2019-04-20 PROCEDURE — 85025 COMPLETE CBC W/AUTO DIFF WBC: CPT | Performed by: ORTHOPAEDIC SURGERY

## 2019-04-20 PROCEDURE — 97116 GAIT TRAINING THERAPY: CPT

## 2019-04-20 PROCEDURE — 82962 GLUCOSE BLOOD TEST: CPT

## 2019-04-20 PROCEDURE — 80048 BASIC METABOLIC PNL TOTAL CA: CPT | Performed by: ORTHOPAEDIC SURGERY

## 2019-04-20 PROCEDURE — 25010000003 CEFAZOLIN 1-4 GM/50ML-% SOLUTION: Performed by: ORTHOPAEDIC SURGERY

## 2019-04-20 RX ORDER — OXYCODONE AND ACETAMINOPHEN 10; 325 MG/1; MG/1
1 TABLET ORAL EVERY 4 HOURS PRN
Start: 2019-04-20 | End: 2019-04-29

## 2019-04-20 RX ADMIN — OXYCODONE HYDROCHLORIDE AND ACETAMINOPHEN 2 TABLET: 10; 325 TABLET ORAL at 09:19

## 2019-04-20 RX ADMIN — FAMOTIDINE 20 MG: 20 TABLET, FILM COATED ORAL at 09:12

## 2019-04-20 RX ADMIN — CEFAZOLIN SODIUM 1 G: 1 INJECTION, SOLUTION INTRAVENOUS at 09:12

## 2019-04-20 RX ADMIN — METOPROLOL SUCCINATE 25 MG: 25 TABLET, FILM COATED, EXTENDED RELEASE ORAL at 09:11

## 2019-04-20 RX ADMIN — SODIUM CHLORIDE, PRESERVATIVE FREE 3 ML: 5 INJECTION INTRAVENOUS at 09:15

## 2019-04-20 RX ADMIN — CHOLECALCIFEROL (VITAMIN D3) 25 MCG (1,000 UNIT) TABLET 3000 UNITS: TABLET at 09:12

## 2019-04-20 RX ADMIN — SODIUM CHLORIDE 75 ML/HR: 9 INJECTION, SOLUTION INTRAVENOUS at 04:08

## 2019-04-20 RX ADMIN — SUCRALFATE 1 G: 1 TABLET ORAL at 11:46

## 2019-04-20 RX ADMIN — CETIRIZINE HYDROCHLORIDE 10 MG: 10 TABLET, FILM COATED ORAL at 09:11

## 2019-04-20 RX ADMIN — CEFAZOLIN SODIUM 1 G: 1 INJECTION, SOLUTION INTRAVENOUS at 01:23

## 2019-04-20 RX ADMIN — SUCRALFATE 1 G: 1 TABLET ORAL at 09:11

## 2019-04-20 NOTE — PLAN OF CARE
Problem: Patient Care Overview  Goal: Plan of Care Review  Outcome: Ongoing (interventions implemented as appropriate)   04/20/19 0538   Coping/Psychosocial   Plan of Care Reviewed With patient   Plan of Care Review   Progress improving   OTHER   Outcome Summary No neuro changes. Pt has been anxious, worried about causes for cont pulse ox beeping (believed to be from defective sensor, because no issues after probe changed). Drsg CDI. Oxygen placed back on pt in attempt to keep pulse ox from beeping while asleep. O2 sat WNL when awake. Voiding well. Has received pain meds twice. SCDs on. Hopes to go home today. Pt also concerned about noise she makes when coughing. No c/o SOA or difficulty swallowing. Balance when OOB has improved over the night.        Problem: Fall Risk (Adult)  Goal: Identify Related Risk Factors and Signs and Symptoms  Outcome: Outcome(s) achieved Date Met: 04/20/19    Goal: Absence of Fall  Outcome: Ongoing (interventions implemented as appropriate)      Problem: Surgery Nonspecified (Adult)  Goal: Signs and Symptoms of Listed Potential Problems Will be Absent, Minimized or Managed (Surgery Nonspecified)  Outcome: Ongoing (interventions implemented as appropriate)

## 2019-04-20 NOTE — DISCHARGE SUMMARY
Date of Discharge:  4/20/2019    Admission Diagnosis: M54.12    Discharge Diagnosis:   1.  Status post previous right C7-T1 foraminotomy, discectomy, Dr. Garcia, December 2004  2.  Increasing chronic neck pain  3.  Bilateral shoulder and arm radiculopathy, left much worse than right  4.  Multilevel cervical degenerative disc disease C3-T1, severe C5-7  5.  Multilevel cervical facet arthropathy  6.  Severe central and bilateral foraminal stenosis C5-7  7.  Mild central stenosis C3-5  8. Status post ACDF with instrumentation C5 to 7, 4/19/2019    Consults During Admission: None    Hospital Course  Patient is a 73 y.o. female Known to our practice. Admitted for the above cervical fusion.  This has been well tolerated and the patient will be discharged home today in good stable condition with instructions for brace at all times.  No driving until directed.  Patient will follow-up with Dr. Horton's clinic in two weeks. They will call if problems arise.         Condition on Discharge:  STABLE    Vital Signs  Temp:  [97.4 °F (36.3 °C)-98.3 °F (36.8 °C)] 98.3 °F (36.8 °C)  Heart Rate:  [] 79  Resp:  [12-18] 18  BP: (113-167)/(52-87) 113/52    Physical Exam:   Alert and oriented ×3, no acute distress, grossly neurovascularly intact, vital signs stable, dressing clean dry and intact, moving all extremities without focal deficit      Discharge Disposition  Home or Self Care    Discharge Medications     Discharge Medications      New Medications      Instructions Start Date   oxyCODONE-acetaminophen  MG per tablet  Commonly known as:  PERCOCET   1 tablet, Oral, Every 4 Hours PRN         Changes to Medications      Instructions Start Date   Insulin Glargine 300 UNIT/ML solution pen-injector  Commonly known as:  TOUJEO SOLOSTAR  What changed:  how much to take   64 Units, Subcutaneous, Every Night at Bedtime         Continue These Medications      Instructions Start Date   acetaminophen 650 MG 8 hr tablet  Commonly  known as:  TYLENOL   650 mg, Oral, Daily      aspirin 81 MG EC tablet   81 mg, Oral, Daily, Hold for surgery 4-12-19      atorvastatin 20 MG tablet  Commonly known as:  LIPITOR   20 mg, Oral, Daily      cetirizine 10 MG tablet  Commonly known as:  zyrTEC   10 mg, Oral, Daily      CoQ10 200 MG capsule   1 tablet, Oral, Daily      HUMALOG KWIKPEN 100 UNIT/ML solution pen-injector  Generic drug:  Insulin Lispro   50 Units, Subcutaneous, 3 Times Daily With Meals, Up to 50 units - patient uses carb counting.  Up to 50 units scale as follows: 1 unit per every 1.5 carbs at breakfast, 1 unit per every 2 grams at lunch, and 1 unit for every 3 grams at supper.      metoprolol succinate XL 25 MG 24 hr tablet  Commonly known as:  TOPROL-XL   25 mg, Oral, 2 Times Daily      OMEGA-3 FATTY ACIDS-VITAMIN E PO   1 capsule, Oral, Daily, omega-3 fatty acids-vitamin E 1,000 mg-5 unit capsule. Hold for surgery 4-12-19      raNITIdine 150 MG tablet  Commonly known as:  ZANTAC   150 mg, Oral, 2 Times Daily      sucralfate 1 g tablet  Commonly known as:  CARAFATE   1 g, Oral, 3 Times Daily      Vitamin D (Cholecalciferol) 1000 units capsule   3 tablets, Oral, Daily             Discharge Diet: Resume Home diet, advance as tolerated    Activity at Discharge: Resume home activity advace as tolerated, no lifting, no twisting, no bending, brace as directed, no driving until directed.     Follow-up Appointments  Followup with PCP within one week  Followup [facility] at 2weeks post-op         Daryl Galan PA-C  04/20/19  11:59 AM

## 2019-04-20 NOTE — PLAN OF CARE
Problem: Patient Care Overview  Goal: Plan of Care Review  Outcome: Ongoing (interventions implemented as appropriate)   04/20/19 1025   Coping/Psychosocial   Plan of Care Reviewed With patient   Plan of Care Review   Progress improving   OTHER   Outcome Summary Pt sitting up in chair hoping to go home today. SIt-stand supervision. Pt amb 300 sba w/ no LOB and up/down 4 steps sba/cga. Feel pt is safe for d/c from PT standpoint

## 2019-04-21 ENCOUNTER — READMISSION MANAGEMENT (OUTPATIENT)
Dept: CALL CENTER | Facility: HOSPITAL | Age: 74
End: 2019-04-21

## 2019-04-21 NOTE — OUTREACH NOTE
Prep Survey      Responses   Facility patient discharged from?  [facility]   Is patient eligible?  Yes   Discharge diagnosis  S/p previous C&-T11 foraminotomy, discectomy 2004, increasing neck pain, bilateral should and arm radiculopathy, left much worse than right. Multilevel cervical DDD C3-T1, multilevel cervical facet arthropathy, S/p ACDF with instrumentation C5-7 04/19/19   Does the patient have one of the following disease processes/diagnoses(primary or secondary)?  General Surgery   Does the patient have Home health ordered?  No   Is there a DME ordered?  No   Comments regarding appointments  Pt. to schedule follow up appointment.    Prep survey completed?  Yes          Pamela De Leon RN

## 2019-04-22 NOTE — THERAPY DISCHARGE NOTE
Acute Care - Physical Therapy Discharge Summary  [facility]       Patient Name: Alisia Velez  : 1945  MRN: [id number]    Today's Date: 2019  Onset of Illness/Injury or Date of Surgery: 19    Date of Referral to PT: 19  Referring Physician: Dr. Horton      Admit Date: 2019      PT Recommendation and Plan    Visit Dx:    ICD-10-CM ICD-9-CM   1. Impaired mobility and ADLs Z74.09 799.89   2. Impaired mobility Z74.09 799.89       Outcome Measures     Row Name 19 1000             How much help from another person do you currently need...    Turning from your back to your side while in flat bed without using bedrails?  4  -NW      Moving from lying on back to sitting on the side of a flat bed without bedrails?  3  -NW      Moving to and from a bed to a chair (including a wheelchair)?  3  -NW      Standing up from a chair using your arms (e.g., wheelchair, bedside chair)?  3  -NW      Climbing 3-5 steps with a railing?  3  -NW      To walk in hospital room?  3  -NW      AM-PAC 6 Clicks Score  19  -NW         Functional Assessment    Outcome Measure Options  AM-PAC 6 Clicks Basic Mobility (PT)  -NW        User Key  (r) = Recorded By, (t) = Taken By, (c) = Cosigned By    Initials Name Provider Type    NW Majo Hector, PTA Physical Therapy Assistant              Rehab Goal Summary     Row Name 19 1603             Physical Therapy Goals    Bed Mobility Goal Selection (PT)  bed mobility, PT goal 1  -      Transfer Goal Selection (PT)  transfer, PT goal 1  -      Gait Training Goal Selection (PT)  gait training, PT goal 1  -         Bed Mobility Goal 1 (PT)    Activity/Assistive Device (Bed Mobility Goal 1, PT)  sit to supine;supine to sit  -      [place] Level/Cues Needed (Bed Mobility Goal 1, PT)  conditional independence  -      Time Frame (Bed Mobility Goal 1, PT)  by discharge  -      Progress/Outcomes (Bed Mobility Goal 1, PT)  goal met  -         Transfer  Goal 1 (PT)    Activity/Assistive Device (Transfer Goal 1, PT)  sit-to-stand/stand-to-sit  -AH      [place] Level/Cues Needed (Transfer Goal 1, PT)  supervision required  -AH      Time Frame (Transfer Goal 1, PT)  by discharge  -AH      Progress/Outcome (Transfer Goal 1, PT)  goal met  -AH         Gait Training Goal 1 (PT)    Activity/Assistive Device (Gait Training Goal 1, PT)  gait (walking locomotion)  -AH      [place] Level (Gait Training Goal 1, PT)  supervision required  -AH      Distance (Gait Goal 1, PT)  100  -AH      Time Frame (Gait Training Goal 1, PT)  by discharge  -AH      Progress/Outcome (Gait Training Goal 1, PT)  goal met  -AH        User Key  (r) = Recorded By, (t) = Taken By, (c) = Cosigned By    Initials Name Provider Type Discipline    Anisa Murphy, PTA Physical Therapy Assistant PT              PT Discharge Summary  Reason for Discharge: Discharge from facility  Outcomes Achieved: Refer to plan of care for updates on goals achieved  Discharge Destination: Home      Anisa Padilla PTA   4/22/2019

## 2019-04-23 ENCOUNTER — READMISSION MANAGEMENT (OUTPATIENT)
Dept: CALL CENTER | Facility: HOSPITAL | Age: 74
End: 2019-04-23

## 2019-04-23 NOTE — OUTREACH NOTE
General Surgery Week 1 Survey      Responses   Facility patient discharged from?  [facility]   Does the patient have one of the following disease processes/diagnoses(primary or secondary)?  General Surgery   Is there a successful TCM telephone encounter documented?  No   Week 1 attempt successful?  Yes   Call start time  1809   Call end time  1812   Meds reviewed with patient/caregiver?  Yes   Is the patient having any side effects they believe may be caused by any medication additions or changes?  No   Does the patient have all medications related to this admission filled (includes all antibiotics, pain medications, etc.)  Yes   Is the patient taking all medications as directed (includes completed medication regime)?  Yes   Does the patient have a follow up appointment scheduled with their surgeon?  Yes   Has the patient kept scheduled appointments due by today?  N/A   Has home health visited the patient within 72 hours of discharge?  N/A   Psychosocial issues?  No   Did the patient receive a copy of their discharge instructions?  Yes   Nursing interventions  Reviewed instructions with patient   What is the patient's perception of their health status since discharge?  Same   Is the patient /caregiver able to teach back basic post-op care?  Continue use of incentive spirometry at least 1 week post discharge, Practice 'cough and deep breath', Drive as instructed by MD in discharge instructions, Take showers only when approved by MD-sponge bathe until then, No tub bath, swimming, or hot tub until instructed by MD, Keep incision areas clean,dry and protected, Do not remove steri-strips, Lifting as instructed by MD in discharge instructions   Is the patient/caregiver able to teach back signs and symptoms of incisional infection?  Increased redness, swelling or pain at the incisonal site, Increased drainage or bleeding, Incisional warmth, Pus or odor from incision, Fever   Is the patient/caregiver able to teach back steps  to recovery at home?  Set small, achievable goals for return to baseline health, Rest and rebuild strength, gradually increase activity, Practice good oral hygiene, Eat a well-balance diet   Is the patient/caregiver able to teach back the hierarchy of who to call/visit for symptoms/problems? PCP, Specialist, Home health nurse, Urgent Care, ED, 911  Yes   Week 1 call completed?  Yes          Radha Zavala, RN

## 2019-04-30 ENCOUNTER — READMISSION MANAGEMENT (OUTPATIENT)
Dept: CALL CENTER | Facility: HOSPITAL | Age: 74
End: 2019-04-30

## 2019-04-30 NOTE — OUTREACH NOTE
General Surgery Week 2 Survey      Responses   Facility patient discharged from?  [facility]   Does the patient have one of the following disease processes/diagnoses(primary or secondary)?  General Surgery   Week 2 attempt successful?  Yes   Call start time  1537   Call end time  1542   Discharge diagnosis  S/p previous C&-T11 foraminotomy, discectomy 2004, increasing neck pain, bilateral should and arm radiculopathy, left much worse than right. Multilevel cervical DDD C3-T1, multilevel cervical facet arthropathy, S/p ACDF with instrumentation C5-7 04/19/19   Meds reviewed with patient/caregiver?  Yes   Is the patient having any side effects they believe may be caused by any medication additions or changes?  No   Does the patient have all medications related to this admission filled (includes all antibiotics, pain medications, etc.)  Yes   Is the patient taking all medications as directed (includes completed medication regime)?  Yes   Does the patient have a follow up appointment scheduled with their surgeon?  Yes   Has the patient kept scheduled appointments due by today?  Yes   Comments  Dr. Galan on thursday.  Pt did see PCP last week.     Psychosocial issues?  No   Did the patient receive a copy of their discharge instructions?  Yes   What is the patient's perception of their health status since discharge?  Improving   Is the patient /caregiver able to teach back basic post-op care?  Keep incision areas clean,dry and protected, Do not remove steri-strips, Lifting as instructed by MD in discharge instructions   Is the patient/caregiver able to teach back signs and symptoms of incisional infection?  Increased redness, swelling or pain at the incisonal site, Increased drainage or bleeding, Incisional warmth, Pus or odor from incision, Fever   Is the patient/caregiver able to teach back steps to recovery at home?  Set small, achievable goals for return to baseline health, Rest and rebuild strength, gradually increase  activity, Eat a well-balance diet, Make a list of questions for surgeon's appointment   Is the patient/caregiver able to teach back the hierarchy of who to call/visit for symptoms/problems? PCP, Specialist, Home health nurse, Urgent Care, ED, 911  Yes   Week 2 call completed?  Yes          Avani Tapia RN

## 2019-06-03 RX ORDER — INSULIN LISPRO 100 [IU]/ML
INJECTION, SOLUTION INTRAVENOUS; SUBCUTANEOUS
Qty: 135 ML | Refills: 3 | Status: SHIPPED | OUTPATIENT
Start: 2019-06-03 | End: 2020-06-16 | Stop reason: SDUPTHER

## 2019-06-03 RX ORDER — INSULIN GLARGINE 300 U/ML
INJECTION, SOLUTION SUBCUTANEOUS
Qty: 22.5 ML | Refills: 3 | Status: SHIPPED | OUTPATIENT
Start: 2019-06-03 | End: 2020-05-21 | Stop reason: SDUPTHER

## 2019-06-14 ENCOUNTER — OFFICE VISIT (OUTPATIENT)
Dept: ENDOCRINOLOGY | Facility: CLINIC | Age: 74
End: 2019-06-14

## 2019-06-14 VITALS
SYSTOLIC BLOOD PRESSURE: 120 MMHG | HEIGHT: 64 IN | BODY MASS INDEX: 28.05 KG/M2 | OXYGEN SATURATION: 95 % | HEART RATE: 79 BPM | DIASTOLIC BLOOD PRESSURE: 72 MMHG | WEIGHT: 164.3 LBS

## 2019-06-14 DIAGNOSIS — E53.8 B12 DEFICIENCY: ICD-10-CM

## 2019-06-14 DIAGNOSIS — E78.2 MIXED HYPERLIPIDEMIA: ICD-10-CM

## 2019-06-14 DIAGNOSIS — Z79.4 TYPE 2 DIABETES MELLITUS WITH DIABETIC POLYNEUROPATHY, WITH LONG-TERM CURRENT USE OF INSULIN (HCC): Primary | ICD-10-CM

## 2019-06-14 DIAGNOSIS — E11.42 TYPE 2 DIABETES MELLITUS WITH DIABETIC POLYNEUROPATHY, WITH LONG-TERM CURRENT USE OF INSULIN (HCC): Primary | ICD-10-CM

## 2019-06-14 DIAGNOSIS — M81.0 OSTEOPOROSIS, UNSPECIFIED OSTEOPOROSIS TYPE, UNSPECIFIED PATHOLOGICAL FRACTURE PRESENCE: ICD-10-CM

## 2019-06-14 DIAGNOSIS — I10 ESSENTIAL HYPERTENSION: ICD-10-CM

## 2019-06-14 DIAGNOSIS — E55.9 VITAMIN D DEFICIENCY: ICD-10-CM

## 2019-06-14 LAB — HBA1C MFR BLD: 7 %

## 2019-06-14 PROCEDURE — 95249 CONT GLUC MNTR PT PROV EQP: CPT | Performed by: INTERNAL MEDICINE

## 2019-06-14 PROCEDURE — 99214 OFFICE O/P EST MOD 30 MIN: CPT | Performed by: INTERNAL MEDICINE

## 2019-06-14 NOTE — PROGRESS NOTES
Alisia Velez is a 74 y.o. female who presents for  evaluation of   Chief Complaint   Patient presents with   • Diabetes         Primary Care / Referring Provider  Mike Erazo MD    Duration/Timing:  Diabetes mellitus type 2, Age at onset of diabetes: 42 years  timing, constant    quality , nearly well controlled    severity , moderate    Severity (Complications/Hospitalizations)  Secondary Macrovascular Complications:  No CAD, CVA, No PAD  cva in 1999  Secondary Microvascular Complications:  No Diabetic Nephropathy, No proteinuria, No Diabetic Retinopathy, No Diabetic Neuropathy    Context  Diabetes Regimen:  Insulin, Oral Medications, Compliant with regimen,    Lab Results   Component Value Date    HGBA1C 7 06/10/2019       Blood Glucose Readings  at goal m checking 4 x daily for the past 90 days   Diet  counts carbs  Exercise:  Exercises    Associated Signs/Symptoms  Hyperglycemic Symptoms:  Polyuria, Polydipsia, Polyphagia resolved   Modifying Factors/Comorbidities      Past Medical History:   Diagnosis Date   • Arthritis    • Back pain    • Basal cell carcinoma    • Blister of great toe of left foot     Nonthermal, initial encounter   • Cancer (CMS/[facility])     skin    • Dyslipidemia    • Elevated blood pressure    • Essential hypertension 9/13/2016   • GERD (gastroesophageal reflux disease)    • History of cerebrovascular accident     1999   • Hyperlipidemia    • Hypo-osmolality and hyponatremia    • Ingrowing nail    • Melanoma (CMS/[facility])    • Onychomycosis    • PONV (postoperative nausea and vomiting)    • Snores    • Stroke (CMS/[facility])    • Type 2 diabetes mellitus (CMS/[facility])    • 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/[facility]) 9/13/2016   • Urinary incontinence    • Vitamin D deficiency      Family History   Problem Relation Age of Onset   • Cancer Other    • Diabetes Other    • Heart disease Other    • Hypertension Other    • Cancer Mother    • Heart disease Father      Social History      Tobacco Use   • Smoking status: Never Smoker   • Smokeless tobacco: Never Used   Substance Use Topics   • Alcohol use: No   • Drug use: No         Current Outpatient Medications:   •  acetaminophen (TYLENOL) 650 MG 8 hr tablet, Take 650 mg by mouth Daily., Disp: , Rfl:   •  aspirin 81 MG EC tablet, Take 81 mg by mouth Daily. Hold for surgery 4-12-19, Disp: , Rfl:   •  atorvastatin (LIPITOR) 20 MG tablet, Take 20 mg by mouth daily., Disp: , Rfl:   •  cetirizine (ZyrTEC) 10 MG tablet, Take 10 mg by mouth daily., Disp: , Rfl:   •  Coenzyme Q10 (COQ10) 200 MG capsule, Take 1 tablet by mouth Daily., Disp: , Rfl:   •  HUMALOG KWIKPEN 100 UNIT/ML solution pen-injector, INJECT SUBCUTANEOUSLY UP TO 50 UNITS WITH MEALS (Patient taking differently: INJECT SUBCUTANEOUSLY UP TO 60 UNITS WITH MEALS), Disp: 135 mL, Rfl: 3  •  metoprolol succinate XL (TOPROL-XL) 25 MG 24 hr tablet, Take 25 mg by mouth 2 (Two) Times a Day., Disp: , Rfl:   •  OMEGA-3 FATTY ACIDS-VITAMIN E PO, Take 1 capsule by mouth Daily. omega-3 fatty acids-vitamin E 1,000 mg-5 unit capsule. Hold for surgery 4-12-19, Disp: , Rfl:   •  ranitidine (ZANTAC) 150 MG tablet, Take 150 mg by mouth 2 (two) times a day., Disp: , Rfl:   •  sucralfate (CARAFATE) 1 G tablet, Take 1 g by mouth 3 (three) times a day., Disp: , Rfl:   •  TOUJEO SOLOSTAR 300 UNIT/ML solution pen-injector, INJECT 64 UNITS UNDER THE  SKIN EVERY NIGHT AT  BEDTIME. (Patient taking differently: INJECT 66 UNITS UNDER THE  SKIN EVERY NIGHT AT  BEDTIME.), Disp: 22.5 mL, Rfl: 3  •  Vitamin D, Cholecalciferol, 1000 UNITS capsule, Take 3 tablets by mouth Daily., Disp: , Rfl:     Review of Systems    Review of Systems   Constitutional: Negative for activity change, appetite change, chills, diaphoresis, fatigue, fever and unexpected weight change.   HENT: Negative for congestion, drooling, ear discharge, ear pain, facial swelling, mouth sores, nosebleeds, postnasal drip, sinus pressure, sneezing, sore  "throat, tinnitus, trouble swallowing and voice change.    Eyes: Negative.  Negative for photophobia, pain, discharge, redness and itching.   Respiratory: Negative.  Negative for apnea, cough, choking, chest tightness, shortness of breath, wheezing and stridor.    Cardiovascular: Negative.  Negative for chest pain, palpitations and leg swelling.   Gastrointestinal: Negative.  Negative for abdominal distention, abdominal pain, constipation, diarrhea, nausea and vomiting.   Endocrine: Negative.  Negative for cold intolerance, heat intolerance, polydipsia, polyphagia and polyuria.   Genitourinary: Negative for difficulty urinating, dysuria, flank pain and frequency.   Musculoskeletal: Negative for arthralgias, back pain, gait problem, joint swelling, myalgias, neck pain and neck stiffness.   Skin: Negative for color change, pallor, rash and wound.   Allergic/Immunologic: Negative for environmental allergies, food allergies and immunocompromised state.   Neurological: Negative for dizziness, tremors, seizures, syncope, facial asymmetry, speech difficulty, weakness, light-headedness, numbness and headaches.   Hematological: Negative for adenopathy. Does not bruise/bleed easily.   Psychiatric/Behavioral: Negative for agitation, behavioral problems, confusion, decreased concentration, dysphoric mood, hallucinations, self-injury, sleep disturbance and suicidal ideas. The patient is not nervous/anxious and is not hyperactive.         Objective:     /72   Pulse 79   Ht 162.6 cm (64\")   Wt 74.5 kg (164 lb 4.8 oz)   LMP 02/27/2000 Comment: Postmenopausal  SpO2 95%   BMI 28.20 kg/m²     Physical Exam   Constitutional: She is oriented to person, place, and time. She appears well-developed.   HENT:   Head: Normocephalic.   Right Ear: External ear normal.   Left Ear: External ear normal.   Nose: Nose normal.   Eyes: Conjunctivae and EOM are normal. No scleral icterus.   Neck: Normal range of motion. Neck supple. No " tracheal deviation present. No thyromegaly present.   Cardiovascular: Normal rate, regular rhythm, normal heart sounds and intact distal pulses. Exam reveals no gallop and no friction rub.   No murmur heard.  Pulmonary/Chest: Effort normal and breath sounds normal. No stridor. No respiratory distress. She has no wheezes. She has no rales. She exhibits no tenderness.   Abdominal: Soft. Bowel sounds are normal. She exhibits no distension and no mass. There is no tenderness. There is no rebound and no guarding.   Musculoskeletal: Normal range of motion. She exhibits no tenderness or deformity.   Lymphadenopathy:     She has no cervical adenopathy.   Neurological: She is alert and oriented to person, place, and time. She displays normal reflexes. She exhibits normal muscle tone. Coordination normal.   Skin: No rash noted. No erythema. No pallor.   Psychiatric: She has a normal mood and affect. Her behavior is normal. Judgment and thought content normal.       Lab Review    Assessment/Plan        ICD-10-CM ICD-9-CM   1. Type 2 diabetes mellitus with diabetic polyneuropathy, with long-term current use of insulin (CMS/[facility]) E11.42 250.60    Z79.4 357.2     V58.67   2. Essential hypertension I10 401.9   3. Mixed hyperlipidemia E78.2 272.2   4. Vitamin D deficiency E55.9 268.9   5. B12 deficiency E53.8 266.2   6. Osteoporosis, unspecified osteoporosis type, unspecified pathological fracture presence M81.0 733.00              Type 2 diabetes mellitus with circulatory disorder    Lab Results   Component Value Date    HGBA1C 7 06/10/2019         Toujeo 64-66       ------------      Humalog ,  Change to 1.5-2-3 -1.3 ( cereal 0.9)  Change to 1.3-1.8   Spears eto 1.2-1.7-1.9 -2  But for cereal do 0.9        Correction , 10              during steroids  typically the carb ratio and correction need to be doubled and the basal( Lantus ) needs to be increased by 30%         We will add orals     Suggest      Can't tolerate metformin ,  diarrhea  Can't tolerate glyburide, developed allergy       jardiance and tradjenta, too expensive and afraid of side effects        Karan reviewed   June 1 to June 14    avg 145     83% at goal, no lows     Mixed hyperlipidemia  On atorvastatin 20 mg qhs and Dec 2018 LDL is 74   stroke in 1999  Essential hypertension  On metoprolol ho cva and svt     longterm nsaid, on diclofenac, unfortunately can't use longterm   Takes carafte                      I reviewed and summarized records from Mike Erazo MD from present year  and I reviewed / ordered labs.     Orders Placed This Encounter   Procedures   • Hemoglobin A1c     This order was created through External Result Entry         A copy of my note was sent to Mike Erazo MD    Please see my above opinion and suggestions.

## 2019-06-18 DIAGNOSIS — E78.2 MIXED HYPERLIPIDEMIA: ICD-10-CM

## 2019-06-18 DIAGNOSIS — E53.8 B12 DEFICIENCY: ICD-10-CM

## 2019-06-18 DIAGNOSIS — E55.9 VITAMIN D DEFICIENCY: ICD-10-CM

## 2019-06-18 DIAGNOSIS — E11.42 TYPE 2 DIABETES MELLITUS WITH DIABETIC POLYNEUROPATHY, WITH LONG-TERM CURRENT USE OF INSULIN (HCC): ICD-10-CM

## 2019-06-18 DIAGNOSIS — Z79.4 TYPE 2 DIABETES MELLITUS WITH DIABETIC POLYNEUROPATHY, WITH LONG-TERM CURRENT USE OF INSULIN (HCC): ICD-10-CM

## 2019-09-23 ENCOUNTER — TRANSCRIBE ORDERS (OUTPATIENT)
Dept: ADMINISTRATIVE | Facility: HOSPITAL | Age: 74
End: 2019-09-23

## 2019-09-23 DIAGNOSIS — M54.2 CERVICALGIA: Primary | ICD-10-CM

## 2019-10-08 ENCOUNTER — HOSPITAL ENCOUNTER (OUTPATIENT)
Dept: CT IMAGING | Facility: HOSPITAL | Age: 74
Discharge: HOME | End: 2019-10-08
Admitting: ORTHOPAEDIC SURGERY

## 2019-10-08 DIAGNOSIS — M54.2 CERVICALGIA: ICD-10-CM

## 2019-10-08 PROCEDURE — 72125 CT NECK SPINE W/O DYE: CPT

## 2019-12-06 ENCOUNTER — OFFICE VISIT (OUTPATIENT)
Dept: OBGYN | Age: 74
End: 2019-12-06
Payer: MEDICARE

## 2019-12-06 ENCOUNTER — TELEPHONE (OUTPATIENT)
Dept: OBGYN | Age: 74
End: 2019-12-06

## 2019-12-06 VITALS
WEIGHT: 166 LBS | DIASTOLIC BLOOD PRESSURE: 53 MMHG | HEIGHT: 63 IN | HEART RATE: 70 BPM | SYSTOLIC BLOOD PRESSURE: 132 MMHG | BODY MASS INDEX: 29.41 KG/M2

## 2019-12-06 DIAGNOSIS — Z12.39 SCREENING BREAST EXAMINATION: ICD-10-CM

## 2019-12-06 DIAGNOSIS — N81.10 BADEN-WALKER GRADE 1 CYSTOCELE: ICD-10-CM

## 2019-12-06 DIAGNOSIS — Z12.31 SCREENING MAMMOGRAM, ENCOUNTER FOR: ICD-10-CM

## 2019-12-06 DIAGNOSIS — N39.3 STRESS INCONTINENCE: ICD-10-CM

## 2019-12-06 DIAGNOSIS — Z91.89 GYN EXAM FOR HIGH-RISK MEDICARE PATIENT: Primary | ICD-10-CM

## 2019-12-06 DIAGNOSIS — Z87.42 HISTORY OF ABNORMAL CERVICAL PAP SMEAR: ICD-10-CM

## 2019-12-06 PROCEDURE — G0101 CA SCREEN;PELVIC/BREAST EXAM: HCPCS | Performed by: ADVANCED PRACTICE MIDWIFE

## 2019-12-08 ASSESSMENT — ENCOUNTER SYMPTOMS
RESPIRATORY NEGATIVE: 1
EYES NEGATIVE: 1
ALLERGIC/IMMUNOLOGIC NEGATIVE: 1
GASTROINTESTINAL NEGATIVE: 1

## 2019-12-12 LAB
HPV COMMENT: NORMAL
HPV TYPE 16: NOT DETECTED
HPV TYPE 18: NOT DETECTED
HPVOH (OTHER TYPES): NOT DETECTED

## 2019-12-13 ENCOUNTER — OFFICE VISIT (OUTPATIENT)
Dept: ENDOCRINOLOGY | Facility: CLINIC | Age: 74
End: 2019-12-13

## 2019-12-13 VITALS
DIASTOLIC BLOOD PRESSURE: 74 MMHG | WEIGHT: 167.3 LBS | BODY MASS INDEX: 28.56 KG/M2 | OXYGEN SATURATION: 98 % | HEIGHT: 64 IN | SYSTOLIC BLOOD PRESSURE: 126 MMHG | HEART RATE: 87 BPM

## 2019-12-13 DIAGNOSIS — E11.42 TYPE 2 DIABETES MELLITUS WITH DIABETIC POLYNEUROPATHY, WITH LONG-TERM CURRENT USE OF INSULIN (HCC): Primary | ICD-10-CM

## 2019-12-13 DIAGNOSIS — E78.2 MIXED HYPERLIPIDEMIA: ICD-10-CM

## 2019-12-13 DIAGNOSIS — E55.9 VITAMIN D DEFICIENCY: ICD-10-CM

## 2019-12-13 DIAGNOSIS — I10 ESSENTIAL HYPERTENSION: ICD-10-CM

## 2019-12-13 DIAGNOSIS — E53.8 B12 DEFICIENCY: ICD-10-CM

## 2019-12-13 DIAGNOSIS — Z79.4 TYPE 2 DIABETES MELLITUS WITH DIABETIC POLYNEUROPATHY, WITH LONG-TERM CURRENT USE OF INSULIN (HCC): Primary | ICD-10-CM

## 2019-12-13 LAB — HBA1C MFR BLD: 7.6 %

## 2019-12-13 PROCEDURE — 99214 OFFICE O/P EST MOD 30 MIN: CPT | Performed by: INTERNAL MEDICINE

## 2019-12-13 PROCEDURE — 95249 CONT GLUC MNTR PT PROV EQP: CPT | Performed by: INTERNAL MEDICINE

## 2019-12-13 RX ORDER — PHENOL 1.4 %
600 AEROSOL, SPRAY (ML) MUCOUS MEMBRANE DAILY
COMMUNITY
End: 2021-05-21

## 2019-12-13 NOTE — PROGRESS NOTES
Alisia Velez is a 74 y.o. female who presents for  evaluation of   Chief Complaint   Patient presents with   • Diabetes         Primary Care / Referring Provider  Mike Erazo MD    Duration/Timing:  Diabetes mellitus type 2, Age at onset of diabetes: 42 years  timing, constant    quality , nearly well controlled    severity , moderate    Severity (Complications/Hospitalizations)  Secondary Macrovascular Complications:  No CAD, CVA, No PAD  cva in 1999  Secondary Microvascular Complications:  No Diabetic Nephropathy, No proteinuria, No Diabetic Retinopathy, No Diabetic Neuropathy    Context  Diabetes Regimen:  Insulin, Oral Medications, Compliant with regimen,    Lab Results   Component Value Date    HGBA1C 7.6 12/13/2019       Blood Glucose Readings  at goal m checking 4 x daily for the past 90 days   Diet  counts carbs  Exercise:  Exercises    Associated Signs/Symptoms  Hyperglycemic Symptoms:  Polyuria, Polydipsia, Polyphagia resolved   Modifying Factors/Comorbidities      Past Medical History:   Diagnosis Date   • Arthritis    • Back pain    • Basal cell carcinoma    • Blister of great toe of left foot     Nonthermal, initial encounter   • Cancer (CMS/[facility])     skin    • Dyslipidemia    • Elevated blood pressure    • Essential hypertension 9/13/2016   • GERD (gastroesophageal reflux disease)    • History of cerebrovascular accident     1999   • Hyperlipidemia    • Hypo-osmolality and hyponatremia    • Ingrowing nail    • Melanoma (CMS/[facility])    • Onychomycosis    • PONV (postoperative nausea and vomiting)    • Snores    • Stroke (CMS/[facility])    • Type 2 diabetes mellitus (CMS/[facility])    • 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/[facility]) 9/13/2016   • Urinary incontinence    • Vitamin D deficiency      Family History   Problem Relation Age of Onset   • Cancer Other    • Diabetes Other    • Heart disease Other    • Hypertension Other    • Cancer Mother    • Heart disease Father      Social History      Tobacco Use   • Smoking status: Never Smoker   • Smokeless tobacco: Never Used   Substance Use Topics   • Alcohol use: No   • Drug use: No         Current Outpatient Medications:   •  acetaminophen (TYLENOL) 650 MG 8 hr tablet, Take 650 mg by mouth Daily., Disp: , Rfl:   •  aspirin 81 MG EC tablet, Take 81 mg by mouth Daily. Hold for surgery 4-12-19, Disp: , Rfl:   •  atorvastatin (LIPITOR) 20 MG tablet, Take 20 mg by mouth daily., Disp: , Rfl:   •  calcium carbonate (OS-SAGRARIO) 600 MG tablet, Take 600 mg by mouth Daily., Disp: , Rfl:   •  cetirizine (ZyrTEC) 10 MG tablet, Take 10 mg by mouth daily., Disp: , Rfl:   •  Coenzyme Q10 (COQ10) 200 MG capsule, Take 1 tablet by mouth Daily., Disp: , Rfl:   •  HUMALOG KWIKPEN 100 UNIT/ML solution pen-injector, INJECT SUBCUTANEOUSLY UP TO 50 UNITS WITH MEALS (Patient taking differently: INJECT SUBCUTANEOUSLY UP TO 60 UNITS WITH MEALS), Disp: 135 mL, Rfl: 3  •  Insulin Pen Needle (B-D UF III MINI PEN NEEDLES) 31G X 5 MM misc, Use 4 times daily  , ICD10 code is E11.9, Disp: 120 each, Rfl: 11  •  metoprolol succinate XL (TOPROL-XL) 25 MG 24 hr tablet, Take 25 mg by mouth 2 (Two) Times a Day., Disp: , Rfl:   •  OMEGA-3 FATTY ACIDS-VITAMIN E PO, Take 1 capsule by mouth Daily. omega-3 fatty acids-vitamin E 1,000 mg-5 unit capsule. Hold for surgery 4-12-19, Disp: , Rfl:   •  ranitidine (ZANTAC) 150 MG tablet, Take 150 mg by mouth 2 (two) times a day., Disp: , Rfl:   •  sucralfate (CARAFATE) 1 G tablet, Take 1 g by mouth 3 (three) times a day., Disp: , Rfl:   •  TOUJEO SOLOSTAR 300 UNIT/ML solution pen-injector, INJECT 64 UNITS UNDER THE  SKIN EVERY NIGHT AT  BEDTIME. (Patient taking differently: INJECT 66 UNITS UNDER THE  SKIN EVERY NIGHT AT  BEDTIME.), Disp: 22.5 mL, Rfl: 3  •  Vitamin D, Cholecalciferol, 1000 UNITS capsule, Take 3 tablets by mouth Daily., Disp: , Rfl:     Review of Systems    Review of Systems   Constitutional: Negative for activity change, appetite change,  "chills, diaphoresis, fatigue, fever and unexpected weight change.   HENT: Negative for congestion, drooling, ear discharge, ear pain, facial swelling, mouth sores, nosebleeds, postnasal drip, sinus pressure, sneezing, sore throat, tinnitus, trouble swallowing and voice change.    Eyes: Negative.  Negative for photophobia, pain, discharge, redness and itching.   Respiratory: Negative.  Negative for apnea, cough, choking, chest tightness, shortness of breath, wheezing and stridor.    Cardiovascular: Negative.  Negative for chest pain, palpitations and leg swelling.   Gastrointestinal: Negative.  Negative for abdominal distention, abdominal pain, constipation, diarrhea, nausea and vomiting.   Endocrine: Negative.  Negative for cold intolerance, heat intolerance, polydipsia, polyphagia and polyuria.   Genitourinary: Negative for difficulty urinating, dysuria, flank pain and frequency.   Musculoskeletal: Negative for arthralgias, back pain, gait problem, joint swelling, myalgias, neck pain and neck stiffness.   Skin: Negative for color change, pallor, rash and wound.   Allergic/Immunologic: Negative for environmental allergies, food allergies and immunocompromised state.   Neurological: Negative for dizziness, tremors, seizures, syncope, facial asymmetry, speech difficulty, weakness, light-headedness, numbness and headaches.   Hematological: Negative for adenopathy. Does not bruise/bleed easily.   Psychiatric/Behavioral: Negative for agitation, behavioral problems, confusion, decreased concentration, dysphoric mood, hallucinations, self-injury, sleep disturbance and suicidal ideas. The patient is not nervous/anxious and is not hyperactive.         Objective:     /74   Pulse 87   Ht 162.6 cm (64\")   Wt 75.9 kg (167 lb 4.8 oz)   LMP 02/27/2000 Comment: Postmenopausal  SpO2 98%   BMI 28.72 kg/m²     Physical Exam   Constitutional: She is oriented to person, place, and time. She appears well-developed.   HENT: "   Head: Normocephalic.   Right Ear: External ear normal.   Left Ear: External ear normal.   Nose: Nose normal.   Eyes: Conjunctivae and EOM are normal. No scleral icterus.   Neck: Normal range of motion. Neck supple. No tracheal deviation present. No thyromegaly present.   Cardiovascular: Normal rate, regular rhythm, normal heart sounds and intact distal pulses. Exam reveals no gallop and no friction rub.   No murmur heard.  Pulmonary/Chest: Effort normal and breath sounds normal. No stridor. No respiratory distress. She has no wheezes. She has no rales. She exhibits no tenderness.   Abdominal: Soft. Bowel sounds are normal. She exhibits no distension and no mass. There is no tenderness. There is no rebound and no guarding.   Musculoskeletal: Normal range of motion. She exhibits no tenderness or deformity.   Lymphadenopathy:     She has no cervical adenopathy.   Neurological: She is alert and oriented to person, place, and time. She displays normal reflexes. She exhibits normal muscle tone. Coordination normal.   Skin: No rash noted. No erythema. No pallor.   Psychiatric: She has a normal mood and affect. Her behavior is normal. Judgment and thought content normal.       Lab Review    Assessment/Plan        ICD-10-CM ICD-9-CM   1. Type 2 diabetes mellitus with diabetic polyneuropathy, with long-term current use of insulin (CMS/[facility]) E11.42 250.60    Z79.4 357.2     V58.67   2. Mixed hyperlipidemia E78.2 272.2   3. Vitamin D deficiency E55.9 268.9   4. B12 deficiency E53.8 266.2   5. Essential hypertension I10 401.9              Type 2 diabetes mellitus with circulatory disorder    Lab Results   Component Value Date    HGBA1C 7.6 12/13/2019         Toujeo 64-66       ------------      Humalog ,  Change to 1.5-2-3 -1.3 ( cereal 0.9)  Change to 1.3-1.8   Spears eto 1.2-1.7-1.9 -2  But for cereal do 0.9        Correction , 10              during steroids  typically the carb ratio and correction need to be doubled and the  basal( Lantus ) needs to be increased by 30%         We will add orals     Suggest      Can't tolerate metformin , diarrhea  Can't tolerate glyburide, developed allergy       jardiance and tradjenta, too expensive and afraid of side effects        Karan reviewed   Last 2 weeks     avg 151    78% at goal   No lows     Mixed hyperlipidemia  On atorvastatin 20 mg qhs and 12/19 LDL 84   stroke in 1999  Essential hypertension  On metoprolol ho cva and svt     longterm nsaid, on diclofenac, unfortunately can't use longterm   Takes carafte                      I reviewed and summarized records from Mike Erazo MD from present year  and I reviewed / ordered labs.     Orders Placed This Encounter   Procedures   • Hemoglobin A1c     This order was created through External Result Entry         A copy of my note was sent to Mike Erazo MD    Please see my above opinion and suggestions.

## 2019-12-14 ENCOUNTER — RESULTS ENCOUNTER (OUTPATIENT)
Dept: ENDOCRINOLOGY | Facility: CLINIC | Age: 74
End: 2019-12-14

## 2019-12-14 DIAGNOSIS — M81.0 OSTEOPOROSIS, UNSPECIFIED OSTEOPOROSIS TYPE, UNSPECIFIED PATHOLOGICAL FRACTURE PRESENCE: ICD-10-CM

## 2019-12-14 DIAGNOSIS — I10 ESSENTIAL HYPERTENSION: ICD-10-CM

## 2019-12-14 DIAGNOSIS — E53.8 B12 DEFICIENCY: ICD-10-CM

## 2019-12-14 DIAGNOSIS — E78.2 MIXED HYPERLIPIDEMIA: ICD-10-CM

## 2019-12-14 DIAGNOSIS — Z79.4 TYPE 2 DIABETES MELLITUS WITH DIABETIC POLYNEUROPATHY, WITH LONG-TERM CURRENT USE OF INSULIN (HCC): ICD-10-CM

## 2019-12-14 DIAGNOSIS — E11.42 TYPE 2 DIABETES MELLITUS WITH DIABETIC POLYNEUROPATHY, WITH LONG-TERM CURRENT USE OF INSULIN (HCC): ICD-10-CM

## 2019-12-14 DIAGNOSIS — E55.9 VITAMIN D DEFICIENCY: ICD-10-CM

## 2019-12-16 DIAGNOSIS — I10 ESSENTIAL HYPERTENSION: ICD-10-CM

## 2019-12-16 DIAGNOSIS — E78.2 MIXED HYPERLIPIDEMIA: ICD-10-CM

## 2019-12-16 DIAGNOSIS — M81.0 OSTEOPOROSIS, UNSPECIFIED OSTEOPOROSIS TYPE, UNSPECIFIED PATHOLOGICAL FRACTURE PRESENCE: ICD-10-CM

## 2019-12-16 DIAGNOSIS — Z79.4 TYPE 2 DIABETES MELLITUS WITH DIABETIC POLYNEUROPATHY, WITH LONG-TERM CURRENT USE OF INSULIN (HCC): ICD-10-CM

## 2019-12-16 DIAGNOSIS — E11.42 TYPE 2 DIABETES MELLITUS WITH DIABETIC POLYNEUROPATHY, WITH LONG-TERM CURRENT USE OF INSULIN (HCC): ICD-10-CM

## 2019-12-16 DIAGNOSIS — E53.8 B12 DEFICIENCY: ICD-10-CM

## 2019-12-16 DIAGNOSIS — E55.9 VITAMIN D DEFICIENCY: ICD-10-CM

## 2020-01-28 ENCOUNTER — TELEPHONE (OUTPATIENT)
Dept: ENDOCRINOLOGY | Facility: CLINIC | Age: 75
End: 2020-01-28

## 2020-02-04 ENCOUNTER — TELEPHONE (OUTPATIENT)
Dept: ENDOCRINOLOGY | Facility: CLINIC | Age: 75
End: 2020-02-04

## 2020-02-04 NOTE — TELEPHONE ENCOUNTER
Pt left VM that [facility] called her and said that they have faxed and called to get latest notes and info showing that the patient is using the sensors to process the 14 day lebre CVM and is getting no response and asked her to follow up on it .  Thank You

## 2020-02-07 ENCOUNTER — TELEPHONE (OUTPATIENT)
Dept: ENDOCRINOLOGY | Facility: CLINIC | Age: 75
End: 2020-02-07

## 2020-02-07 NOTE — TELEPHONE ENCOUNTER
[facility] has been trying to get paperwork needs last office notes to get sensors sent to pt . Thank You

## 2020-02-18 ENCOUNTER — TELEPHONE (OUTPATIENT)
Dept: ENDOCRINOLOGY | Facility: CLINIC | Age: 75
End: 2020-02-18

## 2020-06-09 DIAGNOSIS — E53.8 B12 DEFICIENCY: ICD-10-CM

## 2020-06-09 DIAGNOSIS — Z79.4 TYPE 2 DIABETES MELLITUS WITH DIABETIC POLYNEUROPATHY, WITH LONG-TERM CURRENT USE OF INSULIN (HCC): ICD-10-CM

## 2020-06-09 DIAGNOSIS — E78.2 MIXED HYPERLIPIDEMIA: ICD-10-CM

## 2020-06-09 DIAGNOSIS — M81.0 OSTEOPOROSIS, UNSPECIFIED OSTEOPOROSIS TYPE, UNSPECIFIED PATHOLOGICAL FRACTURE PRESENCE: ICD-10-CM

## 2020-06-09 DIAGNOSIS — E55.9 VITAMIN D DEFICIENCY: ICD-10-CM

## 2020-06-09 DIAGNOSIS — E11.42 TYPE 2 DIABETES MELLITUS WITH DIABETIC POLYNEUROPATHY, WITH LONG-TERM CURRENT USE OF INSULIN (HCC): ICD-10-CM

## 2020-06-09 DIAGNOSIS — I10 ESSENTIAL HYPERTENSION: ICD-10-CM

## 2020-06-16 ENCOUNTER — OFFICE VISIT (OUTPATIENT)
Dept: ENDOCRINOLOGY | Facility: CLINIC | Age: 75
End: 2020-06-16

## 2020-06-16 VITALS
BODY MASS INDEX: 28.65 KG/M2 | SYSTOLIC BLOOD PRESSURE: 141 MMHG | WEIGHT: 167.8 LBS | HEIGHT: 64 IN | HEART RATE: 68 BPM | OXYGEN SATURATION: 98 % | DIASTOLIC BLOOD PRESSURE: 70 MMHG

## 2020-06-16 DIAGNOSIS — E11.42 TYPE 2 DIABETES MELLITUS WITH DIABETIC POLYNEUROPATHY, WITH LONG-TERM CURRENT USE OF INSULIN (HCC): Primary | ICD-10-CM

## 2020-06-16 DIAGNOSIS — M81.0 AGE-RELATED OSTEOPOROSIS WITHOUT CURRENT PATHOLOGICAL FRACTURE: ICD-10-CM

## 2020-06-16 DIAGNOSIS — E55.9 VITAMIN D DEFICIENCY: ICD-10-CM

## 2020-06-16 DIAGNOSIS — E53.8 B12 DEFICIENCY: ICD-10-CM

## 2020-06-16 DIAGNOSIS — I10 ESSENTIAL HYPERTENSION: ICD-10-CM

## 2020-06-16 DIAGNOSIS — Z79.4 TYPE 2 DIABETES MELLITUS WITH DIABETIC POLYNEUROPATHY, WITH LONG-TERM CURRENT USE OF INSULIN (HCC): Primary | ICD-10-CM

## 2020-06-16 DIAGNOSIS — E78.2 MIXED HYPERLIPIDEMIA: ICD-10-CM

## 2020-06-16 DIAGNOSIS — M81.0 OSTEOPOROSIS, UNSPECIFIED OSTEOPOROSIS TYPE, UNSPECIFIED PATHOLOGICAL FRACTURE PRESENCE: ICD-10-CM

## 2020-06-16 PROCEDURE — 99214 OFFICE O/P EST MOD 30 MIN: CPT | Performed by: INTERNAL MEDICINE

## 2020-06-16 RX ORDER — INSULIN LISPRO 100 [IU]/ML
INJECTION, SOLUTION INTRAVENOUS; SUBCUTANEOUS
Qty: 54 PEN | Refills: 3 | Status: SHIPPED | OUTPATIENT
Start: 2020-06-16 | End: 2021-12-16

## 2020-06-16 RX ORDER — GUAIFENESIN 600 MG/1
1200 TABLET, EXTENDED RELEASE ORAL 2 TIMES DAILY
COMMUNITY

## 2020-06-16 NOTE — PROGRESS NOTES
Alisia Velez is a 75 y.o. female who presents for  evaluation of   Chief Complaint   Patient presents with   • Follow-up     6mo joceline          Primary Care / Referring Provider  Mike Erazo MD    Duration/Timing:  Diabetes mellitus type 2, Age at onset of diabetes: 42 years  timing, constant    quality , nearly well controlled    severity , moderate    Severity (Complications/Hospitalizations)  Secondary Macrovascular Complications:  No CAD, CVA, No PAD  cva in 1999  Secondary Microvascular Complications:  No Diabetic Nephropathy, No proteinuria, No Diabetic Retinopathy, No Diabetic Neuropathy    Context  Diabetes Regimen:  Insulin, Oral Medications, Compliant with regimen,    Lab Results   Component Value Date    HGBA1C 7.6 12/13/2019   Aic from June 2020   7.5    Blood Glucose Readings  at goal m checking 4 x daily for the past 90 days   Diet  counts carbs  Exercise:  Exercises    Associated Signs/Symptoms  Hyperglycemic Symptoms:  Polyuria, Polydipsia, Polyphagia resolved   Modifying Factors/Comorbidities      Past Medical History:   Diagnosis Date   • Arthritis    • Back pain    • Basal cell carcinoma    • Blister of great toe of left foot     Nonthermal, initial encounter   • Cancer (CMS/[facility])     skin    • Dyslipidemia    • Elevated blood pressure    • Essential hypertension 9/13/2016   • GERD (gastroesophageal reflux disease)    • History of cerebrovascular accident     1999   • Hyperlipidemia    • Hypo-osmolality and hyponatremia    • Ingrowing nail    • Melanoma (CMS/[facility])    • Onychomycosis    • PONV (postoperative nausea and vomiting)    • Snores    • Stroke (CMS/[facility])    • Type 2 diabetes mellitus (CMS/[facility])    • 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/[facility]) 9/13/2016   • Urinary incontinence    • Vitamin D deficiency      Family History   Problem Relation Age of Onset   • Cancer Other    • Diabetes Other    • Heart disease Other    • Hypertension Other    • Cancer Mother    •  Heart disease Father      Social History     Tobacco Use   • Smoking status: Never Smoker   • Smokeless tobacco: Never Used   Substance Use Topics   • Alcohol use: No   • Drug use: No         Current Outpatient Medications:   •  acetaminophen (TYLENOL) 650 MG 8 hr tablet, Take 650 mg by mouth Daily., Disp: , Rfl:   •  aspirin 81 MG EC tablet, Take 81 mg by mouth Daily. Hold for surgery 4-12-19, Disp: , Rfl:   •  atorvastatin (LIPITOR) 20 MG tablet, Take 20 mg by mouth daily., Disp: , Rfl:   •  calcium carbonate (OS-SAGRARIO) 600 MG tablet, Take 600 mg by mouth Daily., Disp: , Rfl:   •  cetirizine (ZyrTEC) 10 MG tablet, Take 10 mg by mouth daily., Disp: , Rfl:   •  Coenzyme Q10 (COQ10) 200 MG capsule, Take 1 tablet by mouth Daily., Disp: , Rfl:   •  guaiFENesin (Mucinex) 600 MG 12 hr tablet, Take 1,200 mg by mouth 2 (Two) Times a Day., Disp: , Rfl:   •  Insulin Glargine, 1 Unit Dial, (Toujeo SoloStar) 300 UNIT/ML solution pen-injector injection, INJECT 66 UNITS DAILY, Disp: 14 pen, Rfl: 3  •  Insulin Lispro, 1 Unit Dial, (HumaLOG KwikPen) 100 UNIT/ML solution pen-injector, 60 units TID, Disp: 54 pen, Rfl: 3  •  Insulin Pen Needle (B-D UF III MINI PEN NEEDLES) 31G X 5 MM misc, Use 4 times daily  , ICD10 code is E11.9, Disp: 360 each, Rfl: 11  •  metoprolol succinate XL (TOPROL-XL) 25 MG 24 hr tablet, Take 25 mg by mouth 2 (Two) Times a Day., Disp: , Rfl:   •  OMEGA-3 FATTY ACIDS-VITAMIN E PO, Take 1 capsule by mouth Daily. omega-3 fatty acids-vitamin E 1,000 mg-5 unit capsule. Hold for surgery 4-12-19, Disp: , Rfl:   •  ranitidine (ZANTAC) 150 MG tablet, Take 150 mg by mouth 2 (two) times a day., Disp: , Rfl:   •  sucralfate (CARAFATE) 1 G tablet, Take 1 g by mouth 3 (three) times a day., Disp: , Rfl:   •  Vitamin D, Cholecalciferol, 1000 UNITS capsule, Take 3 tablets by mouth Daily., Disp: , Rfl:     Review of Systems    Review of Systems   Constitutional: Negative for activity change, appetite change, chills,  "diaphoresis, fatigue, fever and unexpected weight change.   HENT: Negative for congestion, drooling, ear discharge, ear pain, facial swelling, mouth sores, nosebleeds, postnasal drip, sinus pressure, sneezing, sore throat, tinnitus, trouble swallowing and voice change.    Eyes: Negative.  Negative for photophobia, pain, discharge, redness and itching.   Respiratory: Negative.  Negative for apnea, cough, choking, chest tightness, shortness of breath, wheezing and stridor.    Cardiovascular: Negative.  Negative for chest pain, palpitations and leg swelling.   Gastrointestinal: Negative.  Negative for abdominal distention, abdominal pain, constipation, diarrhea, nausea and vomiting.   Endocrine: Negative.  Negative for cold intolerance, heat intolerance, polydipsia, polyphagia and polyuria.   Genitourinary: Negative for difficulty urinating, dysuria, flank pain and frequency.   Musculoskeletal: Negative for arthralgias, back pain, gait problem, joint swelling, myalgias, neck pain and neck stiffness.   Skin: Negative for color change, pallor, rash and wound.   Allergic/Immunologic: Negative for environmental allergies, food allergies and immunocompromised state.   Neurological: Negative for dizziness, tremors, seizures, syncope, facial asymmetry, speech difficulty, weakness, light-headedness, numbness and headaches.   Hematological: Negative for adenopathy. Does not bruise/bleed easily.   Psychiatric/Behavioral: Negative for agitation, behavioral problems, confusion, decreased concentration, dysphoric mood, hallucinations, self-injury, sleep disturbance and suicidal ideas. The patient is not nervous/anxious and is not hyperactive.         Objective:     /70 (BP Location: Right arm, Patient Position: Sitting, Cuff Size: Large Adult)   Pulse 68   Ht 162.6 cm (64\")   Wt 76.1 kg (167 lb 12.8 oz)   LMP 02/27/2000 Comment: Postmenopausal  SpO2 98%   BMI 28.80 kg/m²     Physical Exam   Constitutional: She is " oriented to person, place, and time. She appears well-developed.   HENT:   Head: Normocephalic.   Right Ear: External ear normal.   Left Ear: External ear normal.   Nose: Nose normal.   Eyes: Conjunctivae and EOM are normal. No scleral icterus.   Neck: Normal range of motion. Neck supple. No tracheal deviation present. No thyromegaly present.   Cardiovascular: Normal rate, regular rhythm, normal heart sounds and intact distal pulses. Exam reveals no gallop and no friction rub.   No murmur heard.  Pulmonary/Chest: Effort normal and breath sounds normal. No stridor. No respiratory distress. She has no wheezes. She has no rales. She exhibits no tenderness.   Abdominal: Soft. Bowel sounds are normal. She exhibits no distension and no mass. There is no tenderness. There is no rebound and no guarding.   Musculoskeletal: Normal range of motion. She exhibits no tenderness or deformity.   Lymphadenopathy:     She has no cervical adenopathy.   Neurological: She is alert and oriented to person, place, and time. She displays normal reflexes. She exhibits normal muscle tone. Coordination normal.   Skin: No rash noted. No erythema. No pallor.   Psychiatric: She has a normal mood and affect. Her behavior is normal. Judgment and thought content normal.       Lab Review    Assessment/Plan        ICD-10-CM ICD-9-CM   1. Type 2 diabetes mellitus with diabetic polyneuropathy, with long-term current use of insulin (CMS/[facility]) E11.42 250.60    Z79.4 357.2     V58.67   2. Mixed hyperlipidemia E78.2 272.2   3. Vitamin D deficiency E55.9 268.9   4. B12 deficiency E53.8 266.2   5. Essential hypertension I10 401.9   6. Osteoporosis, unspecified osteoporosis type, unspecified pathological fracture presence M81.0 733.00   7. Age-related osteoporosis without current pathological fracture M81.0 733.01              Type 2 diabetes mellitus with circulatory disorder    Lab Results   Component Value Date    HGBA1C 7.6 12/13/2019     [facility] from June 2020  , 7.5     Toujeo 64-66       ------------      Humalog ,  Change to 1.5-2-3 -1.3 ( cereal 0.9)  Change to 1.3-1.8   Spears eto 1.2-1.7-1.9 -2  But for cereal do 0.9        Correction , 10              during steroids  typically the carb ratio and correction need to be doubled and the basal( Lantus ) needs to be increased by 30%         We will add orals     Suggest      Can't tolerate metformin , diarrhea  Can't tolerate glyburide, developed allergy       jardiance and tradjenta, too expensive and afraid of side effects        Karan reviewed   Last 2 weeks     avg 151    78% at goal   No lows     Mixed hyperlipidemia  On atorvastatin 20 mg qhs and 12/19 LDL 84   stroke in 1999  Essential hypertension  On metoprolol ho cva and svt     longterm nsaid, on diclofenac, unfortunately can't use longterm   Takes carafte                      I reviewed and summarized records from Mike Erazo MD from present year  and I reviewed / ordered labs.     No orders of the defined types were placed in this encounter.        A copy of my note was sent to Mike Erazo MD    Please see my above opinion and suggestions.

## 2020-12-08 ENCOUNTER — OFFICE VISIT (OUTPATIENT)
Dept: OBGYN | Age: 75
End: 2020-12-08
Payer: MEDICARE

## 2020-12-08 VITALS
SYSTOLIC BLOOD PRESSURE: 152 MMHG | TEMPERATURE: 98.2 F | DIASTOLIC BLOOD PRESSURE: 78 MMHG | HEART RATE: 79 BPM | HEIGHT: 63 IN | WEIGHT: 166 LBS | BODY MASS INDEX: 29.41 KG/M2

## 2020-12-08 PROCEDURE — 99213 OFFICE O/P EST LOW 20 MIN: CPT | Performed by: OBSTETRICS & GYNECOLOGY

## 2020-12-08 PROCEDURE — 1090F PRES/ABSN URINE INCON ASSESS: CPT | Performed by: OBSTETRICS & GYNECOLOGY

## 2020-12-08 PROCEDURE — G8417 CALC BMI ABV UP PARAM F/U: HCPCS | Performed by: OBSTETRICS & GYNECOLOGY

## 2020-12-08 PROCEDURE — G8427 DOCREV CUR MEDS BY ELIG CLIN: HCPCS | Performed by: OBSTETRICS & GYNECOLOGY

## 2020-12-08 PROCEDURE — 1123F ACP DISCUSS/DSCN MKR DOCD: CPT | Performed by: OBSTETRICS & GYNECOLOGY

## 2020-12-08 PROCEDURE — 1036F TOBACCO NON-USER: CPT | Performed by: OBSTETRICS & GYNECOLOGY

## 2020-12-08 PROCEDURE — G8400 PT W/DXA NO RESULTS DOC: HCPCS | Performed by: OBSTETRICS & GYNECOLOGY

## 2020-12-08 PROCEDURE — 3017F COLORECTAL CA SCREEN DOC REV: CPT | Performed by: OBSTETRICS & GYNECOLOGY

## 2020-12-08 PROCEDURE — G8484 FLU IMMUNIZE NO ADMIN: HCPCS | Performed by: OBSTETRICS & GYNECOLOGY

## 2020-12-08 PROCEDURE — G0101 CA SCREEN;PELVIC/BREAST EXAM: HCPCS | Performed by: OBSTETRICS & GYNECOLOGY

## 2020-12-08 PROCEDURE — 4040F PNEUMOC VAC/ADMIN/RCVD: CPT | Performed by: OBSTETRICS & GYNECOLOGY

## 2020-12-08 RX ORDER — FAMOTIDINE 40 MG/1
TABLET, FILM COATED ORAL
COMMUNITY
Start: 2020-12-04

## 2020-12-08 RX ORDER — GUAIFENESIN 600 MG/1
1200 TABLET, EXTENDED RELEASE ORAL 2 TIMES DAILY
COMMUNITY

## 2020-12-08 ASSESSMENT — ENCOUNTER SYMPTOMS
EYES NEGATIVE: 1
GASTROINTESTINAL NEGATIVE: 1
RESPIRATORY NEGATIVE: 1

## 2020-12-08 NOTE — PATIENT INSTRUCTIONS
Patient Education        Breast Self-Exam: Care Instructions  Your Care Instructions     A breast self-exam is when you check your breasts for lumps or changes. This regular exam helps you learn how your breasts normally look and feel. Most breast problems or changes are not because of cancer. Breast self-exam is not a substitute for a mammogram. Having regular breast exams by your doctor and regular mammograms improve your chances of finding any problems with your breasts. Some women set a time each month to do a step-by-step breast self-exam. Other women like a less formal system. They might look at their breasts as they brush their teeth, or feel their breasts once in a while in the shower. If you notice a change in your breast, tell your doctor. Follow-up care is a key part of your treatment and safety. Be sure to make and go to all appointments, and call your doctor if you are having problems. It's also a good idea to know your test results and keep a list of the medicines you take. How do you do a breast self-exam?  · The best time to examine your breasts is usually one week after your menstrual period begins. Your breasts should not be tender then. If you do not have periods, you might do your exam on a day of the month that is easy to remember. · To examine your breasts:  ? Remove all your clothes above the waist and lie down. When you are lying down, your breast tissue spreads evenly over your chest wall, which makes it easier to feel all your breast tissue. ? Use the pads--not the fingertips--of the 3 middle fingers of your left hand to check your right breast. Move your fingers slowly in small coin-sized circles that overlap. ? Use three levels of pressure to feel of all your breast tissue. Use light pressure to feel the tissue close to the skin surface. Use medium pressure to feel a little deeper. Use firm pressure to feel your tissue close to your breastbone and ribs.  Use each pressure level to feel your breast tissue before moving on to the next spot. ? Check your entire breast, moving up and down as if following a strip from the collarbone to the bra line, and from the armpit to the ribs. Repeat until you have covered the entire breast.  ? Repeat this procedure for your left breast, using the pads of the 3 middle fingers of your right hand. · To examine your breasts while in the shower:  ? Place one arm over your head and lightly soap your breast on that side. ? Using the pads of your fingers, gently move your hand over your breast (in the strip pattern described above), feeling carefully for any lumps or changes. ? Repeat for the other breast.  · Have your doctor inspect anything you notice to see if you need further testing. Where can you learn more? Go to https://[email or web address].[employer]. org and sign in to your [employer] account. Enter P148 in the [employer] box to learn more about \"Breast Self-Exam: Care Instructions. \"     If you do not have an account, please click on the \"Sign Up Now\" link. Current as of: April 29, 2020               Content Version: 12.6  © 2221-2871 [employer]. Care instructions adapted under license by [facility] ([facility]). If you have questions about a medical condition or this instruction, always ask your healthcare professional. [address] any warranty or liability for your use of this information.

## 2020-12-08 NOTE — PROGRESS NOTES
I, Eva Bowers RN, am scribing for and in the presence of Dr. Abe Anderson 12/8/2020/10:17 AM/sign    Subjective:      Patient ID: Salinas Gonzalez is a 70 y.o. female. HPI    Patient is here for a pelvic exam and breast exam. Pt states she does not want a pap smear, she had to pay for the one that was obtained last year. She also states that once a year she will have some RLQ cramping and then a 1 spot of blood. This has been occurring for the past couple of years. Her last mammogram was 12/2018.      Salinas Gonzalez is a 70 y.o. female with the following history as recorded in [facility]:  Patient Active Problem List    Diagnosis Date Noted    Uncontrolled type 2 diabetes mellitus with hyperglycemia ([facility] [address]     Lumbar radiculopathy 03/21/2017    Essential hypertension 03/21/2017    Type II diabetes mellitus, uncontrolled ([facility] [address] 03/21/2017    Gastroesophageal reflux disease without esophagitis 03/21/2017    Lumbar spinal stenosis 03/21/2017     Current Outpatient Medications   Medication Sig Dispense Refill    famotidine (PEPCID) 40 MG tablet       guaiFENesin (MUCINEX) 600 MG extended release tablet Take 1,200 mg by mouth 2 times daily      Calcium Carbonate-Vit D-Min (CALCIUM 1200 PO) Take by mouth      clobetasol (TEMOVATE) 0.05 % cream Apply topically 2 times daily for 2 weeks then as needed (Patient taking differently: Indications: PRN Apply topically 2 times daily for 2 weeks then as needed) 1 Tube 5    acetaminophen (TYLENOL ARTHRITIS PAIN) 650 MG extended release tablet Take 650 mg by mouth every 8 hours as needed for Pain      insulin glargine (TOUJEO SOLOSTAR) 300 UNIT/ML injection pen Inject 20 Units into the skin nightly 3 Pen 3    aspirin EC 81 MG EC tablet Take 81 mg by mouth daily      atorvastatin (LIPITOR) 20 MG tablet Take 20 mg by mouth daily      cetirizine (ZYRTEC) 10 MG tablet Take 10 mg by mouth daily      Coenzyme Q-10 200 MG CAPS Take 200 mg by mouth daily      insulin lispro (HUMALOG) Negative. Objective:BP (!) 152/78 (Site: Left Upper Arm, Position: Sitting, Cuff Size: Medium Adult)   Pulse 79   Temp 98.2 °F (36.8 °C) (Temporal)   Ht 5' 3\" (1.6 m)   Wt 166 lb (75.3 kg)   BMI 29.41 kg/m²      Physical Exam  Constitutional:       Appearance: She is well-developed. HENT:      Head: Normocephalic and atraumatic. Right Ear: Hearing normal.      Left Ear: Hearing normal.      Nose: Nose normal.   Eyes:      General: Lids are normal.      Conjunctiva/sclera: Conjunctivae normal.      Pupils: Pupils are equal, round, and reactive to light. Neck:      Musculoskeletal: Normal range of motion and neck supple. Cardiovascular:      Rate and Rhythm: Normal rate and regular rhythm. Heart sounds: No murmur. No friction rub. No gallop. Pulmonary:      Effort: Pulmonary effort is normal.      Breath sounds: Normal breath sounds. Chest:      Breasts: Breasts are symmetrical.         Right: No inverted nipple, mass, nipple discharge, skin change or tenderness. Left: No inverted nipple, mass, nipple discharge, skin change or tenderness. Abdominal:      General: Bowel sounds are normal. There is no distension. Palpations: Abdomen is soft. There is no mass. Tenderness: There is no abdominal tenderness. Genitourinary:     Labia:         Right: No rash, tenderness or lesion. Left: No rash, tenderness or lesion. Vagina: No vaginal discharge. Cervix: No cervical motion tenderness, discharge or friability. Uterus: Not deviated, not enlarged, not fixed and not tender. Adnexa:         Right: No mass, tenderness or fullness. Left: No mass, tenderness or fullness. Rectum: Guaiac result negative. No mass, tenderness, anal fissure, external hemorrhoid or internal hemorrhoid. Comments: External genitalia: labia and vagina are atrophic with some loss of normal anatomical architecture. Musculoskeletal: Normal range of motion. Comments: Normal ROM in all four extremities; normal gait    Skin:     General: Skin is warm and dry. Neurological:      Mental Status: She is alert and oriented to person, place, and time. Psychiatric:         Behavior: Behavior normal.         Assessment:       Diagnosis Orders   1. Visit for pelvic exam     2. Screening breast examination     3. Encounter for screening mammogram for malignant neoplasm of breast  SHIRA DIGITAL SCREEN W OR WO CAD BILATERAL   4. PMB (postmenopausal bleeding)  US NON OB TRANSVAGINAL           Plan:      MEDICATIONS:  No orders of the defined types were placed in this encounter. ORDERS:  Orders Placed This Encounter   Procedures    SHIRA DIGITAL SCREEN W OR WO CAD BILATERAL    US NON OB TRANSVAGINAL     TVUS and mammogram ordered and pt will yanni in [facility]. If TVUS negative then pt can monitor the bleeding that occurs once a year. Pap smear last year negative  All questions answered      I, Ana Rey MD, personally performed services described in this document as scribed by Kathryn Soler RN in my presence, and it is both accurate and complete.

## 2020-12-15 ENCOUNTER — LAB (OUTPATIENT)
Dept: LAB | Facility: HOSPITAL | Age: 75
End: 2020-12-15

## 2020-12-15 ENCOUNTER — OFFICE VISIT (OUTPATIENT)
Dept: ENDOCRINOLOGY | Facility: CLINIC | Age: 75
End: 2020-12-15

## 2020-12-15 VITALS
HEIGHT: 64 IN | DIASTOLIC BLOOD PRESSURE: 68 MMHG | HEART RATE: 70 BPM | WEIGHT: 166 LBS | BODY MASS INDEX: 28.34 KG/M2 | OXYGEN SATURATION: 96 % | SYSTOLIC BLOOD PRESSURE: 132 MMHG

## 2020-12-15 DIAGNOSIS — E53.8 B12 DEFICIENCY: ICD-10-CM

## 2020-12-15 DIAGNOSIS — E55.9 VITAMIN D DEFICIENCY: ICD-10-CM

## 2020-12-15 DIAGNOSIS — Z79.4 TYPE 2 DIABETES MELLITUS WITH DIABETIC POLYNEUROPATHY, WITH LONG-TERM CURRENT USE OF INSULIN (HCC): Primary | ICD-10-CM

## 2020-12-15 DIAGNOSIS — E11.42 TYPE 2 DIABETES MELLITUS WITH DIABETIC POLYNEUROPATHY, WITH LONG-TERM CURRENT USE OF INSULIN (HCC): Primary | ICD-10-CM

## 2020-12-15 DIAGNOSIS — E78.2 MIXED HYPERLIPIDEMIA: ICD-10-CM

## 2020-12-15 DIAGNOSIS — I10 ESSENTIAL HYPERTENSION: ICD-10-CM

## 2020-12-15 PROCEDURE — 95251 CONT GLUC MNTR ANALYSIS I&R: CPT | Performed by: INTERNAL MEDICINE

## 2020-12-15 PROCEDURE — 99214 OFFICE O/P EST MOD 30 MIN: CPT | Performed by: INTERNAL MEDICINE

## 2020-12-15 PROCEDURE — 85025 COMPLETE CBC W/AUTO DIFF WBC: CPT | Performed by: INTERNAL MEDICINE

## 2020-12-15 PROCEDURE — 36415 COLL VENOUS BLD VENIPUNCTURE: CPT | Performed by: INTERNAL MEDICINE

## 2020-12-15 PROCEDURE — 82306 VITAMIN D 25 HYDROXY: CPT | Performed by: INTERNAL MEDICINE

## 2020-12-15 PROCEDURE — 83036 HEMOGLOBIN GLYCOSYLATED A1C: CPT | Performed by: INTERNAL MEDICINE

## 2020-12-15 PROCEDURE — 84443 ASSAY THYROID STIM HORMONE: CPT | Performed by: INTERNAL MEDICINE

## 2020-12-15 PROCEDURE — 82607 VITAMIN B-12: CPT | Performed by: INTERNAL MEDICINE

## 2020-12-15 PROCEDURE — 80053 COMPREHEN METABOLIC PANEL: CPT | Performed by: INTERNAL MEDICINE

## 2020-12-15 PROCEDURE — 82570 ASSAY OF URINE CREATININE: CPT | Performed by: INTERNAL MEDICINE

## 2020-12-15 PROCEDURE — 82043 UR ALBUMIN QUANTITATIVE: CPT | Performed by: INTERNAL MEDICINE

## 2020-12-15 PROCEDURE — 80061 LIPID PANEL: CPT | Performed by: INTERNAL MEDICINE

## 2020-12-15 RX ORDER — INSULIN GLARGINE 300 U/ML
INJECTION, SOLUTION SUBCUTANEOUS
Qty: 7 PEN | Refills: 3 | Status: SHIPPED | OUTPATIENT
Start: 2020-12-15 | End: 2021-02-09

## 2020-12-15 NOTE — PROGRESS NOTES
Alisia Velez is a 75 y.o. female who presents for  evaluation of   Chief Complaint   Patient presents with   • Follow-up     6 mo joceline type 2          Primary Care / Referring Provider  Mike Erazo MD    Duration/Timing:  Diabetes mellitus type 2, Age at onset of diabetes: 42 years  timing, constant    quality , nearly well controlled    severity , moderate    Severity (Complications/Hospitalizations)  Secondary Macrovascular Complications:  No CAD, CVA, No PAD  cva in 1999  Secondary Microvascular Complications:  No Diabetic Nephropathy, No proteinuria, No Diabetic Retinopathy, No Diabetic Neuropathy    Context  Diabetes Regimen:  Insulin, Oral Medications, Compliant with regimen,    Lab Results   Component Value Date    HGBA1C 7.6 12/13/2019   Aic from June 2020   7.5    GMI from Dec 2020 , 7.2%      Blood Glucose Readings  at goal m checking 4 x daily for the past 90 days   Diet  counts carbs  Exercise:  Exercises    Associated Signs/Symptoms  Hyperglycemic Symptoms:  Polyuria, Polydipsia, Polyphagia resolved   Modifying Factors/Comorbidities      Past Medical History:   Diagnosis Date   • Arthritis    • Back pain    • Basal cell carcinoma    • Blister of great toe of left foot     Nonthermal, initial encounter   • Cancer (CMS/[facility])     skin    • Dyslipidemia    • Elevated blood pressure    • Essential hypertension 9/13/2016   • GERD (gastroesophageal reflux disease)    • History of cerebrovascular accident     1999   • Hyperlipidemia    • Hypo-osmolality and hyponatremia    • Ingrowing nail    • Melanoma (CMS/[facility])    • Onychomycosis    • PONV (postoperative nausea and vomiting)    • Snores    • Stroke (CMS/[facility])    • Type 2 diabetes mellitus (CMS/[facility])    • 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/[facility]) 9/13/2016   • Urinary incontinence    • Vitamin D deficiency      Family History   Problem Relation Age of Onset   • Cancer Other    • Diabetes Other    • Heart disease Other    •  Hypertension Other    • Cancer Mother    • Heart disease Father      Social History     Tobacco Use   • Smoking status: Never Smoker   • Smokeless tobacco: Never Used   Substance Use Topics   • Alcohol use: No   • Drug use: No         Current Outpatient Medications:   •  acetaminophen (TYLENOL) 650 MG 8 hr tablet, Take 650 mg by mouth Daily., Disp: , Rfl:   •  aspirin 81 MG EC tablet, Take 81 mg by mouth Daily. Hold for surgery 4-12-19, Disp: , Rfl:   •  atorvastatin (LIPITOR) 20 MG tablet, Take 20 mg by mouth daily., Disp: , Rfl:   •  calcium carbonate (OS-SAGRARIO) 600 MG tablet, Take 600 mg by mouth Daily., Disp: , Rfl:   •  cetirizine (ZyrTEC) 10 MG tablet, Take 10 mg by mouth daily., Disp: , Rfl:   •  Coenzyme Q10 (COQ10) 200 MG capsule, Take 1 tablet by mouth Daily., Disp: , Rfl:   •  Docusate Calcium (STOOL SOFTENER PO), Take  by mouth., Disp: , Rfl:   •  guaiFENesin (Mucinex) 600 MG 12 hr tablet, Take 1,200 mg by mouth 2 (Two) Times a Day., Disp: , Rfl:   •  Insulin Glargine, 1 Unit Dial, (Toujeo SoloStar) 300 UNIT/ML solution pen-injector injection, INJECT 66 UNITS DAILY (Patient taking differently: 60 Units. INJECT 66 UNITS DAILY), Disp: 14 pen, Rfl: 3  •  Insulin Lispro, 1 Unit Dial, (HumaLOG KwikPen) 100 UNIT/ML solution pen-injector, 60 units TID, Disp: 54 pen, Rfl: 3  •  Insulin Pen Needle (B-D UF III MINI PEN NEEDLES) 31G X 5 MM misc, Use 4 times daily  , ICD10 code is E11.9, Disp: 360 each, Rfl: 11  •  metoprolol succinate XL (TOPROL-XL) 25 MG 24 hr tablet, Take 25 mg by mouth 2 (Two) Times a Day., Disp: , Rfl:   •  OMEGA-3 FATTY ACIDS-VITAMIN E PO, Take 1 capsule by mouth Daily. omega-3 fatty acids-vitamin E 1,000 mg-5 unit capsule. Hold for surgery 4-12-19, Disp: , Rfl:   •  Vitamin D, Cholecalciferol, 1000 UNITS capsule, Take 3 tablets by mouth Daily., Disp: , Rfl:     Review of Systems    Review of Systems   Constitutional: Negative for activity change, appetite change, chills, diaphoresis, fatigue,  "fever and unexpected weight change.   HENT: Negative for congestion, drooling, ear discharge, ear pain, facial swelling, mouth sores, nosebleeds, postnasal drip, sinus pressure, sneezing, sore throat, tinnitus, trouble swallowing and voice change.    Eyes: Negative.  Negative for photophobia, pain, discharge, redness and itching.   Respiratory: Negative.  Negative for apnea, cough, choking, chest tightness, shortness of breath, wheezing and stridor.    Cardiovascular: Negative.  Negative for chest pain, palpitations and leg swelling.   Gastrointestinal: Negative.  Negative for abdominal distention, abdominal pain, constipation, diarrhea, nausea and vomiting.   Endocrine: Negative.  Negative for cold intolerance, heat intolerance, polydipsia, polyphagia and polyuria.   Genitourinary: Negative for difficulty urinating, dysuria, flank pain and frequency.   Musculoskeletal: Negative for arthralgias, back pain, gait problem, joint swelling, myalgias, neck pain and neck stiffness.   Skin: Negative for color change, pallor, rash and wound.   Allergic/Immunologic: Negative for environmental allergies, food allergies and immunocompromised state.   Neurological: Negative for dizziness, tremors, seizures, syncope, facial asymmetry, speech difficulty, weakness, light-headedness, numbness and headaches.   Hematological: Negative for adenopathy. Does not bruise/bleed easily.   Psychiatric/Behavioral: Negative for agitation, behavioral problems, confusion, decreased concentration, dysphoric mood, hallucinations, self-injury, sleep disturbance and suicidal ideas. The patient is not nervous/anxious and is not hyperactive.         Objective:     /68 (BP Location: Left arm, Patient Position: Sitting, Cuff Size: Large Adult)   Pulse 70   Ht 162.6 cm (64\")   Wt 75.3 kg (166 lb)   LMP 02/27/2000 Comment: Postmenopausal  SpO2 96%   BMI 28.49 kg/m²     Physical Exam   Constitutional: She is oriented to person, place, and time. " She appears well-developed.   HENT:   Head: Normocephalic.   Right Ear: External ear normal.   Left Ear: External ear normal.   Nose: Nose normal.   Eyes: Conjunctivae are normal. No scleral icterus.   Neck: Normal range of motion. Neck supple. No tracheal deviation present. No thyromegaly present.   Cardiovascular: Normal rate, regular rhythm and normal heart sounds. Exam reveals no gallop and no friction rub.   No murmur heard.  Pulmonary/Chest: Effort normal and breath sounds normal. No stridor. No respiratory distress. She has no wheezes. She has no rales. She exhibits no tenderness.   Abdominal: Soft. Bowel sounds are normal. She exhibits no distension and no mass. There is no abdominal tenderness. There is no rebound and no guarding.   Musculoskeletal: Normal range of motion. No tenderness or deformity.   Lymphadenopathy:     She has no cervical adenopathy.   Neurological: She is alert and oriented to person, place, and time. She displays normal reflexes. She exhibits normal muscle tone. Coordination normal.   Skin: No rash noted. No erythema. No pallor.   Psychiatric: Her behavior is normal. Judgment and thought content normal.       Lab Review    Assessment/Plan        ICD-10-CM ICD-9-CM   1. Type 2 diabetes mellitus with diabetic polyneuropathy, with long-term current use of insulin (CMS/[facility])  E11.42 250.60    Z79.4 357.2     V58.67   2. Mixed hyperlipidemia  E78.2 272.2   3. Essential hypertension  I10 401.9   4. B12 deficiency  E53.8 266.2   5. Vitamin D deficiency  E55.9 268.9   6. Age-related osteoporosis without current pathological fracture  M81.0 733.01              Type 2 diabetes mellitus with circulatory disorder    Lab Results   Component Value Date    HGBA1C 7.6 12/13/2019     Karan 2 weeks reviewed from Dec 2 to Dec 15, 2020     Scanned    In [employer] 68%    Lows 0%        Toujeo 64-66       ------------      Humalog ,  Change to 1.5-2-3 -1.3 ( cereal 0.9)  Change to 1.3-1.8   Regan sadler  1.2-1.7-1.9 -2  But for cereal do 0.9        Correction , 10              during steroids  typically the carb ratio and correction need to be doubled and the basal( Lantus ) needs to be increased by 30%         We will add orals     Suggest      Can't tolerate metformin , diarrhea  Can't tolerate glyburide, developed allergy       jardiance and tradjenta, too expensive and afraid of side effects             Mixed hyperlipidemia  On atorvastatin 20 mg qhs    stroke in 1999  Essential hypertension  On metoprolol ho cva and svt     longterm nsaid, on diclofenac, unfortunately can't use longterm   Takes carafte         --    DXA Jan 2017 , normal                  I reviewed and summarized records from Mike Erazo MD from present year  and I reviewed / ordered labs.     No orders of the defined types were placed in this encounter.        A copy of my note was sent to Mike Erazo MD    Please see my above opinion and suggestions.

## 2020-12-16 LAB
25(OH)D3 SERPL-MCNC: 37.4 NG/ML (ref 30–100)
ALBUMIN SERPL-MCNC: 4.4 G/DL (ref 3.5–5.2)
ALBUMIN UR-MCNC: <1.2 MG/DL
ALBUMIN/GLOB SERPL: 1.5 G/DL
ALP SERPL-CCNC: 49 U/L (ref 39–117)
ALT SERPL W P-5'-P-CCNC: 38 U/L (ref 1–33)
ANION GAP SERPL CALCULATED.3IONS-SCNC: 11.6 MMOL/L (ref 5–15)
AST SERPL-CCNC: 33 U/L (ref 1–32)
BASOPHILS # BLD AUTO: 0.04 10*3/MM3 (ref 0–0.2)
BASOPHILS NFR BLD AUTO: 0.5 % (ref 0–1.5)
BILIRUB SERPL-MCNC: 0.2 MG/DL (ref 0–1.2)
BUN SERPL-MCNC: 11 MG/DL (ref 8–23)
BUN/CREAT SERPL: 14.9 (ref 7–25)
CALCIUM SPEC-SCNC: 9.9 MG/DL (ref 8.6–10.5)
CHLORIDE SERPL-SCNC: 102 MMOL/L (ref 98–107)
CHOLEST SERPL-MCNC: 166 MG/DL (ref 0–200)
CO2 SERPL-SCNC: 27.4 MMOL/L (ref 22–29)
CREAT SERPL-MCNC: 0.74 MG/DL (ref 0.57–1)
CREAT UR-MCNC: 72.6 MG/DL
DEPRECATED RDW RBC AUTO: 44.5 FL (ref 37–54)
EOSINOPHIL # BLD AUTO: 0.13 10*3/MM3 (ref 0–0.4)
EOSINOPHIL NFR BLD AUTO: 1.6 % (ref 0.3–6.2)
ERYTHROCYTE [DISTWIDTH] IN BLOOD BY AUTOMATED COUNT: 12.7 % (ref 12.3–15.4)
GFR SERPL CREATININE-BSD FRML MDRD: 77 ML/MIN/1.73
GLOBULIN UR ELPH-MCNC: 2.9 GM/DL
GLUCOSE SERPL-MCNC: 162 MG/DL (ref 65–99)
HBA1C MFR BLD: 7.88 % (ref 4.8–5.6)
HCT VFR BLD AUTO: 43.2 % (ref 34–46.6)
HDLC SERPL-MCNC: 50 MG/DL (ref 40–60)
HGB BLD-MCNC: 14.4 G/DL (ref 12–15.9)
IMM GRANULOCYTES # BLD AUTO: 0.01 10*3/MM3 (ref 0–0.05)
IMM GRANULOCYTES NFR BLD AUTO: 0.1 % (ref 0–0.5)
LDLC SERPL CALC-MCNC: 76 MG/DL (ref 0–100)
LDLC/HDLC SERPL: 1.34 {RATIO}
LYMPHOCYTES # BLD AUTO: 3.09 10*3/MM3 (ref 0.7–3.1)
LYMPHOCYTES NFR BLD AUTO: 37 % (ref 19.6–45.3)
MCH RBC QN AUTO: 31.8 PG (ref 26.6–33)
MCHC RBC AUTO-ENTMCNC: 33.3 G/DL (ref 31.5–35.7)
MCV RBC AUTO: 95.4 FL (ref 79–97)
MICROALBUMIN/CREAT UR: NORMAL MG/G{CREAT}
MONOCYTES # BLD AUTO: 0.82 10*3/MM3 (ref 0.1–0.9)
MONOCYTES NFR BLD AUTO: 9.8 % (ref 5–12)
NEUTROPHILS NFR BLD AUTO: 4.26 10*3/MM3 (ref 1.7–7)
NEUTROPHILS NFR BLD AUTO: 51 % (ref 42.7–76)
NRBC BLD AUTO-RTO: 0 /100 WBC (ref 0–0.2)
PLATELET # BLD AUTO: 253 10*3/MM3 (ref 140–450)
PMV BLD AUTO: 10.6 FL (ref 6–12)
POTASSIUM SERPL-SCNC: 4.1 MMOL/L (ref 3.5–5.2)
PROT SERPL-MCNC: 7.3 G/DL (ref 6–8.5)
RBC # BLD AUTO: 4.53 10*6/MM3 (ref 3.77–5.28)
SODIUM SERPL-SCNC: 141 MMOL/L (ref 136–145)
TRIGL SERPL-MCNC: 244 MG/DL (ref 0–150)
TSH SERPL DL<=0.05 MIU/L-ACNC: 1.77 UIU/ML (ref 0.27–4.2)
VIT B12 BLD-MCNC: 398 PG/ML (ref 211–946)
VLDLC SERPL-MCNC: 40 MG/DL (ref 5–40)
WBC # BLD AUTO: 8.35 10*3/MM3 (ref 3.4–10.8)

## 2020-12-18 ENCOUNTER — TELEPHONE (OUTPATIENT)
Dept: OBGYN | Age: 75
End: 2020-12-18

## 2020-12-18 NOTE — TELEPHONE ENCOUNTER
Called to inform pt that u/s was normal and pt can monitor the yearly bleeding. If she starts having persistent PMB then pt instructed to let office know.  Pt VU

## 2021-01-14 ENCOUNTER — DOCUMENTATION (OUTPATIENT)
Dept: ENDOCRINOLOGY | Facility: CLINIC | Age: 76
End: 2021-01-14

## 2021-02-09 RX ORDER — INSULIN GLARGINE 300 U/ML
INJECTION, SOLUTION SUBCUTANEOUS
Qty: 22.5 ML | Refills: 3 | Status: SHIPPED | OUTPATIENT
Start: 2021-02-09 | End: 2021-06-29

## 2021-05-20 ENCOUNTER — TELEPHONE (OUTPATIENT)
Dept: VASCULAR SURGERY | Facility: CLINIC | Age: 76
End: 2021-05-20

## 2021-05-21 ENCOUNTER — OFFICE VISIT (OUTPATIENT)
Dept: PODIATRY | Facility: CLINIC | Age: 76
End: 2021-05-21

## 2021-05-21 VITALS
HEIGHT: 64 IN | DIASTOLIC BLOOD PRESSURE: 70 MMHG | SYSTOLIC BLOOD PRESSURE: 122 MMHG | HEART RATE: 70 BPM | BODY MASS INDEX: 28.61 KG/M2 | WEIGHT: 167.6 LBS | OXYGEN SATURATION: 95 %

## 2021-05-21 DIAGNOSIS — M21.42 PES PLANUS OF BOTH FEET: ICD-10-CM

## 2021-05-21 DIAGNOSIS — M72.2 PLANTAR FASCIITIS, BILATERAL: ICD-10-CM

## 2021-05-21 DIAGNOSIS — E11.42 TYPE 2 DIABETES MELLITUS WITH DIABETIC POLYNEUROPATHY, WITH LONG-TERM CURRENT USE OF INSULIN (HCC): ICD-10-CM

## 2021-05-21 DIAGNOSIS — M21.41 PES PLANUS OF BOTH FEET: ICD-10-CM

## 2021-05-21 DIAGNOSIS — B35.1 ONYCHOMYCOSIS: Primary | ICD-10-CM

## 2021-05-21 DIAGNOSIS — Z79.4 TYPE 2 DIABETES MELLITUS WITH DIABETIC POLYNEUROPATHY, WITH LONG-TERM CURRENT USE OF INSULIN (HCC): ICD-10-CM

## 2021-05-21 PROCEDURE — 11721 DEBRIDE NAIL 6 OR MORE: CPT | Performed by: NURSE PRACTITIONER

## 2021-05-21 PROCEDURE — 99203 OFFICE O/P NEW LOW 30 MIN: CPT | Performed by: NURSE PRACTITIONER

## 2021-05-21 RX ORDER — FAMOTIDINE 40 MG/1
40 TABLET, FILM COATED ORAL DAILY
COMMUNITY
Start: 2020-12-04

## 2021-06-02 ENCOUNTER — CLINICAL SUPPORT (OUTPATIENT)
Dept: AUDIOLOGY | Facility: CLINIC | Age: 76
End: 2021-06-02

## 2021-06-02 DIAGNOSIS — H93.13 TINNITUS OF BOTH EARS: ICD-10-CM

## 2021-06-02 DIAGNOSIS — H69.81 EUSTACHIAN TUBE DYSFUNCTION, RIGHT: Primary | ICD-10-CM

## 2021-06-02 DIAGNOSIS — H90.3 SENSORINEURAL HEARING LOSS, BILATERAL: ICD-10-CM

## 2021-06-02 PROCEDURE — 92557 COMPREHENSIVE HEARING TEST: CPT | Performed by: AUDIOLOGIST

## 2021-06-02 PROCEDURE — 92567 TYMPANOMETRY: CPT | Performed by: AUDIOLOGIST

## 2021-06-02 NOTE — PROGRESS NOTES
STANDARD AUDIOMETRIC EVALUATION      Name:  Alisia Velez  :  1945  Age:  76 y.o.  Date of Evaluation:  2021      HISTORY    Reason for visit:  Alisia Velez is seen today for a hearing test at the request of Dr. Mike Erazo.  Patient reports she has a tube in her right ear for past 2 years.  She states the tube gets clogged up periodically and needs to be changed every few months.  She states her hearing has been okay.  She reports tinnitus in both ears.      EVALUATION    See Audiogram    RESULTS        Otoscopy and Tympanometry 226 Hz :  Right Ear:  Otoscopy:  Clear ear canal          Tympanometry:  Unable to test due to no seal    Left Ear:   Otoscopy:  Clear ear canal        Tympanometry:  Middle ear function within normal limits    Test technique:  Standard Audiometry     Pure Tone Audiometry:   Patient responded to pure tones at 10-65 dB for 250-8000 Hz in right ear, and at 5-60 dB for 250-8000 Hz in left ear.       Speech Audiometry:        Right Ear:  Speech Reception Threshold (SRT) was obtained at 10 dBHL                 Speech Discrimination scores were 96% in quiet when words were presented at 50 dBHL       Left Ear:  Speech Reception Threshold (SRT) was obtained at 15 dBHL                 Speech Discrimination scores were 84% in quiet when words were presented at 55 dBHL    Reliability:   good    IMPRESSIONS:  1.  Tympanometry results are consistent with Unable to test due to no seal in right ear, and Middle ear function within normal limits in left ear.  2.  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       RECOMMENDATIONS:  A copy of the test was given to the patient to take with her when she sees her ENT physician.  It was a pleasure seeing Alisia Velez in Audiology today.  We would be happy to do further testing or discuss these test as necessary. My thanks to Dr. Mike Erazo for this  referral.           This document has been electronically signed by Nancy Burgess MS CCC-A on June 2, 2021 09:11 CDT       Nancy Burgess MS CCC-A  Licensed Audiologist

## 2021-06-16 ENCOUNTER — OFFICE VISIT (OUTPATIENT)
Dept: OTOLARYNGOLOGY | Facility: CLINIC | Age: 76
End: 2021-06-16

## 2021-06-16 VITALS
HEART RATE: 72 BPM | DIASTOLIC BLOOD PRESSURE: 72 MMHG | WEIGHT: 166 LBS | TEMPERATURE: 97.8 F | SYSTOLIC BLOOD PRESSURE: 174 MMHG | BODY MASS INDEX: 28.49 KG/M2

## 2021-06-16 DIAGNOSIS — H90.3 SENSORINEURAL HEARING LOSS (SNHL), BILATERAL: ICD-10-CM

## 2021-06-16 DIAGNOSIS — H69.81 EUSTACHIAN TUBE DYSFUNCTION, RIGHT: Primary | ICD-10-CM

## 2021-06-16 PROCEDURE — 99213 OFFICE O/P EST LOW 20 MIN: CPT | Performed by: EMERGENCY MEDICINE

## 2021-06-16 NOTE — PROGRESS NOTES
"Oksana Recinos, VINNY     Chief Complaint   Patient presents with   • Ear Problem        HPI   Alisia Velez is a  76 y.o. female who presents for evaluation to establish care with our group.  She has previously been a patient of Dr. Guerrero and has a right sided PE tube.  She reports the tube has been \"blocked\" for the last two months, however, on her way here this morning, she states she belched and was able to pop her right ear.  She reports using flonase at bedtime.  She has no current complaints today.        Past History:   Past Medical History:   Diagnosis Date   • Arthritis    • Back pain    • Basal cell carcinoma    • Blister of great toe of left foot     Nonthermal, initial encounter   • Cancer (CMS/HCC)     skin    • Dyslipidemia    • Elevated blood pressure    • Essential hypertension 9/13/2016   • GERD (gastroesophageal reflux disease)    • History of cerebrovascular accident     1999   • Hyperlipidemia    • Hypo-osmolality and hyponatremia    • Ingrowing nail    • Melanoma (CMS/HCC)    • Onychomycosis    • PONV (postoperative nausea and vomiting)    • Snores    • Stroke (CMS/HCC)    • Type 2 diabetes mellitus (CMS/HCC)    • 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/HCC) 9/13/2016   • Urinary incontinence    • Vitamin D deficiency      Past Surgical History:   Procedure Laterality Date   • ANTERIOR CERVICAL DISCECTOMY W/ FUSION N/A 4/19/2019    Procedure: ANTERIOR CERVICAL DISCECTOMY FUSION C5-7;  Surgeon: MIGDALIA Horton MD;  Location: [facility];  Service: Orthopedic Spine   • BACK SURGERY     • BLEPHAROPLASTY     • CARDIAC CATHETERIZATION     • CARPAL TUNNEL RELEASE     • CERVICAL SPINE SURGERY      cydney-laminotomy c7-t1   • COLONOSCOPY     • ENDOSCOPY     • EYE SURGERY Bilateral     cataracts    • LAPAROSCOPIC CHOLECYSTECTOMY     • LUMBAR DISC SURGERY     • LUMBAR FUSION Left 3/13/2017    Procedure: LEFT LUMBAR LATERAL INTERBODY FUSION L 3-5 WITH INSTRUMENTATION;  Surgeon: MIGDALIA" Jose Horton MD;  Location:  [facility] OR;  Service:    • LUMBAR LAMINECTOMY WITH FUSION Right 3/15/2017    Procedure: RIGHT L3-S1 POSSIBLE RIGHT L 3-4 HEMILAMINECTOMY FACETECTOMY DECOMPRESSION TRANSFORAMINAL LUMBAR INTERBODY FUSION L4-S1 POSTERIOR SPINAL FUSION WITH INSTRUMENTATION L3-S1;  Surgeon: MIGDALIA Horton MD;  Location:  [facility] OR;  Service:    • MYRINGOTOMY W/ TUBES     • NAIL BED REMOVAL/REVISION  03/11/2016   • NECK SURGERY     • NISSEN FUNDOPLICATION LAPAROSCOPIC     • OTHER SURGICAL HISTORY      had left and right big toenials removed mar 2016     Family History   Problem Relation Age of Onset   • Cancer Other    • Diabetes Other    • Heart disease Other    • Hypertension Other    • Cancer Mother    • Heart disease Father      Social History     Tobacco Use   • Smoking status: Never Smoker   • Smokeless tobacco: Never Used   Vaping Use   • Vaping Use: Never used   Substance Use Topics   • Alcohol use: No   • Drug use: No     Outpatient Medications Marked as Taking for the 6/16/21 encounter (Office Visit) with Oksana Recinos APRN   Medication Sig Dispense Refill   • acetaminophen (TYLENOL) 650 MG 8 hr tablet Take 650 mg by mouth Daily.     • aspirin 81 MG EC tablet Take 81 mg by mouth Daily. Hold for surgery 4-12-19     • atorvastatin (LIPITOR) 20 MG tablet Take 20 mg by mouth daily.     • cetirizine (ZyrTEC) 10 MG tablet Take 10 mg by mouth daily.     • Coenzyme Q10 (COQ10) 200 MG capsule Take 1 tablet by mouth Daily.     • Docusate Calcium (STOOL SOFTENER PO) Take  by mouth.     • famotidine (PEPCID) 40 MG tablet      • guaiFENesin (Mucinex) 600 MG 12 hr tablet Take 1,200 mg by mouth 2 (Two) Times a Day.     • Insulin Glargine, 1 Unit Dial, (Toujeo SoloStar) 300 UNIT/ML solution pen-injector injection INJECT SUBCUTANEOUSLY 66  UNITS DAILY 22.5 mL 3   • Insulin Lispro, 1 Unit Dial, (HumaLOG KwikPen) 100 UNIT/ML solution pen-injector 60 units TID 54 pen 3   • Insulin Pen Needle (B-D UF III MINI  PEN NEEDLES) 31G X 5 MM misc Use 4 times daily  , ICD10 code is E11.9 360 each 11   • metoprolol succinate XL (TOPROL-XL) 25 MG 24 hr tablet Take 25 mg by mouth 2 (Two) Times a Day.     • OMEGA-3 FATTY ACIDS-VITAMIN E PO Take 1 capsule by mouth Daily. omega-3 fatty acids-vitamin E 1,000 mg-5 unit capsule. Hold for surgery 4-12-19       Allergies:  Erythromycin, Humulin n [insulin isophane], Mold extract [trichophyton], Sudafed [pseudoephedrine], Sulfa antibiotics, and Other        Vital Signs:   Temp:  [97.8 °F (36.6 °C)] 97.8 °F (36.6 °C)  Heart Rate:  [72] 72  BP: (174)/(72) 174/72    Physical Exam  Constitutional:       Appearance: Normal appearance.   HENT:      Head: Normocephalic.      Right Ear: Hearing, ear canal and external ear normal. A PE tube (tube in place, dry and patent) is present.      Left Ear: Hearing, ear canal and external ear normal.   Neurological:      Mental Status: She is alert.                        Assessment:    1. Eustachian tube dysfunction, right               Plan:        Continue current management plan.             Return in about 6 months (around 12/16/2021) for Follow up with me when Dr. Boucher in clinic.      Oksana Recinos, VINNY  06/16/21  09:12 CDT

## 2021-06-16 NOTE — PATIENT INSTRUCTIONS
CONTACT INFORMATION:  The main office phone number is [phone]. For emergencies after hours and on weekends, this number will convert over to our answering service and the on call provider will answer. Please try to keep non emergent phone calls/ questions to office hours 9am-5pm Monday through Friday.     [employer]  As an alternative, you can sign up and use the Epic MyChart system for more direct and quicker access for non emergent questions/ problems.  [facility] Ufree allows you to send messages to your doctor, view your test results, renew your prescriptions, schedule appointments, and more. To sign up, go to [employer] and click on the Sign Up Now link in the New User? box. Enter your Ufree Activation Code exactly as it appears below along with the last four digits of your Social Security Number and your Date of Birth () to complete the sign-up process. If you do not sign up before the expiration date, you must request a new code.    Ufree Activation Code: Activation code not generated  Current Ufree Status: Active    If you have questions, you can email [email or web address] or call [phone] to talk to our U[employer] staff. Remember, [employer] is NOT to be used for urgent needs. For medical emergencies, dial 911.

## 2021-06-29 ENCOUNTER — TELEMEDICINE (OUTPATIENT)
Dept: ENDOCRINOLOGY | Facility: CLINIC | Age: 76
End: 2021-06-29

## 2021-06-29 DIAGNOSIS — E11.42 TYPE 2 DIABETES MELLITUS WITH DIABETIC POLYNEUROPATHY, WITH LONG-TERM CURRENT USE OF INSULIN (HCC): Primary | ICD-10-CM

## 2021-06-29 DIAGNOSIS — Z79.4 TYPE 2 DIABETES MELLITUS WITH DIABETIC POLYNEUROPATHY, WITH LONG-TERM CURRENT USE OF INSULIN (HCC): Primary | ICD-10-CM

## 2021-06-29 DIAGNOSIS — E78.2 MIXED HYPERLIPIDEMIA: ICD-10-CM

## 2021-06-29 DIAGNOSIS — I10 ESSENTIAL HYPERTENSION: ICD-10-CM

## 2021-06-29 PROCEDURE — 99214 OFFICE O/P EST MOD 30 MIN: CPT | Performed by: INTERNAL MEDICINE

## 2021-06-29 RX ORDER — INSULIN LISPRO 200 [IU]/ML
20 INJECTION, SOLUTION SUBCUTANEOUS
Qty: 27 PEN | Refills: 3 | Status: SHIPPED | OUTPATIENT
Start: 2021-06-29 | End: 2022-06-22

## 2021-06-29 RX ORDER — INSULIN GLARGINE 300 U/ML
INJECTION, SOLUTION SUBCUTANEOUS
Qty: 8 PEN | Refills: 11 | Status: SHIPPED | OUTPATIENT
Start: 2021-06-29 | End: 2022-05-26

## 2021-07-20 DIAGNOSIS — I10 ESSENTIAL HYPERTENSION: ICD-10-CM

## 2021-07-20 DIAGNOSIS — E78.2 MIXED HYPERLIPIDEMIA: ICD-10-CM

## 2021-07-20 DIAGNOSIS — Z79.4 TYPE 2 DIABETES MELLITUS WITH DIABETIC POLYNEUROPATHY, WITH LONG-TERM CURRENT USE OF INSULIN (HCC): ICD-10-CM

## 2021-07-20 DIAGNOSIS — E11.42 TYPE 2 DIABETES MELLITUS WITH DIABETIC POLYNEUROPATHY, WITH LONG-TERM CURRENT USE OF INSULIN (HCC): ICD-10-CM

## 2021-07-21 ENCOUNTER — TELEPHONE (OUTPATIENT)
Dept: ENDOCRINOLOGY | Facility: CLINIC | Age: 76
End: 2021-07-21

## 2021-07-21 NOTE — TELEPHONE ENCOUNTER
Pt said Dr Spears was going to mail her scripts so she can get filled as needed . Does not use the drug store she uses [facility] [facility] mail order with 90 day supply     Pt says can come by this week or next and  the paper scripts personally if they cannot be mailed to her     Toujeo   humalog and pen needles

## 2021-08-06 NOTE — PROGRESS NOTES
[facility] - PODIATRY    Today's Date: 08/20/21    Patient Name: Alisia Velez  MRN: [id number]  CSN: [id number]  PCP: Mike Erazo MD  Referring Provider: No ref. provider found    SUBJECTIVE     Chief Complaint   Patient presents with   • Follow-up     pcp05/07/2021 3 mo fu diabetic nail care- pt states need for nail care- pt denies pain- pt presents with long toenails   • Diabetes     122mg/dl last BG a1c 7.5     HPI: Alisia Velez, a 76 y.o.female, comes to clinic as a(n) established patient presenting for diabetic foot exam and complaining of foot pain. Patient has h/o Arthritis, back pain, skin cancer, hyperlipidemia, hypertension, GERD, melanoma, CVA, diabetes mellitus with neuropathy. Patient is IDDM with last stated BG level of 122mg/dl.  Presents for diabetic foot and nail care.  Denies any new issues with her feet.  States that nails are long, thick, and discolored and that she is unable to care for them herself. Denies pain today. Relates previous treatment(s) including Nail care by podiatry. Denies any constitutional symptoms. No other pedal complaints at this time.    Past Medical History:   Diagnosis Date   • Arthritis    • Back pain    • Basal cell carcinoma    • Blister of great toe of left foot     Nonthermal, initial encounter   • Cancer (CMS/HCC)     skin    • Dyslipidemia    • Elevated blood pressure    • Essential hypertension 9/13/2016   • GERD (gastroesophageal reflux disease)    • History of cerebrovascular accident     1999   • Hyperlipidemia    • Hypo-osmolality and hyponatremia    • Ingrowing nail    • Melanoma (CMS/HCC)    • Onychomycosis    • PONV (postoperative nausea and vomiting)    • Snores    • Stroke (CMS/HCC)    • Type 2 diabetes mellitus (CMS/HCC)    • 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/HCC) 9/13/2016   • Urinary incontinence    • Vitamin D deficiency      Past Surgical History:   Procedure Laterality Date   • ANTERIOR CERVICAL  DISCECTOMY W/ FUSION N/A 4/19/2019    Procedure: ANTERIOR CERVICAL DISCECTOMY FUSION C5-7;  Surgeon: MIGDALIA Horton MD;  Location:  [facility] OR;  Service: Orthopedic Spine   • BACK SURGERY     • BLEPHAROPLASTY     • CARDIAC CATHETERIZATION     • CARPAL TUNNEL RELEASE     • CERVICAL SPINE SURGERY      cydney-laminotomy c7-t1   • COLONOSCOPY     • ENDOSCOPY     • EYE SURGERY Bilateral     cataracts    • LAPAROSCOPIC CHOLECYSTECTOMY     • LUMBAR DISC SURGERY     • LUMBAR FUSION Left 3/13/2017    Procedure: LEFT LUMBAR LATERAL INTERBODY FUSION L 3-5 WITH INSTRUMENTATION;  Surgeon: MIGDALIA Horton MD;  Location:  [facility] OR;  Service:    • LUMBAR LAMINECTOMY WITH FUSION Right 3/15/2017    Procedure: RIGHT L3-S1 POSSIBLE RIGHT L 3-4 HEMILAMINECTOMY FACETECTOMY DECOMPRESSION TRANSFORAMINAL LUMBAR INTERBODY FUSION L4-S1 POSTERIOR SPINAL FUSION WITH INSTRUMENTATION L3-S1;  Surgeon: MIGDALIA Horton MD;  Location:  [facility] OR;  Service:    • MYRINGOTOMY W/ TUBES     • NAIL BED REMOVAL/REVISION  03/11/2016   • NECK SURGERY     • NISSEN FUNDOPLICATION LAPAROSCOPIC     • OTHER SURGICAL HISTORY      had left and right big toenials removed mar 2016     Family History   Problem Relation Age of Onset   • Cancer Other    • Diabetes Other    • Heart disease Other    • Hypertension Other    • Cancer Mother    • Heart disease Father      Social History     Socioeconomic History   • Marital status:      Spouse name: Not on file   • Number of children: Not on file   • Years of education: Not on file   • Highest education level: Not on file   Tobacco Use   • Smoking status: Never Smoker   • Smokeless tobacco: Never Used   Vaping Use   • Vaping Use: Never used   Substance and Sexual Activity   • Alcohol use: No   • Drug use: No   • Sexual activity: Defer     Allergies   Allergen Reactions   • Erythromycin Other (See Comments) and Shortness Of Breath     Eyes see big black spots   • Humulin N [Insulin Isophane] Shortness Of Breath    • Mold Extract [Trichophyton] Shortness Of Breath   • Sudafed [Pseudoephedrine] Shortness Of Breath   • Sulfa Antibiotics Shortness Of Breath   • Other Itching     Silk Tape   • Adhesive Tape Itching   • Insulin Unknown - Low Severity   • Molds & Smuts Itching     Current Outpatient Medications   Medication Sig Dispense Refill   • acetaminophen (TYLENOL) 650 MG 8 hr tablet Take 650 mg by mouth Daily.     • aspirin 81 MG EC tablet Take 81 mg by mouth Daily. Hold for surgery 4-12-19     • atorvastatin (LIPITOR) 20 MG tablet Take 20 mg by mouth daily.     • cetirizine (ZyrTEC) 10 MG tablet Take 10 mg by mouth daily.     • Coenzyme Q10 (COQ10) 200 MG capsule Take 1 tablet by mouth Daily.     • Docusate Calcium (STOOL SOFTENER PO) Take  by mouth.     • famotidine (PEPCID) 40 MG tablet      • fluticasone (FLONASE) 50 MCG/ACT nasal spray 2 sprays into the nostril(s) as directed by provider Daily.     • guaiFENesin (Mucinex) 600 MG 12 hr tablet Take 1,200 mg by mouth 2 (Two) Times a Day.     • Insulin Glargine, 2 Unit Dial, (Toujeo Max SoloStar) 300 UNIT/ML solution pen-injector injection 80 units daily 8 pen 11   • Insulin Lispro (HumaLOG KwikPen) 200 UNIT/ML solution pen-injector Inject 20 Units under the skin into the appropriate area as directed 3 (Three) Times a Day With Meals. 60 units TID 27 pen 3   • Insulin Lispro, 1 Unit Dial, (HumaLOG KwikPen) 100 UNIT/ML solution pen-injector 60 units TID 54 pen 3   • Insulin Pen Needle 32G X 6 MM misc 4 x daily 360 each 3   • metoprolol succinate XL (TOPROL-XL) 25 MG 24 hr tablet Take 25 mg by mouth 2 (Two) Times a Day.     • OMEGA-3 FATTY ACIDS-VITAMIN E PO Take 1 capsule by mouth Daily. omega-3 fatty acids-vitamin E 1,000 mg-5 unit capsule. Hold for surgery 4-12-19       No current facility-administered medications for this visit.     Review of Systems   Constitutional: Negative for chills and fever.   HENT: Negative for congestion.    Respiratory: Negative for shortness of  breath.    Cardiovascular: Negative for chest pain and leg swelling.   Gastrointestinal: Negative for constipation, diarrhea, nausea and vomiting.   Musculoskeletal: Positive for arthralgias and back pain. Negative for myalgias.   Skin: Negative for wound.   Neurological: Positive for numbness.       OBJECTIVE     Vitals:    08/20/21 0947   BP: 128/62   Pulse: 78   SpO2: 96%       PHYSICAL EXAM  GEN:   Accompanied by spouse.     Foot/Ankle Exam:       General:   Appearance: appears stated age and healthy    Orientation: AAOx3    Affect: appropriate    Gait: unimpaired    Assistance: independent    Shoe Gear:  Casual shoes    VASCULAR      Right Foot Vascularity   Dorsalis pedis:  2+  Posterior tibial:  2+  Skin Temperature: warm    Edema Grading:  None  CFT:  3  Pedal Hair Growth:  Present  Varicosities: none       Left Foot Vascularity   Dorsalis pedis:  2+  Posterior tibial:  2+  Skin Temperature: warm    Edema Grading:  None  CFT:  3  Pedal Hair Growth:  Present  Varicosities: none        NEUROLOGIC     Right Foot Neurologic   Light touch sensation:  Diminished  Vibratory sensation:  Diminished  Hot/Cold sensation: diminished    Protective Sensation using [place]rieto Monofilament:  Diminished     Left Foot Neurologic   Light touch sensation:  Diminished  Vibratory sensation:  Diminished  Hot/cold sensation: diminished    Protective Sensation using [place]rieto Monofilament:  Diminished     MUSCULOSKELETAL      Right Foot Musculoskeletal   Ecchymosis:  None  Tenderness: none    Arch:  Pes planus (Mild)  Hallux valgus: Yes    Hallux limitus: No       Left Foot Musculoskeletal   Ecchymosis:  None  Tenderness: none    Arch:  Pes planus (Mild)  Hallux valgus: Yes    Hallux limitus: No       MUSCLE STRENGTH     Right Foot Muscle Strength   Foot dorsiflexion:  5  Foot plantar flexion:  5  Foot inversion:  5  Foot eversion:  5     Left Foot Muscle Strength   Foot dorsiflexion:  5  Foot plantar flexion:  5  Foot  inversion:  5  Foot eversion:  5     RANGE OF MOTION      Right Foot Range of Motion   Foot and ankle ROM within normal limits       Left Foot Range of Motion   Foot and ankle ROM within normal limits       DERMATOLOGIC     Right Foot Dermatologic   Skin: skin intact    Nails: onychomycosis, abnormally thick, subungual debris and dystrophic nails       Left Foot Dermatologic   Skin: skin intact    Nails: onychomycosis, abnormally thick, subungual debris and dystrophic nails        RADIOLOGY/NUCLEAR:  No results found.    LABORATORY/CULTURE RESULTS:      PATHOLOGY RESULTS:       ASSESSMENT/PLAN     Diagnoses and all orders for this visit:    1. Onychomycosis (Primary)    2. Type 2 diabetes mellitus with diabetic polyneuropathy, with long-term current use of insulin (CMS/[facility])    3. Plantar fasciitis, bilateral    4. Pes planus of both feet      Comprehensive lower extremity examination and evaluation was performed.  Discussed findings and treatment plan including risks, benefits, and treatment options with patient in detail. Patient agreed with treatment plan.  After verbal consent obtained, nail(s) x10 debrided of length and thickness with nail nipper without incidence  Patient may maintain nails and calluses at home utilizing emery board or pumice stone between visits as needed  Reviewed at home diabetic foot care including daily foot checks.  An After Visit Summary was printed and given to the patient at discharge, including (if requested) any available informative/educational handouts regarding diagnosis, treatment, or medications. All questions were answered to patient/family satisfaction. Should symptoms fail to improve or worsen they agree to call or return to clinic or to go to the Emergency Department. Discussed the importance of following up with any needed screening tests/labs/specialist appointments and any requested follow-up recommended by me today. Importance of maintaining follow-up discussed and  patient accepts that missed appointments can delay diagnosis and potentially lead to worsening of conditions.  Return in about 3 months (around 11/20/2021)., or sooner if acute issues arise.    Lab Frequency Next Occurrence       This document has been electronically signed by VINNY Causey on August 20, 2021 10:10 CDT

## 2021-08-17 ENCOUNTER — TELEPHONE (OUTPATIENT)
Dept: ENDOCRINOLOGY | Facility: CLINIC | Age: 76
End: 2021-08-17

## 2021-08-17 NOTE — TELEPHONE ENCOUNTER
Pt left a vm stating that [employer] needs the quantity for pen needles, pt says they are supposed to be faxing information over.

## 2021-08-20 ENCOUNTER — TELEPHONE (OUTPATIENT)
Dept: VASCULAR SURGERY | Facility: CLINIC | Age: 76
End: 2021-08-20

## 2021-08-20 ENCOUNTER — OFFICE VISIT (OUTPATIENT)
Dept: PODIATRY | Facility: CLINIC | Age: 76
End: 2021-08-20

## 2021-08-20 VITALS
OXYGEN SATURATION: 96 % | WEIGHT: 166 LBS | HEART RATE: 78 BPM | BODY MASS INDEX: 28.34 KG/M2 | SYSTOLIC BLOOD PRESSURE: 128 MMHG | HEIGHT: 64 IN | DIASTOLIC BLOOD PRESSURE: 62 MMHG

## 2021-08-20 DIAGNOSIS — B35.1 ONYCHOMYCOSIS: Primary | ICD-10-CM

## 2021-08-20 DIAGNOSIS — Z79.4 TYPE 2 DIABETES MELLITUS WITH DIABETIC POLYNEUROPATHY, WITH LONG-TERM CURRENT USE OF INSULIN (HCC): ICD-10-CM

## 2021-08-20 DIAGNOSIS — E11.42 TYPE 2 DIABETES MELLITUS WITH DIABETIC POLYNEUROPATHY, WITH LONG-TERM CURRENT USE OF INSULIN (HCC): ICD-10-CM

## 2021-08-20 DIAGNOSIS — M72.2 PLANTAR FASCIITIS, BILATERAL: ICD-10-CM

## 2021-08-20 DIAGNOSIS — M21.41 PES PLANUS OF BOTH FEET: ICD-10-CM

## 2021-08-20 DIAGNOSIS — M21.42 PES PLANUS OF BOTH FEET: ICD-10-CM

## 2021-08-20 PROCEDURE — 11721 DEBRIDE NAIL 6 OR MORE: CPT | Performed by: NURSE PRACTITIONER

## 2021-08-20 RX ORDER — FLUTICASONE PROPIONATE 50 MCG
2 SPRAY, SUSPENSION (ML) NASAL NIGHTLY
COMMUNITY

## 2021-08-20 NOTE — TELEPHONE ENCOUNTER
Patient stated her and her  come together for apts.  has an apt with [facility] in a few days so they want to wait and see how that apt goes before making 3 month fu.

## 2021-08-24 ENCOUNTER — TELEPHONE (OUTPATIENT)
Dept: ENDOCRINOLOGY | Facility: CLINIC | Age: 76
End: 2021-08-24

## 2021-08-24 NOTE — TELEPHONE ENCOUNTER
Nicolette with [facility] called needs last office notes so they can ship supplies     They faxed the forms and are waiting for the chart notes for last appt  Can call [phone]

## 2021-09-02 ENCOUNTER — TELEPHONE (OUTPATIENT)
Dept: ENDOCRINOLOGY | Facility: CLINIC | Age: 76
End: 2021-09-02

## 2021-09-02 NOTE — TELEPHONE ENCOUNTER
Pt has a  written script from [facility] for humalog quick pen took it to Pharmacy to get filled and they said it  didn't have the strength of insulin on the script and pt will run by tomorrow and have it corrected or if [facility] can rewrite she will exchange it  .

## 2021-09-03 ENCOUNTER — DOCUMENTATION (OUTPATIENT)
Dept: ENDOCRINOLOGY | Facility: CLINIC | Age: 76
End: 2021-09-03

## 2021-11-24 NOTE — PROGRESS NOTES
[facility] - PODIATRY    Today's Date: 11/30/21    Patient Name: Alisia Velez  MRN: [id number]  CSN: [id number]  PCP: Mike Erazo MD  Referring Provider: No ref. provider found    SUBJECTIVE     Chief Complaint   Patient presents with   • Follow-up     pcp05/27/2021 3 month diabetic f/u- pt states no issues, doing good- pt denies pain- pt presents with long thick nails   • Diabetes     127mg/dl BG this am     HPI: Alisia Velez, a 76 y.o.female, comes to clinic as a(n) established patient presenting for diabetic foot exam and complaining of foot pain. Patient has h/o Arthritis, back pain, skin cancer, hyperlipidemia, hypertension, GERD, melanoma, CVA, diabetes mellitus with neuropathy. Patient is IDDM with last stated BG level of 127mg/dl.  Presents for diabetic foot exam and nail care.  Denies issues with her feet at the present time.  States that nails are long, thick, and discolored and that she is unable to care for them herself. Denies pain at the present time. Relates previous treatment(s) including Nail care by podiatry. Denies any constitutional symptoms. No other pedal complaints at this time.    Past Medical History:   Diagnosis Date   • Arthritis    • Back pain    • Basal cell carcinoma    • Blister of great toe of left foot     Nonthermal, initial encounter   • Cancer (HCC)     skin    • Dyslipidemia    • Elevated blood pressure    • Essential hypertension 9/13/2016   • GERD (gastroesophageal reflux disease)    • History of cerebrovascular accident     1999   • Hyperlipidemia    • Hypo-osmolality and hyponatremia    • Ingrowing nail    • Melanoma (HCC)    • Onychomycosis    • PONV (postoperative nausea and vomiting)    • Snores    • Stroke (HCC)    • Type 2 diabetes mellitus (HCC)    • Type 2 diabetes mellitus with circulatory disorder (HCC) 9/13/2016   • Urinary incontinence    • Vitamin D deficiency      Past Surgical History:   Procedure Laterality Date   • ANTERIOR  CERVICAL DISCECTOMY W/ FUSION N/A 4/19/2019    Procedure: ANTERIOR CERVICAL DISCECTOMY FUSION C5-7;  Surgeon: MIGDALIA Horton MD;  Location:  [facility] OR;  Service: Orthopedic Spine   • BACK SURGERY     • BLEPHAROPLASTY     • CARDIAC CATHETERIZATION     • CARPAL TUNNEL RELEASE     • CERVICAL SPINE SURGERY      cydney-laminotomy c7-t1   • COLONOSCOPY     • ENDOSCOPY     • EYE SURGERY Bilateral     cataracts    • LAPAROSCOPIC CHOLECYSTECTOMY     • LUMBAR DISC SURGERY     • LUMBAR FUSION Left 3/13/2017    Procedure: LEFT LUMBAR LATERAL INTERBODY FUSION L 3-5 WITH INSTRUMENTATION;  Surgeon: MIGDALIA Horton MD;  Location:  [facility] OR;  Service:    • LUMBAR LAMINECTOMY WITH FUSION Right 3/15/2017    Procedure: RIGHT L3-S1 POSSIBLE RIGHT L 3-4 HEMILAMINECTOMY FACETECTOMY DECOMPRESSION TRANSFORAMINAL LUMBAR INTERBODY FUSION L4-S1 POSTERIOR SPINAL FUSION WITH INSTRUMENTATION L3-S1;  Surgeon: MIGDALIA Horton MD;  Location:  [facility] OR;  Service:    • MYRINGOTOMY W/ TUBES     • NAIL BED REMOVAL/REVISION  03/11/2016   • NECK SURGERY     • NISSEN FUNDOPLICATION LAPAROSCOPIC     • OTHER SURGICAL HISTORY      had left and right big toenials removed mar 2016     Family History   Problem Relation Age of Onset   • Cancer Other    • Diabetes Other    • Heart disease Other    • Hypertension Other    • Cancer Mother    • Heart disease Father      Social History     Socioeconomic History   • Marital status:    Tobacco Use   • Smoking status: Never Smoker   • Smokeless tobacco: Never Used   Vaping Use   • Vaping Use: Never used   Substance and Sexual Activity   • Alcohol use: No   • Drug use: No   • Sexual activity: Defer     Allergies   Allergen Reactions   • Erythromycin Other (See Comments) and Shortness Of Breath     Eyes see big black spots   • Humulin N [Insulin Isophane] Shortness Of Breath   • Mold Extract [Trichophyton] Shortness Of Breath   • Sudafed [Pseudoephedrine] Shortness Of Breath   • Sulfa Antibiotics Shortness  Of Breath   • Other Itching     Silk Tape   • Adhesive Tape Itching   • Insulin Unknown - Low Severity   • Molds & Smuts Itching     Current Outpatient Medications   Medication Sig Dispense Refill   • acetaminophen (TYLENOL) 650 MG 8 hr tablet Take 650 mg by mouth Daily.     • aspirin 81 MG EC tablet Take 81 mg by mouth Daily. Hold for surgery 4-12-19     • atorvastatin (LIPITOR) 20 MG tablet Take 20 mg by mouth daily.     • cetirizine (ZyrTEC) 10 MG tablet Take 10 mg by mouth daily.     • Coenzyme Q10 (COQ10) 200 MG capsule Take 1 tablet by mouth Daily.     • Docusate Calcium (STOOL SOFTENER PO) Take  by mouth.     • famotidine (PEPCID) 40 MG tablet      • fluticasone (FLONASE) 50 MCG/ACT nasal spray 2 sprays into the nostril(s) as directed by provider Daily.     • guaiFENesin (Mucinex) 600 MG 12 hr tablet Take 1,200 mg by mouth 2 (Two) Times a Day.     • Insulin Glargine, 2 Unit Dial, (Toujeo Max SoloStar) 300 UNIT/ML solution pen-injector injection 80 units daily 8 pen 11   • Insulin Lispro (HumaLOG KwikPen) 200 UNIT/ML solution pen-injector Inject 20 Units under the skin into the appropriate area as directed 3 (Three) Times a Day With Meals. 60 units TID 27 pen 3   • Insulin Lispro, 1 Unit Dial, (HumaLOG KwikPen) 100 UNIT/ML solution pen-injector 60 units TID 54 pen 3   • Insulin Pen Needle 32G X 6 MM misc 4 x daily 360 each 3   • metoprolol succinate XL (TOPROL-XL) 25 MG 24 hr tablet Take 25 mg by mouth 2 (Two) Times a Day.     • OMEGA-3 FATTY ACIDS-VITAMIN E PO Take 1 capsule by mouth Daily. omega-3 fatty acids-vitamin E 1,000 mg-5 unit capsule. Hold for surgery 4-12-19       No current facility-administered medications for this visit.     Review of Systems   Constitutional: Negative for chills and fever.   HENT: Negative for congestion.    Respiratory: Negative for shortness of breath.    Cardiovascular: Negative for chest pain and leg swelling.   Gastrointestinal: Negative for constipation, diarrhea, nausea  and vomiting.   Musculoskeletal: Positive for arthralgias and back pain. Negative for myalgias.   Skin: Negative for wound.   Neurological: Positive for numbness.       OBJECTIVE     Vitals:    11/30/21 1043   Pulse: 62   SpO2: 98%       PHYSICAL EXAM  GEN:   Accompanied by spouse.     Foot/Ankle Exam:       General:   Appearance: appears stated age and healthy    Orientation: AAOx3    Affect: appropriate    Gait: unimpaired    Assistance: independent    Shoe Gear:  Diabetic shoes    VASCULAR      Right Foot Vascularity   Dorsalis pedis:  2+  Posterior tibial:  2+  Skin Temperature: warm    Edema Grading:  None  CFT:  3  Pedal Hair Growth:  Present  Varicosities: none       Left Foot Vascularity   Dorsalis pedis:  2+  Posterior tibial:  2+  Skin Temperature: warm    Edema Grading:  None  CFT:  3  Pedal Hair Growth:  Present  Varicosities: none        NEUROLOGIC     Right Foot Neurologic   Light touch sensation:  Diminished  Vibratory sensation:  Diminished  Hot/Cold sensation: diminished    Protective Sensation using [place]-Prieto Monofilament:  7     Left Foot Neurologic   Light touch sensation:  Diminished  Vibratory sensation:  Diminished  Hot/cold sensation: diminished    Protective Sensation using [place]-Prieto Monofilament:  7     MUSCULOSKELETAL      Right Foot Musculoskeletal   Ecchymosis:  None  Tenderness: none    Arch:  Pes planus (Mild)  Hallux valgus: Yes    Hallux limitus: No       Left Foot Musculoskeletal   Ecchymosis:  None  Tenderness: none    Arch:  Pes planus (Mild)  Hallux valgus: Yes    Hallux limitus: No       MUSCLE STRENGTH     Right Foot Muscle Strength   Foot dorsiflexion:  5  Foot plantar flexion:  5  Foot inversion:  5  Foot eversion:  5     Left Foot Muscle Strength   Foot dorsiflexion:  5  Foot plantar flexion:  5  Foot inversion:  5  Foot eversion:  5     RANGE OF MOTION      Right Foot Range of Motion   Foot and ankle ROM within normal limits       Left Foot Range of Motion    Foot and ankle ROM within normal limits       DERMATOLOGIC     Right Foot Dermatologic   Skin: skin intact    Nails: onychomycosis, abnormally thick, subungual debris and dystrophic nails       Left Foot Dermatologic   Skin: skin intact    Nails: onychomycosis, abnormally thick, subungual debris and dystrophic nails        RADIOLOGY/NUCLEAR:  No results found.    LABORATORY/CULTURE RESULTS:      PATHOLOGY RESULTS:       ASSESSMENT/PLAN     Diagnoses and all orders for this visit:    1. Onychomycosis (Primary)    2. Type 2 diabetes mellitus with diabetic polyneuropathy, with long-term current use of insulin (HCC)    3. Pes planus of both feet    4. Valgus deformity of both great toes      Comprehensive lower extremity examination and evaluation was performed.  Discussed findings and treatment plan including risks, benefits, and treatment options with patient in detail. Patient agreed with treatment plan.  After verbal consent obtained, nail(s) x10 debrided of length and thickness with nail nipper without incidence  Patient may maintain nails and calluses at home utilizing emery board or pumice stone between visits as needed  Reviewed at home diabetic foot care including daily foot checks.  Continue diabetic monitoring and control under direction of PCP.  An After Visit Summary was printed and given to the patient at discharge, including (if requested) any available informative/educational handouts regarding diagnosis, treatment, or medications. All questions were answered to patient/family satisfaction. Should symptoms fail to improve or worsen they agree to call or return to clinic or to go to the Emergency Department. Discussed the importance of following up with any needed screening tests/labs/specialist appointments and any requested follow-up recommended by me today. Importance of maintaining follow-up discussed and patient accepts that missed appointments can delay diagnosis and potentially lead to worsening  of conditions.  Return in about 3 months (around 2/28/2022) for Follow-up with Podiatry Provider., or sooner if acute issues arise.    Lab Frequency Next Occurrence       This document has been electronically signed by Navarro Newton DPM on November 30, 2021 10:59 CST

## 2021-11-29 ENCOUNTER — TELEPHONE (OUTPATIENT)
Dept: PODIATRY | Facility: CLINIC | Age: 76
End: 2021-11-29

## 2021-11-29 NOTE — TELEPHONE ENCOUNTER
Called patient regarding appt on 11/30/2021. Left message for patient to return call if any questions or concerns arise.

## 2021-11-30 ENCOUNTER — OFFICE VISIT (OUTPATIENT)
Dept: PODIATRY | Facility: CLINIC | Age: 76
End: 2021-11-30

## 2021-11-30 VITALS — HEIGHT: 64 IN | HEART RATE: 62 BPM | OXYGEN SATURATION: 98 % | WEIGHT: 169.2 LBS | BODY MASS INDEX: 28.89 KG/M2

## 2021-11-30 DIAGNOSIS — M20.11 VALGUS DEFORMITY OF BOTH GREAT TOES: ICD-10-CM

## 2021-11-30 DIAGNOSIS — E11.42 TYPE 2 DIABETES MELLITUS WITH DIABETIC POLYNEUROPATHY, WITH LONG-TERM CURRENT USE OF INSULIN (HCC): ICD-10-CM

## 2021-11-30 DIAGNOSIS — M20.12 VALGUS DEFORMITY OF BOTH GREAT TOES: ICD-10-CM

## 2021-11-30 DIAGNOSIS — B35.1 ONYCHOMYCOSIS: Primary | ICD-10-CM

## 2021-11-30 DIAGNOSIS — Z79.4 TYPE 2 DIABETES MELLITUS WITH DIABETIC POLYNEUROPATHY, WITH LONG-TERM CURRENT USE OF INSULIN (HCC): ICD-10-CM

## 2021-11-30 DIAGNOSIS — M21.42 PES PLANUS OF BOTH FEET: ICD-10-CM

## 2021-11-30 DIAGNOSIS — M21.41 PES PLANUS OF BOTH FEET: ICD-10-CM

## 2021-11-30 PROCEDURE — 11721 DEBRIDE NAIL 6 OR MORE: CPT | Performed by: PODIATRIST

## 2021-12-09 ENCOUNTER — OFFICE VISIT (OUTPATIENT)
Dept: OTOLARYNGOLOGY | Facility: CLINIC | Age: 76
End: 2021-12-09

## 2021-12-09 VITALS
BODY MASS INDEX: 28.85 KG/M2 | DIASTOLIC BLOOD PRESSURE: 70 MMHG | HEIGHT: 64 IN | WEIGHT: 169 LBS | SYSTOLIC BLOOD PRESSURE: 128 MMHG | HEART RATE: 88 BPM | TEMPERATURE: 97.1 F

## 2021-12-09 DIAGNOSIS — H61.22 IMPACTED CERUMEN OF LEFT EAR: ICD-10-CM

## 2021-12-09 DIAGNOSIS — H69.81 EUSTACHIAN TUBE DYSFUNCTION, RIGHT: Primary | ICD-10-CM

## 2021-12-09 DIAGNOSIS — Z96.22 S/P BILATERAL MYRINGOTOMY WITH TUBE PLACEMENT: ICD-10-CM

## 2021-12-09 DIAGNOSIS — H90.3 SENSORINEURAL HEARING LOSS (SNHL), BILATERAL: ICD-10-CM

## 2021-12-09 PROCEDURE — 69210 REMOVE IMPACTED EAR WAX UNI: CPT | Performed by: EMERGENCY MEDICINE

## 2021-12-09 PROCEDURE — 99213 OFFICE O/P EST LOW 20 MIN: CPT | Performed by: EMERGENCY MEDICINE

## 2021-12-09 NOTE — PATIENT INSTRUCTIONS
CONTACT INFORMATION:  The main office phone number is [phone]. For emergencies after hours and on weekends, this number will convert over to our answering service and the on call provider will answer. Please try to keep non emergent phone calls/ questions to office hours 9am-5pm Monday through Friday.     [employer]  As an alternative, you can sign up and use the Epic MyChart system for more direct and quicker access for non emergent questions/ problems.  [facility] [employer] allows you to send messages to your doctor, view your test results, renew your prescriptions, schedule appointments, and more. To sign up, go to [employer] and click on the Sign Up Now link in the New User? box. Enter your [employer] Activation Code exactly as it appears below along with the last four digits of your Social Security Number and your Date of Birth () to complete the sign-up process. If you do not sign up before the expiration date, you must request a new code.    [employer] Activation Code: Activation code not generated  Current [employer] Status: Active    If you have questions, you can email [email or web address] or call [phone] to talk to our [employer] staff. Remember, [employer] is NOT to be used for urgent needs. For medical emergencies, dial 911.

## 2021-12-09 NOTE — PROGRESS NOTES
VINNY Noble  [facility] EAR NOSE & THROAT  [address], [address]  [facility] [address]  Fax [phone]  Phone [phone]    Visit Type: FOLLOW UP   Chief Complaint   Patient presents with   • Ear Problem        HPI  Alisia Velez is a 76 y.o. female who presents status post myringotomy tube insertion. The patient has had: no related complaints. The patient denies pain, fever, change of hearing and otorrhea.      History     Last Reviewed by Oksana Recinos APRN on 12/9/2021 at  1:06 PM    Sections Reviewed    Medical, Family, Tobacco, Surgical      Problem list reviewed by Oksana Recinos APRN on 12/9/2021 at  1:06 PM  Medicines reviewed by Oksana Recinos APRN on 12/9/2021 at  1:06 PM  Allergies reviewed by Oksana Recinos APRN on 12/9/2021 at  1:06 PM        Vital Signs:   Temp:  [97.1 °F (36.2 °C)] 97.1 °F (36.2 °C)  Heart Rate:  [88] 88  BP: (128)/(70) 128/70  ENT Physical Exam  Constitutional  Appearance: patient appears well-developed, well-nourished and well-groomed,  Communication/Voice: communication appropriate for developmental age; vocal quality normal;  Head and Face  Appearance: head appears normal, face appears normal and face appears atraumatic;  Palpation: facial palpation normal;  Salivary: glands normal;  Ear  Hearing: intact;  Auricles: right auricle normal; left auricle normal;  External Mastoids: right external mastoid normal; left external mastoid normal;  Ear Canals: right ear canal normal; left ear canal impacted cerumen observed;  Tympanic Membranes: right tympanic membrane tympanostomy tube noted; T-type tube;     Ear Cerumen Removal    Date/Time: 12/9/2021 1:06 PM  Performed by: Oksnaa Recinos APRN  Authorized by: Oksana Recinos APRN   Consent: Verbal consent obtained.  Consent given by: patient  Patient identity confirmed: verbally with patient    Anesthesia:  Local Anesthetic: none  Location  details: left ear  Patient tolerance: patient tolerated the procedure well with no immediate complications  Comments: TM intact   Procedure type: instrumentation, alligator forceps, curette   Sedation:  Patient sedated: no           Result Review    RESULTS REVIEW    I have reviewed the patients old records in the chart.     Assessment/Plan    Diagnoses and all orders for this visit:    1. Eustachian tube dysfunction, right (Primary)    2. Sensorineural hearing loss (SNHL), bilateral    3. S/p bilateral myringotomy with tube placement    4. Impacted cerumen of left ear    Other orders  -     Ear Cerumen Removal            Call for ear problems, especially change of hearing, ear pain or dizziness.  For the best results, use nasal sprays every day. It may take a week to build up in the nasal lining and a few more weeks to improve the eustachian tube function.  Protect getting water in the ears. If needed, may use over the counter silicone plugs or a cotton ball coated with vasoline when bathing.  Use hairdryer on a cool setting after bathing.      Return in about 6 months (around 6/9/2022) for Follow up with VINNY Reyez for tube follow up.      VINNY Noble  12/09/21  13:06 CST

## 2021-12-16 ENCOUNTER — OFFICE VISIT (OUTPATIENT)
Dept: ENDOCRINOLOGY | Facility: CLINIC | Age: 76
End: 2021-12-16

## 2021-12-16 VITALS
HEIGHT: 63 IN | SYSTOLIC BLOOD PRESSURE: 120 MMHG | BODY MASS INDEX: 29.77 KG/M2 | WEIGHT: 168 LBS | HEART RATE: 72 BPM | DIASTOLIC BLOOD PRESSURE: 60 MMHG | OXYGEN SATURATION: 95 %

## 2021-12-16 DIAGNOSIS — E78.2 MIXED HYPERLIPIDEMIA: ICD-10-CM

## 2021-12-16 DIAGNOSIS — E11.42 TYPE 2 DIABETES MELLITUS WITH DIABETIC POLYNEUROPATHY, WITH LONG-TERM CURRENT USE OF INSULIN (HCC): Primary | ICD-10-CM

## 2021-12-16 DIAGNOSIS — E53.8 B12 DEFICIENCY: ICD-10-CM

## 2021-12-16 DIAGNOSIS — E55.9 VITAMIN D DEFICIENCY: ICD-10-CM

## 2021-12-16 DIAGNOSIS — Z79.4 TYPE 2 DIABETES MELLITUS WITH DIABETIC POLYNEUROPATHY, WITH LONG-TERM CURRENT USE OF INSULIN (HCC): Primary | ICD-10-CM

## 2021-12-16 DIAGNOSIS — I10 ESSENTIAL HYPERTENSION: ICD-10-CM

## 2021-12-16 PROCEDURE — 95251 CONT GLUC MNTR ANALYSIS I&R: CPT | Performed by: INTERNAL MEDICINE

## 2021-12-16 PROCEDURE — 99214 OFFICE O/P EST MOD 30 MIN: CPT | Performed by: INTERNAL MEDICINE

## 2021-12-16 NOTE — PROGRESS NOTES
Alisia Velez is a 76 y.o. female who presents for  evaluation of type 2 Diabetes    HPI    76 y o female with DM for more than 10 years  Complications CVA 1999  Excellent control but occasional hypo and hyperglycemia     PE  AOx3  No visible goiter  Normal Respiratory Effort  No Edema    Labs    Lab Results   Component Value Date    WBC 8.35 12/15/2020    HGB 14.4 12/15/2020    HCT 43.2 12/15/2020    MCV 95.4 12/15/2020     12/15/2020     Lab Results   Component Value Date    GLUCOSE 162 (H) 12/15/2020    BUN 11 12/15/2020    CREATININE 0.74 12/15/2020    EGFRIFNONA 77 12/15/2020    BCR 14.9 12/15/2020    K 4.1 12/15/2020    CO2 27.4 12/15/2020    CALCIUM 9.9 12/15/2020    ALBUMIN 4.40 12/15/2020    AST 33 (H) 12/15/2020    ALT 38 (H) 12/15/2020         Assessment/Plan        ICD-10-CM ICD-9-CM   1. Type 2 diabetes mellitus with diabetic polyneuropathy, with long-term current use of insulin (HCC)  E11.42 250.60    Z79.4 357.2     V58.67   2. Mixed hyperlipidemia  E78.2 272.2   3. Essential hypertension  I10 401.9   4. B12 deficiency  E53.8 266.2   5. Vitamin D deficiency  E55.9 268.9          Glycemic control     Lab Results   Component Value Date    HGBA1C 7.88 (H) 12/15/2020       Karan 14    Dec 3 to Dec 16 , 2021  TIR 61%  No lows  Type 2 dm w hyperglycemia     Main pattern , she builds food cushion at bedtime and then glucose drops overnight   Jey post bkfast but returns to normal     Toujeo 70 , decrease to 64    ------------      Humalog ,  Change to 1.5-2-3 -1.3 ( cereal 0.9) take 5 min before   Change to 1.3-1.8 - 1.7  Spears eto 1.2-1.7-1.9 -2 - 1.9  But for cereal do 0.9      Correction , 10         during steroids  typically the carb ratio and correction need to be doubled and the basal( Lantus ) needs to be increased by 30%         Can't tolerate any orals     Criteria for upgrade to karan 2     Criteria for Approval of CGM and/or Insulin Pump     The patient has diabetes mellitus,  insulin-dependent.    Our Diabetes Department has evaluated the patient in the last six months and will continue counseling on insulin adjustment.     The patient performs blood glucose testing at least four times daily with proven glucose variability from 50 to250 mg per dl.    The patient is administering basal insulin and prandial insulin four times per day for more than six months.    The patient uses a home blood glucose monitor to assess blood glucose at least four times daily for more than six months.    The patient requires frequent adjustment of insulin treatment regimen based on blood glucose readings  She uses a correction of 1 unit per 10 mg per dl increase above 150       The patient has frequent variability in blood glucose readings due to activity and variability in meal content and time.     The patient has completed a diabetes education program with us.    The patient has demonstrated the ability to self-monitor her glucose.     The patient is motivated in improving diabetes control     She has hypoglycemia w unawareness.            Mixed hyperlipidemia  On atorvastatin 20 mg qhs    stroke in 1999      Essential hypertension  Only on metoprolol ho cva and svt     longterm nsaid, on diclofenac, unfortunately can't use longterm   Takes carafte         --    DXA Jan 2017 , normal                This document has been electronically signed by Omid Jules MD on December 16, 2021 11:43 CST

## 2021-12-17 ENCOUNTER — OFFICE VISIT (OUTPATIENT)
Dept: OBGYN CLINIC | Age: 76
End: 2021-12-17
Payer: MEDICARE

## 2021-12-17 VITALS
DIASTOLIC BLOOD PRESSURE: 50 MMHG | SYSTOLIC BLOOD PRESSURE: 148 MMHG | WEIGHT: 166 LBS | HEIGHT: 63 IN | BODY MASS INDEX: 29.41 KG/M2

## 2021-12-17 DIAGNOSIS — Z01.419 VISIT FOR PELVIC EXAM: Primary | ICD-10-CM

## 2021-12-17 DIAGNOSIS — Z12.39 SCREENING BREAST EXAMINATION: ICD-10-CM

## 2021-12-17 DIAGNOSIS — Z12.31 ENCOUNTER FOR SCREENING MAMMOGRAM FOR BREAST CANCER: ICD-10-CM

## 2021-12-17 PROCEDURE — 1123F ACP DISCUSS/DSCN MKR DOCD: CPT | Performed by: OBSTETRICS & GYNECOLOGY

## 2021-12-17 PROCEDURE — 3017F COLORECTAL CA SCREEN DOC REV: CPT | Performed by: OBSTETRICS & GYNECOLOGY

## 2021-12-17 PROCEDURE — G0101 CA SCREEN;PELVIC/BREAST EXAM: HCPCS | Performed by: OBSTETRICS & GYNECOLOGY

## 2021-12-17 PROCEDURE — 4040F PNEUMOC VAC/ADMIN/RCVD: CPT | Performed by: OBSTETRICS & GYNECOLOGY

## 2021-12-17 PROCEDURE — G8400 PT W/DXA NO RESULTS DOC: HCPCS | Performed by: OBSTETRICS & GYNECOLOGY

## 2021-12-17 PROCEDURE — G8427 DOCREV CUR MEDS BY ELIG CLIN: HCPCS | Performed by: OBSTETRICS & GYNECOLOGY

## 2021-12-17 PROCEDURE — G8484 FLU IMMUNIZE NO ADMIN: HCPCS | Performed by: OBSTETRICS & GYNECOLOGY

## 2021-12-17 PROCEDURE — 1036F TOBACCO NON-USER: CPT | Performed by: OBSTETRICS & GYNECOLOGY

## 2021-12-17 PROCEDURE — G8417 CALC BMI ABV UP PARAM F/U: HCPCS | Performed by: OBSTETRICS & GYNECOLOGY

## 2021-12-17 PROCEDURE — 1090F PRES/ABSN URINE INCON ASSESS: CPT | Performed by: OBSTETRICS & GYNECOLOGY

## 2021-12-17 ASSESSMENT — ENCOUNTER SYMPTOMS
EYES NEGATIVE: 1
GASTROINTESTINAL NEGATIVE: 1
RESPIRATORY NEGATIVE: 1

## 2021-12-17 NOTE — PROGRESS NOTES
I, Eva Bowers RN, am scribing for and in the presence of Dr. Diony Walker 2021/10:40 AM/sign         2021    Stephanie Estrella (:  1945) is a 78 y.o. female, here for a preventive medicine evaluation. She is having some urinary leaking with movements. Last mammogram was 2020. She states that she looked up Medicare guidelines and screening mammogram will not be covered in pt age 76 and older, only diagnostic. She denies any vaginal bleeding this year. Patient Active Problem List   Diagnosis    Lumbar radiculopathy    Essential hypertension    Type II diabetes mellitus, uncontrolled ([facility] [address]    Gastroesophageal reflux disease without esophagitis    Lumbar spinal stenosis    Uncontrolled type 2 diabetes mellitus with hyperglycemia ([facility] [address]       Review of Systems   Constitutional: Negative. HENT: Negative. Eyes: Negative. Respiratory: Negative. Cardiovascular: Negative. Gastrointestinal: Negative. Genitourinary: Negative for difficulty urinating, dyspareunia, dysuria, enuresis, frequency, hematuria, menstrual problem, pelvic pain, urgency and vaginal discharge. Musculoskeletal: Negative. Skin: Negative. Neurological: Negative. Psychiatric/Behavioral: Negative. Prior to Visit Medications    Medication Sig Taking?  Authorizing Provider   famotidine (PEPCID) 40 MG tablet  Yes Historical Provider, MD   guaiFENesin (MUCINEX) 600 MG extended release tablet Take 1,200 mg by mouth 2 times daily Yes Historical Provider, MD   Calcium Carbonate-Vit D-Min (CALCIUM 1200 PO) Take by mouth Yes Historical Provider, MD   clobetasol (TEMOVATE) 0.05 % cream Apply topically 2 times daily for 2 weeks then as needed  Patient taking differently: Indications: PRN Apply topically 2 times daily for 2 weeks then as needed Yes Jessica Stratton MD   acetaminophen (TYLENOL ARTHRITIS PAIN) 650 MG extended release tablet Take 650 mg by mouth every 8 hours as needed for Pain Yes Historical Provider, MD   insulin glargine (TOUJEO SOLOSTAR) 300 UNIT/ML injection pen Inject 20 Units into the skin nightly Yes Lala Al MD   aspirin EC 81 MG EC tablet Take 81 mg by mouth daily Yes Historical Provider, MD   atorvastatin (LIPITOR) 20 MG tablet Take 20 mg by mouth daily Yes Historical Provider, MD   cetirizine (ZYRTEC) 10 MG tablet Take 10 mg by mouth daily Yes Historical Provider, MD   Coenzyme Q-10 200 MG CAPS Take 200 mg by mouth daily Yes Historical Provider, MD   insulin lispro (HUMALOG) 100 UNIT/ML injection vial Inject 50 Units into the skin 3 times daily (before meals) Indications: Up to 50 units scale as follows:  1 unit per every 1.5 carbs at breakfast, 1 unit per every 2 grams at lunch, and 1 unit for every 3 grams at supper.  Yes Historical Provider, MD   OMEGA-3 FATTY ACIDS-VITAMIN E PO Take 1 capsule by mouth daily Yes Historical Provider, MD        Allergies   Allergen Reactions    Erythromycin Shortness Of Breath    Humulin N [Insulin Isophane] Shortness Of Breath    Mold Extract [Molds & Smuts] Shortness Of Breath    Pseudophed-Chlophedianol-Gg Shortness Of Breath    Sulfa Antibiotics Shortness Of Breath       Past Medical History:   Diagnosis Date    Cataract     bilateral    Stroke (cerebrum) ([facility] [address] 1999    Type 2 diabetes mellitus without complication ([facility] [address]        Past Surgical History:   Procedure Laterality Date    BACK SURGERY  03/2017    L3, 4, 5 Fusion    BLEPHAROPLASTY  10/2003    CARPAL TUNNEL RELEASE      02/2003 (left) 10/2003 (right)    CATARACT REMOVAL Bilateral     CERVICAL LAMINECTOMY  12/2004    C7, T1    CHOLECYSTECTOMY  09/11/2018    GASTRIC FUNDOPLICATION  06/0322    LUMBAR DISC SURGERY  10/2002    L5, 1/2 disc removed    NECK SURGERY  04/2018    TOENAIL EXCISION Bilateral 03/2016       Social History     Socioeconomic History    Marital status:      Spouse name: Not on file    Number of children: Not on file    Years of education: Not on file 5' 3\" (1.6 m)     Estimated body mass index is 29.41 kg/m² as calculated from the following:    Height as of this encounter: 5' 3\" (1.6 m). Weight as of this encounter: 166 lb (75.3 kg). Physical Exam  Constitutional:       Appearance: She is well-developed. HENT:      Head: Normocephalic and atraumatic. Right Ear: Hearing normal.      Left Ear: Hearing normal.      Nose: Nose normal.   Eyes:      General: Lids are normal.      Conjunctiva/sclera: Conjunctivae normal.      Pupils: Pupils are equal, round, and reactive to light. Cardiovascular:      Rate and Rhythm: Normal rate and regular rhythm. Heart sounds: No murmur heard. No friction rub. No gallop. Pulmonary:      Effort: Pulmonary effort is normal.      Breath sounds: Normal breath sounds. Chest:   Breasts: Breasts are symmetrical.      Right: No inverted nipple, mass, nipple discharge, skin change or tenderness. Left: No inverted nipple, mass, nipple discharge, skin change or tenderness. Abdominal:      General: Bowel sounds are normal. There is no distension. Palpations: Abdomen is soft. There is no mass. Tenderness: There is no abdominal tenderness. Genitourinary:     Labia:         Right: No rash, tenderness or lesion. Left: No rash, tenderness or lesion. Vagina: No vaginal discharge. Cervix: No cervical motion tenderness, discharge or friability. Uterus: Not deviated, not enlarged, not fixed and not tender. Adnexa:         Right: No mass, tenderness or fullness. Left: No mass, tenderness or fullness. Rectum: Guaiac result negative. No mass, tenderness, anal fissure, external hemorrhoid or internal hemorrhoid. Comments: External genitalia: labia and vagina are atrophic with some loss of normal anatomical architecture. Minimal prolapse   Musculoskeletal:         General: Normal range of motion. Cervical back: Normal range of motion and neck supple. Comments: Normal ROM in all four extremities; normal gait    Skin:     General: Skin is warm and dry. Neurological:      Mental Status: She is alert and oriented to person, place, and time. Psychiatric:         Behavior: Behavior normal.         No flowsheet data found. Lab Results   Component Value Date    GLUCOSE 166 03/30/2017       The ASCVD Risk score (Yoselin Land, et al., 2013) failed to calculate for the following reasons:    Cannot find a previous HDL lab    Cannot find a previous total cholesterol lab      There is no immunization history on file for this patient. Health Maintenance   Topic Date Due    Hepatitis C screen  Never done    Diabetic foot exam  Never done    A1C test (Diabetic or Prediabetic)  Never done    Lipid screen  Never done    COVID-19 Vaccine (1) Never done    Diabetic microalbuminuria test  Never done    Diabetic retinal exam  Never done    DTaP/Tdap/Td vaccine (1 - Tdap) Never done    Colon cancer screen colonoscopy  Never done    Breast cancer screen  Never done    Shingles Vaccine (1 of 2) Never done    DEXA (modify frequency per FRAX score)  Never done    Pneumococcal 65+ years Vaccine (1 of 1 - PPSV23) Never done    Potassium monitoring  03/30/2018    Creatinine monitoring  03/30/2018    Annual Wellness Visit (AWV)  Never done    Flu vaccine (1) Never done    Hepatitis A vaccine  Aged Out    Hib vaccine  Aged Out    Meningococcal (ACWY) vaccine  Aged Out          ASSESSMENT/PLAN:  1. Visit for pelvic exam  2. Screening breast examination  3. Encounter for screening mammogram for breast cancer  -     SHIRA DIGITAL SCREEN W OR WO CAD BILATERAL; Future    The importance of self-breast examination was discussed, as well as yearly mammograms after age 36. A well balanced diet and exercise were encouraged. The risk factors for osteopenia/osteoporosis were discussed along with need for additional calcium and vitamin D.    A colonoscopy is recommended at age 48 unless otherwise indicated based on symptoms or family history. [facility] and [facility] can go [facility] there after visit to check to see if mammogram will be covered. Discussed stress urinary incontinence and offered referral for surgery consult to either urology vs urogyn and pt declined. No follow-ups on file. An electronic signature was used to authenticate this note. Over 50% of total visit time of 20 minutes was spend on counseling and/or coordination of care. Andressa Monique MD, personally performed services described in this document as scribed by Mari Beltran RN in my presence, and it is both accurate and complete.

## 2021-12-20 ENCOUNTER — TELEPHONE (OUTPATIENT)
Dept: OBGYN CLINIC | Age: 76
End: 2021-12-20

## 2021-12-20 NOTE — TELEPHONE ENCOUNTER
Informed pt that we will fax mammogram order to [facility]. Pt DOYLE

## 2021-12-20 NOTE — TELEPHONE ENCOUNTER
Patient would like orders sent to the hospital by her, [facility] in [place], [address] [place] [address] S. Phone number [phone].

## 2021-12-21 ENCOUNTER — TELEPHONE (OUTPATIENT)
Dept: OBGYN CLINIC | Age: 76
End: 2021-12-21

## 2021-12-21 NOTE — TELEPHONE ENCOUNTER
The pt requested the mammogram orders send to [facility].

## 2022-02-25 ENCOUNTER — DOCUMENTATION (OUTPATIENT)
Dept: ENDOCRINOLOGY | Facility: CLINIC | Age: 77
End: 2022-02-25

## 2022-03-01 ENCOUNTER — OFFICE VISIT (OUTPATIENT)
Dept: PODIATRY | Facility: CLINIC | Age: 77
End: 2022-03-01

## 2022-03-01 VITALS
OXYGEN SATURATION: 96 % | BODY MASS INDEX: 29.66 KG/M2 | HEIGHT: 63 IN | DIASTOLIC BLOOD PRESSURE: 70 MMHG | SYSTOLIC BLOOD PRESSURE: 118 MMHG | WEIGHT: 167.4 LBS | HEART RATE: 77 BPM

## 2022-03-01 DIAGNOSIS — B35.1 ONYCHOMYCOSIS: Primary | ICD-10-CM

## 2022-03-01 DIAGNOSIS — M21.42 PES PLANUS OF BOTH FEET: ICD-10-CM

## 2022-03-01 DIAGNOSIS — E11.42 TYPE 2 DIABETES MELLITUS WITH DIABETIC POLYNEUROPATHY, WITH LONG-TERM CURRENT USE OF INSULIN: ICD-10-CM

## 2022-03-01 DIAGNOSIS — M21.41 PES PLANUS OF BOTH FEET: ICD-10-CM

## 2022-03-01 DIAGNOSIS — M20.12 VALGUS DEFORMITY OF BOTH GREAT TOES: ICD-10-CM

## 2022-03-01 DIAGNOSIS — Z79.4 TYPE 2 DIABETES MELLITUS WITH DIABETIC POLYNEUROPATHY, WITH LONG-TERM CURRENT USE OF INSULIN: ICD-10-CM

## 2022-03-01 DIAGNOSIS — M20.11 VALGUS DEFORMITY OF BOTH GREAT TOES: ICD-10-CM

## 2022-03-01 DIAGNOSIS — E11.9 ENCOUNTER FOR DIABETIC FOOT EXAM: ICD-10-CM

## 2022-03-01 PROCEDURE — 99213 OFFICE O/P EST LOW 20 MIN: CPT | Performed by: PODIATRIST

## 2022-03-01 PROCEDURE — 11721 DEBRIDE NAIL 6 OR MORE: CPT | Performed by: PODIATRIST

## 2022-04-04 ENCOUNTER — TRANSCRIBE ORDERS (OUTPATIENT)
Dept: ADMINISTRATIVE | Facility: HOSPITAL | Age: 77
End: 2022-04-04

## 2022-04-04 DIAGNOSIS — M54.50 LUMBAR PAIN: Primary | ICD-10-CM

## 2022-04-20 ENCOUNTER — HOSPITAL ENCOUNTER (OUTPATIENT)
Dept: MRI IMAGING | Facility: HOSPITAL | Age: 77
Discharge: HOME | End: 2022-04-20

## 2022-04-20 ENCOUNTER — HOSPITAL ENCOUNTER (OUTPATIENT)
Dept: CT IMAGING | Facility: HOSPITAL | Age: 77
Discharge: HOME | End: 2022-04-20

## 2022-04-20 DIAGNOSIS — M54.50 LUMBAR PAIN: ICD-10-CM

## 2022-04-20 PROCEDURE — 72148 MRI LUMBAR SPINE W/O DYE: CPT

## 2022-04-20 PROCEDURE — 72131 CT LUMBAR SPINE W/O DYE: CPT

## 2022-05-26 RX ORDER — INSULIN GLARGINE 300 U/ML
INJECTION, SOLUTION SUBCUTANEOUS
Qty: 24 ML | Refills: 3 | Status: SHIPPED | OUTPATIENT
Start: 2022-05-26 | End: 2022-12-29 | Stop reason: SDUPTHER

## 2022-06-02 ENCOUNTER — OFFICE VISIT (OUTPATIENT)
Dept: PODIATRY | Facility: CLINIC | Age: 77
End: 2022-06-02

## 2022-06-02 VITALS
SYSTOLIC BLOOD PRESSURE: 124 MMHG | OXYGEN SATURATION: 98 % | DIASTOLIC BLOOD PRESSURE: 64 MMHG | BODY MASS INDEX: 28.53 KG/M2 | HEART RATE: 97 BPM | HEIGHT: 63 IN | WEIGHT: 161 LBS

## 2022-06-02 DIAGNOSIS — Z79.4 TYPE 2 DIABETES MELLITUS WITH DIABETIC POLYNEUROPATHY, WITH LONG-TERM CURRENT USE OF INSULIN: ICD-10-CM

## 2022-06-02 DIAGNOSIS — B35.1 ONYCHOMYCOSIS: Primary | ICD-10-CM

## 2022-06-02 DIAGNOSIS — M20.12 VALGUS DEFORMITY OF BOTH GREAT TOES: ICD-10-CM

## 2022-06-02 DIAGNOSIS — M21.42 PES PLANUS OF BOTH FEET: ICD-10-CM

## 2022-06-02 DIAGNOSIS — M21.41 PES PLANUS OF BOTH FEET: ICD-10-CM

## 2022-06-02 DIAGNOSIS — M20.11 VALGUS DEFORMITY OF BOTH GREAT TOES: ICD-10-CM

## 2022-06-02 DIAGNOSIS — E11.42 TYPE 2 DIABETES MELLITUS WITH DIABETIC POLYNEUROPATHY, WITH LONG-TERM CURRENT USE OF INSULIN: ICD-10-CM

## 2022-06-02 PROCEDURE — 11721 DEBRIDE NAIL 6 OR MORE: CPT | Performed by: PODIATRIST

## 2022-06-22 ENCOUNTER — OFFICE VISIT (OUTPATIENT)
Dept: ENDOCRINOLOGY | Facility: CLINIC | Age: 77
End: 2022-06-22

## 2022-06-22 VITALS
DIASTOLIC BLOOD PRESSURE: 70 MMHG | SYSTOLIC BLOOD PRESSURE: 158 MMHG | HEIGHT: 63 IN | OXYGEN SATURATION: 97 % | HEART RATE: 71 BPM | WEIGHT: 163 LBS | BODY MASS INDEX: 28.88 KG/M2

## 2022-06-22 DIAGNOSIS — E55.9 VITAMIN D DEFICIENCY: ICD-10-CM

## 2022-06-22 DIAGNOSIS — E78.2 MIXED HYPERLIPIDEMIA: ICD-10-CM

## 2022-06-22 DIAGNOSIS — I10 ESSENTIAL HYPERTENSION: ICD-10-CM

## 2022-06-22 DIAGNOSIS — Z79.4 TYPE 2 DIABETES MELLITUS WITH DIABETIC POLYNEUROPATHY, WITH LONG-TERM CURRENT USE OF INSULIN: Primary | ICD-10-CM

## 2022-06-22 DIAGNOSIS — E11.42 TYPE 2 DIABETES MELLITUS WITH DIABETIC POLYNEUROPATHY, WITH LONG-TERM CURRENT USE OF INSULIN: Primary | ICD-10-CM

## 2022-06-22 DIAGNOSIS — E53.8 B12 DEFICIENCY: ICD-10-CM

## 2022-06-22 PROCEDURE — 99214 OFFICE O/P EST MOD 30 MIN: CPT | Performed by: INTERNAL MEDICINE

## 2022-06-22 PROCEDURE — 95251 CONT GLUC MNTR ANALYSIS I&R: CPT | Performed by: INTERNAL MEDICINE

## 2022-06-22 RX ORDER — PSYLLIUM HUSK (WITH SUGAR) 3 G/12 G
POWDER (GRAM) ORAL DAILY
COMMUNITY

## 2022-06-22 RX ORDER — CHOLECALCIFEROL (VITAMIN D3) 25 MCG
CAPSULE ORAL
COMMUNITY

## 2022-06-22 NOTE — PROGRESS NOTES
Alisia Velez is a 77 y.o. female who presents for  evaluation of type 2 Diabetes    HPI    76 y o female with DM for more than 10 years  Complications CVA 1999  Excellent control but occasional hypo and hyperglycemia     PE  AOx3  No visible goiter  Normal Respiratory Effort  No Edema    Labs    Lab Results   Component Value Date    WBC 8.35 12/15/2020    HGB 14.4 12/15/2020    HCT 43.2 12/15/2020    MCV 95.4 12/15/2020     12/15/2020     Lab Results   Component Value Date    GLUCOSE 162 (H) 12/15/2020    BUN 11 12/15/2020    CREATININE 0.74 12/15/2020    EGFRIFNONA 77 12/15/2020    BCR 14.9 12/15/2020    K 4.1 12/15/2020    CO2 27.4 12/15/2020    CALCIUM 9.9 12/15/2020    ALBUMIN 4.40 12/15/2020    AST 33 (H) 12/15/2020    ALT 38 (H) 12/15/2020         Assessment & Plan        ICD-10-CM ICD-9-CM   1. Type 2 diabetes mellitus with diabetic polyneuropathy, with long-term current use of insulin (HCC)  E11.42 250.60    Z79.4 357.2     V58.67   2. Mixed hyperlipidemia  E78.2 272.2   3. Essential hypertension  I10 401.9   4. B12 deficiency  E53.8 266.2   5. Vitamin D deficiency  E55.9 268.9          Glycemic control     Lab Results   Component Value Date    HGBA1C 7.88 (H) 12/15/2020       Karan 2    Dec 3 to Dec 16 , 2021  TIR 61%  No lows  Type 2 dm w hyperglycemia     This time    TIR 76%  Less than 1 percent hypoglycemia       Main pattern , she builds food cushion at bedtime and then glucose drops overnight   Jey post bkfast but returns to normal     Toujeo 70 , decrease to 64 - 56- 52    ------------      Humalog ,  Change to 1.5-2-3 -1.3 ( cereal 0.9) take 5 min before   Change to 1.3-1.8 - 1.7  Spears t o 1.2-1.7-1.9 -2 - 1.9  But for cereal do 0.9      Correction , 10         during steroids  typically the carb ratio and correction need to be doubled and the basal( Lantus ) needs to be increased by 30%         Can't tolerate any orals           Mixed hyperlipidemia  On atorvastatin 20 mg qhs    stroke  "in 1999      Essential hypertension  /70   Pulse 71   Ht 160 cm (63\")   Wt 73.9 kg (163 lb)   LMP 02/27/2000 Comment: Postmenopausal  SpO2 97%   BMI 28.87 kg/m²     Only on metoprolol ho cva and svt     longterm nsaid, on diclofenac, unfortunately can't use longterm   Takes carafte         --    DXA Jan 2017 , normal                This document has been electronically signed by Omid Jules MD on June 22, 2022 09:19 CDT      "

## 2022-06-23 ENCOUNTER — OFFICE VISIT (OUTPATIENT)
Dept: OTOLARYNGOLOGY | Facility: CLINIC | Age: 77
End: 2022-06-23

## 2022-06-23 VITALS — TEMPERATURE: 98 F | SYSTOLIC BLOOD PRESSURE: 165 MMHG | DIASTOLIC BLOOD PRESSURE: 74 MMHG | HEART RATE: 74 BPM

## 2022-06-23 DIAGNOSIS — Z96.22 S/P BILATERAL MYRINGOTOMY WITH TUBE PLACEMENT: ICD-10-CM

## 2022-06-23 DIAGNOSIS — H90.3 SENSORINEURAL HEARING LOSS (SNHL), BILATERAL: ICD-10-CM

## 2022-06-23 DIAGNOSIS — H69.81 EUSTACHIAN TUBE DYSFUNCTION, RIGHT: Primary | ICD-10-CM

## 2022-06-23 PROCEDURE — 99213 OFFICE O/P EST LOW 20 MIN: CPT | Performed by: EMERGENCY MEDICINE

## 2022-06-23 RX ORDER — OFLOXACIN 3 MG/ML
4 SOLUTION AURICULAR (OTIC) 2 TIMES DAILY
Qty: 10 ML | Refills: 0 | Status: SHIPPED | OUTPATIENT
Start: 2022-06-23 | End: 2022-06-27

## 2022-06-23 NOTE — PROGRESS NOTES
VINNY Noble [facility] EAR NOSE & THROAT  [address], [address]  [facility] [address]  Fax [phone]  Phone [phone]      Visit Type: FOLLOW UP   Chief Complaint   Patient presents with   • Ear Problem        HPI  Alisia Velez is a 77 y.o. female who presents status post myringotomy tube insertion. The patient has had: no related complaints. The patient denies pain, fever, change of hearing and otorrhea. He feels like she has cotton in the right ear.  She is unable to blow air out of her right tube.    Past Medical History:   Diagnosis Date   • Arthritis    • Back pain    • Basal cell carcinoma    • Blister of great toe of left foot     Nonthermal, initial encounter   • Cancer (HCC)     skin    • Dyslipidemia    • Elevated blood pressure    • Essential hypertension 9/13/2016   • GERD (gastroesophageal reflux disease)    • History of cerebrovascular accident     1999   • Hyperlipidemia    • Hypo-osmolality and hyponatremia    • Ingrowing nail    • Melanoma (HCC)    • Onychomycosis    • PONV (postoperative nausea and vomiting)    • Snores    • Stroke (HCC)    • Type 2 diabetes mellitus (HCC)    • Type 2 diabetes mellitus with circulatory disorder (HCC) 9/13/2016   • Urinary incontinence    • Vitamin D deficiency        Past Surgical History:   Procedure Laterality Date   • ANTERIOR CERVICAL DISCECTOMY W/ FUSION N/A 4/19/2019    Procedure: ANTERIOR CERVICAL DISCECTOMY FUSION C5-7;  Surgeon: MIGDALIA Horton MD;  [facility]: [facility];  Service: Orthopedic Spine   • BACK SURGERY     • BLEPHAROPLASTY     • CARDIAC CATHETERIZATION     • CARPAL TUNNEL RELEASE     • CERVICAL SPINE SURGERY      cydney-laminotomy c7-t1   • COLONOSCOPY     • ENDOSCOPY     • EYE SURGERY Bilateral     cataracts    • LAPAROSCOPIC CHOLECYSTECTOMY     • LUMBAR DISC SURGERY     • LUMBAR FUSION Left 3/13/2017    Procedure: LEFT LUMBAR LATERAL INTERBODY FUSION L 3-5 WITH  INSTRUMENTATION;  Surgeon: MIGDALIA Horton MD;  Location:  [facility];  Service:    • LUMBAR LAMINECTOMY WITH FUSION Right 3/15/2017    Procedure: RIGHT L3-S1 POSSIBLE RIGHT L 3-4 HEMILAMINECTOMY FACETECTOMY DECOMPRESSION TRANSFORAMINAL LUMBAR INTERBODY FUSION L4-S1 POSTERIOR SPINAL FUSION WITH INSTRUMENTATION L3-S1;  Surgeon: MIGDALIA Horton MD;  Location:  [facility];  Service:    • MYRINGOTOMY W/ TUBES     • NAIL BED REMOVAL/REVISION  03/11/2016   • NECK SURGERY     • NISSEN FUNDOPLICATION LAPAROSCOPIC     • OTHER SURGICAL HISTORY      had left and right big toenials removed mar 2016       Family History: Her family history includes Cancer in her mother and another family member; Diabetes in an other family member; Heart disease in her father and another family member; Hypertension in an other family member.     Social History: She  reports that she has never smoked. She has never used smokeless tobacco. She reports that she does not drink alcohol and does not use drugs.    Home Medications:  CoQ10, Docusate Calcium, Fiber, Insulin Glargine (2 Unit Dial), Insulin Lispro-aabc, Insulin Pen Needle, Omega-3 Fatty Acids-Vitamin E, Vitamin D-3, acetaminophen, aspirin, atorvastatin, cetirizine, famotidine, fluticasone, guaiFENesin, metoprolol succinate XL, and ofloxacin    Allergies:  She is allergic to erythromycin, humulin n [insulin isophane], mold extract [trichophyton], sudafed [pseudoephedrine], sulfa antibiotics, other, adhesive tape, insulin, and molds & smuts.       Vital Signs:   Temp:  [98 °F (36.7 °C)] 98 °F (36.7 °C)  Heart Rate:  [74] 74  BP: (165)/(74) 165/74  ENT Physical Exam  Constitutional  Appearance: patient appears well-developed, well-nourished and well-groomed,  Communication/Voice: communication appropriate for developmental age; vocal quality normal;  Head and Face  Appearance: head appears normal, face appears normal and face appears atraumatic;  Palpation: facial palpation  normal;  Salivary: glands normal;  Ear  Hearing: intact;  Auricles: right auricle normal; left auricle normal;  External Mastoids: right external mastoid normal; left external mastoid normal;  Ear Canals: right ear canal normal; left ear canal normal;  Tympanic Membranes: left tympanic membrane normal;  Ear comments: Paparella tube in right         Result Review    RESULTS REVIEW    I have reviewed the patients old records in the chart.     Assessment & Plan    Diagnoses and all orders for this visit:    1. Eustachian tube dysfunction, right (Primary)    2. Sensorineural hearing loss (SNHL), bilateral    3. S/p bilateral myringotomy with tube placement    Other orders  -     ofloxacin (FLOXIN) 0.3 % otic solution; Administer 4 drops into ear(s) as directed by provider 2 (Two) Times a Day.  Dispense: 10 mL; Refill: 0            Protect getting water in the ears. If needed, may use over the counter silicone plugs or a cotton ball coated with vasoline when bathing.  Use hairdryer on a cool setting after bathing.  For proper use of ear drops, push on tragus (cartilage in front of ear canal) after drop placement.      Return in about 6 months (around 12/23/2022) for Follow up with VINNY Reyez for tube follow up.      VINNY Noble  06/23/22  10:01 CDT

## 2022-06-27 ENCOUNTER — TELEPHONE (OUTPATIENT)
Dept: ENDOCRINOLOGY | Facility: CLINIC | Age: 77
End: 2022-06-27

## 2022-06-27 DIAGNOSIS — H69.81 EUSTACHIAN TUBE DYSFUNCTION, RIGHT: Primary | ICD-10-CM

## 2022-06-27 RX ORDER — INSULIN LISPRO-AABC 100 [IU]/ML
40 INJECTION, SOLUTION SUBCUTANEOUS
Qty: 18 PEN | Refills: 3 | Status: SHIPPED | OUTPATIENT
Start: 2022-06-27 | End: 2022-08-03

## 2022-06-27 RX ORDER — CIPROFLOXACIN AND DEXAMETHASONE 3; 1 MG/ML; MG/ML
4 SUSPENSION/ DROPS AURICULAR (OTIC) 4 TIMES DAILY
Qty: 7.5 ML | Refills: 0 | Status: SHIPPED | OUTPATIENT
Start: 2022-06-27 | End: 2022-07-06 | Stop reason: CLARIF

## 2022-06-30 DIAGNOSIS — I10 ESSENTIAL HYPERTENSION: ICD-10-CM

## 2022-06-30 DIAGNOSIS — E55.9 VITAMIN D DEFICIENCY: ICD-10-CM

## 2022-06-30 DIAGNOSIS — E53.8 B12 DEFICIENCY: ICD-10-CM

## 2022-06-30 DIAGNOSIS — Z79.4 TYPE 2 DIABETES MELLITUS WITH DIABETIC POLYNEUROPATHY, WITH LONG-TERM CURRENT USE OF INSULIN: ICD-10-CM

## 2022-06-30 DIAGNOSIS — E78.2 MIXED HYPERLIPIDEMIA: ICD-10-CM

## 2022-06-30 DIAGNOSIS — E11.42 TYPE 2 DIABETES MELLITUS WITH DIABETIC POLYNEUROPATHY, WITH LONG-TERM CURRENT USE OF INSULIN: ICD-10-CM

## 2022-07-01 ENCOUNTER — TELEPHONE (OUTPATIENT)
Dept: OTOLARYNGOLOGY | Facility: CLINIC | Age: 77
End: 2022-07-01

## 2022-07-06 RX ORDER — OFLOXACIN 3 MG/ML
3 SOLUTION AURICULAR (OTIC) 2 TIMES DAILY
Qty: 10 ML | Refills: 0 | Status: ON HOLD | OUTPATIENT
Start: 2022-07-06 | End: 2022-08-17

## 2022-07-15 ENCOUNTER — TELEPHONE (OUTPATIENT)
Dept: OTOLARYNGOLOGY | Facility: CLINIC | Age: 77
End: 2022-07-15

## 2022-07-15 NOTE — TELEPHONE ENCOUNTER
"  Caller: JERRY CARVAJAL     Relationship: SELF     Best call back number: [phone]    What is the best time to reach you: ANYTIME     INCOMING CALL FROM PT. PT LAST SEEN 6/23/22. PT REQUESTING AN APPT THIS WEEK OR NEXT WEEK. PT'S SYMPTOMS: PT'S EAR IS \"STOPPED UP\" PT HAS A TUBE IN HER EAR, WAS PRESCRIBED DROPS AT LAST VISIT, CLAIMS DROPS HAVE NOT HELPED AT ALL. RIGHT SIDE OF FACE IS NUMB, PIERCING PAIN IN RIGHT EAR, UPPER RIGHT TEETH HURT.         "

## 2022-08-03 ENCOUNTER — PRE-ADMISSION TESTING (OUTPATIENT)
Dept: PREADMISSION TESTING | Facility: HOSPITAL | Age: 77
End: 2022-08-03

## 2022-08-03 VITALS
BODY MASS INDEX: 29.65 KG/M2 | HEIGHT: 63 IN | RESPIRATION RATE: 18 BRPM | SYSTOLIC BLOOD PRESSURE: 144 MMHG | OXYGEN SATURATION: 98 % | WEIGHT: 167.33 LBS | DIASTOLIC BLOOD PRESSURE: 51 MMHG | HEART RATE: 72 BPM

## 2022-08-03 LAB
ANION GAP SERPL CALCULATED.3IONS-SCNC: 9 MMOL/L (ref 5–15)
BUN SERPL-MCNC: 14 MG/DL (ref 8–23)
BUN/CREAT SERPL: 16.3 (ref 7–25)
CALCIUM SPEC-SCNC: 9.9 MG/DL (ref 8.6–10.5)
CHLORIDE SERPL-SCNC: 103 MMOL/L (ref 98–107)
CO2 SERPL-SCNC: 27 MMOL/L (ref 22–29)
CREAT SERPL-MCNC: 0.86 MG/DL (ref 0.57–1)
DEPRECATED RDW RBC AUTO: 47.6 FL (ref 37–54)
EGFRCR SERPLBLD CKD-EPI 2021: 70.6 ML/MIN/1.73
ERYTHROCYTE [DISTWIDTH] IN BLOOD BY AUTOMATED COUNT: 13.2 % (ref 12.3–15.4)
GLUCOSE SERPL-MCNC: 270 MG/DL (ref 65–99)
HCT VFR BLD AUTO: 40.2 % (ref 34–46.6)
HGB BLD-MCNC: 13 G/DL (ref 12–15.9)
MCH RBC QN AUTO: 31.8 PG (ref 26.6–33)
MCHC RBC AUTO-ENTMCNC: 32.3 G/DL (ref 31.5–35.7)
MCV RBC AUTO: 98.3 FL (ref 79–97)
PLATELET # BLD AUTO: 208 10*3/MM3 (ref 140–450)
PMV BLD AUTO: 10.2 FL (ref 6–12)
POTASSIUM SERPL-SCNC: 4.4 MMOL/L (ref 3.5–5.2)
RBC # BLD AUTO: 4.09 10*6/MM3 (ref 3.77–5.28)
SODIUM SERPL-SCNC: 139 MMOL/L (ref 136–145)
WBC NRBC COR # BLD: 7.89 10*3/MM3 (ref 3.4–10.8)

## 2022-08-03 PROCEDURE — 80048 BASIC METABOLIC PNL TOTAL CA: CPT

## 2022-08-03 PROCEDURE — 85027 COMPLETE CBC AUTOMATED: CPT

## 2022-08-03 PROCEDURE — 36415 COLL VENOUS BLD VENIPUNCTURE: CPT

## 2022-08-03 PROCEDURE — 93005 ELECTROCARDIOGRAM TRACING: CPT

## 2022-08-03 PROCEDURE — 93010 ELECTROCARDIOGRAM REPORT: CPT | Performed by: INTERNAL MEDICINE

## 2022-08-03 NOTE — DISCHARGE INSTRUCTIONS
Before you come to the hospital        Arrival time: AS DIRECTED BY OFFICE     YOU MAY TAKE THE FOLLOWING MEDICATION(S) THE MORNING OF SURGERY WITH A SIP OF WATER: Metoprolol Succinate (TOPROL XL)             ALL OTHER HOME MEDICATION CHECK WITH YOUR PHYSICIAN (especially if you are taking diabetes medicines or blood thinners)        If you were given and instructed to use a germ- killing soap, use as directed the night before surgery and the morning of surgery before coming to the hospital.             Eating and drinking restrictions prior to scheduled arrival time    2 Hours before arrival time STOP   Drinking Clear liquids (water, apple juice-no pulp)     6 Hours before arrival time STOP   Milk or drinks that contain milk, full liquids    6 Hours before arrival time STOP   Light meals or foods, such as toast or cereal    8 Hours before arrival time STOP   Heavy foods, such as meat, fried foods, or fatty foods    (It is extremely important that you follow these guidelines to prevent delay or cancelation of your procedure)     Clear Liquids  Water and flavored water                                                                      Clear Fruit juices, such as cranberry juice and apple juice.  Black coffee (NO cream of any kind, including powdered).  Plain tea  Clear bouillon or broth.  Flavored gelatin.  Soda.  Gatorade or Powerade.  Full liquid examples  Juices that have pulp.  Frozen ice pops that contain fruit pieces.  Coffee with creamer  Milk.  Yogurt.                MANAGING PAIN AFTER SURGERY    We know you are probably wondering what your pain will be like after surgery.  Following surgery it is unrealistic to expect you will not have pain.   Pain is how our bodies let us know that something is wrong or cautions us to be careful.  That said, our goal is to make your pain tolerable.    Methods we may use to treat your pain include (oral or IV medications, PCAs, epidurals, nerve blocks, etc.)   While some  procedures require IV pain medications for a short time after surgery, transitioning to pain medications by mouth allows for better management of pain.   Your nurse will encourage you to take oral pain medications whenever possible.  IV medications work almost immediately, but only last a short while.  Taking medications by mouth allows for a more constant level of medication in your blood stream for a longer period of time.      Once your pain is out of control it is harder to get back under control.  It is important you are aware when your next dose of pain medication is due.  If you are admitted, your nurse may write the time of your next dose on the white board in your room to help you remember.      We are interested in your pain and encourage you to inform us about aggravating factors during your visit.   Many times a simple repositioning every few hours can make a big difference.    If your physician says it is okay, do not let your pain prevent you from getting out of bed. Be sure to call your nurse for assistance prior to getting up so you do not fall.      Before surgery, please decide your tolerable pain goal.  These faces help describe the pain ratings we use on a 0-10 scale.   Be prepared to tell us your goal and whether or not you take pain or anxiety medications at home.          Preparing for Surgery  Preparing for surgery is an important part of your care. It can make things go more smoothly and help you avoid complications. The steps leading up to surgery may vary among hospitals. Follow all instructions given to you by your health care providers. Ask questions if you do not understand something. Talk about any concerns that you have.  Here are some questions to consider asking before your surgery:  If my surgery is not an emergency (is elective), when would be the best time to have the surgery?  What arrangements do I need to make for work, home, or school?  What will my recovery be like? How long  will it be before I can return to normal activities?  Will I need to prepare my home? Will I need to arrange care for me or my children?  Should I expect to have pain after surgery? What are my pain management options? Are there nonmedical options that I can try for pain?  Tell a health care provider about:  Any allergies you have.  All medicines you are taking, including vitamins, herbs, eye drops, creams, and over-the-counter medicines.  Any problems you or family members have had with anesthetic medicines.  Any blood disorders you have.  Any surgeries you have had.  Any medical conditions you have.  Whether you are pregnant or may be pregnant.  What are the risks?  The risks and complications of surgery depend on the specific procedure that you have. Discuss all the risks with your health care providers before your surgery. Ask about common surgical complications, which may include:  Infection.  Bleeding or a need for blood replacement (transfusion).  Allergic reactions to medicines.  Damage to surrounding nerves, tissues, or structures.  A blood clot.  Scarring.  Failure of the surgery to correct the problem.  Follow these instructions before the procedure:  Several days or weeks before your procedure  You may have a physical exam by your primary health care provider to make sure it is safe for you to have surgery.  You may have testing. This may include a chest X-ray, blood and urine tests, electrocardiogram (ECG), or other testing.  Ask your health care provider about:  Changing or stopping your regular medicines. This is especially important if you are taking diabetes medicines or blood thinners.  Taking medicines such as aspirin and ibuprofen. These medicines can thin your blood. Do not take these medicines unless your health care provider tells you to take them.  Taking over-the-counter medicines, vitamins, herbs, and supplements.  Do not use any products that contain nicotine or tobacco, such as cigarettes  and e-cigarettes. If you need help quitting, ask your health care provider.  Avoid alcohol.  Ask your health care provider if there are exercises you can do to prepare for surgery.  Eat a healthy diet.   Plan to have someone take you home from the hospital or clinic.  Plan to have a responsible adult care for you for at least 24 hours after you leave the hospital or clinic. This is important.  The day before your procedure  You may be given antibiotic medicine to take by mouth to help prevent infection. Take it as told by your health care provider.  You may be asked to shower with a germ-killing soap.  Follow instructions from your health care provider about eating and drinking restrictions. This includes gum, mints and hard candy.  Pack comfortable clothes according to your procedure.   The day of your procedure  You may need to take another shower with a germ-killing soap before you leave home in the morning.  With a small sip of water, take only the medicines that you are told to take.  Remove all jewelry including rings.   Leave anything you consider valuable at home except hearing aids if needed.  Do not wear any makeup, nail polish, powder, deodorant, lotion, hair accessories, or anything on your skin or body except your clothes.  If you will be staying in the hospital, bring a case to hold your glasses, contacts, or dentures. You may also want to bring your robe and non-skid footwear.  If you wear oxygen at home, bring it with you the day of surgery.  If instructed by your health care provider, bring your sleep apnea device with you on the day of your surgery (if this applies to you).  You may want to leave your suitcase and sleep apnea device in the car until after surgery.   Arrive at the hospital as scheduled.  Bring a friend or family member with you who can help to answer questions and be present while you meet with your health care provider.  At the hospital  When you arrive at the hospital:  Go to  registration located at the main entrance of the hospital. You will be registered and given a beeper and a sheet of name stickers. Take the stickers to the Outpatient nurses desk and place in the black tray. This is to notify staff that you have arrived. Then return to the lobby to wait.   When your beeper lights up and vibrates proceed through the double doors, under the stairs, and a member of the Outpatient Surgery staff will escort you to your preoperative room.  You may have to wear compression sleeves. These help to prevent blood clots and reduce swelling in your legs.  An IV may be inserted into one of your veins.              In the operating room, you may be given one or more of the following:        A medicine to help you relax (sedative).        A medicine to numb the area (local anesthetic).        A medicine to make you fall asleep (general anesthetic).        A medicine that is injected into an area of your body to numb everything below the                      injection site (regional anesthetic).  You may be given an antibiotic through your IV to help prevent infection.  Your surgical site will be marked or identified.    Contact a health care provider if you:  Develop a fever of more than 100.4°F (38°C) or other feelings of illness during the 48 hours before your surgery.  Have symptoms that get worse.  Have questions or concerns about your surgery.  Summary  Preparing for surgery can make the procedure go more smoothly and lower your risk of complications.  Before surgery, make a list of questions and concerns to discuss with your surgeon. Ask about the risks and possible complications.  In the days or weeks before your surgery, follow all instructions from your health care provider. You may need to stop smoking, avoid alcohol, follow eating restrictions, and change or stop your regular medicines.  Contact your surgeon if you develop a fever or other signs of illness during the few days before your  surgery.  This information is not intended to replace advice given to you by your health care provider. Make sure you discuss any questions you have with your health care provider.  Document Revised: 12/21/2018 Document Reviewed: 10/23/2018  Elsevier Patient Education © 2021 [employer]

## 2022-08-05 LAB
QT INTERVAL: 388 MS
QTC INTERVAL: 409 MS

## 2022-08-10 ENCOUNTER — TRANSCRIBE ORDERS (OUTPATIENT)
Dept: LAB | Facility: HOSPITAL | Age: 77
End: 2022-08-10

## 2022-08-10 DIAGNOSIS — Z11.59 SCREENING FOR VIRAL DISEASE: Primary | ICD-10-CM

## 2022-08-15 ENCOUNTER — LAB (OUTPATIENT)
Dept: LAB | Facility: HOSPITAL | Age: 77
End: 2022-08-15

## 2022-08-15 LAB — SARS-COV-2 ORF1AB RESP QL NAA+PROBE: NOT DETECTED

## 2022-08-15 PROCEDURE — U0004 COV-19 TEST NON-CDC HGH THRU: HCPCS | Performed by: ORTHOPAEDIC SURGERY

## 2022-08-15 PROCEDURE — U0005 INFEC AGEN DETEC AMPLI PROBE: HCPCS | Performed by: ORTHOPAEDIC SURGERY

## 2022-08-16 ENCOUNTER — ANESTHESIA EVENT (OUTPATIENT)
Dept: PERIOP | Facility: HOSPITAL | Age: 77
End: 2022-08-16

## 2022-08-17 ENCOUNTER — ANESTHESIA (OUTPATIENT)
Dept: PERIOP | Facility: HOSPITAL | Age: 77
End: 2022-08-17

## 2022-08-17 ENCOUNTER — HOSPITAL ENCOUNTER (OUTPATIENT)
Facility: HOSPITAL | Age: 77
Setting detail: HOSPITAL OUTPATIENT SURGERY
Discharge: HOME | End: 2022-08-17
Attending: ORTHOPAEDIC SURGERY | Admitting: ORTHOPAEDIC SURGERY

## 2022-08-17 VITALS
TEMPERATURE: 96.9 F | OXYGEN SATURATION: 95 % | HEART RATE: 64 BPM | RESPIRATION RATE: 16 BRPM | DIASTOLIC BLOOD PRESSURE: 86 MMHG | SYSTOLIC BLOOD PRESSURE: 129 MMHG

## 2022-08-17 DIAGNOSIS — R22.31 MASS OF RIGHT WRIST: ICD-10-CM

## 2022-08-17 LAB
GLUCOSE BLDC GLUCOMTR-MCNC: 172 MG/DL (ref 70–130)
GLUCOSE BLDC GLUCOMTR-MCNC: 172 MG/DL (ref 70–130)

## 2022-08-17 PROCEDURE — 82962 GLUCOSE BLOOD TEST: CPT

## 2022-08-17 PROCEDURE — 25010000002 CEFAZOLIN PER 500 MG: Performed by: ORTHOPAEDIC SURGERY

## 2022-08-17 PROCEDURE — 25010000002 PROPOFOL 10 MG/ML EMULSION: Performed by: NURSE ANESTHETIST, CERTIFIED REGISTERED

## 2022-08-17 PROCEDURE — 25010000002 FENTANYL CITRATE (PF) 100 MCG/2ML SOLUTION: Performed by: NURSE ANESTHETIST, CERTIFIED REGISTERED

## 2022-08-17 PROCEDURE — 0 LIDOCAINE 1 % SOLUTION: Performed by: ORTHOPAEDIC SURGERY

## 2022-08-17 PROCEDURE — 25010000002 DEXAMETHASONE PER 1 MG: Performed by: NURSE ANESTHETIST, CERTIFIED REGISTERED

## 2022-08-17 PROCEDURE — 25010000002 ONDANSETRON PER 1 MG: Performed by: NURSE ANESTHETIST, CERTIFIED REGISTERED

## 2022-08-17 PROCEDURE — 88307 TISSUE EXAM BY PATHOLOGIST: CPT | Performed by: ORTHOPAEDIC SURGERY

## 2022-08-17 RX ORDER — LIDOCAINE HYDROCHLORIDE 10 MG/ML
0.5 INJECTION, SOLUTION EPIDURAL; INFILTRATION; INTRACAUDAL; PERINEURAL ONCE AS NEEDED
Status: DISCONTINUED | OUTPATIENT
Start: 2022-08-17 | End: 2022-08-17 | Stop reason: HOSPADM

## 2022-08-17 RX ORDER — PHENYLEPHRINE HCL IN 0.9% NACL 1 MG/10 ML
SYRINGE (ML) INTRAVENOUS AS NEEDED
Status: DISCONTINUED | OUTPATIENT
Start: 2022-08-17 | End: 2022-08-17 | Stop reason: SURG

## 2022-08-17 RX ORDER — SODIUM CHLORIDE 0.9 % (FLUSH) 0.9 %
10 SYRINGE (ML) INJECTION AS NEEDED
Status: DISCONTINUED | OUTPATIENT
Start: 2022-08-17 | End: 2022-08-17 | Stop reason: HOSPADM

## 2022-08-17 RX ORDER — DEXAMETHASONE SODIUM PHOSPHATE 4 MG/ML
INJECTION, SOLUTION INTRA-ARTICULAR; INTRALESIONAL; INTRAMUSCULAR; INTRAVENOUS; SOFT TISSUE AS NEEDED
Status: DISCONTINUED | OUTPATIENT
Start: 2022-08-17 | End: 2022-08-17 | Stop reason: SURG

## 2022-08-17 RX ORDER — DROPERIDOL 2.5 MG/ML
0.62 INJECTION, SOLUTION INTRAMUSCULAR; INTRAVENOUS ONCE AS NEEDED
Status: DISCONTINUED | OUTPATIENT
Start: 2022-08-17 | End: 2022-08-17 | Stop reason: HOSPADM

## 2022-08-17 RX ORDER — SODIUM CHLORIDE 0.9 % (FLUSH) 0.9 %
10 SYRINGE (ML) INJECTION EVERY 12 HOURS SCHEDULED
Status: DISCONTINUED | OUTPATIENT
Start: 2022-08-17 | End: 2022-08-17 | Stop reason: HOSPADM

## 2022-08-17 RX ORDER — ONDANSETRON 2 MG/ML
INJECTION INTRAMUSCULAR; INTRAVENOUS AS NEEDED
Status: DISCONTINUED | OUTPATIENT
Start: 2022-08-17 | End: 2022-08-17 | Stop reason: SURG

## 2022-08-17 RX ORDER — LABETALOL HYDROCHLORIDE 5 MG/ML
5 INJECTION, SOLUTION INTRAVENOUS
Status: DISCONTINUED | OUTPATIENT
Start: 2022-08-17 | End: 2022-08-17 | Stop reason: HOSPADM

## 2022-08-17 RX ORDER — FENTANYL CITRATE 50 UG/ML
INJECTION, SOLUTION INTRAMUSCULAR; INTRAVENOUS AS NEEDED
Status: DISCONTINUED | OUTPATIENT
Start: 2022-08-17 | End: 2022-08-17 | Stop reason: SURG

## 2022-08-17 RX ORDER — SUCCINYLCHOLINE/SOD CL,ISO/PF 200MG/10ML
SYRINGE (ML) INTRAVENOUS AS NEEDED
Status: DISCONTINUED | OUTPATIENT
Start: 2022-08-17 | End: 2022-08-17 | Stop reason: SURG

## 2022-08-17 RX ORDER — SODIUM CHLORIDE 0.9 % (FLUSH) 0.9 %
3-10 SYRINGE (ML) INJECTION AS NEEDED
Status: DISCONTINUED | OUTPATIENT
Start: 2022-08-17 | End: 2022-08-17 | Stop reason: HOSPADM

## 2022-08-17 RX ORDER — LIDOCAINE HYDROCHLORIDE 10 MG/ML
INJECTION, SOLUTION INFILTRATION; PERINEURAL AS NEEDED
Status: DISCONTINUED | OUTPATIENT
Start: 2022-08-17 | End: 2022-08-17 | Stop reason: HOSPADM

## 2022-08-17 RX ORDER — OXYCODONE AND ACETAMINOPHEN 7.5; 325 MG/1; MG/1
2 TABLET ORAL EVERY 4 HOURS PRN
Status: DISCONTINUED | OUTPATIENT
Start: 2022-08-17 | End: 2022-08-17 | Stop reason: HOSPADM

## 2022-08-17 RX ORDER — FENTANYL CITRATE 50 UG/ML
25 INJECTION, SOLUTION INTRAMUSCULAR; INTRAVENOUS
Status: DISCONTINUED | OUTPATIENT
Start: 2022-08-17 | End: 2022-08-17 | Stop reason: HOSPADM

## 2022-08-17 RX ORDER — SODIUM CHLORIDE, SODIUM LACTATE, POTASSIUM CHLORIDE, CALCIUM CHLORIDE 600; 310; 30; 20 MG/100ML; MG/100ML; MG/100ML; MG/100ML
100 INJECTION, SOLUTION INTRAVENOUS CONTINUOUS PRN
Status: DISCONTINUED | OUTPATIENT
Start: 2022-08-17 | End: 2022-08-17 | Stop reason: HOSPADM

## 2022-08-17 RX ORDER — ONDANSETRON 2 MG/ML
4 INJECTION INTRAMUSCULAR; INTRAVENOUS ONCE AS NEEDED
Status: DISCONTINUED | OUTPATIENT
Start: 2022-08-17 | End: 2022-08-17 | Stop reason: HOSPADM

## 2022-08-17 RX ORDER — PROPOFOL 10 MG/ML
VIAL (ML) INTRAVENOUS AS NEEDED
Status: DISCONTINUED | OUTPATIENT
Start: 2022-08-17 | End: 2022-08-17 | Stop reason: SURG

## 2022-08-17 RX ORDER — ROCURONIUM BROMIDE 10 MG/ML
INJECTION, SOLUTION INTRAVENOUS AS NEEDED
Status: DISCONTINUED | OUTPATIENT
Start: 2022-08-17 | End: 2022-08-17 | Stop reason: SURG

## 2022-08-17 RX ORDER — SODIUM CHLORIDE, SODIUM LACTATE, POTASSIUM CHLORIDE, CALCIUM CHLORIDE 600; 310; 30; 20 MG/100ML; MG/100ML; MG/100ML; MG/100ML
100 INJECTION, SOLUTION INTRAVENOUS CONTINUOUS
Status: DISCONTINUED | OUTPATIENT
Start: 2022-08-17 | End: 2022-08-17 | Stop reason: HOSPADM

## 2022-08-17 RX ORDER — LIDOCAINE HYDROCHLORIDE 40 MG/ML
SOLUTION TOPICAL AS NEEDED
Status: DISCONTINUED | OUTPATIENT
Start: 2022-08-17 | End: 2022-08-17 | Stop reason: SURG

## 2022-08-17 RX ORDER — OXYCODONE AND ACETAMINOPHEN 10; 325 MG/1; MG/1
1 TABLET ORAL ONCE AS NEEDED
Status: DISCONTINUED | OUTPATIENT
Start: 2022-08-17 | End: 2022-08-17 | Stop reason: HOSPADM

## 2022-08-17 RX ORDER — SODIUM CHLORIDE 0.9 % (FLUSH) 0.9 %
3 SYRINGE (ML) INJECTION EVERY 12 HOURS SCHEDULED
Status: DISCONTINUED | OUTPATIENT
Start: 2022-08-17 | End: 2022-08-17 | Stop reason: HOSPADM

## 2022-08-17 RX ORDER — FLUMAZENIL 0.1 MG/ML
0.2 INJECTION INTRAVENOUS AS NEEDED
Status: DISCONTINUED | OUTPATIENT
Start: 2022-08-17 | End: 2022-08-17 | Stop reason: HOSPADM

## 2022-08-17 RX ORDER — NALOXONE HCL 0.4 MG/ML
0.4 VIAL (ML) INJECTION AS NEEDED
Status: DISCONTINUED | OUTPATIENT
Start: 2022-08-17 | End: 2022-08-17 | Stop reason: HOSPADM

## 2022-08-17 RX ORDER — IBUPROFEN 400 MG/1
400 TABLET ORAL ONCE AS NEEDED
Status: DISCONTINUED | OUTPATIENT
Start: 2022-08-17 | End: 2022-08-17 | Stop reason: HOSPADM

## 2022-08-17 RX ORDER — LIDOCAINE HYDROCHLORIDE 20 MG/ML
INJECTION, SOLUTION EPIDURAL; INFILTRATION; INTRACAUDAL; PERINEURAL AS NEEDED
Status: DISCONTINUED | OUTPATIENT
Start: 2022-08-17 | End: 2022-08-17 | Stop reason: SURG

## 2022-08-17 RX ADMIN — ROCURONIUM BROMIDE 10 MG: 10 SOLUTION INTRAVENOUS at 07:03

## 2022-08-17 RX ADMIN — LIDOCAINE HYDROCHLORIDE 80 MG: 20 INJECTION, SOLUTION EPIDURAL; INFILTRATION; INTRACAUDAL; PERINEURAL at 07:03

## 2022-08-17 RX ADMIN — PROPOFOL 100 MCG/KG/MIN: 10 INJECTION, EMULSION INTRAVENOUS at 07:05

## 2022-08-17 RX ADMIN — ONDANSETRON 4 MG: 2 INJECTION INTRAMUSCULAR; INTRAVENOUS at 07:22

## 2022-08-17 RX ADMIN — Medication 100 MCG: at 07:18

## 2022-08-17 RX ADMIN — LIDOCAINE HYDROCHLORIDE 1 EACH: 40 SOLUTION TOPICAL at 07:04

## 2022-08-17 RX ADMIN — DEXAMETHASONE SODIUM PHOSPHATE 4 MG: 4 INJECTION, SOLUTION INTRA-ARTICULAR; INTRALESIONAL; INTRAMUSCULAR; INTRAVENOUS; SOFT TISSUE at 07:09

## 2022-08-17 RX ADMIN — Medication 100 MCG: at 07:26

## 2022-08-17 RX ADMIN — FENTANYL CITRATE 100 MCG: 50 INJECTION, SOLUTION INTRAMUSCULAR; INTRAVENOUS at 06:57

## 2022-08-17 RX ADMIN — Medication 100 MCG: at 07:22

## 2022-08-17 RX ADMIN — Medication 100 MG: at 07:03

## 2022-08-17 RX ADMIN — PROPOFOL 140 MG: 10 INJECTION, EMULSION INTRAVENOUS at 07:03

## 2022-08-17 RX ADMIN — SODIUM CHLORIDE, POTASSIUM CHLORIDE, SODIUM LACTATE AND CALCIUM CHLORIDE 100 ML/HR: 600; 310; 30; 20 INJECTION, SOLUTION INTRAVENOUS at 06:17

## 2022-08-17 RX ADMIN — Medication 100 MCG: at 07:12

## 2022-08-17 NOTE — ANESTHESIA POSTPROCEDURE EVALUATION
Patient: Alisia Velez    Procedure Summary     Date: 08/17/22 Room / Location: [facility] OR 09 /  [facility] OR    Anesthesia Start: 0657 Anesthesia Stop: 0744    Procedure: RIGHT WRIST MARGINAL MASS EXCISION (Right ) Diagnosis: (MASS OF RIGHT WRIST)    Surgeons: Magen Pardo MD Provider:     Anesthesia Type: general ASA Status: 3          Anesthesia Type: general    Vitals  Vitals Value Taken Time   /99 08/17/22 0805   Temp 96.9 °F (36.1 °C) 08/17/22 0805   Pulse 68 08/17/22 0806   Resp 18 08/17/22 0805   SpO2 98 % 08/17/22 0806   Vitals shown include unvalidated device data.        Post Anesthesia Care and Evaluation    Patient location during evaluation: PACU  Patient participation: complete - patient participated  Level of consciousness: awake and alert  Pain management: adequate    Airway patency: patent  Anesthetic complications: No anesthetic complications  PONV Status: none  Cardiovascular status: acceptable and hemodynamically stable  Respiratory status: acceptable  Hydration status: acceptable    Comments: Blood pressure 135/64, pulse 61, temperature 96.9 °F (36.1 °C), resp. rate 16, last menstrual period 02/27/2000, SpO2 98 %, not currently breastfeeding.    Patient discharged from PACU based upon Shirley score. Please see RN notes for further details

## 2022-08-17 NOTE — ANESTHESIA PREPROCEDURE EVALUATION
Anesthesia Evaluation     Patient summary reviewed   history of anesthetic complications (PONV ):  NPO Solid Status: > 8 hours  NPO Liquid Status: > 8 hours           Airway   Mallampati: II  TM distance: >3 FB  Neck ROM: full  Dental - normal exam     Pulmonary    (-) COPD, asthma, sleep apnea, not a smoker  Cardiovascular   Exercise tolerance: good (4-7 METS)    ECG reviewed  Patient on routine beta blocker and Beta blocker given within 24 hours of surgery    (+) hypertension, dysrhythmias Tachycardia, hyperlipidemia,   (-) pacemaker, past MI, angina, cardiac stents      Neuro/Psych  (+) TIA (1999), CVA (1999),    (-) seizures  GI/Hepatic/Renal/Endo    (+)  GERD,  diabetes mellitus (continuous glucose monitor) type 2 using insulin,   (-) liver disease    Musculoskeletal     (+) back pain,   Abdominal    Substance History      OB/GYN          Other   arthritis,    history of cancer                      Anesthesia Plan    ASA 3     general     (Propofol component gtt due to severe PONV )  intravenous induction     Anesthetic plan, risks, benefits, and alternatives have been provided, discussed and informed consent has been obtained with: patient.

## 2022-08-17 NOTE — ANESTHESIA PROCEDURE NOTES
Airway  Urgency: elective    Date/Time: 8/17/2022 7:05 AM  Airway not difficult    General Information and Staff    Patient location during procedure: OR  CRNA/CAA: Vipin Storey CRNA    Indications and Patient Condition  Indications for airway management: airway protection    Preoxygenated: yes  Mask difficulty assessment: 1 - vent by mask    Final Airway Details  Final airway type: endotracheal airway      Successful airway: ETT  Cuffed: yes   Successful intubation technique: direct laryngoscopy  Endotracheal tube insertion site: oral  Blade: Moreno  Blade size: 2  ETT size (mm): 7.0  Cormack-Lehane Classification: grade I - full view of glottis  Placement verified by: chest auscultation and capnometry   Cuff volume (mL): 6  Measured from: lips  ETT/EBT  to lips (cm): 22  Number of attempts at approach: 1  Assessment: lips, teeth, and gum same as pre-op and atraumatic intubation

## 2022-08-17 NOTE — OP NOTE
Patient Name: Alisia Velez  : 1945  MRN: [id number]    DATE of SURGERY: 2022    SURGEON: Magen Pardo MD    ASSISTANT: NONE    PREOPERATIVE DIAGNOSIS:   1.  Right volar radial wrist mass  2.  Diabetes mellitus, type II  3.  Hypertension   4.  Hypercholesterolemia  5.  Obesity  6.  Sleep apnea    POSTOPERATIVE DIAGNOSIS:    1.  Right volar radial wrist mass  2.  Diabetes mellitus, type II  3.  Hypertension   4.  Hypercholesterolemia  5.  Obesity  6.  Sleep apnea     PROCEDURE PERFORMED:   1.  Excision of right wrist volar ganglion cyst.     IMPLANTS  None.     ANESTHESIA USED  Laryngeal mask airway.     OPERATIVE INDICATIONS  The patient is a 77-year-old female who presented to the clinic with complaints of a cyst overlying the wrist. The cyst had been chronic, atraumatic and was causing  pain and discomfort, and the patient wished to have it removed. We discussed this in detail with the risks, benefits and alternatives. Risks included, but are not limited to, that of anesthesia, bleeding, infection, pain, damage to local structure, need for further surgery, cyst recurrence, damage to the radial artery and its branches as well as the superficial radial nerve.     ESTIMATED BLOOD LOSS    Less than 10 mL.     SPECIMENS  1.  Soft tissue cystic-appearing mass from the volar radial right wrist     DRAINS  None.     COMPLICATIONS  None.     PROCEDURE IN DETAIL  The patient was seen in the preoperative holding room; once again the informed consent form was reviewed with the patient and signed. The site of surgery was marked with the patient's agreement. The patient was transported to the operating room where a time out was performed identifying the correct patient.  Preoperative antibiotics were administered in 1 hour of incision.  A nonsterile tourniquet was placed about the right brachium.  The right upper extremities prepped and draped in a normal sterile fashion.     A skin marker was used to delineate a  longitudinal approach.  The right upper extremity was exsanguinated with an Esmarch and the tourniquet was inflated to 250 mmHg for less than 30 minutes.  An incision was made overlying the cyst in line with the FCR tendon.  The cyst was readily identified and was circumferentially dissected, traced down to the level of the joint space. The entire cyst was removed without complication. The stalk on the cyst was excised and a small portion of the joint capsule was removed to prevent cyst recurrence.     The incision was irrigated followed by closing in layers. The skin was closed with nylon sutures.  For postoperative pain control, approximately 5 cc of 1% lidocaine without epinephrine were injected in line with the incision just ulnar to the FCR tendon.  A well-padded volar resting splint was placed. She was awakened from anesthesia, transported to the recovery room in stable condition.  All counts the end procedure were correct.  She tolerated the procedure well and was without intraoperative complication.  The     POSTOPERATIVE PLAN: Discharge home with family to follow up in 2 weeks for a clinical check.    Electronically signed by Magen Pardo MD on 8/17/2022 at 0740

## 2022-08-19 LAB
CYTO UR: NORMAL
LAB AP CASE REPORT: NORMAL
Lab: NORMAL
PATH REPORT.FINAL DX SPEC: NORMAL
PATH REPORT.GROSS SPEC: NORMAL

## 2022-09-02 ENCOUNTER — OFFICE VISIT (OUTPATIENT)
Dept: PODIATRY | Facility: CLINIC | Age: 77
End: 2022-09-02

## 2022-09-02 VITALS
WEIGHT: 166 LBS | BODY MASS INDEX: 29.41 KG/M2 | OXYGEN SATURATION: 97 % | HEIGHT: 63 IN | HEART RATE: 70 BPM | SYSTOLIC BLOOD PRESSURE: 122 MMHG | DIASTOLIC BLOOD PRESSURE: 60 MMHG

## 2022-09-02 DIAGNOSIS — B35.1 ONYCHOMYCOSIS: Primary | ICD-10-CM

## 2022-09-02 DIAGNOSIS — M21.41 PES PLANUS OF BOTH FEET: ICD-10-CM

## 2022-09-02 DIAGNOSIS — M20.11 VALGUS DEFORMITY OF BOTH GREAT TOES: ICD-10-CM

## 2022-09-02 DIAGNOSIS — Z79.4 TYPE 2 DIABETES MELLITUS WITH DIABETIC POLYNEUROPATHY, WITH LONG-TERM CURRENT USE OF INSULIN: ICD-10-CM

## 2022-09-02 DIAGNOSIS — E11.42 TYPE 2 DIABETES MELLITUS WITH DIABETIC POLYNEUROPATHY, WITH LONG-TERM CURRENT USE OF INSULIN: ICD-10-CM

## 2022-09-02 DIAGNOSIS — M20.12 VALGUS DEFORMITY OF BOTH GREAT TOES: ICD-10-CM

## 2022-09-02 DIAGNOSIS — M21.42 PES PLANUS OF BOTH FEET: ICD-10-CM

## 2022-09-02 PROCEDURE — 11721 DEBRIDE NAIL 6 OR MORE: CPT | Performed by: PODIATRIST

## 2022-09-09 ENCOUNTER — DOCUMENTATION (OUTPATIENT)
Dept: ENDOCRINOLOGY | Facility: CLINIC | Age: 77
End: 2022-09-09

## 2022-09-09 NOTE — PROGRESS NOTES
Progress Note Request and progress note faxed to [facility]. Confirmation received and sent to

## 2022-12-01 ENCOUNTER — TELEPHONE (OUTPATIENT)
Dept: PODIATRY | Facility: CLINIC | Age: 77
End: 2022-12-01

## 2022-12-01 NOTE — TELEPHONE ENCOUNTER
Called patient regarding appt on 12/02/2022. Left message for patient to return call if any questions or concerns arise.

## 2022-12-02 ENCOUNTER — OFFICE VISIT (OUTPATIENT)
Dept: PODIATRY | Facility: CLINIC | Age: 77
End: 2022-12-02

## 2022-12-02 VITALS — HEART RATE: 97 BPM | HEIGHT: 63 IN | OXYGEN SATURATION: 98 % | WEIGHT: 166 LBS | BODY MASS INDEX: 29.41 KG/M2

## 2022-12-02 DIAGNOSIS — M21.42 PES PLANUS OF BOTH FEET: ICD-10-CM

## 2022-12-02 DIAGNOSIS — M21.41 PES PLANUS OF BOTH FEET: ICD-10-CM

## 2022-12-02 DIAGNOSIS — M20.12 VALGUS DEFORMITY OF BOTH GREAT TOES: ICD-10-CM

## 2022-12-02 DIAGNOSIS — Z79.4 TYPE 2 DIABETES MELLITUS WITH DIABETIC POLYNEUROPATHY, WITH LONG-TERM CURRENT USE OF INSULIN: ICD-10-CM

## 2022-12-02 DIAGNOSIS — M20.11 VALGUS DEFORMITY OF BOTH GREAT TOES: ICD-10-CM

## 2022-12-02 DIAGNOSIS — E11.42 TYPE 2 DIABETES MELLITUS WITH DIABETIC POLYNEUROPATHY, WITH LONG-TERM CURRENT USE OF INSULIN: ICD-10-CM

## 2022-12-02 DIAGNOSIS — B35.1 ONYCHOMYCOSIS: Primary | ICD-10-CM

## 2022-12-02 PROCEDURE — 11721 DEBRIDE NAIL 6 OR MORE: CPT | Performed by: PODIATRIST

## 2022-12-29 ENCOUNTER — OFFICE VISIT (OUTPATIENT)
Dept: ENDOCRINOLOGY | Facility: CLINIC | Age: 77
End: 2022-12-29

## 2022-12-29 VITALS
WEIGHT: 163 LBS | BODY MASS INDEX: 28.88 KG/M2 | HEART RATE: 88 BPM | DIASTOLIC BLOOD PRESSURE: 70 MMHG | SYSTOLIC BLOOD PRESSURE: 130 MMHG | OXYGEN SATURATION: 95 % | HEIGHT: 63 IN

## 2022-12-29 DIAGNOSIS — E55.9 VITAMIN D DEFICIENCY: ICD-10-CM

## 2022-12-29 DIAGNOSIS — E78.2 MIXED HYPERLIPIDEMIA: ICD-10-CM

## 2022-12-29 DIAGNOSIS — E11.42 TYPE 2 DIABETES MELLITUS WITH DIABETIC POLYNEUROPATHY, WITH LONG-TERM CURRENT USE OF INSULIN: Primary | ICD-10-CM

## 2022-12-29 DIAGNOSIS — I10 ESSENTIAL HYPERTENSION: ICD-10-CM

## 2022-12-29 DIAGNOSIS — E53.8 B12 DEFICIENCY: ICD-10-CM

## 2022-12-29 DIAGNOSIS — Z79.4 TYPE 2 DIABETES MELLITUS WITH DIABETIC POLYNEUROPATHY, WITH LONG-TERM CURRENT USE OF INSULIN: Primary | ICD-10-CM

## 2022-12-29 LAB — HBA1C MFR BLD: 7.4 %

## 2022-12-29 PROCEDURE — 99214 OFFICE O/P EST MOD 30 MIN: CPT | Performed by: INTERNAL MEDICINE

## 2022-12-29 RX ORDER — INSULIN GLARGINE 300 U/ML
INJECTION, SOLUTION SUBCUTANEOUS
Qty: 24 ML | Refills: 3 | OUTPATIENT
Start: 2022-12-29 | End: 2022-12-30 | Stop reason: SDUPTHER

## 2022-12-29 RX ORDER — INSULIN GLARGINE 300 U/ML
INJECTION, SOLUTION SUBCUTANEOUS
Qty: 18 ML | Refills: 3 | Status: SHIPPED | OUTPATIENT
Start: 2022-12-29 | End: 2022-12-29 | Stop reason: SDUPTHER

## 2022-12-29 NOTE — PROGRESS NOTES
Alisia Velez is a 77 y.o. female who presents for  evaluation of type 2 Diabetes    HPI    77 y o female with DM for more than 10 years  Complications CVA 1999  Excellent control but occasional hypo and hyperglycemia     PE  AOx3  No visible goiter  Normal Respiratory Effort  No Edema    Labs    Lab Results   Component Value Date    WBC 7.89 08/03/2022    HGB 13.0 08/03/2022    HCT 40.2 08/03/2022    MCV 98.3 (H) 08/03/2022     08/03/2022     Lab Results   Component Value Date    GLUCOSE 270 (H) 08/03/2022    BUN 14 08/03/2022    CREATININE 0.86 08/03/2022    EGFRIFNONA 77 12/15/2020    BCR 16.3 08/03/2022    K 4.4 08/03/2022    CO2 27.0 08/03/2022    CALCIUM 9.9 08/03/2022    ALBUMIN 4.40 12/15/2020    AST 33 (H) 12/15/2020    ALT 38 (H) 12/15/2020         Assessment & Plan        ICD-10-CM ICD-9-CM   1. Type 2 diabetes mellitus with diabetic polyneuropathy, with long-term current use of insulin (HCC)  E11.42 250.60    Z79.4 357.2     V58.67   2. Mixed hyperlipidemia  E78.2 272.2   3. Essential hypertension  I10 401.9   4. B12 deficiency  E53.8 266.2   5. Vitamin D deficiency  E55.9 268.9          Glycemic control     Lab Results   Component Value Date    HGBA1C 7.88 (H) 12/15/2020       Karan 2    2 week review and sent for scanning   TIR 61%  No lows  t2dm well controlled    Main pattern , she builds food cushion at bedtime and then glucose drops overnight   Jey post bkfast but returns to normal     Toujeo 70 , decrease to 64 - 56- 52- 55     ------------      Humalog ,  Change to 1.5-2-3 -1.3 ( cereal 0.9) take 5 min before   Change to 1.3-1.8 - 1.7  Spears t o 1.2-1.7-1.9 -2 - 1.9  But for cereal do 0.9      Correction , 10         during steroids  typically the carb ratio and correction need to be doubled and the basal( Lantus ) needs to be increased by 30%         Can't tolerate any orals           Mixed hyperlipidemia  On atorvastatin 20 mg qhs    stroke in 1999      Essential hypertension  BP  Normal iron studies. Can lab add hgb electrophoresis to the lab draw? "130/70   Pulse 88   Ht 160 cm (63\")   Wt 73.9 kg (163 lb)   LMP 02/27/2000 Comment: Postmenopausal  SpO2 95%   BMI 28.87 kg/m²     Only on metoprolol ho cva and svt     longterm nsaid, on diclofenac, unfortunately can't use longterm   Takes carafte         --    DXA Jan 2017 , normal                This document has been electronically signed by Omid Jules MD on December 29, 2022 11:57 CST      "

## 2022-12-30 ENCOUNTER — TELEPHONE (OUTPATIENT)
Dept: ENDOCRINOLOGY | Facility: CLINIC | Age: 77
End: 2022-12-30

## 2022-12-30 DIAGNOSIS — E11.42 TYPE 2 DIABETES MELLITUS WITH DIABETIC POLYNEUROPATHY, WITH LONG-TERM CURRENT USE OF INSULIN: Primary | ICD-10-CM

## 2022-12-30 DIAGNOSIS — Z79.4 TYPE 2 DIABETES MELLITUS WITH DIABETIC POLYNEUROPATHY, WITH LONG-TERM CURRENT USE OF INSULIN: Primary | ICD-10-CM

## 2022-12-30 RX ORDER — INSULIN GLARGINE 300 U/ML
INJECTION, SOLUTION SUBCUTANEOUS
Qty: 24 ML | Refills: 3 | OUTPATIENT
Start: 2022-12-30

## 2022-12-30 NOTE — TELEPHONE ENCOUNTER
Patient called and is asking if Dr. Spears has sent her prescriptions to [facility] yet. She stated he saw her yesterday and that he at first sent them to [facility] accidentally and said he would send them to [facility] when he had a moment.     Requested call back.    Phone: [phone]    Thank you

## 2023-01-09 DIAGNOSIS — E11.42 TYPE 2 DIABETES MELLITUS WITH DIABETIC POLYNEUROPATHY, WITH LONG-TERM CURRENT USE OF INSULIN: ICD-10-CM

## 2023-01-09 DIAGNOSIS — Z79.4 TYPE 2 DIABETES MELLITUS WITH DIABETIC POLYNEUROPATHY, WITH LONG-TERM CURRENT USE OF INSULIN: ICD-10-CM

## 2023-01-09 DIAGNOSIS — I10 ESSENTIAL HYPERTENSION: ICD-10-CM

## 2023-01-09 DIAGNOSIS — E53.8 B12 DEFICIENCY: ICD-10-CM

## 2023-01-09 DIAGNOSIS — E55.9 VITAMIN D DEFICIENCY: ICD-10-CM

## 2023-01-09 DIAGNOSIS — E78.2 MIXED HYPERLIPIDEMIA: ICD-10-CM

## 2023-01-20 ENCOUNTER — TRANSCRIBE ORDERS (OUTPATIENT)
Dept: ADMINISTRATIVE | Facility: HOSPITAL | Age: 78
End: 2023-01-20
Payer: MEDICARE

## 2023-01-20 DIAGNOSIS — M54.50 LUMBAR SPINE PAIN: Primary | ICD-10-CM

## 2023-02-03 ENCOUNTER — HOSPITAL ENCOUNTER (OUTPATIENT)
Dept: MRI IMAGING | Facility: HOSPITAL | Age: 78
Discharge: HOME | End: 2023-02-03
Payer: MEDICARE

## 2023-02-03 ENCOUNTER — TELEPHONE (OUTPATIENT)
Dept: OBGYN CLINIC | Age: 78
End: 2023-02-03

## 2023-02-03 ENCOUNTER — HOSPITAL ENCOUNTER (OUTPATIENT)
Dept: CT IMAGING | Facility: HOSPITAL | Age: 78
Discharge: HOME | End: 2023-02-03
Payer: MEDICARE

## 2023-02-03 ENCOUNTER — DOCUMENTATION (OUTPATIENT)
Dept: ENDOCRINOLOGY | Facility: CLINIC | Age: 78
End: 2023-02-03
Payer: MEDICARE

## 2023-02-03 DIAGNOSIS — M54.50 LUMBAR SPINE PAIN: ICD-10-CM

## 2023-02-03 PROCEDURE — 72131 CT LUMBAR SPINE W/O DYE: CPT

## 2023-02-03 PROCEDURE — 72148 MRI LUMBAR SPINE W/O DYE: CPT

## 2023-03-02 ENCOUNTER — DOCUMENTATION (OUTPATIENT)
Dept: ENDOCRINOLOGY | Facility: CLINIC | Age: 78
End: 2023-03-02
Payer: MEDICARE

## 2023-03-09 ENCOUNTER — OFFICE VISIT (OUTPATIENT)
Dept: PODIATRY | Facility: CLINIC | Age: 78
End: 2023-03-09
Payer: MEDICARE

## 2023-03-09 VITALS
SYSTOLIC BLOOD PRESSURE: 130 MMHG | HEART RATE: 72 BPM | WEIGHT: 165 LBS | DIASTOLIC BLOOD PRESSURE: 62 MMHG | OXYGEN SATURATION: 98 % | HEIGHT: 63 IN | BODY MASS INDEX: 29.23 KG/M2

## 2023-03-09 DIAGNOSIS — M20.12 VALGUS DEFORMITY OF BOTH GREAT TOES: ICD-10-CM

## 2023-03-09 DIAGNOSIS — E11.42 TYPE 2 DIABETES MELLITUS WITH DIABETIC POLYNEUROPATHY, WITH LONG-TERM CURRENT USE OF INSULIN: ICD-10-CM

## 2023-03-09 DIAGNOSIS — B35.1 ONYCHOMYCOSIS: Primary | ICD-10-CM

## 2023-03-09 DIAGNOSIS — E11.9 ENCOUNTER FOR DIABETIC FOOT EXAM: ICD-10-CM

## 2023-03-09 DIAGNOSIS — M21.41 PES PLANUS OF BOTH FEET: ICD-10-CM

## 2023-03-09 DIAGNOSIS — M21.42 PES PLANUS OF BOTH FEET: ICD-10-CM

## 2023-03-09 DIAGNOSIS — M20.11 VALGUS DEFORMITY OF BOTH GREAT TOES: ICD-10-CM

## 2023-03-09 DIAGNOSIS — Z79.4 TYPE 2 DIABETES MELLITUS WITH DIABETIC POLYNEUROPATHY, WITH LONG-TERM CURRENT USE OF INSULIN: ICD-10-CM

## 2023-03-09 PROCEDURE — 11721 DEBRIDE NAIL 6 OR MORE: CPT | Performed by: NURSE PRACTITIONER

## 2023-03-09 PROCEDURE — 99213 OFFICE O/P EST LOW 20 MIN: CPT | Performed by: NURSE PRACTITIONER

## 2023-03-24 ENCOUNTER — TRANSCRIBE ORDERS (OUTPATIENT)
Dept: ADMINISTRATIVE | Facility: HOSPITAL | Age: 78
End: 2023-03-24
Payer: MEDICARE

## 2023-03-24 ENCOUNTER — HOSPITAL ENCOUNTER (OUTPATIENT)
Dept: CARDIOLOGY | Facility: HOSPITAL | Age: 78
Discharge: HOME | End: 2023-03-24
Payer: MEDICARE

## 2023-03-24 ENCOUNTER — HOSPITAL ENCOUNTER (OUTPATIENT)
Dept: GENERAL RADIOLOGY | Facility: HOSPITAL | Age: 78
Discharge: HOME | End: 2023-03-24
Payer: MEDICARE

## 2023-03-24 ENCOUNTER — LAB (OUTPATIENT)
Dept: LAB | Facility: HOSPITAL | Age: 78
End: 2023-03-24
Payer: MEDICARE

## 2023-03-24 DIAGNOSIS — Z79.4 LONG TERM CURRENT USE OF INSULIN: ICD-10-CM

## 2023-03-24 DIAGNOSIS — Z01.811 ENCOUNTER FOR PREPROCEDURAL RESPIRATORY EXAMINATION: ICD-10-CM

## 2023-03-24 DIAGNOSIS — I63.9 CEREBRAL INFARCTION, UNSPECIFIED MECHANISM: ICD-10-CM

## 2023-03-24 DIAGNOSIS — Z01.812 ENCOUNTER FOR PREPROCEDURAL LABORATORY EXAMINATION: ICD-10-CM

## 2023-03-24 DIAGNOSIS — Z01.810 ENCOUNTER FOR PREPROCEDURAL CARDIOVASCULAR EXAMINATION: ICD-10-CM

## 2023-03-24 DIAGNOSIS — E78.5 HYPERLIPIDEMIA, UNSPECIFIED HYPERLIPIDEMIA TYPE: ICD-10-CM

## 2023-03-24 DIAGNOSIS — I10 ESSENTIAL (PRIMARY) HYPERTENSION: ICD-10-CM

## 2023-03-24 DIAGNOSIS — E78.5 HYPERLIPIDEMIA, UNSPECIFIED HYPERLIPIDEMIA TYPE: Primary | ICD-10-CM

## 2023-03-24 LAB
ALBUMIN SERPL-MCNC: 4.6 G/DL (ref 3.5–5.2)
ALBUMIN/GLOB SERPL: 1.5 G/DL
ALP SERPL-CCNC: 56 U/L (ref 39–117)
ALT SERPL W P-5'-P-CCNC: 23 U/L (ref 1–33)
ANION GAP SERPL CALCULATED.3IONS-SCNC: 13 MMOL/L (ref 5–15)
APTT PPP: 27.1 SECONDS (ref 24.1–35)
AST SERPL-CCNC: 22 U/L (ref 1–32)
BASOPHILS # BLD AUTO: 0.04 10*3/MM3 (ref 0–0.2)
BASOPHILS NFR BLD AUTO: 0.4 % (ref 0–1.5)
BILIRUB SERPL-MCNC: 0.3 MG/DL (ref 0–1.2)
BUN SERPL-MCNC: 14 MG/DL (ref 8–23)
BUN/CREAT SERPL: 20.6 (ref 7–25)
CALCIUM SPEC-SCNC: 10 MG/DL (ref 8.6–10.5)
CHLORIDE SERPL-SCNC: 101 MMOL/L (ref 98–107)
CO2 SERPL-SCNC: 25 MMOL/L (ref 22–29)
CREAT SERPL-MCNC: 0.68 MG/DL (ref 0.57–1)
DEPRECATED RDW RBC AUTO: 44.4 FL (ref 37–54)
EGFRCR SERPLBLD CKD-EPI 2021: 91 ML/MIN/1.73
EOSINOPHIL # BLD AUTO: 0.09 10*3/MM3 (ref 0–0.4)
EOSINOPHIL NFR BLD AUTO: 1 % (ref 0.3–6.2)
ERYTHROCYTE [DISTWIDTH] IN BLOOD BY AUTOMATED COUNT: 12.7 % (ref 12.3–15.4)
GLOBULIN UR ELPH-MCNC: 3 GM/DL
GLUCOSE SERPL-MCNC: 166 MG/DL (ref 65–99)
HCT VFR BLD AUTO: 42.3 % (ref 34–46.6)
HGB BLD-MCNC: 13.7 G/DL (ref 12–15.9)
IMM GRANULOCYTES # BLD AUTO: 0.02 10*3/MM3 (ref 0–0.05)
IMM GRANULOCYTES NFR BLD AUTO: 0.2 % (ref 0–0.5)
INR PPP: 1.06 (ref 0.91–1.09)
LYMPHOCYTES # BLD AUTO: 2.7 10*3/MM3 (ref 0.7–3.1)
LYMPHOCYTES NFR BLD AUTO: 29.2 % (ref 19.6–45.3)
MCH RBC QN AUTO: 31 PG (ref 26.6–33)
MCHC RBC AUTO-ENTMCNC: 32.4 G/DL (ref 31.5–35.7)
MCV RBC AUTO: 95.7 FL (ref 79–97)
MONOCYTES # BLD AUTO: 0.96 10*3/MM3 (ref 0.1–0.9)
MONOCYTES NFR BLD AUTO: 10.4 % (ref 5–12)
NEUTROPHILS NFR BLD AUTO: 5.43 10*3/MM3 (ref 1.7–7)
NEUTROPHILS NFR BLD AUTO: 58.8 % (ref 42.7–76)
NRBC BLD AUTO-RTO: 0 /100 WBC (ref 0–0.2)
PLATELET # BLD AUTO: 248 10*3/MM3 (ref 140–450)
PMV BLD AUTO: 9.7 FL (ref 6–12)
POTASSIUM SERPL-SCNC: 4.2 MMOL/L (ref 3.5–5.2)
PROT SERPL-MCNC: 7.6 G/DL (ref 6–8.5)
PROTHROMBIN TIME: 13.9 SECONDS (ref 11.8–14.8)
RBC # BLD AUTO: 4.42 10*6/MM3 (ref 3.77–5.28)
SODIUM SERPL-SCNC: 139 MMOL/L (ref 136–145)
WBC NRBC COR # BLD: 9.24 10*3/MM3 (ref 3.4–10.8)

## 2023-03-24 PROCEDURE — 87081 CULTURE SCREEN ONLY: CPT

## 2023-03-24 PROCEDURE — 71046 X-RAY EXAM CHEST 2 VIEWS: CPT

## 2023-03-24 PROCEDURE — 84378 SUGARS SINGLE QUANT: CPT

## 2023-03-24 PROCEDURE — 85730 THROMBOPLASTIN TIME PARTIAL: CPT

## 2023-03-24 PROCEDURE — 85025 COMPLETE CBC W/AUTO DIFF WBC: CPT

## 2023-03-24 PROCEDURE — 80053 COMPREHEN METABOLIC PANEL: CPT

## 2023-03-24 PROCEDURE — 83036 HEMOGLOBIN GLYCOSYLATED A1C: CPT

## 2023-03-24 PROCEDURE — 36415 COLL VENOUS BLD VENIPUNCTURE: CPT

## 2023-03-24 PROCEDURE — 85610 PROTHROMBIN TIME: CPT

## 2023-03-24 PROCEDURE — 93010 ELECTROCARDIOGRAM REPORT: CPT | Performed by: INTERNAL MEDICINE

## 2023-03-24 PROCEDURE — 93005 ELECTROCARDIOGRAM TRACING: CPT | Performed by: ORTHOPAEDIC SURGERY

## 2023-03-25 LAB — MRSA SPEC QL CULT: NORMAL

## 2023-03-27 LAB
QT INTERVAL: 386 MS
QTC INTERVAL: 425 MS

## 2023-03-29 LAB
1,5-ANHYDROGLUCITOL SERPL-MCNC: 15.3 UG/ML
EST. AVERAGE GLUCOSE BLD GHB EST-MCNC: 174 MG/DL
HBA1C MFR BLD: 7.7 %HB

## 2023-04-10 ENCOUNTER — TELEPHONE (OUTPATIENT)
Dept: ENDOCRINOLOGY | Facility: CLINIC | Age: 78
End: 2023-04-10
Payer: MEDICARE

## 2023-04-10 NOTE — TELEPHONE ENCOUNTER
Pt reports elevated BG x 48 hours after surger 4/7/23 (-235, BG during day 200-300). No low BG reported. Reminded pt that it may take more than 48 hours for BG to return to pre-surgery numbers, as well as pain may cause higher BG numbers as well. Pt agreed to call back if BG has not began to return to pre-surgery numbers.

## 2023-04-10 NOTE — TELEPHONE ENCOUNTER
Pt called, stating she had hip replacement surgery last week and her BS readings are riding pretty high - 200-350 since the surgery. She is asking what she can do to cover the high numbers    [phone] - okay to leave voicemail, per patient.

## 2023-04-28 DIAGNOSIS — E11.42 TYPE 2 DIABETES MELLITUS WITH DIABETIC POLYNEUROPATHY, WITH LONG-TERM CURRENT USE OF INSULIN: ICD-10-CM

## 2023-04-28 DIAGNOSIS — Z79.4 TYPE 2 DIABETES MELLITUS WITH DIABETIC POLYNEUROPATHY, WITH LONG-TERM CURRENT USE OF INSULIN: ICD-10-CM

## 2023-04-28 RX ORDER — INSULIN GLARGINE 300 U/ML
INJECTION, SOLUTION SUBCUTANEOUS
Qty: 24 ML | Refills: 3 | Status: SHIPPED | OUTPATIENT
Start: 2023-04-28

## 2023-08-02 ENCOUNTER — TELEPHONE (OUTPATIENT)
Dept: PODIATRY | Facility: CLINIC | Age: 78
End: 2023-08-02
Payer: MEDICARE

## 2023-08-07 NOTE — PROGRESS NOTES
Alisia Velez is a 72 y.o. female who presents for  evaluation of   Chief Complaint   Patient presents with   • Diabetes         Primary Care / Referring Provider  Mike Erazo MD    Duration/Timing:  Diabetes mellitus type 2, Age at onset of diabetes: 42 years  timing, constant    quality , nearly well controlled    severity , moderate    Severity (Complications/Hospitalizations)  Secondary Macrovascular Complications:  No CAD, CVA, No PAD  cva in 1999  Secondary Microvascular Complications:  No Diabetic Nephropathy, No proteinuria, No Diabetic Retinopathy, No Diabetic Neuropathy    Context  Diabetes Regimen:  Insulin, Oral Medications, Compliant with regimen,    Lab Results   Component Value Date    HGBA1C 7.4 01/09/2018       Blood Glucose Readings  at goal m checking 4 x daily for the past 90 days   Diet  counts carbs  Exercise:  Exercises    Associated Signs/Symptoms  Hyperglycemic Symptoms:  Polyuria, Polydipsia, Polyphagia  Modifying Factors/Comorbidities      Past Medical History:   Diagnosis Date   • Arthritis    • Back pain    • Blister of great toe of left foot     Nonthermal, initial encounter   • Cancer     skin    • Dyslipidemia    • Elevated blood pressure    • Essential hypertension 9/13/2016   • GERD (gastroesophageal reflux disease)    • History of cerebrovascular accident     1999   • Hypo-osmolality and hyponatremia    • Ingrowing nail    • Onychomycosis    • PONV (postoperative nausea and vomiting)    • Snores    • Type 2 diabetes mellitus    • 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   • Urinary incontinence    • Vitamin D deficiency      Family History   Problem Relation Age of Onset   • Cancer Other    • Diabetes Other    • Heart disease Other    • Hypertension Other    • Cancer Mother    • Heart disease Father      Social History   Substance Use Topics   • Smoking status: Never Smoker   • Smokeless tobacco: Never Used   • Alcohol use No         Current  Outpatient Prescriptions:   •  acetaminophen (TYLENOL) 650 MG 8 hr tablet, Take 650 mg by mouth Every 8 (Eight) Hours As Needed., Disp: , Rfl:   •  aspirin 81 MG EC tablet, Take 81 mg by mouth Daily., Disp: , Rfl:   •  atorvastatin (LIPITOR) 20 MG tablet, Take 20 mg by mouth daily., Disp: , Rfl:   •  JEROME CONTOUR TEST test strip, USE AS DIRECTED 5 TIMES A DAY. E11.42, Disp: 200 each, Rfl: 4  •  cetirizine (ZyrTEC) 10 MG tablet, Take 10 mg by mouth daily., Disp: , Rfl:   •  Coenzyme Q10 (COQ10) 200 MG capsule, Take 1 tablet/day by mouth., Disp: , Rfl:   •  HUMALOG KWIKPEN 100 UNIT/ML solution pen-injector, INJECT SUBCUTANEOUSLY UP TO 50 UNITS WITH MEALS, Disp: 135 mL, Rfl: 5  •  Insulin Glargine (TOUJEO SOLOSTAR) 300 UNIT/ML solution pen-injector, Inject 64 Units under the skin every night at bedtime., Disp: , Rfl:   •  Insulin Pen Needle (B-D UF III MINI PEN NEEDLES) 31G X 5 MM misc, Use 5 times daily, Disp: 450 each, Rfl: 3  •  metoprolol succinate XL (TOPROL-XL) 25 MG 24 hr tablet, Take 25 mg by mouth 2 (Two) Times a Day., Disp: , Rfl:   •  OMEGA-3 FATTY ACIDS-VITAMIN E PO, Take 1 capsule by mouth. omega-3 fatty acids-vitamin E 1,000 mg-5 unit capsule. , Disp: , Rfl:   •  ranitidine (ZANTAC) 150 MG tablet, Take 150 mg by mouth 2 (two) times a day., Disp: , Rfl:   •  sucralfate (CARAFATE) 1 G tablet, Take 1 g by mouth 3 (three) times a day., Disp: , Rfl:   •  Vitamin D, Cholecalciferol, 1000 UNITS capsule, 3 tablets Daily., Disp: , Rfl:     Review of Systems    Review of Systems   Constitutional: Negative for activity change, appetite change, chills, diaphoresis, fatigue, fever and unexpected weight change.   HENT: Negative for congestion, drooling, ear discharge, ear pain, facial swelling, mouth sores, nosebleeds, postnasal drip, sinus pressure, sneezing, sore throat, tinnitus, trouble swallowing and voice change.    Eyes: Negative.  Negative for photophobia, pain, discharge, redness and itching.   Respiratory:  "Negative.  Negative for apnea, cough, choking, chest tightness, shortness of breath, wheezing and stridor.    Cardiovascular: Negative.  Negative for chest pain, palpitations and leg swelling.   Gastrointestinal: Negative.  Negative for abdominal distention, abdominal pain, constipation, diarrhea, nausea and vomiting.   Endocrine: Negative.  Negative for cold intolerance, heat intolerance, polydipsia, polyphagia and polyuria.   Genitourinary: Negative for difficulty urinating, dysuria, flank pain and frequency.   Musculoskeletal: Negative for arthralgias, back pain, gait problem, joint swelling, myalgias, neck pain and neck stiffness.   Skin: Negative for color change, pallor, rash and wound.   Allergic/Immunologic: Negative for environmental allergies, food allergies and immunocompromised state.   Neurological: Negative for dizziness, tremors, seizures, syncope, facial asymmetry, speech difficulty, weakness, light-headedness, numbness and headaches.   Hematological: Negative for adenopathy. Does not bruise/bleed easily.   Psychiatric/Behavioral: Negative for agitation, behavioral problems, confusion, decreased concentration, dysphoric mood, hallucinations, self-injury, sleep disturbance and suicidal ideas. The patient is not nervous/anxious and is not hyperactive.         Objective:     /62 (BP Location: Right arm, Patient Position: Sitting, Cuff Size: Adult)  Pulse 86  Ht 160 cm (63\")  Wt 75.8 kg (167 lb 1 oz)  LMP 02/27/2000 (Within Months) Comment: Postmenopausal  Breastfeeding? No  BMI 29.59 kg/m2    Physical Exam   Constitutional: She is oriented to person, place, and time. She appears well-developed.   HENT:   Head: Normocephalic.   Right Ear: External ear normal.   Left Ear: External ear normal.   Nose: Nose normal.   Eyes: Conjunctivae and EOM are normal. No scleral icterus.   Neck: Normal range of motion. Neck supple. No tracheal deviation present. No thyromegaly present.   Cardiovascular: " Normal rate, regular rhythm, normal heart sounds and intact distal pulses.  Exam reveals no gallop and no friction rub.    No murmur heard.  Pulmonary/Chest: Effort normal and breath sounds normal. No stridor. No respiratory distress. She has no wheezes. She has no rales. She exhibits no tenderness.   Abdominal: Soft. Bowel sounds are normal. She exhibits no distension and no mass. There is no tenderness. There is no rebound and no guarding.   Musculoskeletal: Normal range of motion. She exhibits no tenderness or deformity.   Lymphadenopathy:     She has no cervical adenopathy.   Neurological: She is alert and oriented to person, place, and time. She displays normal reflexes. She exhibits normal muscle tone. Coordination normal.   Skin: No rash noted. No erythema. No pallor.   Psychiatric: She has a normal mood and affect. Her behavior is normal. Judgment and thought content normal.       Lab Review    Assessment/Plan        ICD-10-CM ICD-9-CM   1. Type 2 diabetes mellitus with diabetic polyneuropathy, with long-term current use of insulin E11.42 250.60    Z79.4 357.2     V58.67   2. Mixed hyperlipidemia E78.2 272.2   3. Vitamin D deficiency E55.9 268.9   4. Essential hypertension I10 401.9   5. B12 deficiency E53.8 266.2   6. Age-related osteoporosis without current pathological fracture M81.0 733.01              Type 2 diabetes mellitus with circulatory disorder    Lab Results   Component Value Date    HGBA1C 7.4 01/09/2018         Toujeo 64       ------------      Humalog ,  Change to 1.5-2-3  Change to 1.3-1.8-2  But for cereal do 0.9       Correction , 10              during steroids  typically the carb ratio and correction need to be doubled and the basal( Lantus ) needs to be increased by 30%         We will add orals     Suggest      Can't tolerate metformin , diarrhea  Can't tolerate glyburide, developed allergy       jardiance and tradjenta, too expensive and afraid of side effects     Demar 3, to Jan  16  Aic 8.6 at that time but now at goal       Approve for Freestyle home   #1  Patient has diabetes mellitus, insulin-dependent.    #2 She performs blood glucose testing 4 times daily and blood glucose log was brought to office with variability from .    #3  She is requiring  Basal insulin  and Prandial Insulin 3 times daily for a total of 4 injections per day.    #4 Based on blood glucose readings we are making adjustments.     #5 I have personally seen patient within the past 6 months    #6 We plan on seeing her every 2-3 months for continuous adjustment of her diabetes regimen     #7 patient has hypoglycemia with unawareness.    #8 patient has day-to-day variation in her mealtime which confounds the degree of insulin dosing with multiple daily injections.    #9 patient has completed diabetes education program with us.    #10 she has demonstrated the ability to self monitor her glucose.        #11 Patient is motivated in improving  diabetes control     Mixed hyperlipidemia  On atorvastatin 20 mg qhs      Essential hypertension  On metoprolol ho cva and svt     longterm nsaid, on diclofenac, unfortunately can't use longterm   Takes carafte                      I reviewed and summarized records from Mike Erazo MD from 2016 and I reviewed / ordered labs.     Orders Placed This Encounter   Procedures   • Hemoglobin A1c     This order was created through External Result Entry         A copy of my note was sent to Mike Erazo MD    Please see my above opinion and suggestions.      No

## 2023-08-28 ENCOUNTER — DOCUMENTATION (OUTPATIENT)
Dept: ENDOCRINOLOGY | Facility: CLINIC | Age: 78
End: 2023-08-28
Payer: MEDICARE

## 2023-10-27 ENCOUNTER — TELEPHONE (OUTPATIENT)
Dept: PODIATRY | Facility: CLINIC | Age: 78
End: 2023-10-27
Payer: MEDICARE

## 2023-11-28 NOTE — TELEPHONE ENCOUNTER
Pt was on Humalog insulin, but is switching to Lyumjev. [facility] called pt and informed her that they only have a script for Humalog, therefore they are needing the new script for Lyumjev insulin.    Detail Level: Generalized Instructions: This plan will send the code FBSE to the PM system.  DO NOT or CHANGE the price. Price (Do Not Change): 0.00

## 2023-12-13 NOTE — PROGRESS NOTES
[facility] - PODIATRY    Today's Date: 12/18/23    Patient Name: Alisia Velez  MRN: [id number]  CSN: [id number]  PCP: Mike Erazo MD  Referring Provider: No ref. provider found    SUBJECTIVE     Chief Complaint   Patient presents with    Follow-up     Mike Erazo MD -01/10/2023 6 MTH FU DIABETIC- pt states feet doing good- pt denies pain     Diabetes     89mg/dl BG this am      HPI: Alisia Velez, a 78 y.o.female, comes to clinic as a(n) established patient complaining of long, irregular toenails of both feet . Patient has h/o Arthritis, back pain, skin cancer, hyperlipidemia, hypertension, GERD, melanoma, CVA, diabetes mellitus with neuropathy . Patient is IDDM with last stated BG level of 89mg/dl.  No acute issues with feet today. Notes nails are long, thick, and discolored. Patient has attempted to care for her own nails recently due to length of time since last visit, but was unable to care for them.  Due to neuropathy and back issues she is unable to reach feet to care for them herself.  Patient reports she will have another back surgery in January. Denies pain at the present time. Relates previous treatment(s) including Nail care by podiatry . Denies any constitutional symptoms. No other pedal complaints at this time.    Past Medical History:   Diagnosis Date    Anesthesia complication     severe nausea and vomitig    Arthritis     Back pain     Basal cell carcinoma     Blister of great toe of left foot     Nonthermal, initial encounter    Bunion     Cancer     skin     Dyslipidemia     Elevated blood pressure     Essential hypertension 09/13/2016    Nimesh blanchard     GERD (gastroesophageal reflux disease)     History of cerebrovascular accident     1999    Hyperlipidemia     Hypo-osmolality and hyponatremia     Ingrowing nail     Melanoma     Neuropathy in diabetes     Some    Onychomycosis     Plantar fasciitis     PONV (postoperative nausea and vomiting)     Sleep  apnea     Snores     Stroke     TIA (transient ischemic attack) 1999    After stroke    Type 2 diabetes mellitus     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    Urinary incontinence     Vitamin D deficiency      Past Surgical History:   Procedure Laterality Date    ANTERIOR CERVICAL DISCECTOMY W/ FUSION N/A 04/19/2019    Procedure: ANTERIOR CERVICAL DISCECTOMY FUSION C5-7;  Surgeon: MIGDALIA Horton MD;  Location:  [facility] OR;  Service: Orthopedic Spine    ARM LESION/CYST EXCISION Right 08/17/2022    Procedure: RIGHT WRIST MARGINAL MASS EXCISION;  Surgeon: Magen Pardo MD;  Location:  [facility] OR;  Service: Orthopedics;  Laterality: Right;    BACK SURGERY      BLEPHAROPLASTY      CARDIAC CATHETERIZATION      CARPAL TUNNEL RELEASE      CERVICAL SPINE SURGERY      cydney-laminotomy c7-t1    COLONOSCOPY      ENDOSCOPY      EYE SURGERY Bilateral     cataracts     FOOT SURGERY      Toe nails removed    LAPAROSCOPIC CHOLECYSTECTOMY      LUMBAR DISC SURGERY      LUMBAR FUSION Left 03/13/2017    Procedure: LEFT LUMBAR LATERAL INTERBODY FUSION L 3-5 WITH INSTRUMENTATION;  Surgeon: MIGDALIA Horton MD;  Location:  [facility] OR;  Service:     LUMBAR LAMINECTOMY WITH FUSION Right 03/15/2017    Procedure: RIGHT L3-S1 POSSIBLE RIGHT L 3-4 HEMILAMINECTOMY FACETECTOMY DECOMPRESSION TRANSFORAMINAL LUMBAR INTERBODY FUSION L4-S1 POSTERIOR SPINAL FUSION WITH INSTRUMENTATION L3-S1;  Surgeon: MIGDALIA Horton MD;  Location:  [facility] OR;  Service:     MYRINGOTOMY W/ TUBES      NAIL BED REMOVAL/REVISION  03/11/2016    NECK SURGERY      NISSEN FUNDOPLICATION LAPAROSCOPIC      OTHER SURGICAL HISTORY      had left and right big toenials removed mar 2016    TOENAIL EXCISION      Both big toes    WRIST SURGERY Right     ganglian cyst removal     Family History   Problem Relation Age of Onset    Cancer Other     Diabetes Other     Heart disease Other     Hypertension Other     Cancer Mother     Heart disease Father     Diabetes  Brother     Heart disease Brother      Social History     Socioeconomic History    Marital status:    Tobacco Use    Smoking status: Never    Smokeless tobacco: Never   Vaping Use    Vaping Use: Never used   Substance and Sexual Activity    Alcohol use: Never    Drug use: Never    Sexual activity: Not Currently     Partners: Male     Birth control/protection: Post-menopausal, Vasectomy     Comment: Used pill many years ago     Allergies   Allergen Reactions    Erythromycin Other (See Comments) and Shortness Of Breath     Eyes see big black spots    Humulin N [Insulin Isophane] Shortness Of Breath    Mold Extract [Trichophyton] Shortness Of Breath    Sudafed [Pseudoephedrine] Shortness Of Breath    Sulfa Antibiotics Shortness Of Breath    Other Itching     Silk Tape    Adhesive Tape Itching    Insulin Unknown - Low Severity    Molds & Smuts Itching     Current Outpatient Medications   Medication Sig Dispense Refill    acetaminophen (TYLENOL) 650 MG 8 hr tablet Take 1 tablet by mouth Every 8 (Eight) Hours As Needed for Mild Pain.      aspirin 81 MG EC tablet Take 1 tablet by mouth Daily.      atorvastatin (LIPITOR) 20 MG tablet Take 1 tablet by mouth Daily.      cetirizine (ZyrTEC) 10 MG tablet Take 1 tablet by mouth Daily.      Cholecalciferol (Vitamin D-3) 25 MCG (1000 UT) capsule Take  by mouth. Takes 3 daily      Coenzyme Q10 (COQ10) 200 MG capsule Take 1 capsule by mouth Daily.      famotidine (PEPCID) 40 MG tablet 1 tablet Daily.      fluticasone (FLONASE) 50 MCG/ACT nasal spray 2 sprays into the nostril(s) as directed by provider Every Night.      Glucagon (Baqsimi One Pack) 3 MG/DOSE powder 1 each into the nostril(s) as directed by provider As Needed (Hypoglycemia). Apply intranasal if hypoglycemia 2 each 11    guaiFENesin (MUCINEX) 600 MG 12 hr tablet Take 2 tablets by mouth 2 (Two) Times a Day.      Insulin Glargine, 2 Unit Dial, (Toujeo Max SoloStar) 300 UNIT/ML solution pen-injector injection INJECT  SUBCUTANEOUSLY 80  UNITS DAILY 24 mL 3    Insulin Lispro-aabc 200 UNIT/ML solution pen-injector Inject 60 Units under the skin into the appropriate area as directed 3 (Three) Times a Day With Meals. Sliding scale 27 mL 3    Insulin Pen Needle 32G X 6 MM misc 4 x daily 360 each 3    metoprolol succinate XL (TOPROL-XL) 25 MG 24 hr tablet Take 1 tablet by mouth 2 (Two) Times a Day.      OMEGA-3 FATTY ACIDS-VITAMIN E PO Take 1 capsule by mouth Daily. omega-3 fatty acids-vitamin E 1,000 mg-5 unit capsule. Hold for surgery 4-12-19      Psyllium (Fiber) 28.3 % powder Take  by mouth Daily.       No current facility-administered medications for this visit.     Review of Systems   Constitutional:  Negative for chills and fever.   HENT:  Negative for congestion.    Respiratory:  Negative for shortness of breath.    Cardiovascular:  Negative for chest pain and leg swelling.   Gastrointestinal:  Negative for constipation, diarrhea, nausea and vomiting.   Musculoskeletal:  Positive for arthralgias and back pain. Negative for myalgias.   Skin:  Negative for wound.   Neurological:  Positive for numbness.       OBJECTIVE     Vitals:    12/18/23 0957   BP: 132/62   Pulse: 73   SpO2: 96%         PHYSICAL EXAM  GEN:   Accompanied by spouse.     Foot/Ankle Exam    GENERAL  Appearance:  elderly  Orientation:  AAOx3  Affect:  appropriate  Gait:  unimpaired  Assistance:  independent  Right shoe gear: casual shoe  Left shoe gear: casual shoe    VASCULAR     Right Foot Vascularity   Dorsalis pedis:  2+  Posterior tibial:  2+  Skin temperature:  warm  Edema grading:  None  CFT:  3  Pedal hair growth:  Present  Varicosities:  none     Left Foot Vascularity   Dorsalis pedis:  2+  Posterior tibial:  2+  Skin temperature:  warm  Edema grading:  None  CFT:  3  Pedal hair growth:  Present  Varicosities:  none     NEUROLOGIC     Right Foot Neurologic   Light touch sensation: diminished  Vibratory sensation: diminished  Hot/Cold sensation:  diminished     Left Foot Neurologic   Light touch sensation: diminished  Vibratory sensation: diminished  Hot/Cold sensation:  diminished    MUSCULOSKELETAL     Right Foot Musculoskeletal   Tenderness:  none    Arch:  Pes planus (Mild)  Hallux valgus: Yes    Hallux limitus: No       Left Foot Musculoskeletal   Tenderness:  none  Arch:  Pes planus (Mild)  Hallux valgus: Yes    Hallux limitus: No      MUSCLE STRENGTH     Right Foot Muscle Strength   Foot dorsiflexion:  5  Foot plantar flexion:  5  Foot inversion:  5  Foot eversion:  5     Left Foot Muscle Strength   Foot dorsiflexion:  5  Foot plantar flexion:  5  Foot inversion:  5  Foot eversion:  5    RANGE OF MOTION     Right Foot Range of Motion   Foot and ankle ROM within normal limits       Left Foot Range of Motion   Foot and ankle ROM within normal limits      DERMATOLOGIC      Right Foot Dermatologic   Skin  Right foot skin is intact. (stucco keratoses)  Nails  1.  Absent.  2.  Positive for elongated and abnormal thickness.  3.  Positive for elongated and abnormal thickness.  4.  Positive for elongated and abnormal thickness.  5.  Positive for elongated and abnormal thickness.  Nails comment:  2-5     Left Foot Dermatologic   Skin  Left foot skin is intact. (stucco keratoses)   Nails comment:  2-5  Nails  1.  Absent.  2.  Positive for elongated and abnormal thickness.  3.  Positive for elongated and abnormal thickness.  4.  Positive for elongated and abnormally thick.  5.  Positive for elongated and abnormally thick.      RADIOLOGY/NUCLEAR:  No results found.    LABORATORY/CULTURE RESULTS:      PATHOLOGY RESULTS:       ASSESSMENT/PLAN     Diagnoses and all orders for this visit:    1. Onychomycosis (Primary)    2. Type 2 diabetes mellitus with diabetic polyneuropathy, with long-term current use of insulin    3. Valgus deformity of both great toes    4. Pes planus of both feet        Comprehensive lower extremity examination and evaluation was  performed.  Discussed findings and treatment plan including risks, benefits, and treatment options with patient in detail. Patient agreed with treatment plan.  After verbal consent obtained, nail(s) x8 debrided of length and thickness with nail nipper without incidence  Patient may maintain nails and calluses at home utilizing emery board or pumice stone between visits as needed  Reviewed at home diabetic foot care including daily foot checks.  Continue diabetic monitoring and control under direction of PCP.  Remain conservative with foot deformities.  An After Visit Summary was printed and given to the patient at discharge, including (if requested) any available informative/educational handouts regarding diagnosis, treatment, or medications. All questions were answered to patient/family satisfaction. Should symptoms fail to improve or worsen they agree to call or return to clinic or to go to the Emergency Department. Discussed the importance of following up with any needed screening tests/labs/specialist appointments and any requested follow-up recommended by me today. Importance of maintaining follow-up discussed and patient accepts that missed appointments can delay diagnosis and potentially lead to worsening of conditions.  Return in about 3 months (around 3/18/2024) for Schedule [facility], Follow-up with Podiatry APRN., or sooner if acute issues arise.    Lab Frequency Next Occurrence       This document has been electronically signed by VINNY Davila on December 18, 2023 10:30 CST

## 2023-12-15 ENCOUNTER — TELEPHONE (OUTPATIENT)
Dept: PODIATRY | Facility: CLINIC | Age: 78
End: 2023-12-15
Payer: MEDICARE

## 2023-12-18 ENCOUNTER — OFFICE VISIT (OUTPATIENT)
Dept: PODIATRY | Facility: CLINIC | Age: 78
End: 2023-12-18
Payer: MEDICARE

## 2023-12-18 VITALS
WEIGHT: 162 LBS | OXYGEN SATURATION: 96 % | SYSTOLIC BLOOD PRESSURE: 132 MMHG | BODY MASS INDEX: 28.7 KG/M2 | DIASTOLIC BLOOD PRESSURE: 62 MMHG | HEART RATE: 73 BPM | HEIGHT: 63 IN

## 2023-12-18 DIAGNOSIS — E11.42 TYPE 2 DIABETES MELLITUS WITH DIABETIC POLYNEUROPATHY, WITH LONG-TERM CURRENT USE OF INSULIN: ICD-10-CM

## 2023-12-18 DIAGNOSIS — M21.41 PES PLANUS OF BOTH FEET: ICD-10-CM

## 2023-12-18 DIAGNOSIS — M20.11 VALGUS DEFORMITY OF BOTH GREAT TOES: ICD-10-CM

## 2023-12-18 DIAGNOSIS — B35.1 ONYCHOMYCOSIS: Primary | ICD-10-CM

## 2023-12-18 DIAGNOSIS — M20.12 VALGUS DEFORMITY OF BOTH GREAT TOES: ICD-10-CM

## 2023-12-18 DIAGNOSIS — Z79.4 TYPE 2 DIABETES MELLITUS WITH DIABETIC POLYNEUROPATHY, WITH LONG-TERM CURRENT USE OF INSULIN: ICD-10-CM

## 2023-12-18 DIAGNOSIS — M21.42 PES PLANUS OF BOTH FEET: ICD-10-CM

## 2023-12-20 ENCOUNTER — TRANSCRIBE ORDERS (OUTPATIENT)
Dept: ADMINISTRATIVE | Facility: HOSPITAL | Age: 78
End: 2023-12-20
Payer: MEDICARE

## 2023-12-20 DIAGNOSIS — M51.26 OTHER INTERVERTEBRAL DISC DISPLACEMENT, LUMBAR REGION: Primary | ICD-10-CM

## 2024-01-10 ENCOUNTER — OFFICE VISIT (OUTPATIENT)
Dept: OBGYN CLINIC | Age: 79
End: 2024-01-10
Payer: MEDICARE

## 2024-01-10 VITALS
SYSTOLIC BLOOD PRESSURE: 147 MMHG | BODY MASS INDEX: 28.7 KG/M2 | WEIGHT: 162 LBS | HEART RATE: 73 BPM | HEIGHT: 63 IN | DIASTOLIC BLOOD PRESSURE: 67 MMHG

## 2024-01-10 DIAGNOSIS — Z01.419 WELL WOMAN EXAM: Primary | ICD-10-CM

## 2024-01-10 DIAGNOSIS — N95.0 POST-MENOPAUSAL BLEEDING: ICD-10-CM

## 2024-01-10 DIAGNOSIS — Z12.31 ENCOUNTER FOR SCREENING MAMMOGRAM FOR MALIGNANT NEOPLASM OF BREAST: ICD-10-CM

## 2024-01-10 DIAGNOSIS — Z78.0 POST-MENOPAUSAL: ICD-10-CM

## 2024-01-10 PROCEDURE — G8427 DOCREV CUR MEDS BY ELIG CLIN: HCPCS | Performed by: ADVANCED PRACTICE MIDWIFE

## 2024-01-10 PROCEDURE — G8419 CALC BMI OUT NRM PARAM NOF/U: HCPCS | Performed by: ADVANCED PRACTICE MIDWIFE

## 2024-01-10 PROCEDURE — G0101 CA SCREEN;PELVIC/BREAST EXAM: HCPCS | Performed by: ADVANCED PRACTICE MIDWIFE

## 2024-01-10 RX ORDER — INSULIN LISPRO-AABC 200 [IU]/ML
INJECTION, SOLUTION SUBCUTANEOUS
COMMUNITY
Start: 2023-10-30

## 2024-01-10 RX ORDER — INSULIN GLARGINE 300 U/ML
INJECTION, SOLUTION SUBCUTANEOUS
COMMUNITY
Start: 2024-01-04

## 2024-01-10 NOTE — PROGRESS NOTES
Antibiotics Shortness Of Breath    Trichophyton Shortness Of Breath    Other Itching     Silk Tape    Adhesive Tape Itching    Insulin Other (See Comments)    Wound Dressing Adhesive Hives     Vitals:    01/10/24 0938   BP: (!) 147/67   Site: Right Upper Arm   Position: Sitting   Cuff Size: Medium Adult   Pulse: 73   Weight: 73.5 kg (162 lb)   Height: 1.6 m (5' 3\")     Body mass index is 28.7 kg/m².    A comprehensive review of systems was negative except for what was noted in the HPI.     Physical Exam  Constitutional:       Appearance: Normal appearance.   Genitourinary:      Bladder, rectum and urethral meatus normal.      No lesions in the vagina.      Genitourinary Comments: Cystocele grade 2      Right Labia: No lesions or skin changes.     Left Labia: No lesions or skin changes.     No vaginal erythema or tenderness.      No vaginal prolapse present.     Mild vaginal atrophy present.       Right Adnexa: not tender and no mass present.     Left Adnexa: not tender and no mass present.     No cervical motion tenderness, discharge, friability or lesion.      Uterus is not enlarged or tender.      Pelvic floor neuro is intact.     Pelvic exam was performed with patient in the lithotomy position.   Breasts:     Right: Normal. No swelling, bleeding, mass, nipple discharge, skin change or tenderness.      Left: Normal. No swelling, bleeding, mass, nipple discharge, skin change or tenderness.   HENT:      Head: Normocephalic and atraumatic.      Nose: Nose normal.   Eyes:      Conjunctiva/sclera: Conjunctivae normal.      Pupils: Pupils are equal, round, and reactive to light.   Cardiovascular:      Rate and Rhythm: Normal rate and regular rhythm.      Heart sounds: Normal heart sounds.   Pulmonary:      Effort: Pulmonary effort is normal.      Breath sounds: Normal breath sounds.   Abdominal:      General: Abdomen is flat.      Palpations: Abdomen is soft.   Musculoskeletal:         General: Normal range of motion.

## 2024-01-11 ENCOUNTER — HOSPITAL ENCOUNTER (OUTPATIENT)
Dept: CT IMAGING | Facility: HOSPITAL | Age: 79
Discharge: HOME | End: 2024-01-11
Payer: MEDICARE

## 2024-01-11 ENCOUNTER — HOSPITAL ENCOUNTER (OUTPATIENT)
Dept: MRI IMAGING | Facility: HOSPITAL | Age: 79
Discharge: HOME | End: 2024-01-11
Payer: MEDICARE

## 2024-01-11 DIAGNOSIS — M51.26 OTHER INTERVERTEBRAL DISC DISPLACEMENT, LUMBAR REGION: ICD-10-CM

## 2024-01-11 PROCEDURE — 72131 CT LUMBAR SPINE W/O DYE: CPT

## 2024-01-11 PROCEDURE — 72148 MRI LUMBAR SPINE W/O DYE: CPT

## 2024-01-17 DIAGNOSIS — Z12.31 ENCOUNTER FOR SCREENING MAMMOGRAM FOR MALIGNANT NEOPLASM OF BREAST: ICD-10-CM

## 2024-01-17 DIAGNOSIS — N95.0 POST-MENOPAUSAL BLEEDING: ICD-10-CM

## 2024-01-18 ENCOUNTER — TELEPHONE (OUTPATIENT)
Dept: OBGYN CLINIC | Age: 79
End: 2024-01-18

## 2024-01-18 NOTE — TELEPHONE ENCOUNTER
Left message for patient to return call regarding results.     lining inside is thickened and need an endometrial biopsy     Congratulations on your negative mammogram screening. Your next clinical breast exam will be due in 1 year along with a routine mammogram. If you have any questions or concerns, please contact the office. Ynaet Ruano

## 2024-02-26 ENCOUNTER — PROCEDURE VISIT (OUTPATIENT)
Dept: OBGYN CLINIC | Age: 79
End: 2024-02-26
Payer: MEDICARE

## 2024-02-26 VITALS — BODY MASS INDEX: 29.06 KG/M2 | HEIGHT: 63 IN | WEIGHT: 164 LBS

## 2024-02-26 DIAGNOSIS — R93.89 THICKENED ENDOMETRIUM: ICD-10-CM

## 2024-02-26 DIAGNOSIS — N95.0 POST-MENOPAUSAL BLEEDING: Primary | ICD-10-CM

## 2024-02-26 PROCEDURE — 58100 BIOPSY OF UTERUS LINING: CPT | Performed by: ADVANCED PRACTICE MIDWIFE

## 2024-02-26 PROCEDURE — 81025 URINE PREGNANCY TEST: CPT | Performed by: ADVANCED PRACTICE MIDWIFE

## 2024-02-26 NOTE — PATIENT INSTRUCTIONS
home?  Practice healthy eating habits. This includes eating plenty of fruits, vegetables, whole grains, lean protein, and low-fat dairy.  If your doctor recommends it, get more exercise. Walking is a good choice. Bit by bit, increase the amount you walk every day. Try for at least 30 minutes on most days of the week.  Do not smoke. Smoking can increase your risk for health problems. If you need help quitting, talk to your doctor about stop-smoking programs and medicines. These can increase your chances of quitting for good.  Limit alcohol to 2 drinks a day for men and 1 drink a day for women. Too much alcohol can cause health problems.  If you have a BMI higher than 25  Your doctor may do other tests to check your risk for weight-related health problems. This may include measuring the distance around your waist. A waist measurement of more than 40 inches in men or 35 inches in women can increase the risk of weight-related health problems.  Talk with your doctor about steps you can take to stay healthy or improve your health. You may need to make lifestyle changes to lose weight and stay healthy, such as changing your diet and getting regular exercise.  If you have a BMI lower than 18.5  Your doctor may do other tests to check your risk for health problems.  Talk with your doctor about steps you can take to stay healthy or improve your health. You may need to make lifestyle changes to gain or maintain weight and stay healthy, such as getting more healthy foods in your diet and doing exercises to build muscle.  Where can you learn more?  Go to https://www.healthHitlab.net/patientEd and enter S176 to learn more about \"Body Mass Index: Care Instructions.\"  Current as of: May 14, 2023               Content Version: 13.9  © 0367-2693 [employer].   Care instructions adapted under license by [employer]. If you have questions about a medical condition or this instruction, always ask your healthcare

## 2024-02-26 NOTE — PROGRESS NOTES
Naomi Alvarez is a 78 y.o.  with history of PMB who presents today for endometrial biopsy.    Ht 1.6 m (5' 3\")   Wt 74.4 kg (164 lb)   BMI 29.05 kg/m²     Endometrial Biopsy Procedure Note    Pre-operative Diagnosis: PMB    Post-operative Diagnosis: same    Indications: postmenopausal bleeding, endometrium 17mm    Procedure Details    Urine pregnancy test was done today and result was neg.  The risks (including infection, bleeding, pain, and uterine perforation) and benefits of the procedure were explained to the patient and Written informed consent was obtained.      The patient was placed in the dorsal lithotomy position.  Speculum inserted in the vagina, and the cervix prepped with povidone iodine. Single tooth tenaculum applied to anterior cervical lip. Pipelle endometrial aspirator was inserted and gentle pressure applied. Adequate tissue sample obtained. Sent for pathology. Pt tolerated well.       Condition:  Stable    Complications:  None    Plan:    The patient was advised to call for any fever or for prolonged or severe pain or bleeding. She was advised to use OTC ibuprofen as needed for mild to moderate pain. She was advised to avoid vaginal intercourse for 48 hours or until the bleeding has completely stopped.

## 2024-03-01 PROBLEM — N94.6 DYSMENORRHEA: Status: RESOLVED | Noted: 2024-03-01 | Resolved: 2024-03-01

## 2024-03-01 PROBLEM — N94.6 DYSMENORRHEA: Status: ACTIVE | Noted: 2024-03-01

## 2024-03-04 ENCOUNTER — OFFICE VISIT (OUTPATIENT)
Dept: OBGYN CLINIC | Age: 79
End: 2024-03-04
Payer: MEDICARE

## 2024-03-04 VITALS
HEART RATE: 85 BPM | SYSTOLIC BLOOD PRESSURE: 139 MMHG | BODY MASS INDEX: 28.53 KG/M2 | WEIGHT: 161 LBS | HEIGHT: 63 IN | DIASTOLIC BLOOD PRESSURE: 76 MMHG

## 2024-03-04 DIAGNOSIS — N95.0 PMB (POSTMENOPAUSAL BLEEDING): Primary | ICD-10-CM

## 2024-03-04 PROCEDURE — 1123F ACP DISCUSS/DSCN MKR DOCD: CPT | Performed by: OBSTETRICS & GYNECOLOGY

## 2024-03-04 PROCEDURE — 3075F SYST BP GE 130 - 139MM HG: CPT | Performed by: OBSTETRICS & GYNECOLOGY

## 2024-03-04 PROCEDURE — 3078F DIAST BP <80 MM HG: CPT | Performed by: OBSTETRICS & GYNECOLOGY

## 2024-03-04 PROCEDURE — 99212 OFFICE O/P EST SF 10 MIN: CPT | Performed by: OBSTETRICS & GYNECOLOGY

## 2024-03-04 ASSESSMENT — ENCOUNTER SYMPTOMS
RESPIRATORY NEGATIVE: 1
GASTROINTESTINAL NEGATIVE: 1

## 2024-03-04 NOTE — PROGRESS NOTES
breath sounds.   Abdominal:      General: Bowel sounds are normal.      Palpations: Abdomen is soft.   Musculoskeletal:         General: Normal range of motion.      Cervical back: Neck supple.   Neurological:      Mental Status: She is alert.         Microscopic wet-mount exam not performed.         Diagnosis Orders   1. PMB (postmenopausal bleeding)            PLAN:  EMBx reviewed with pt as well as expectant and surgical management. Pt is scheduled for D&C on 3/21 @ 0800.  Surgical procedure reviewed with pt and her .

## 2024-03-11 NOTE — PROGRESS NOTES
[facility] - PODIATRY    Today's Date: 03/18/24    Patient Name: Alisia Velez  MRN: [id number]  CSN: [id number]  PCP: Mike Erazo MD  Referring Provider: No ref. provider found    SUBJECTIVE     Chief Complaint   Patient presents with    Follow-up     Mike Erazo MD -  3 MTH FU DIABETIC-pt states she is here today for diabetic nail/foot care-pt denies pain    Diabetes     122 mg/dl BG     HPI: Alisia Velez, a 78 y.o.female, comes to clinic as a(n) established patient complaining of long, irregular toenails of both feet . Patient has h/o Arthritis, back pain, skin cancer, hyperlipidemia, hypertension, GERD, melanoma, CVA, diabetes mellitus with neuropathy . Patient is IDDM with last stated BG level of 122 mg/dl.  No acute issues with feet today. Notes nails are long, thick, and discolored.  Due to neuropathy and back issues she is unable to reach feet to care for them herself.  Patient reports she will may need another back surgery soon. She also reports she may move to [place] later this year to be closer to family. Denies pain at the present time. Relates previous treatment(s) including Nail care by podiatry . Denies any constitutional symptoms. No other pedal complaints at this time.    Past Medical History:   Diagnosis Date    Anesthesia complication     severe nausea and vomitig    Arthritis     Back pain     Basal cell carcinoma     Blister of great toe of left foot     Nonthermal, initial encounter    Bunion     Cancer     skin     Dyslipidemia     Elevated blood pressure     Essential hypertension 09/13/2016    Nimesh blanchard     GERD (gastroesophageal reflux disease)     History of cerebrovascular accident     1999    Hyperlipidemia     Hypo-osmolality and hyponatremia     Ingrowing nail     Melanoma     Neuropathy in diabetes     Some    Onychomycosis     Plantar fasciitis     PONV (postoperative nausea and vomiting)     Sleep apnea     Snores     Stroke     TIA  (transient ischemic attack) 1999    After stroke    Type 2 diabetes mellitus     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    Urinary incontinence     Vitamin D deficiency      Past Surgical History:   Procedure Laterality Date    ANTERIOR CERVICAL DISCECTOMY W/ FUSION N/A 04/19/2019    Procedure: ANTERIOR CERVICAL DISCECTOMY FUSION C5-7;  Surgeon: MIGDALIA Horton MD;  Location:  [facility] OR;  Service: Orthopedic Spine    ARM LESION/CYST EXCISION Right 08/17/2022    Procedure: RIGHT WRIST MARGINAL MASS EXCISION;  Surgeon: Magen Pardo MD;  Location:  [facility] OR;  Service: Orthopedics;  Laterality: Right;    BACK SURGERY      BLEPHAROPLASTY      CARDIAC CATHETERIZATION      CARPAL TUNNEL RELEASE      CERVICAL SPINE SURGERY      cydney-laminotomy c7-t1    COLONOSCOPY      ENDOSCOPY      EYE SURGERY Bilateral     cataracts     FOOT SURGERY      Toe nails removed    LAPAROSCOPIC CHOLECYSTECTOMY      LUMBAR DISC SURGERY      LUMBAR FUSION Left 03/13/2017    Procedure: LEFT LUMBAR LATERAL INTERBODY FUSION L 3-5 WITH INSTRUMENTATION;  Surgeon: MIGDALIA Horton MD;  Location:  [facility] OR;  Service:     LUMBAR LAMINECTOMY WITH FUSION Right 03/15/2017    Procedure: RIGHT L3-S1 POSSIBLE RIGHT L 3-4 HEMILAMINECTOMY FACETECTOMY DECOMPRESSION TRANSFORAMINAL LUMBAR INTERBODY FUSION L4-S1 POSTERIOR SPINAL FUSION WITH INSTRUMENTATION L3-S1;  Surgeon: MIGDALIA Horton MD;  Location:  [facility] OR;  Service:     MYRINGOTOMY W/ TUBES      NAIL BED REMOVAL/REVISION  03/11/2016    NECK SURGERY      NISSEN FUNDOPLICATION LAPAROSCOPIC      OTHER SURGICAL HISTORY      had left and right big toenials removed mar 2016    TOENAIL EXCISION      Both big toes    WRIST SURGERY Right     ganglian cyst removal     Family History   Problem Relation Age of Onset    Cancer Other     Diabetes Other     Heart disease Other     Hypertension Other     Cancer Mother     Heart disease Father     Diabetes Brother     Heart disease Brother       Social History     Socioeconomic History    Marital status:    Tobacco Use    Smoking status: Never     Passive exposure: Never    Smokeless tobacco: Never   Vaping Use    Vaping status: Never Used   Substance and Sexual Activity    Alcohol use: Never    Drug use: Never    Sexual activity: Not Currently     Partners: Male     Birth control/protection: Post-menopausal, Vasectomy     Comment: Used pill many years ago     Allergies   Allergen Reactions    Erythromycin Other (See Comments) and Shortness Of Breath     Eyes see big black spots    Humulin N [Insulin Isophane] Shortness Of Breath    Mold Extract [Trichophyton] Shortness Of Breath    Sudafed [Pseudoephedrine] Shortness Of Breath    Sulfa Antibiotics Shortness Of Breath    Other Itching     Silk Tape    Adhesive Tape Itching    Insulin Unknown - Low Severity     Just humulin N    Molds & Smuts Itching     Current Outpatient Medications   Medication Sig Dispense Refill    acetaminophen (TYLENOL) 650 MG 8 hr tablet Take 1 tablet by mouth Every 8 (Eight) Hours As Needed for Mild Pain.      aspirin 81 MG EC tablet Take 1 tablet by mouth Daily.      atorvastatin (LIPITOR) 20 MG tablet Take 1 tablet by mouth Daily.      cetirizine (ZyrTEC) 10 MG tablet Take 1 tablet by mouth Daily.      Cholecalciferol (Vitamin D-3) 25 MCG (1000 UT) capsule Take  by mouth. Takes 3 daily      Coenzyme Q10 (COQ10) 200 MG capsule Take 1 capsule by mouth Daily.      famotidine (PEPCID) 40 MG tablet 1 tablet Daily.      fluticasone (FLONASE) 50 MCG/ACT nasal spray 2 sprays into the nostril(s) as directed by provider Every Night.      Glucagon (Baqsimi One Pack) 3 MG/DOSE powder 1 each into the nostril(s) as directed by provider As Needed (Hypoglycemia). Apply intranasal if hypoglycemia 2 each 11    guaiFENesin (MUCINEX) 600 MG 12 hr tablet Take 2 tablets by mouth 2 (Two) Times a Day.      Insulin Glargine, 2 Unit Dial, (Toujeo Max SoloStar) 300 UNIT/ML solution pen-injector  injection INJECT SUBCUTANEOUSLY 80  UNITS DAILY 24 mL 3    Insulin Lispro-aabc 200 UNIT/ML solution pen-injector Inject 60 Units under the skin into the appropriate area as directed 3 (Three) Times a Day With Meals. Sliding scale 27 mL 3    Insulin Pen Needle 32G X 6 MM misc 4 x daily 360 each 3    metoprolol succinate XL (TOPROL-XL) 25 MG 24 hr tablet Take 1 tablet by mouth 2 (Two) Times a Day.      OMEGA-3 FATTY ACIDS-VITAMIN E PO Take 1 capsule by mouth Daily. omega-3 fatty acids-vitamin E 1,000 mg-5 unit capsule. Hold for surgery 4-12-19      Psyllium (Fiber) 28.3 % powder Take  by mouth Daily.       No current facility-administered medications for this visit.     Review of Systems   Constitutional:  Negative for chills and fever.   HENT:  Negative for congestion.    Respiratory:  Negative for shortness of breath.    Cardiovascular:  Negative for chest pain and leg swelling.   Gastrointestinal:  Negative for constipation, diarrhea, nausea and vomiting.   Musculoskeletal:  Positive for arthralgias and back pain. Negative for myalgias.   Skin:  Negative for wound.   Neurological:  Positive for numbness.       OBJECTIVE     Vitals:    03/18/24 0931   BP: 118/62   Pulse: 64   SpO2: 97%           PHYSICAL EXAM  GEN:   Accompanied by spouse.     Foot/Ankle Exam    GENERAL  Appearance:  elderly  Orientation:  AAOx3  Affect:  appropriate  Gait:  unimpaired  Assistance:  independent  Right shoe gear: casual shoe  Left shoe gear: casual shoe    VASCULAR     Right Foot Vascularity   Dorsalis pedis:  2+  Posterior tibial:  2+  Skin temperature:  warm  Edema grading:  None  CFT:  3  Pedal hair growth:  Present  Varicosities:  none     Left Foot Vascularity   Dorsalis pedis:  2+  Posterior tibial:  2+  Skin temperature:  warm  Edema grading:  None  CFT:  3  Pedal hair growth:  Present  Varicosities:  none     NEUROLOGIC     Right Foot Neurologic   Light touch sensation: diminished  Vibratory sensation: diminished  Hot/Cold  sensation: diminished     Left Foot Neurologic   Light touch sensation: diminished  Vibratory sensation: diminished  Hot/Cold sensation:  diminished    MUSCULOSKELETAL     Right Foot Musculoskeletal   Tenderness:  none    Arch:  Pes planus (Mild)  Hallux valgus: Yes    Hallux limitus: No       Left Foot Musculoskeletal   Tenderness:  none  Arch:  Pes planus (Mild)  Hallux valgus: Yes    Hallux limitus: No      MUSCLE STRENGTH     Right Foot Muscle Strength   Foot dorsiflexion:  5  Foot plantar flexion:  5  Foot inversion:  5  Foot eversion:  5     Left Foot Muscle Strength   Foot dorsiflexion:  5  Foot plantar flexion:  5  Foot inversion:  5  Foot eversion:  5    RANGE OF MOTION     Right Foot Range of Motion   Foot and ankle ROM within normal limits       Left Foot Range of Motion   Foot and ankle ROM within normal limits      DERMATOLOGIC      Right Foot Dermatologic   Skin  Right foot skin is intact. (stucco keratoses)  Nails  1.  Absent.  2.  Positive for elongated and abnormal thickness.  3.  Positive for elongated and abnormal thickness.  4.  Positive for elongated and abnormal thickness.  5.  Positive for elongated and abnormal thickness.  Nails comment:  2-5     Left Foot Dermatologic   Skin  Left foot skin is intact. (stucco keratoses)   Nails comment:  2-5  Nails  1.  Absent.  2.  Positive for elongated and abnormal thickness.  3.  Positive for elongated and abnormal thickness.  4.  Positive for elongated and abnormally thick.  5.  Positive for elongated and abnormally thick.      RADIOLOGY/NUCLEAR:  No results found.    LABORATORY/CULTURE RESULTS:      PATHOLOGY RESULTS:       ASSESSMENT/PLAN     Diagnoses and all orders for this visit:    1. Onychomycosis (Primary)    2. Type 2 diabetes mellitus with diabetic polyneuropathy, with long-term current use of insulin    3. Valgus deformity of both great toes    4. Pes planus of both feet    5. Encounter for diabetic foot exam          Comprehensive lower  extremity examination and evaluation was performed.  Discussed findings and treatment plan including risks, benefits, and treatment options with patient in detail. Patient agreed with treatment plan.  Diabetic foot exam performed.   After verbal consent obtained, nail(s) x8 debrided of length and thickness with nail nipper without incidence  Patient may maintain nails and calluses at home utilizing emery board or pumice stone between visits as needed  Reviewed at home diabetic foot care including daily foot checks.  Continue diabetic monitoring and control under direction of PCP.  Remain conservative with foot deformities.  An After Visit Summary was printed and given to the patient at discharge, including (if requested) any available informative/educational handouts regarding diagnosis, treatment, or medications. All questions were answered to patient/family satisfaction. Should symptoms fail to improve or worsen they agree to call or return to clinic or to go to the Emergency Department. Discussed the importance of following up with any needed screening tests/labs/specialist appointments and any requested follow-up recommended by me today. Importance of maintaining follow-up discussed and patient accepts that missed appointments can delay diagnosis and potentially lead to worsening of conditions.  Return in about 3 months (around 6/18/2024) for Follow-up with Podiatry Provider, Schedule [facility]., or sooner if acute issues arise.    Lab Frequency Next Occurrence       This document has been electronically signed by VINNY Davila on March 18, 2024 09:38 CDT

## 2024-03-15 ENCOUNTER — TELEPHONE (OUTPATIENT)
Dept: PODIATRY | Facility: CLINIC | Age: 79
End: 2024-03-15
Payer: MEDICARE

## 2024-03-18 ENCOUNTER — HOSPITAL ENCOUNTER (OUTPATIENT)
Dept: PREADMISSION TESTING | Age: 79
Discharge: HOME | End: 2024-03-22
Payer: MEDICARE

## 2024-03-18 ENCOUNTER — OFFICE VISIT (OUTPATIENT)
Dept: PODIATRY | Facility: CLINIC | Age: 79
End: 2024-03-18
Payer: MEDICARE

## 2024-03-18 VITALS
SYSTOLIC BLOOD PRESSURE: 118 MMHG | WEIGHT: 160 LBS | OXYGEN SATURATION: 97 % | HEIGHT: 63 IN | DIASTOLIC BLOOD PRESSURE: 62 MMHG | BODY MASS INDEX: 28.35 KG/M2 | HEART RATE: 64 BPM

## 2024-03-18 VITALS — WEIGHT: 164 LBS | BODY MASS INDEX: 29.05 KG/M2

## 2024-03-18 DIAGNOSIS — B35.1 ONYCHOMYCOSIS: Primary | ICD-10-CM

## 2024-03-18 DIAGNOSIS — M20.11 VALGUS DEFORMITY OF BOTH GREAT TOES: ICD-10-CM

## 2024-03-18 DIAGNOSIS — M20.12 VALGUS DEFORMITY OF BOTH GREAT TOES: ICD-10-CM

## 2024-03-18 DIAGNOSIS — M21.41 PES PLANUS OF BOTH FEET: ICD-10-CM

## 2024-03-18 DIAGNOSIS — E11.9 ENCOUNTER FOR DIABETIC FOOT EXAM: ICD-10-CM

## 2024-03-18 DIAGNOSIS — E11.42 TYPE 2 DIABETES MELLITUS WITH DIABETIC POLYNEUROPATHY, WITH LONG-TERM CURRENT USE OF INSULIN: ICD-10-CM

## 2024-03-18 DIAGNOSIS — Z79.4 TYPE 2 DIABETES MELLITUS WITH DIABETIC POLYNEUROPATHY, WITH LONG-TERM CURRENT USE OF INSULIN: ICD-10-CM

## 2024-03-18 DIAGNOSIS — M21.42 PES PLANUS OF BOTH FEET: ICD-10-CM

## 2024-03-18 LAB
ANION GAP SERPL CALCULATED.3IONS-SCNC: 15 MMOL/L (ref 7–19)
BASOPHILS # BLD: 0 K/UL (ref 0–0.2)
BASOPHILS NFR BLD: 0.5 % (ref 0–1)
BUN SERPL-MCNC: 14 MG/DL (ref 8–23)
CALCIUM SERPL-MCNC: 9.8 MG/DL (ref 8.8–10.2)
CHLORIDE SERPL-SCNC: 103 MMOL/L (ref 98–111)
CO2 SERPL-SCNC: 23 MMOL/L (ref 22–29)
CREAT SERPL-MCNC: 0.8 MG/DL (ref 0.5–0.9)
EKG P AXIS: 34 DEGREES
EKG P-R INTERVAL: 206 MS
EKG Q-T INTERVAL: 418 MS
EKG QRS DURATION: 82 MS
EKG QTC CALCULATION (BAZETT): 417 MS
EKG T AXIS: 34 DEGREES
EOSINOPHIL # BLD: 0.1 K/UL (ref 0–0.6)
EOSINOPHIL NFR BLD: 1.7 % (ref 0–5)
ERYTHROCYTE [DISTWIDTH] IN BLOOD BY AUTOMATED COUNT: 13.4 % (ref 11.5–14.5)
GLUCOSE SERPL-MCNC: 291 MG/DL (ref 74–109)
HCT VFR BLD AUTO: 40.9 % (ref 37–47)
HGB BLD-MCNC: 13.5 G/DL (ref 12–16)
IMM GRANULOCYTES # BLD: 0 K/UL
LYMPHOCYTES # BLD: 3 K/UL (ref 1.1–4.5)
LYMPHOCYTES NFR BLD: 36.1 % (ref 20–40)
MCH RBC QN AUTO: 32.6 PG (ref 27–31)
MCHC RBC AUTO-ENTMCNC: 33 G/DL (ref 33–37)
MCV RBC AUTO: 98.8 FL (ref 81–99)
MONOCYTES # BLD: 0.7 K/UL (ref 0–0.9)
MONOCYTES NFR BLD: 8.1 % (ref 0–10)
NEUTROPHILS # BLD: 4.5 K/UL (ref 1.5–7.5)
NEUTS SEG NFR BLD: 53.5 % (ref 50–65)
PLATELET # BLD AUTO: 235 K/UL (ref 130–400)
PMV BLD AUTO: 10.9 FL (ref 9.4–12.3)
POTASSIUM SERPL-SCNC: 4.6 MMOL/L (ref 3.5–5)
RBC # BLD AUTO: 4.14 M/UL (ref 4.2–5.4)
SODIUM SERPL-SCNC: 141 MMOL/L (ref 136–145)
WBC # BLD AUTO: 8.4 K/UL (ref 4.8–10.8)

## 2024-03-18 PROCEDURE — 1160F RVW MEDS BY RX/DR IN RCRD: CPT | Performed by: NURSE PRACTITIONER

## 2024-03-18 PROCEDURE — 3074F SYST BP LT 130 MM HG: CPT | Performed by: NURSE PRACTITIONER

## 2024-03-18 PROCEDURE — 93005 ELECTROCARDIOGRAM TRACING: CPT | Performed by: OBSTETRICS & GYNECOLOGY

## 2024-03-18 PROCEDURE — 80048 BASIC METABOLIC PNL TOTAL CA: CPT

## 2024-03-18 PROCEDURE — 1159F MED LIST DOCD IN RCRD: CPT | Performed by: NURSE PRACTITIONER

## 2024-03-18 PROCEDURE — 3078F DIAST BP <80 MM HG: CPT | Performed by: NURSE PRACTITIONER

## 2024-03-18 PROCEDURE — 93010 ELECTROCARDIOGRAM REPORT: CPT | Performed by: INTERNAL MEDICINE

## 2024-03-18 PROCEDURE — 11721 DEBRIDE NAIL 6 OR MORE: CPT | Performed by: NURSE PRACTITIONER

## 2024-03-18 PROCEDURE — 99213 OFFICE O/P EST LOW 20 MIN: CPT | Performed by: NURSE PRACTITIONER

## 2024-03-18 PROCEDURE — 85025 COMPLETE CBC W/AUTO DIFF WBC: CPT

## 2024-03-18 RX ORDER — GUAIFENESIN 400 MG/1
400 TABLET ORAL NIGHTLY
COMMUNITY

## 2024-03-18 RX ORDER — METOPROLOL SUCCINATE 25 MG/1
25 TABLET, EXTENDED RELEASE ORAL 2 TIMES DAILY
COMMUNITY

## 2024-03-18 NOTE — DISCHARGE INSTRUCTIONS
PREOPERATIVE GUIDELINES WHEN RECEIVING ANESTHESIA    Do not eat or drink anything after midnight, the night before your surgery. No gum or candy the morning of surgery.  This is extremely important for your safety.    Take a bath (or shower) the night before your surgery and you may brush your teeth the morning of your surgery.    You will be scheduled to arrive at the hospital 2 hours before your surgery, or follow your surgeon's instructions.    Dress comfortably.  Wear loose clothing that will be easy to remove and comfortable for your trip home.    You may wear eyeglasses or contacts but bring your cases with you as they must be remove before your surgery.    Hearing aids and dentures will need to be removed before your surgery.    Do not wear any jewelry, including body jewelry.  All jewelry will need to be removed prior to your surgery.    Do not wear fingernail polish or make-up.    It is best not to bring any valuables with you.    If you are to stay in the hospital overnight, bring your robe, slippers and personal toiletries that you may need.      POSTOPERATIVE GUIDELINES AFTER RECEIVING ANESTHESIA    If you are to go home after your surgery, you will need a responsible adult to drive you home.     You will not be able to take public transportation after your discharge from the Operative Care Unit unless you are accompanied by a        responsible adult.    On returning home, be sure to follow your physician's orders regarding diet, activity and medications.    Remember, surgery with general anesthesia or sedation may leave you sleepy, very tired and with a decreased appetite for 12 to 24 hours.    If you develop any post-surgical complications or problems, call your surgeon or [facility] Emergency Department ([phone]).      The day before surgery you will receive a phone call from the surgery nurse to let you know what time to arrive on the day of surgery. This call will usually be between 2-4 PM. If

## 2024-03-21 ENCOUNTER — HOSPITAL ENCOUNTER (OUTPATIENT)
Age: 79
Setting detail: OUTPATIENT SURGERY
Discharge: HOME | End: 2024-03-21
Attending: OBSTETRICS & GYNECOLOGY | Admitting: OBSTETRICS & GYNECOLOGY
Payer: MEDICARE

## 2024-03-21 ENCOUNTER — ANESTHESIA EVENT (OUTPATIENT)
Dept: OPERATING ROOM | Age: 79
End: 2024-03-21
Payer: MEDICARE

## 2024-03-21 ENCOUNTER — ANESTHESIA (OUTPATIENT)
Dept: OPERATING ROOM | Age: 79
End: 2024-03-21
Payer: MEDICARE

## 2024-03-21 VITALS
TEMPERATURE: 98.3 F | BODY MASS INDEX: 29.06 KG/M2 | HEART RATE: 58 BPM | DIASTOLIC BLOOD PRESSURE: 79 MMHG | SYSTOLIC BLOOD PRESSURE: 137 MMHG | HEIGHT: 63 IN | RESPIRATION RATE: 18 BRPM | WEIGHT: 164 LBS | OXYGEN SATURATION: 95 %

## 2024-03-21 DIAGNOSIS — R93.89 THICKENED ENDOMETRIUM: ICD-10-CM

## 2024-03-21 DIAGNOSIS — N95.0 PMB (POSTMENOPAUSAL BLEEDING): ICD-10-CM

## 2024-03-21 LAB
GLUCOSE BLD-MCNC: 174 MG/DL (ref 70–99)
GLUCOSE BLD-MCNC: 218 MG/DL (ref 70–99)
PERFORMED ON: ABNORMAL
PERFORMED ON: ABNORMAL

## 2024-03-21 PROCEDURE — 2709999900 HC NON-CHARGEABLE SUPPLY: Performed by: OBSTETRICS & GYNECOLOGY

## 2024-03-21 PROCEDURE — 7100000001 HC PACU RECOVERY - ADDTL 15 MIN: Performed by: OBSTETRICS & GYNECOLOGY

## 2024-03-21 PROCEDURE — 7100000010 HC PHASE II RECOVERY - FIRST 15 MIN: Performed by: OBSTETRICS & GYNECOLOGY

## 2024-03-21 PROCEDURE — 6370000000 HC RX 637 (ALT 250 FOR IP): Performed by: ANESTHESIOLOGY

## 2024-03-21 PROCEDURE — 88305 TISSUE EXAM BY PATHOLOGIST: CPT

## 2024-03-21 PROCEDURE — 3600000014 HC SURGERY LEVEL 4 ADDTL 15MIN: Performed by: OBSTETRICS & GYNECOLOGY

## 2024-03-21 PROCEDURE — 2580000003 HC RX 258: Performed by: NURSE ANESTHETIST, CERTIFIED REGISTERED

## 2024-03-21 PROCEDURE — 3700000000 HC ANESTHESIA ATTENDED CARE: Performed by: OBSTETRICS & GYNECOLOGY

## 2024-03-21 PROCEDURE — 7100000000 HC PACU RECOVERY - FIRST 15 MIN: Performed by: OBSTETRICS & GYNECOLOGY

## 2024-03-21 PROCEDURE — 6360000002 HC RX W HCPCS: Performed by: NURSE ANESTHETIST, CERTIFIED REGISTERED

## 2024-03-21 PROCEDURE — 6360000002 HC RX W HCPCS: Performed by: ANESTHESIOLOGY

## 2024-03-21 PROCEDURE — 3700000001 HC ADD 15 MINUTES (ANESTHESIA): Performed by: OBSTETRICS & GYNECOLOGY

## 2024-03-21 PROCEDURE — 7100000011 HC PHASE II RECOVERY - ADDTL 15 MIN: Performed by: OBSTETRICS & GYNECOLOGY

## 2024-03-21 PROCEDURE — 6360000002 HC RX W HCPCS: Performed by: OBSTETRICS & GYNECOLOGY

## 2024-03-21 PROCEDURE — 2500000003 HC RX 250 WO HCPCS: Performed by: NURSE ANESTHETIST, CERTIFIED REGISTERED

## 2024-03-21 PROCEDURE — 2580000003 HC RX 258: Performed by: OBSTETRICS & GYNECOLOGY

## 2024-03-21 PROCEDURE — 82962 GLUCOSE BLOOD TEST: CPT

## 2024-03-21 PROCEDURE — 3600000004 HC SURGERY LEVEL 4 BASE: Performed by: OBSTETRICS & GYNECOLOGY

## 2024-03-21 PROCEDURE — 58120 DILATION AND CURETTAGE: CPT | Performed by: OBSTETRICS & GYNECOLOGY

## 2024-03-21 PROCEDURE — 2580000003 HC RX 258: Performed by: ANESTHESIOLOGY

## 2024-03-21 RX ORDER — MORPHINE SULFATE 2 MG/ML
1 INJECTION, SOLUTION INTRAMUSCULAR; INTRAVENOUS EVERY 5 MIN PRN
Status: CANCELLED | OUTPATIENT
Start: 2024-03-21

## 2024-03-21 RX ORDER — FENTANYL CITRATE 50 UG/ML
INJECTION, SOLUTION INTRAMUSCULAR; INTRAVENOUS PRN
Status: DISCONTINUED | OUTPATIENT
Start: 2024-03-21 | End: 2024-03-21 | Stop reason: SDUPTHER

## 2024-03-21 RX ORDER — LIDOCAINE HYDROCHLORIDE 10 MG/ML
1 INJECTION, SOLUTION EPIDURAL; INFILTRATION; INTRACAUDAL; PERINEURAL
Status: DISCONTINUED | OUTPATIENT
Start: 2024-03-21 | End: 2024-03-21 | Stop reason: HOSPADM

## 2024-03-21 RX ORDER — SODIUM CHLORIDE 0.9 % (FLUSH) 0.9 %
5-40 SYRINGE (ML) INJECTION PRN
Status: CANCELLED | OUTPATIENT
Start: 2024-03-21

## 2024-03-21 RX ORDER — NALOXONE HYDROCHLORIDE 0.4 MG/ML
INJECTION, SOLUTION INTRAMUSCULAR; INTRAVENOUS; SUBCUTANEOUS PRN
Status: CANCELLED | OUTPATIENT
Start: 2024-03-21

## 2024-03-21 RX ORDER — SODIUM CHLORIDE, SODIUM LACTATE, POTASSIUM CHLORIDE, CALCIUM CHLORIDE 600; 310; 30; 20 MG/100ML; MG/100ML; MG/100ML; MG/100ML
INJECTION, SOLUTION INTRAVENOUS CONTINUOUS
Status: DISCONTINUED | OUTPATIENT
Start: 2024-03-21 | End: 2024-03-21 | Stop reason: HOSPADM

## 2024-03-21 RX ORDER — ONDANSETRON 2 MG/ML
INJECTION INTRAMUSCULAR; INTRAVENOUS PRN
Status: DISCONTINUED | OUTPATIENT
Start: 2024-03-21 | End: 2024-03-21 | Stop reason: SDUPTHER

## 2024-03-21 RX ORDER — SODIUM CHLORIDE 0.9 % (FLUSH) 0.9 %
5-40 SYRINGE (ML) INJECTION EVERY 12 HOURS SCHEDULED
Status: DISCONTINUED | OUTPATIENT
Start: 2024-03-21 | End: 2024-03-21 | Stop reason: HOSPADM

## 2024-03-21 RX ORDER — SODIUM CHLORIDE 9 MG/ML
INJECTION, SOLUTION INTRAVENOUS PRN
Status: DISCONTINUED | OUTPATIENT
Start: 2024-03-21 | End: 2024-03-21 | Stop reason: HOSPADM

## 2024-03-21 RX ORDER — PROPOFOL 10 MG/ML
INJECTION, EMULSION INTRAVENOUS PRN
Status: DISCONTINUED | OUTPATIENT
Start: 2024-03-21 | End: 2024-03-21 | Stop reason: SDUPTHER

## 2024-03-21 RX ORDER — APREPITANT 40 MG/1
40 CAPSULE ORAL ONCE
Status: COMPLETED | OUTPATIENT
Start: 2024-03-21 | End: 2024-03-21

## 2024-03-21 RX ORDER — SODIUM CHLORIDE 0.9 % (FLUSH) 0.9 %
5-40 SYRINGE (ML) INJECTION PRN
Status: DISCONTINUED | OUTPATIENT
Start: 2024-03-21 | End: 2024-03-21 | Stop reason: HOSPADM

## 2024-03-21 RX ORDER — MORPHINE SULFATE 2 MG/ML
2 INJECTION, SOLUTION INTRAMUSCULAR; INTRAVENOUS EVERY 5 MIN PRN
Status: CANCELLED | OUTPATIENT
Start: 2024-03-21

## 2024-03-21 RX ORDER — SCOLOPAMINE TRANSDERMAL SYSTEM 1 MG/1
1 PATCH, EXTENDED RELEASE TRANSDERMAL
Status: DISCONTINUED | OUTPATIENT
Start: 2024-03-21 | End: 2024-03-21 | Stop reason: HOSPADM

## 2024-03-21 RX ORDER — LIDOCAINE HYDROCHLORIDE 10 MG/ML
INJECTION, SOLUTION EPIDURAL; INFILTRATION; INTRACAUDAL; PERINEURAL PRN
Status: DISCONTINUED | OUTPATIENT
Start: 2024-03-21 | End: 2024-03-21 | Stop reason: SDUPTHER

## 2024-03-21 RX ORDER — SODIUM CHLORIDE 0.9 % (FLUSH) 0.9 %
5-40 SYRINGE (ML) INJECTION EVERY 12 HOURS SCHEDULED
Status: CANCELLED | OUTPATIENT
Start: 2024-03-21

## 2024-03-21 RX ORDER — SODIUM CHLORIDE 9 MG/ML
INJECTION, SOLUTION INTRAVENOUS PRN
Status: CANCELLED | OUTPATIENT
Start: 2024-03-21

## 2024-03-21 RX ORDER — SODIUM CHLORIDE, SODIUM LACTATE, POTASSIUM CHLORIDE, CALCIUM CHLORIDE 600; 310; 30; 20 MG/100ML; MG/100ML; MG/100ML; MG/100ML
INJECTION, SOLUTION INTRAVENOUS CONTINUOUS PRN
Status: DISCONTINUED | OUTPATIENT
Start: 2024-03-21 | End: 2024-03-21 | Stop reason: SDUPTHER

## 2024-03-21 RX ORDER — FAMOTIDINE 20 MG/1
20 TABLET, FILM COATED ORAL ONCE
Status: COMPLETED | OUTPATIENT
Start: 2024-03-21 | End: 2024-03-21

## 2024-03-21 RX ORDER — MIDAZOLAM HYDROCHLORIDE 2 MG/2ML
2 INJECTION, SOLUTION INTRAMUSCULAR; INTRAVENOUS
Status: DISCONTINUED | OUTPATIENT
Start: 2024-03-21 | End: 2024-03-21 | Stop reason: HOSPADM

## 2024-03-21 RX ADMIN — SODIUM CHLORIDE, SODIUM LACTATE, POTASSIUM CHLORIDE, AND CALCIUM CHLORIDE: 600; 310; 30; 20 INJECTION, SOLUTION INTRAVENOUS at 07:49

## 2024-03-21 RX ADMIN — FENTANYL CITRATE 25 MCG: 0.05 INJECTION, SOLUTION INTRAMUSCULAR; INTRAVENOUS at 07:52

## 2024-03-21 RX ADMIN — APREPITANT 40 MG: 40 CAPSULE ORAL at 07:19

## 2024-03-21 RX ADMIN — FENTANYL CITRATE 50 MCG: 0.05 INJECTION, SOLUTION INTRAMUSCULAR; INTRAVENOUS at 08:24

## 2024-03-21 RX ADMIN — ONDANSETRON 4 MG: 2 INJECTION INTRAMUSCULAR; INTRAVENOUS at 08:17

## 2024-03-21 RX ADMIN — SODIUM CHLORIDE, SODIUM LACTATE, POTASSIUM CHLORIDE, AND CALCIUM CHLORIDE: 600; 310; 30; 20 INJECTION, SOLUTION INTRAVENOUS at 07:19

## 2024-03-21 RX ADMIN — SODIUM CHLORIDE, SODIUM LACTATE, POTASSIUM CHLORIDE, AND CALCIUM CHLORIDE: 600; 310; 30; 20 INJECTION, SOLUTION INTRAVENOUS at 06:59

## 2024-03-21 RX ADMIN — PROPOFOL 50 MG: 10 INJECTION, EMULSION INTRAVENOUS at 07:53

## 2024-03-21 RX ADMIN — FAMOTIDINE 20 MG: 20 TABLET ORAL at 07:19

## 2024-03-21 RX ADMIN — FENTANYL CITRATE 25 MCG: 0.05 INJECTION, SOLUTION INTRAMUSCULAR; INTRAVENOUS at 08:09

## 2024-03-21 RX ADMIN — CEFAZOLIN 1000 MG: 1 INJECTION, POWDER, FOR SOLUTION INTRAMUSCULAR; INTRAVENOUS at 08:07

## 2024-03-21 RX ADMIN — LIDOCAINE HYDROCHLORIDE 50 MG: 10 INJECTION, SOLUTION EPIDURAL; INFILTRATION; INTRACAUDAL; PERINEURAL at 07:49

## 2024-03-21 ASSESSMENT — LIFESTYLE VARIABLES: SMOKING_STATUS: 0

## 2024-03-21 ASSESSMENT — PAIN - FUNCTIONAL ASSESSMENT
PAIN_FUNCTIONAL_ASSESSMENT: NONE - DENIES PAIN
PAIN_FUNCTIONAL_ASSESSMENT: 0-10

## 2024-03-21 NOTE — ANESTHESIA POSTPROCEDURE EVALUATION
Department of Anesthesiology  Postprocedure Note    Patient: Naomi Alvarez  MRN: [id number]  YOB: 1945  Date of evaluation: 3/21/2024    Procedure Summary       Date: 03/21/24 Room / Location: [address]    Anesthesia Start: 0749 Anesthesia Stop: 0827    Procedure: DIALTION AND CURETTAGE, EXAM UNDER ANESTHESIA Diagnosis:       PMB (postmenopausal bleeding)      Thickened endometrium      (PMB (postmenopausal bleeding) [N95.0])      (Thickened endometrium [R93.89])    Surgeons: Fan Forrester MD Responsible Provider: Remington Meza APRN - CRNA    Anesthesia Type: general ASA Status: 2            Anesthesia Type: No value filed.    Flaquito Phase I: Flaquito Score: 10    Flaquito Phase II:      Anesthesia Post Evaluation    Patient location during evaluation: PACU  Patient participation: complete - patient participated  Level of consciousness: awake and alert  Pain score: 0  Airway patency: patent  Nausea & Vomiting: no nausea and no vomiting  Cardiovascular status: hemodynamically stable  Respiratory status: spontaneous ventilation, nonlabored ventilation and room air  Hydration status: stable  Multimodal analgesia pain management approach    No notable events documented.

## 2024-03-21 NOTE — ANESTHESIA PRE PROCEDURE
\"MFA1EPP\", \"SHR2RDE\", \"BEART\", \"Y3NICRDF\"     Type & Screen (If Applicable):  No results found for: \"LABABO\", \"LABRH\"    Drug/Infectious Status (If Applicable):  No results found for: \"HIV\", \"HEPCAB\"    COVID-19 Screening (If Applicable): No results found for: \"COVID19\"        Anesthesia Evaluation  Patient summary reviewed and Nursing notes reviewed   history of anesthetic complications: PONV.  Airway: Mallampati: II  TM distance: >3 FB     Mouth opening: > = 3 FB   Dental:          Pulmonary:normal exam        (-) sleep apnea and not a current smoker                           Cardiovascular:    (+) hypertension:    (-) CAD and CABG/stent       Beta Blocker:  Dose within 24 Hrs         Neuro/Psych:   (+) CVA:   (-) seizures           GI/Hepatic/Renal:   (+) GERD: well controlled     (-) liver disease and no renal disease       Endo/Other:    (+) DiabetesType II DM, : arthritis:..                 Abdominal:             Vascular:          Other Findings:       Anesthesia Plan      general     ASA 2       Induction: intravenous.    MIPS: Postoperative opioids intended and Prophylactic antiemetics administered.  Anesthetic plan and risks discussed with patient.                    Adriana Araiza MD   3/21/2024

## 2024-03-21 NOTE — OP NOTE
Pre-op Dx: 1. 79y/o                     2. PMB                    3. Thickened endometrium    Post-op Dx: Same    Procedure: EUA and D&C    Surgeon: Dr. BARBARA Forrester    Assistant: None    Anesthesia: LMA    Findings: Normal size uterus with no adnexal masses palpable    EBL: 5cc    IVFs: LR 800cc    Urine output: 200cc    Abx: Ancef 1g    Complications: None    Specimen: Endometrial curettings    Procedure Details:  The patient was taken to the OR where LMA was found to be adequate. Patient was prepped and draped in the normal sterile fashion in the dorsal lithotomy position. The patient was examined under anesthesia with the findings noted above. A weighted speculum was placed into the posterior aspect of the patient's vagina. A right angle retractor was placed into the anterior aspect of the patient's vagina. The patient's cervix was visualized, and the anterior lip of the cervix was grasped with a single toothed tenaculum. The patient's cervix was then gently dilated with the cervical dilators. A sharp curettage was then performed. All instruments were then removed from the patient's vagina. Excellent hemostasis was noted. The patient tolerated the procedure well. Sponge, lap, and needle counts were correct x 2. The patient was taken to PACU in stable condition. The endometrial curettings were sent to Pathology.

## 2024-03-21 NOTE — DISCHARGE INSTRUCTIONS
Nothing in the vagina for 2 weeks.     No sex, no tampons, no bath, no douche.    No heavy lifting or straining. Vaginal bleeding may occur while healing. If like a period or heavier, you are doing too much.    Rest with your feet elevated for next 48 hours and it should taper off.     Call MD with fever > 101, foul smelling vaginal discharge or any other questions or concerns.

## 2024-03-21 NOTE — H&P
Department of Gynecology  Attending Pre-operative History and Physical        DIAGNOSIS:  Postmenopausal bleeding    INDICATION:  Postmenopausal bleeding and thickened endometrium    PROCEDURE:  EUA and D&C    CHIEF COMPLAINT:   PMB    Reason for Admission:  Scheduled surgery    History obtained from patient    HISTORY OF PRESENT ILLNESS:                     The patient is a 78 y.o.  female with significant past medical history as below who presents for D&C for PMB and thickened endometrium, and pt is without other complaints.      Past Medical History:        Diagnosis Date    Arthritis     Cancer ([facility])     new onset; skin    Hyperlipidemia     Sleep apnea     per pt; no cpap    Stroke (cerebrum) ([facility])     Type 2 diabetes mellitus without complication ([facility])      Past Surgical History:        Procedure Laterality Date    BACK SURGERY  2017    L3, 4, 5 Fusion    BLEPHAROPLASTY  10/2003    CARPAL TUNNEL RELEASE      2003 (left) 10/2003 (right)    CATARACT REMOVAL Bilateral     CERVICAL LAMINECTOMY  2004    C7, T1    CHOLECYSTECTOMY  2018    CYST REMOVAL Right     right wrist    GASTRIC FUNDOPLICATION  2003    HIP SURGERY Right 2023    LUMBAR DISC SURGERY  10/2002    L5, 1/2 disc removed    NECK SURGERY  2018    TOENAIL EXCISION Bilateral 2016       Past Gynecological History:    1. Last menstrual period: Postmenopausal      OB History    Para Term  AB Living   1 1           SAB IAB Ectopic Molar Multiple Live Births                    # Outcome Date GA Lbr Daron/2nd Weight Sex Delivery Anes PTL Lv   1 Para                Medications Prior to Admission:   Medications Prior to Admission: metoprolol succinate (TOPROL XL) 25 MG extended release tablet, Take 1 tablet by mouth in the morning and 1 tablet in the evening. Indications: Rapid Heartbeat.  vitamin D (CHOLECALCIFEROL) 25 MCG (1000 UT) TABS tablet, Take 3 tablets by mouth daily  guaiFENesin 400 MG tablet, Take 1  tablet by mouth nightly (Patient not taking: Reported on 3/21/2024)  LYUMJEV KWIKLINDA 200 UNIT/ML SOPN, Inject into the skin 3 times daily Indications: Diabetes Sliding scale  JACINTO GOMEZOSTAR 300 UNIT/ML SOPN, Inject 56 Units into the skin nightly  famotidine (PEPCID) 40 MG tablet, Take 1 tablet by mouth Daily  acetaminophen (TYLENOL ARTHRITIS PAIN) 650 MG extended release tablet, Take 1 tablet by mouth every 8 hours as needed for Pain  aspirin EC 81 MG EC tablet, Take 1 tablet by mouth daily  atorvastatin (LIPITOR) 20 MG tablet, Take 1 tablet by mouth daily  cetirizine (ZYRTEC) 10 MG tablet, Take 1 tablet by mouth daily  Coenzyme Q-10 200 MG CAPS, Take 200 mg by mouth daily  OMEGA-3 FATTY ACIDS-VITAMIN E PO, Take 2 capsules by mouth daily    Allergies:  Erythromycin, Humulin n [insulin isophane], Insulin, Mold extract [molds & smuts], Pseudoephedrine, Pseudophed-chlophedianol-gg, Sulfa antibiotics, Trichophyton, Other, Wound dressing adhesive, and Adhesive tape     Social History: Noncontributory    Family History:       Problem Relation Age of Onset    Cancer Mother         Lung and throat ca    Heart Disease Father     Heart Disease Brother     Diabetes Brother        REVIEW OF SYSTEMS:      Constitutional:  negative  Eyes:  negative  Ears, nose, mouth, throat, and face:  negative  Respiratory:  negative  Cardiovascular:  negative  Gastrointestinal:  negative  Genitourinary:  positive for PMB  Integument/breast:  negative  Hematologic/lymphatic:  negative  Allergic/Immunologic:  negative  Endocrine:  negative  Musculoskeletal:  negative  Neurological:  negative  Behavior/Psych:  negative    PHYSICAL EXAM:    Vitals:  BP (!) 137/56   Pulse 63   Temp 97.4 °F (36.3 °C) (Temporal)   Resp 18   Ht 1.6 m (5' 3\")   Wt 74.4 kg (164 lb)   SpO2 93%   BMI 29.05 kg/m²       Head/ENT:  NC/AT    Neck:  Supple    Heart:  Normal apical impulse, regular rate and rhythm, normal S1 and S2, no S3 or S4, and no murmur

## 2024-03-26 PROBLEM — N95.0 PMB (POSTMENOPAUSAL BLEEDING): Status: ACTIVE | Noted: 2024-03-26

## 2024-06-12 NOTE — PROGRESS NOTES
[facility] - PODIATRY    Today's Date: 06/19/24    Patient Name: Alisia Velez  MRN: [id number]  CSN: [id number]  PCP: Mike Erazo MD  Referring Provider: No ref. provider found    SUBJECTIVE     Chief Complaint   Patient presents with    Follow-up     Mike Erazo MD 06/13/2024-3 months foot and nail care- pt states feet doing ok. Bunions have started rubbing on shoes recently- pt pain 2/10 at worst     Diabetes     122mg/dl BG this am      HPI: Alisia Velez, a 79 y.o.female, comes to clinic as a(n) established patient complaining of long, irregular toenails of both feet . Patient has h/o Arthritis, back pain, skin cancer, hyperlipidemia, hypertension, GERD, melanoma, CVA, diabetes mellitus with neuropathy . Patient is IDDM with last stated BG level of 122 mg/dl.  Notes nails are long, thick, and discolored.  Due to neuropathy and back issues she is unable to reach feet to care for them herself. Patient reports she has had a large callus formed to the anterior distal right hallux.  Denies pain at the present time. Relates previous treatment(s) including Nail care by podiatry . Denies any constitutional symptoms. No other pedal complaints at this time.    Past Medical History:   Diagnosis Date    Anesthesia complication     severe nausea and vomitig    Arthritis     Back pain     Basal cell carcinoma     Blister of great toe of left foot     Nonthermal, initial encounter    Bunion     Cancer     skin     Dyslipidemia     Elevated blood pressure     Essential hypertension 09/13/2016    Nimesh blanchard     GERD (gastroesophageal reflux disease)     History of cerebrovascular accident     1999    Hyperlipidemia     Hypo-osmolality and hyponatremia     Ingrowing nail     Melanoma     Neuropathy in diabetes     Some    Onychomycosis     Plantar fasciitis     PONV (postoperative nausea and vomiting)     Sleep apnea     Snores     Stroke     TIA (transient ischemic attack) 1999     After stroke    Type 2 diabetes mellitus     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    Urinary incontinence     Vitamin D deficiency      Past Surgical History:   Procedure Laterality Date    ANTERIOR CERVICAL DISCECTOMY W/ FUSION N/A 04/19/2019    Procedure: ANTERIOR CERVICAL DISCECTOMY FUSION C5-7;  Surgeon: MIGDALIA Horton MD;  Location:  [facility] OR;  Service: Orthopedic Spine    ARM LESION/CYST EXCISION Right 08/17/2022    Procedure: RIGHT WRIST MARGINAL MASS EXCISION;  Surgeon: Magen Pardo MD;  Location:  [facility] OR;  Service: Orthopedics;  Laterality: Right;    BACK SURGERY      BLEPHAROPLASTY      CARDIAC CATHETERIZATION      CARPAL TUNNEL RELEASE      CERVICAL SPINE SURGERY      cydney-laminotomy c7-t1    COLONOSCOPY      ENDOSCOPY      EYE SURGERY Bilateral     cataracts     FOOT SURGERY      Toe nails removed    LAPAROSCOPIC CHOLECYSTECTOMY      LUMBAR DISC SURGERY      LUMBAR FUSION Left 03/13/2017    Procedure: LEFT LUMBAR LATERAL INTERBODY FUSION L 3-5 WITH INSTRUMENTATION;  Surgeon: MIGDALIA Horton MD;  Location:  [facility];  Service:     LUMBAR LAMINECTOMY WITH FUSION Right 03/15/2017    Procedure: RIGHT L3-S1 POSSIBLE RIGHT L 3-4 HEMILAMINECTOMY FACETECTOMY DECOMPRESSION TRANSFORAMINAL LUMBAR INTERBODY FUSION L4-S1 POSTERIOR SPINAL FUSION WITH INSTRUMENTATION L3-S1;  Surgeon: MIGDALIA Horton MD;  Location:  [facility] OR;  Service:     MYRINGOTOMY W/ TUBES      NAIL BED REMOVAL/REVISION  03/11/2016    NECK SURGERY      NISSEN FUNDOPLICATION LAPAROSCOPIC      OTHER SURGICAL HISTORY      had left and right big toenials removed mar 2016    TOENAIL EXCISION      Both big toes    WRIST SURGERY Right     ganglian cyst removal     Family History   Problem Relation Age of Onset    Cancer Other     Diabetes Other     Heart disease Other     Hypertension Other     Cancer Mother     Heart disease Father     Diabetes Brother     Heart disease Brother      Social History     Socioeconomic  History    Marital status:    Tobacco Use    Smoking status: Never     Passive exposure: Never    Smokeless tobacco: Never   Vaping Use    Vaping status: Never Used   Substance and Sexual Activity    Alcohol use: Never    Drug use: Never    Sexual activity: Not Currently     Partners: Male     Birth control/protection: Post-menopausal, Vasectomy     Comment: Used pill many years ago     Allergies   Allergen Reactions    Erythromycin Other (See Comments) and Shortness Of Breath     Eyes see big black spots    Humulin N [Insulin Isophane] Shortness Of Breath    Mold Extract [Trichophyton] Shortness Of Breath    Sudafed [Pseudoephedrine] Shortness Of Breath    Sulfa Antibiotics Shortness Of Breath    Other Itching     Silk Tape    Adhesive Tape Itching    Insulin Unknown - Low Severity     Just humulin N    Molds & Smuts Itching     Current Outpatient Medications   Medication Sig Dispense Refill    acetaminophen (TYLENOL) 650 MG 8 hr tablet Take 1 tablet by mouth Every 8 (Eight) Hours As Needed for Mild Pain.      aspirin 81 MG EC tablet Take 1 tablet by mouth Daily.      atorvastatin (LIPITOR) 20 MG tablet Take 1 tablet by mouth Daily.      cetirizine (ZyrTEC) 10 MG tablet Take 1 tablet by mouth Daily.      Cholecalciferol (Vitamin D-3) 25 MCG (1000 UT) capsule Take  by mouth. Takes 3 daily      Coenzyme Q10 (COQ10) 200 MG capsule Take 1 capsule by mouth Daily.      famotidine (PEPCID) 40 MG tablet 1 tablet Daily.      fluticasone (FLONASE) 50 MCG/ACT nasal spray 2 sprays into the nostril(s) as directed by provider Every Night.      Glucagon (Baqsimi One Pack) 3 MG/DOSE powder 1 each into the nostril(s) as directed by provider As Needed (Hypoglycemia). Apply intranasal if hypoglycemia 2 each 11    guaiFENesin (MUCINEX) 600 MG 12 hr tablet Take 2 tablets by mouth 2 (Two) Times a Day.      Insulin Glargine, 2 Unit Dial, (Toujeo Max SoloStar) 300 UNIT/ML solution pen-injector injection INJECT SUBCUTANEOUSLY 80   UNITS DAILY 24 mL 3    Insulin Lispro-aabc 200 UNIT/ML solution pen-injector Inject 60 Units under the skin into the appropriate area as directed 3 (Three) Times a Day With Meals. Sliding scale 27 mL 3    Insulin Pen Needle 32G X 6 MM misc 4 x daily 360 each 3    metoprolol succinate XL (TOPROL-XL) 25 MG 24 hr tablet Take 1 tablet by mouth 2 (Two) Times a Day.      OMEGA-3 FATTY ACIDS-VITAMIN E PO Take 1 capsule by mouth Daily. omega-3 fatty acids-vitamin E 1,000 mg-5 unit capsule. Hold for surgery 4-12-19      Psyllium (Fiber) 28.3 % powder Take  by mouth Daily.       No current facility-administered medications for this visit.     Review of Systems   Constitutional:  Negative for chills and fever.   HENT:  Negative for congestion.    Respiratory:  Negative for shortness of breath.    Cardiovascular:  Negative for chest pain and leg swelling.   Gastrointestinal:  Negative for constipation, diarrhea, nausea and vomiting.   Musculoskeletal:  Positive for arthralgias and back pain. Negative for myalgias.   Skin:  Negative for wound.   Neurological:  Positive for numbness.       OBJECTIVE     Vitals:    06/19/24 0901   BP: 120/62       PHYSICAL EXAM  GEN:   Accompanied by spouse.     Foot/Ankle Exam    GENERAL  Appearance:  elderly  Orientation:  AAOx3  Affect:  appropriate  Gait:  unimpaired  Assistance:  independent  Right shoe gear: casual shoe  Left shoe gear: casual shoe    VASCULAR     Right Foot Vascularity   Dorsalis pedis:  2+  Posterior tibial:  2+  Skin temperature:  warm  Edema grading:  None  CFT:  3  Pedal hair growth:  Present  Varicosities:  none     Left Foot Vascularity   Dorsalis pedis:  2+  Posterior tibial:  2+  Skin temperature:  warm  Edema grading:  None  CFT:  3  Pedal hair growth:  Present  Varicosities:  none     NEUROLOGIC     Right Foot Neurologic   Light touch sensation: diminished  Vibratory sensation: diminished  Hot/Cold sensation: diminished     Left Foot Neurologic   Light touch  sensation: diminished  Vibratory sensation: diminished  Hot/Cold sensation:  diminished    MUSCULOSKELETAL     Right Foot Musculoskeletal   Tenderness:  none    Arch:  Pes planus (Mild)  Hallux valgus: Yes    Hallux limitus: No       Left Foot Musculoskeletal   Tenderness:  none  Arch:  Pes planus (Mild)  Hallux valgus: Yes    Hallux limitus: No      MUSCLE STRENGTH     Right Foot Muscle Strength   Foot dorsiflexion:  5  Foot plantar flexion:  5  Foot inversion:  5  Foot eversion:  5     Left Foot Muscle Strength   Foot dorsiflexion:  5  Foot plantar flexion:  5  Foot inversion:  5  Foot eversion:  5    RANGE OF MOTION     Right Foot Range of Motion   Foot and ankle ROM within normal limits       Left Foot Range of Motion   Foot and ankle ROM within normal limits      DERMATOLOGIC      Right Foot Dermatologic   Skin  Positive for corn. (stucco keratoses)  Nails  1.  Absent.  2.  Positive for elongated and abnormal thickness.  3.  Positive for elongated and abnormal thickness.  4.  Positive for elongated and abnormal thickness.  5.  Positive for elongated and abnormal thickness.  Nails comment:  2-5     Left Foot Dermatologic   Skin  Left foot skin is intact. (stucco keratoses)   Nails comment:  2-5  Nails  1.  Absent.  2.  Positive for elongated and abnormal thickness.  3.  Positive for elongated and abnormal thickness.  4.  Positive for elongated and abnormally thick.  5.  Positive for elongated and abnormally thick.    Image:       RADIOLOGY/NUCLEAR:  No results found.    LABORATORY/CULTURE RESULTS:      PATHOLOGY RESULTS:       ASSESSMENT/PLAN     Diagnoses and all orders for this visit:    1. Onychomycosis (Primary)    2. Type 2 diabetes mellitus with diabetic polyneuropathy, with long-term current use of insulin    3. Valgus deformity of both great toes    4. Pes planus of both feet    5. Foot callus      Comprehensive lower extremity examination and evaluation was performed.  Discussed findings and treatment  plan including risks, benefits, and treatment options with patient in detail. Patient agreed with treatment plan.  After verbal consent obtained, nail(s) x8 debrided of length and thickness with nail nipper without incidence  After verbal consent obtained, calluses x1 pared utilizing dermal curette and/or scalpel without incidence  Patient may maintain nails and calluses at home utilizing emery board or pumice stone between visits as needed  Reviewed at home diabetic foot care including daily foot checks.  Toe cap x 1 dispensed.   Continue diabetic monitoring and control under direction of PCP.  Remain conservative with foot deformities.  An After Visit Summary was printed and given to the patient at discharge, including (if requested) any available informative/educational handouts regarding diagnosis, treatment, or medications. All questions were answered to patient/family satisfaction. Should symptoms fail to improve or worsen they agree to call or return to clinic or to go to the Emergency Department. Discussed the importance of following up with any needed screening tests/labs/specialist appointments and any requested follow-up recommended by me today. Importance of maintaining follow-up discussed and patient accepts that missed appointments can delay diagnosis and potentially lead to worsening of conditions.  Return in about 3 months (around 9/19/2024) for Follow-up with Podiatry VINNY, Schedule [facility]., or sooner if acute issues arise.    Lab Frequency Next Occurrence       This document has been electronically signed by VINNY Davila on June 19, 2024 10:51 CDT

## 2024-06-18 ENCOUNTER — TELEPHONE (OUTPATIENT)
Dept: PODIATRY | Facility: CLINIC | Age: 79
End: 2024-06-18
Payer: MEDICARE

## 2024-06-18 NOTE — TELEPHONE ENCOUNTER
Hub to relay  Called patient regarding appt on 06/19/2024. Left message for patient to return call if any questions or concerns arise.

## 2024-06-19 ENCOUNTER — OFFICE VISIT (OUTPATIENT)
Dept: PODIATRY | Facility: CLINIC | Age: 79
End: 2024-06-19
Payer: MEDICARE

## 2024-06-19 VITALS
BODY MASS INDEX: 28.53 KG/M2 | HEIGHT: 63 IN | SYSTOLIC BLOOD PRESSURE: 120 MMHG | DIASTOLIC BLOOD PRESSURE: 62 MMHG | WEIGHT: 161 LBS

## 2024-06-19 DIAGNOSIS — L84 FOOT CALLUS: ICD-10-CM

## 2024-06-19 DIAGNOSIS — M21.42 PES PLANUS OF BOTH FEET: ICD-10-CM

## 2024-06-19 DIAGNOSIS — E11.42 TYPE 2 DIABETES MELLITUS WITH DIABETIC POLYNEUROPATHY, WITH LONG-TERM CURRENT USE OF INSULIN: ICD-10-CM

## 2024-06-19 DIAGNOSIS — B35.1 ONYCHOMYCOSIS: Primary | ICD-10-CM

## 2024-06-19 DIAGNOSIS — Z79.4 TYPE 2 DIABETES MELLITUS WITH DIABETIC POLYNEUROPATHY, WITH LONG-TERM CURRENT USE OF INSULIN: ICD-10-CM

## 2024-06-19 DIAGNOSIS — M20.12 VALGUS DEFORMITY OF BOTH GREAT TOES: ICD-10-CM

## 2024-06-19 DIAGNOSIS — M20.11 VALGUS DEFORMITY OF BOTH GREAT TOES: ICD-10-CM

## 2024-06-19 DIAGNOSIS — M21.41 PES PLANUS OF BOTH FEET: ICD-10-CM

## 2024-09-18 ENCOUNTER — OFFICE VISIT (OUTPATIENT)
Age: 79
End: 2024-09-18
Payer: MEDICARE

## 2024-09-18 VITALS
HEIGHT: 63 IN | DIASTOLIC BLOOD PRESSURE: 68 MMHG | HEART RATE: 64 BPM | WEIGHT: 161 LBS | SYSTOLIC BLOOD PRESSURE: 128 MMHG | OXYGEN SATURATION: 97 % | BODY MASS INDEX: 28.53 KG/M2

## 2024-09-18 DIAGNOSIS — E11.42 TYPE 2 DIABETES MELLITUS WITH DIABETIC POLYNEUROPATHY, WITH LONG-TERM CURRENT USE OF INSULIN: ICD-10-CM

## 2024-09-18 DIAGNOSIS — M20.12 VALGUS DEFORMITY OF BOTH GREAT TOES: ICD-10-CM

## 2024-09-18 DIAGNOSIS — L84 FOOT CALLUS: ICD-10-CM

## 2024-09-18 DIAGNOSIS — M21.41 PES PLANUS OF BOTH FEET: ICD-10-CM

## 2024-09-18 DIAGNOSIS — M20.11 VALGUS DEFORMITY OF BOTH GREAT TOES: ICD-10-CM

## 2024-09-18 DIAGNOSIS — Z79.4 TYPE 2 DIABETES MELLITUS WITH DIABETIC POLYNEUROPATHY, WITH LONG-TERM CURRENT USE OF INSULIN: ICD-10-CM

## 2024-09-18 DIAGNOSIS — B35.1 ONYCHOMYCOSIS: Primary | ICD-10-CM

## 2024-09-18 DIAGNOSIS — M21.42 PES PLANUS OF BOTH FEET: ICD-10-CM

## 2024-09-26 ENCOUNTER — PRE-ADMISSION TESTING (OUTPATIENT)
Dept: PREADMISSION TESTING | Facility: HOSPITAL | Age: 79
End: 2024-09-26
Payer: MEDICARE

## 2024-09-26 VITALS
OXYGEN SATURATION: 95 % | HEART RATE: 73 BPM | HEIGHT: 63 IN | BODY MASS INDEX: 29.02 KG/M2 | SYSTOLIC BLOOD PRESSURE: 152 MMHG | DIASTOLIC BLOOD PRESSURE: 58 MMHG | WEIGHT: 163.8 LBS | RESPIRATION RATE: 18 BRPM

## 2024-09-26 LAB
ALBUMIN SERPL-MCNC: 4.2 G/DL (ref 3.5–5.2)
ALBUMIN/GLOB SERPL: 1.4 G/DL
ALP SERPL-CCNC: 54 U/L (ref 39–117)
ALT SERPL W P-5'-P-CCNC: 24 U/L (ref 1–33)
ANION GAP SERPL CALCULATED.3IONS-SCNC: 10 MMOL/L (ref 5–15)
APTT PPP: 29.4 SECONDS (ref 24.5–36)
AST SERPL-CCNC: 23 U/L (ref 1–32)
BILIRUB SERPL-MCNC: 0.2 MG/DL (ref 0–1.2)
BILIRUB UR QL STRIP: NEGATIVE
BUN SERPL-MCNC: 11 MG/DL (ref 8–23)
BUN/CREAT SERPL: 12.2 (ref 7–25)
CALCIUM SPEC-SCNC: 9.8 MG/DL (ref 8.6–10.5)
CHLORIDE SERPL-SCNC: 104 MMOL/L (ref 98–107)
CLARITY UR: CLEAR
CO2 SERPL-SCNC: 28 MMOL/L (ref 22–29)
COLOR UR: YELLOW
CREAT SERPL-MCNC: 0.9 MG/DL (ref 0.57–1)
DEPRECATED RDW RBC AUTO: 45.2 FL (ref 37–54)
EGFRCR SERPLBLD CKD-EPI 2021: 66 ML/MIN/1.73
ERYTHROCYTE [DISTWIDTH] IN BLOOD BY AUTOMATED COUNT: 12.8 % (ref 12.3–15.4)
GLOBULIN UR ELPH-MCNC: 2.9 GM/DL
GLUCOSE SERPL-MCNC: 184 MG/DL (ref 65–99)
GLUCOSE UR STRIP-MCNC: NEGATIVE MG/DL
HCT VFR BLD AUTO: 40.7 % (ref 34–46.6)
HGB BLD-MCNC: 13.2 G/DL (ref 12–15.9)
HGB UR QL STRIP.AUTO: NEGATIVE
INR PPP: 1.04 (ref 0.91–1.09)
KETONES UR QL STRIP: NEGATIVE
LEUKOCYTE ESTERASE UR QL STRIP.AUTO: NEGATIVE
MCH RBC QN AUTO: 31.3 PG (ref 26.6–33)
MCHC RBC AUTO-ENTMCNC: 32.4 G/DL (ref 31.5–35.7)
MCV RBC AUTO: 96.4 FL (ref 79–97)
NITRITE UR QL STRIP: NEGATIVE
PH UR STRIP.AUTO: 5.5 [PH] (ref 5–8)
PLATELET # BLD AUTO: 261 10*3/MM3 (ref 140–450)
PMV BLD AUTO: 9.6 FL (ref 6–12)
POTASSIUM SERPL-SCNC: 4.2 MMOL/L (ref 3.5–5.2)
PROT SERPL-MCNC: 7.1 G/DL (ref 6–8.5)
PROT UR QL STRIP: NEGATIVE
PROTHROMBIN TIME: 14 SECONDS (ref 11.8–14.8)
RBC # BLD AUTO: 4.22 10*6/MM3 (ref 3.77–5.28)
SODIUM SERPL-SCNC: 142 MMOL/L (ref 136–145)
SP GR UR STRIP: 1.01 (ref 1–1.03)
UROBILINOGEN UR QL STRIP: NORMAL
WBC NRBC COR # BLD AUTO: 7.13 10*3/MM3 (ref 3.4–10.8)

## 2024-09-26 PROCEDURE — 80053 COMPREHEN METABOLIC PANEL: CPT

## 2024-09-26 PROCEDURE — 81003 URINALYSIS AUTO W/O SCOPE: CPT

## 2024-09-26 PROCEDURE — 85730 THROMBOPLASTIN TIME PARTIAL: CPT

## 2024-09-26 PROCEDURE — 36415 COLL VENOUS BLD VENIPUNCTURE: CPT

## 2024-09-26 PROCEDURE — 85610 PROTHROMBIN TIME: CPT

## 2024-09-26 PROCEDURE — 85027 COMPLETE CBC AUTOMATED: CPT

## 2024-09-26 PROCEDURE — 93010 ELECTROCARDIOGRAM REPORT: CPT | Performed by: EMERGENCY MEDICINE

## 2024-09-26 PROCEDURE — 93005 ELECTROCARDIOGRAM TRACING: CPT

## 2024-09-26 NOTE — DISCHARGE INSTRUCTIONS
Preparing for Surgery  Follow these instructions before the procedure:  Several days or weeks before your procedure      Ask your health care provider about:  Changing or stopping your regular medicines. This is especially important if you are taking diabetes medicines or blood thinners.  Taking medicines such as aspirin and ibuprofen. These medicines can thin your blood. Do not take these medicines unless your health care provider tells you to take them.  Taking over-the-counter medicines, vitamins, herbs, and supplements.    Contact your surgeon if you:  Develop a fever of more than 100.4°F (38°C) or other feelings of illness during the 48 hours before your surgery.  Have symptoms that get worse.  Have questions or concerns about your surgery.  If you are going home the same day of your surgery you will need to arrange for a responsible adult, age 18 years old or older, to drive you home from the hospital and stay with you for 24 hours. Verification of the  will be made prior to any procedure requiring sedation. You may not go home in a taxi or any form of public transportation by yourself.     Day before your procedure    24 hours before your procedure DO NOT drink alcoholic beverages or smoke.  24 hours before your procedure STOP taking Erectile Dysfunction medication (i.e.,Cialis, Viagra)   You may be asked to shower with a germ-killing soap.  Day of your procedure   You may take the following medication(s) the morning of surgery with a sip of water: PEPCID, METOPROLOL       8 hours before your scheduled arrival time, STOP all food, any dairy products, and full liquids. This includes hard candy, chewing gum or mints. This is extremely important to prevent serious complications.     Up to 2 hours before your scheduled arrival time, you may have clear liquids no cream, powder, or pulp of any kind. Safe options are water, black coffee, plain tea, soda, Gatorade/Powerade, clear broth, apple juice.    2 hours  before your scheduled arrival time, STOP drinking clear liquids.    You may need to take another shower with a germ-killing soap before you leave home in the morning. Do not use perfumes, colognes, or body lotions.  Wear comfortable loose-fitting clothing.  Remove all jewelry including body piercing and rings, dark colored nail polish, and make up prior to arrival at the hospital. Leave all valuables at home.   Bring your hearing aids if you rely on them.  Do not wear contact lenses. If you wear eyeglasses remember to bring a case to store them in while you are in surgery.  Do not use denture adhesives since you will be asked to remove them during your surgery.    You do not need to bring your home medications into the hospital.   Bring your sleep apnea device with you on the day of your surgery (if this applies to you).  If you wear portable oxygen, bring it with you.   If you are staying overnight, you may bring a bag of items you may need such as slippers, robe and a change of clothes for your discharge. You may want to leave these items in the car until you are ready for them since your family will take your belongings when you leave the pre-operative area.  Arrive at the hospital as scheduled by the office. You will be asked to arrive 2 hours prior to your surgery time in order to prepare for your procedure.  When you arrive at the hospital  Go to the registration desk located at the main entrance of the hospital.  After registration is completed, you will be given a beeper and a sticker sheet. Take the stickers to Outpatient Surgery and place in the tray at the end of the desk to notify the staff that you have arrived and registered.   Return to the lobby to wait. You are not always called back according to the time of arrival but rather the time your doctor will be ready.  When your beeper lights up and vibrates proceed through the double doors, under the stairs, and a member of the Outpatient Surgery staff  will escort you to your preoperative room.           How to Use Chlorhexidine Before Surgery  Chlorhexidine gluconate (CHG) is a germ-killing (antiseptic) solution that is used to clean the skin. It can get rid of the bacteria that normally live on the skin and can keep them away for about 24 hours. To clean your skin with CHG, you may be given:  A CHG solution to use in the shower or as part of a sponge bath.  A prepackaged cloth that contains CHG.  Cleaning your skin with CHG may help lower the risk for infection:  While you are staying in the intensive care unit of the hospital.  If you have a vascular access, such as a central line, to provide short-term or long-term access to your veins.  If you have a catheter to drain urine from your bladder.  If you are on a ventilator. A ventilator is a machine that helps you breathe by moving air in and out of your lungs.  After surgery.  What are the risks?  Risks of using CHG include:  A skin reaction.  Hearing loss, if CHG gets in your ears and you have a perforated eardrum.  Eye injury, if CHG gets in your eyes and is not rinsed out.  The CHG product catching fire.  Make sure that you avoid smoking and flames after applying CHG to your skin.  Do not use CHG:  If you have a chlorhexidine allergy or have previously reacted to chlorhexidine.  On babies younger than 2 months of age.  How to use CHG solution  Use CHG only as told by your health care provider, and follow the instructions on the label.  Use the full amount of CHG as directed. Usually, this is one bottle.  During a shower    Follow these steps when using CHG solution during a shower (unless your health care provider gives you different instructions):  Start the shower.  Use your normal soap and shampoo to wash your face and hair.  Turn off the shower or move out of the shower stream.  Pour the CHG onto a clean washcloth. Do not use any type of brush or rough-edged sponge.  Starting at your neck, lather your  body down to your toes. Make sure you follow these instructions:  If you will be having surgery, pay special attention to the part of your body where you will be having surgery. Scrub this area for at least 1 minute.  Do not use CHG on your head or face. If the solution gets into your ears or eyes, rinse them well with water.  Avoid your genital area.  Avoid any areas of skin that have broken skin, cuts, or scrapes.  Scrub your back and under your arms. Make sure to wash skin folds.  Let the lather sit on your skin for 1-2 minutes or as long as told by your health care provider.  Thoroughly rinse your entire body in the shower. Make sure that all body creases and crevices are rinsed well.  Dry off with a clean towel. Do not put any substances on your body afterward--such as powder, lotion, or perfume--unless you are told to do so by your health care provider. Only use lotions that are recommended by the .  Put on clean clothes or pajamas.  If it is the night before your surgery, sleep in clean sheets.     During a sponge bath  Follow these steps when using CHG solution during a sponge bath (unless your health care provider gives you different instructions):  Use your normal soap and shampoo to wash your face and hair.  Pour the CHG onto a clean washcloth.  Starting at your neck, lather your body down to your toes. Make sure you follow these instructions:  If you will be having surgery, pay special attention to the part of your body where you will be having surgery. Scrub this area for at least 1 minute.  Do not use CHG on your head or face. If the solution gets into your ears or eyes, rinse them well with water.  Avoid your genital area.  Avoid any areas of skin that have broken skin, cuts, or scrapes.  Scrub your back and under your arms. Make sure to wash skin folds.  Let the lather sit on your skin for 1-2 minutes or as long as told by your health care provider.  Using a different clean, wet  washcloth, thoroughly rinse your entire body. Make sure that all body creases and crevices are rinsed well.  Dry off with a clean towel. Do not put any substances on your body afterward--such as powder, lotion, or perfume--unless you are told to do so by your health care provider. Only use lotions that are recommended by the .  Put on clean clothes or pajamas.  If it is the night before your surgery, sleep in clean sheets.  How to use CHG prepackaged cloths  Only use CHG cloths as told by your health care provider, and follow the instructions on the label.  Use the CHG cloth on clean, dry skin.  Do not use the CHG cloth on your head or face unless your health care provider tells you to.  When washing with the CHG cloth:  Avoid your genital area.  Avoid any areas of skin that have broken skin, cuts, or scrapes.  Before surgery    Follow these steps when using a CHG cloth to clean before surgery (unless your health care provider gives you different instructions):  Using the CHG cloth, vigorously scrub the part of your body where you will be having surgery. Scrub using a back-and-forth motion for 3 minutes. The area on your body should be completely wet with CHG when you are done scrubbing.  Do not rinse. Discard the cloth and let the area air-dry. Do not put any substances on the area afterward, such as powder, lotion, or perfume.  Put on clean clothes or pajamas.  If it is the night before your surgery, sleep in clean sheets.     For general bathing  Follow these steps when using CHG cloths for general bathing (unless your health care provider gives you different instructions).  Use a separate CHG cloth for each area of your body. Make sure you wash between any folds of skin and between your fingers and toes. Wash your body in the following order, switching to a new cloth after each step:  The front of your neck, shoulders, and chest.  Both of your arms, under your arms, and your hands.  Your stomach and  groin area, avoiding the genitals.  Your right leg and foot.  Your left leg and foot.  The back of your neck, your back, and your buttocks.  Do not rinse. Discard the cloth and let the area air-dry. Do not put any substances on your body afterward--such as powder, lotion, or perfume--unless you are told to do so by your health care provider. Only use lotions that are recommended by the .  Put on clean clothes or pajamas.  Contact a health care provider if:  Your skin gets irritated after scrubbing.  You have questions about using your solution or cloth.  You swallow any chlorhexidine. Call your local poison control center (1-570.531.7062 in the [place]  Get help right away if:  Your eyes itch badly, or they become very red or swollen.  Your skin itches badly and is red or swollen.  Your hearing changes.  You have trouble seeing.  You have swelling or tingling in your mouth or throat.  You have trouble breathing.  These symptoms may represent a serious problem that is an emergency. Do not wait to see if the symptoms will go away. Get medical help right away. Call your local emergency services (611 in the [place] Do not drive yourself to the hospital.  Summary  Chlorhexidine gluconate (CHG) is a germ-killing (antiseptic) solution that is used to clean the skin. Cleaning your skin with CHG may help to lower your risk for infection.  You may be given CHG to use for bathing. It may be in a bottle or in a prepackaged cloth to use on your skin. Carefully follow your health care provider's instructions and the instructions on the product label.  Do not use CHG if you have a chlorhexidine allergy.  Contact your health care provider if your skin gets irritated after scrubbing.  This information is not intended to replace advice given to you by your health care provider. Make sure you discuss any questions you have with your health care provider.  Document Revised: 04/17/2023 Document Reviewed: 02/28/2022  [place]  Patient Education © 2023 [employer]

## 2024-09-29 LAB
QT INTERVAL: 404 MS
QTC INTERVAL: 426 MS

## 2024-10-09 ENCOUNTER — HOSPITAL ENCOUNTER (INPATIENT)
Facility: HOSPITAL | Age: 79
LOS: 3 days | Discharge: HOME | DRG: 402 | End: 2024-10-12
Attending: ORTHOPAEDIC SURGERY | Admitting: ORTHOPAEDIC SURGERY
Payer: MEDICARE

## 2024-10-09 ENCOUNTER — APPOINTMENT (OUTPATIENT)
Dept: GENERAL RADIOLOGY | Facility: HOSPITAL | Age: 79
DRG: 402 | End: 2024-10-09
Payer: MEDICARE

## 2024-10-09 ENCOUNTER — ANESTHESIA (OUTPATIENT)
Dept: PERIOP | Facility: HOSPITAL | Age: 79
End: 2024-10-09
Payer: MEDICARE

## 2024-10-09 ENCOUNTER — ANESTHESIA EVENT (OUTPATIENT)
Dept: PERIOP | Facility: HOSPITAL | Age: 79
End: 2024-10-09
Payer: MEDICARE

## 2024-10-09 DIAGNOSIS — Z74.09 IMPAIRED MOBILITY: Primary | ICD-10-CM

## 2024-10-09 PROBLEM — G89.29 CHRONIC BILATERAL LOW BACK PAIN WITHOUT SCIATICA: Status: ACTIVE | Noted: 2024-10-09

## 2024-10-09 PROBLEM — M51.369 DEGENERATION OF INTERVERTEBRAL DISC OF LUMBAR REGION: Status: ACTIVE | Noted: 2024-10-09

## 2024-10-09 PROBLEM — M47.816 LUMBAR SPONDYLOSIS: Status: ACTIVE | Noted: 2024-10-09

## 2024-10-09 PROBLEM — M54.16 LUMBAR RADICULOPATHY: Status: ACTIVE | Noted: 2024-10-09

## 2024-10-09 PROBLEM — M54.50 CHRONIC BILATERAL LOW BACK PAIN WITHOUT SCIATICA: Status: ACTIVE | Noted: 2024-10-09

## 2024-10-09 PROBLEM — M51.362 DEGENERATION OF INTERVERTEBRAL DISC OF LUMBAR REGION WITH DISCOGENIC BACK PAIN AND LOWER EXTREMITY PAIN: Status: ACTIVE | Noted: 2024-10-09

## 2024-10-09 LAB
ABO GROUP BLD: NORMAL
BLD GP AB SCN SERPL QL: NEGATIVE
GLUCOSE BLDC GLUCOMTR-MCNC: 156 MG/DL (ref 70–130)
GLUCOSE BLDC GLUCOMTR-MCNC: 170 MG/DL (ref 70–130)
GLUCOSE BLDC GLUCOMTR-MCNC: 176 MG/DL (ref 70–130)
GLUCOSE BLDC GLUCOMTR-MCNC: 179 MG/DL (ref 70–130)
GLUCOSE BLDC GLUCOMTR-MCNC: 205 MG/DL (ref 70–130)
HBA1C MFR BLD: 7.4 % (ref 4.8–5.6)
RH BLD: POSITIVE
T&S EXPIRATION DATE: NORMAL

## 2024-10-09 PROCEDURE — 82948 REAGENT STRIP/BLOOD GLUCOSE: CPT

## 2024-10-09 PROCEDURE — C1713 ANCHOR/SCREW BN/BN,TIS/BN: HCPCS | Performed by: ORTHOPAEDIC SURGERY

## 2024-10-09 PROCEDURE — 25010000002 ONDANSETRON PER 1 MG: Performed by: ORTHOPAEDIC SURGERY

## 2024-10-09 PROCEDURE — 25010000002 FENTANYL CITRATE (PF) 50 MCG/ML SOLUTION: Performed by: ANESTHESIOLOGY

## 2024-10-09 PROCEDURE — 0SB20ZZ EXCISION OF LUMBAR VERTEBRAL DISC, OPEN APPROACH: ICD-10-PCS | Performed by: ORTHOPAEDIC SURGERY

## 2024-10-09 PROCEDURE — 86901 BLOOD TYPING SEROLOGIC RH(D): CPT | Performed by: ORTHOPAEDIC SURGERY

## 2024-10-09 PROCEDURE — 97161 PT EVAL LOW COMPLEX 20 MIN: CPT | Performed by: PHYSICAL THERAPIST

## 2024-10-09 PROCEDURE — C1769 GUIDE WIRE: HCPCS | Performed by: ORTHOPAEDIC SURGERY

## 2024-10-09 PROCEDURE — 25810000003 LACTATED RINGERS PER 1000 ML: Performed by: ANESTHESIOLOGY

## 2024-10-09 PROCEDURE — 25010000002 FENTANYL CITRATE (PF) 50 MCG/ML SOLUTION

## 2024-10-09 PROCEDURE — 63710000001 INSULIN GLARGINE PER 5 UNITS: Performed by: INTERNAL MEDICINE

## 2024-10-09 PROCEDURE — 25810000003 LACTATED RINGERS PER 1000 ML: Performed by: ORTHOPAEDIC SURGERY

## 2024-10-09 PROCEDURE — 25010000002 PROPOFOL 10 MG/ML EMULSION

## 2024-10-09 PROCEDURE — 97165 OT EVAL LOW COMPLEX 30 MIN: CPT

## 2024-10-09 PROCEDURE — 94799 UNLISTED PULMONARY SVC/PX: CPT

## 2024-10-09 PROCEDURE — 25010000002 LIDOCAINE PF 2% 2 % SOLUTION

## 2024-10-09 PROCEDURE — 94660 CPAP INITIATION&MGMT: CPT

## 2024-10-09 PROCEDURE — 25010000002 GLYCOPYRROLATE 0.4 MG/2ML SOLUTION

## 2024-10-09 PROCEDURE — 25010000002 HYDROMORPHONE PER 4 MG: Performed by: ANESTHESIOLOGY

## 2024-10-09 PROCEDURE — 86850 RBC ANTIBODY SCREEN: CPT | Performed by: ORTHOPAEDIC SURGERY

## 2024-10-09 PROCEDURE — 86900 BLOOD TYPING SEROLOGIC ABO: CPT | Performed by: ORTHOPAEDIC SURGERY

## 2024-10-09 PROCEDURE — 83036 HEMOGLOBIN GLYCOSYLATED A1C: CPT | Performed by: INTERNAL MEDICINE

## 2024-10-09 PROCEDURE — 25010000002 CEFAZOLIN PER 500 MG: Performed by: ORTHOPAEDIC SURGERY

## 2024-10-09 PROCEDURE — 72100 X-RAY EXAM L-S SPINE 2/3 VWS: CPT

## 2024-10-09 PROCEDURE — 76000 FLUOROSCOPY <1 HR PHYS/QHP: CPT

## 2024-10-09 PROCEDURE — 0SG00A0 FUSION OF LUMBAR VERTEBRAL JOINT WITH INTERBODY FUSION DEVICE, ANTERIOR APPROACH, ANTERIOR COLUMN, OPEN APPROACH: ICD-10-PCS | Performed by: ORTHOPAEDIC SURGERY

## 2024-10-09 PROCEDURE — 25010000002 ONDANSETRON PER 1 MG

## 2024-10-09 PROCEDURE — 25810000003 SODIUM CHLORIDE PER 500 ML: Performed by: ORTHOPAEDIC SURGERY

## 2024-10-09 PROCEDURE — 01NB0ZZ RELEASE LUMBAR NERVE, OPEN APPROACH: ICD-10-PCS | Performed by: ORTHOPAEDIC SURGERY

## 2024-10-09 DEVICE — LIF AMP, ONE SCREW PLATE, 04 WITH CENTER SCREW, RIGID, GREEN
Type: IMPLANTABLE DEVICE | Site: SPINE LUMBAR | Status: FUNCTIONAL
Brand: LIF AMP

## 2024-10-09 DEVICE — ALLOGRFT CORT NMP FIBR FZD 11.1CC LG LNG STRL: Type: IMPLANTABLE DEVICE | Site: SPINE LUMBAR | Status: FUNCTIONAL

## 2024-10-09 DEVICE — TRANSCEND NANOTEC LIF PEEK SPACER, 8 X 22 X 45 MM, 10°
Type: IMPLANTABLE DEVICE | Site: SPINE LUMBAR | Status: FUNCTIONAL
Brand: TRANSCEND NANOTEC

## 2024-10-09 DEVICE — AVITENE FLOUR, 1.0 GRAM
Type: IMPLANTABLE DEVICE | Site: SPINE LUMBAR | Status: FUNCTIONAL
Brand: AVITENE

## 2024-10-09 DEVICE — LIF AMP, Ø5.5MM X 35MM BONE SCREW X2
Type: IMPLANTABLE DEVICE | Site: SPINE LUMBAR | Status: FUNCTIONAL
Brand: AMP

## 2024-10-09 RX ORDER — SODIUM CHLORIDE 0.9 % (FLUSH) 0.9 %
3-10 SYRINGE (ML) INJECTION AS NEEDED
Status: DISCONTINUED | OUTPATIENT
Start: 2024-10-09 | End: 2024-10-09 | Stop reason: HOSPADM

## 2024-10-09 RX ORDER — DEXTROSE MONOHYDRATE 25 G/50ML
10-50 INJECTION, SOLUTION INTRAVENOUS
Status: DISCONTINUED | OUTPATIENT
Start: 2024-10-09 | End: 2024-10-09

## 2024-10-09 RX ORDER — SODIUM CHLORIDE, SODIUM LACTATE, POTASSIUM CHLORIDE, CALCIUM CHLORIDE 600; 310; 30; 20 MG/100ML; MG/100ML; MG/100ML; MG/100ML
1000 INJECTION, SOLUTION INTRAVENOUS CONTINUOUS
Status: DISCONTINUED | OUTPATIENT
Start: 2024-10-09 | End: 2024-10-09

## 2024-10-09 RX ORDER — NALOXONE HCL 0.4 MG/ML
0.04 VIAL (ML) INJECTION AS NEEDED
Status: DISCONTINUED | OUTPATIENT
Start: 2024-10-09 | End: 2024-10-09 | Stop reason: HOSPADM

## 2024-10-09 RX ORDER — GLYCOPYRROLATE 0.2 MG/ML
INJECTION INTRAMUSCULAR; INTRAVENOUS AS NEEDED
Status: DISCONTINUED | OUTPATIENT
Start: 2024-10-09 | End: 2024-10-09 | Stop reason: SURG

## 2024-10-09 RX ORDER — INSULIN LISPRO 100 [IU]/ML
2-9 INJECTION, SOLUTION INTRAVENOUS; SUBCUTANEOUS
Status: DISCONTINUED | OUTPATIENT
Start: 2024-10-09 | End: 2024-10-10

## 2024-10-09 RX ORDER — LIDOCAINE HYDROCHLORIDE 10 MG/ML
0.5 INJECTION, SOLUTION EPIDURAL; INFILTRATION; INTRACAUDAL; PERINEURAL ONCE AS NEEDED
Status: DISCONTINUED | OUTPATIENT
Start: 2024-10-09 | End: 2024-10-09 | Stop reason: HOSPADM

## 2024-10-09 RX ORDER — BISACODYL 10 MG
10 SUPPOSITORY, RECTAL RECTAL DAILY PRN
Status: DISCONTINUED | OUTPATIENT
Start: 2024-10-09 | End: 2024-10-12 | Stop reason: HOSPADM

## 2024-10-09 RX ORDER — AMOXICILLIN 250 MG
2 CAPSULE ORAL 2 TIMES DAILY
Status: DISCONTINUED | OUTPATIENT
Start: 2024-10-09 | End: 2024-10-12 | Stop reason: HOSPADM

## 2024-10-09 RX ORDER — LABETALOL HYDROCHLORIDE 5 MG/ML
5 INJECTION, SOLUTION INTRAVENOUS
Status: DISCONTINUED | OUTPATIENT
Start: 2024-10-09 | End: 2024-10-09 | Stop reason: HOSPADM

## 2024-10-09 RX ORDER — ONDANSETRON 4 MG/1
4 TABLET, ORALLY DISINTEGRATING ORAL EVERY 6 HOURS PRN
Status: DISCONTINUED | OUTPATIENT
Start: 2024-10-09 | End: 2024-10-12 | Stop reason: HOSPADM

## 2024-10-09 RX ORDER — ONDANSETRON 2 MG/ML
INJECTION INTRAMUSCULAR; INTRAVENOUS AS NEEDED
Status: DISCONTINUED | OUTPATIENT
Start: 2024-10-09 | End: 2024-10-09 | Stop reason: SURG

## 2024-10-09 RX ORDER — PROPOFOL 10 MG/ML
VIAL (ML) INTRAVENOUS AS NEEDED
Status: DISCONTINUED | OUTPATIENT
Start: 2024-10-09 | End: 2024-10-09 | Stop reason: SURG

## 2024-10-09 RX ORDER — FENTANYL CITRATE 50 UG/ML
50 INJECTION, SOLUTION INTRAMUSCULAR; INTRAVENOUS
Status: COMPLETED | OUTPATIENT
Start: 2024-10-09 | End: 2024-10-09

## 2024-10-09 RX ORDER — IBUPROFEN 600 MG/1
1 TABLET ORAL
Status: DISCONTINUED | OUTPATIENT
Start: 2024-10-09 | End: 2024-10-12 | Stop reason: HOSPADM

## 2024-10-09 RX ORDER — SODIUM CHLORIDE, SODIUM LACTATE, POTASSIUM CHLORIDE, CALCIUM CHLORIDE 600; 310; 30; 20 MG/100ML; MG/100ML; MG/100ML; MG/100ML
100 INJECTION, SOLUTION INTRAVENOUS CONTINUOUS
Status: DISCONTINUED | OUTPATIENT
Start: 2024-10-09 | End: 2024-10-09

## 2024-10-09 RX ORDER — SODIUM CHLORIDE 9 MG/ML
40 INJECTION, SOLUTION INTRAVENOUS AS NEEDED
Status: DISCONTINUED | OUTPATIENT
Start: 2024-10-09 | End: 2024-10-09 | Stop reason: HOSPADM

## 2024-10-09 RX ORDER — ONDANSETRON 2 MG/ML
4 INJECTION INTRAMUSCULAR; INTRAVENOUS EVERY 6 HOURS PRN
Status: DISCONTINUED | OUTPATIENT
Start: 2024-10-09 | End: 2024-10-12 | Stop reason: HOSPADM

## 2024-10-09 RX ORDER — SODIUM CHLORIDE 0.9 % (FLUSH) 0.9 %
10 SYRINGE (ML) INJECTION AS NEEDED
Status: DISCONTINUED | OUTPATIENT
Start: 2024-10-09 | End: 2024-10-12 | Stop reason: HOSPADM

## 2024-10-09 RX ORDER — POLYETHYLENE GLYCOL 3350 17 G/17G
17 POWDER, FOR SOLUTION ORAL DAILY PRN
Status: DISCONTINUED | OUTPATIENT
Start: 2024-10-09 | End: 2024-10-12 | Stop reason: HOSPADM

## 2024-10-09 RX ORDER — GUAIFENESIN 600 MG/1
600 TABLET, EXTENDED RELEASE ORAL EVERY 12 HOURS SCHEDULED
Status: DISCONTINUED | OUTPATIENT
Start: 2024-10-09 | End: 2024-10-12 | Stop reason: HOSPADM

## 2024-10-09 RX ORDER — SODIUM CHLORIDE 9 MG/ML
INJECTION, SOLUTION INTRAVENOUS AS NEEDED
Status: DISCONTINUED | OUTPATIENT
Start: 2024-10-09 | End: 2024-10-09 | Stop reason: HOSPADM

## 2024-10-09 RX ORDER — INSULIN LISPRO 100 [IU]/ML
1-200 INJECTION, SOLUTION INTRAVENOUS; SUBCUTANEOUS
Status: DISCONTINUED | OUTPATIENT
Start: 2024-10-09 | End: 2024-10-09

## 2024-10-09 RX ORDER — ACETAMINOPHEN 500 MG
1000 TABLET ORAL ONCE
Status: COMPLETED | OUTPATIENT
Start: 2024-10-09 | End: 2024-10-09

## 2024-10-09 RX ORDER — ACETAMINOPHEN 325 MG/1
650 TABLET ORAL EVERY 4 HOURS PRN
Status: DISCONTINUED | OUTPATIENT
Start: 2024-10-09 | End: 2024-10-12 | Stop reason: HOSPADM

## 2024-10-09 RX ORDER — FLUMAZENIL 0.1 MG/ML
0.2 INJECTION INTRAVENOUS AS NEEDED
Status: DISCONTINUED | OUTPATIENT
Start: 2024-10-09 | End: 2024-10-09 | Stop reason: HOSPADM

## 2024-10-09 RX ORDER — METOPROLOL SUCCINATE 25 MG/1
25 TABLET, EXTENDED RELEASE ORAL EVERY 12 HOURS SCHEDULED
Status: DISCONTINUED | OUTPATIENT
Start: 2024-10-09 | End: 2024-10-09

## 2024-10-09 RX ORDER — BISACODYL 5 MG/1
5 TABLET, DELAYED RELEASE ORAL DAILY PRN
Status: DISCONTINUED | OUTPATIENT
Start: 2024-10-09 | End: 2024-10-12 | Stop reason: HOSPADM

## 2024-10-09 RX ORDER — ROCURONIUM BROMIDE 10 MG/ML
INJECTION, SOLUTION INTRAVENOUS AS NEEDED
Status: DISCONTINUED | OUTPATIENT
Start: 2024-10-09 | End: 2024-10-09 | Stop reason: SURG

## 2024-10-09 RX ORDER — SODIUM CHLORIDE 9 MG/ML
75 INJECTION, SOLUTION INTRAVENOUS CONTINUOUS
Status: DISCONTINUED | OUTPATIENT
Start: 2024-10-09 | End: 2024-10-09

## 2024-10-09 RX ORDER — SODIUM CHLORIDE, SODIUM LACTATE, POTASSIUM CHLORIDE, CALCIUM CHLORIDE 600; 310; 30; 20 MG/100ML; MG/100ML; MG/100ML; MG/100ML
100 INJECTION, SOLUTION INTRAVENOUS CONTINUOUS PRN
Status: DISCONTINUED | OUTPATIENT
Start: 2024-10-09 | End: 2024-10-09 | Stop reason: HOSPADM

## 2024-10-09 RX ORDER — SODIUM CHLORIDE 0.9 % (FLUSH) 0.9 %
3 SYRINGE (ML) INJECTION EVERY 12 HOURS SCHEDULED
Status: DISCONTINUED | OUTPATIENT
Start: 2024-10-09 | End: 2024-10-12 | Stop reason: HOSPADM

## 2024-10-09 RX ORDER — SODIUM CHLORIDE 0.9 % (FLUSH) 0.9 %
10 SYRINGE (ML) INJECTION AS NEEDED
Status: DISCONTINUED | OUTPATIENT
Start: 2024-10-09 | End: 2024-10-09 | Stop reason: HOSPADM

## 2024-10-09 RX ORDER — SUCCINYLCHOLINE/SOD CL,ISO/PF 200MG/10ML
SYRINGE (ML) INTRAVENOUS AS NEEDED
Status: DISCONTINUED | OUTPATIENT
Start: 2024-10-09 | End: 2024-10-09 | Stop reason: SURG

## 2024-10-09 RX ORDER — ONDANSETRON 2 MG/ML
4 INJECTION INTRAMUSCULAR; INTRAVENOUS
Status: DISCONTINUED | OUTPATIENT
Start: 2024-10-09 | End: 2024-10-09 | Stop reason: HOSPADM

## 2024-10-09 RX ORDER — OXYCODONE HCL 20 MG/1
20 TABLET, FILM COATED, EXTENDED RELEASE ORAL ONCE
Status: COMPLETED | OUTPATIENT
Start: 2024-10-09 | End: 2024-10-09

## 2024-10-09 RX ORDER — NICOTINE POLACRILEX 4 MG
15 LOZENGE BUCCAL
Status: DISCONTINUED | OUTPATIENT
Start: 2024-10-09 | End: 2024-10-12 | Stop reason: HOSPADM

## 2024-10-09 RX ORDER — NICOTINE POLACRILEX 4 MG
15 LOZENGE BUCCAL
Status: DISCONTINUED | OUTPATIENT
Start: 2024-10-09 | End: 2024-10-09

## 2024-10-09 RX ORDER — OXYCODONE AND ACETAMINOPHEN 10; 325 MG/1; MG/1
1 TABLET ORAL EVERY 4 HOURS PRN
Status: DISCONTINUED | OUTPATIENT
Start: 2024-10-09 | End: 2024-10-09 | Stop reason: HOSPADM

## 2024-10-09 RX ORDER — SODIUM CHLORIDE 0.9 % (FLUSH) 0.9 %
10 SYRINGE (ML) INJECTION EVERY 12 HOURS SCHEDULED
Status: DISCONTINUED | OUTPATIENT
Start: 2024-10-09 | End: 2024-10-09 | Stop reason: HOSPADM

## 2024-10-09 RX ORDER — INSULIN LISPRO 100 [IU]/ML
1-200 INJECTION, SOLUTION INTRAVENOUS; SUBCUTANEOUS AS NEEDED
Status: DISCONTINUED | OUTPATIENT
Start: 2024-10-09 | End: 2024-10-09

## 2024-10-09 RX ORDER — HYDROMORPHONE HYDROCHLORIDE 1 MG/ML
0.5 INJECTION, SOLUTION INTRAMUSCULAR; INTRAVENOUS; SUBCUTANEOUS
Status: DISCONTINUED | OUTPATIENT
Start: 2024-10-09 | End: 2024-10-09 | Stop reason: HOSPADM

## 2024-10-09 RX ORDER — SODIUM CHLORIDE 0.9 % (FLUSH) 0.9 %
3 SYRINGE (ML) INJECTION EVERY 12 HOURS SCHEDULED
Status: DISCONTINUED | OUTPATIENT
Start: 2024-10-09 | End: 2024-10-09 | Stop reason: HOSPADM

## 2024-10-09 RX ORDER — EPHEDRINE SULFATE 50 MG/ML
INJECTION INTRAVENOUS AS NEEDED
Status: DISCONTINUED | OUTPATIENT
Start: 2024-10-09 | End: 2024-10-09 | Stop reason: SURG

## 2024-10-09 RX ORDER — FENTANYL CITRATE 50 UG/ML
INJECTION, SOLUTION INTRAMUSCULAR; INTRAVENOUS AS NEEDED
Status: DISCONTINUED | OUTPATIENT
Start: 2024-10-09 | End: 2024-10-09 | Stop reason: SURG

## 2024-10-09 RX ORDER — DEXTROSE MONOHYDRATE 25 G/50ML
25 INJECTION, SOLUTION INTRAVENOUS
Status: DISCONTINUED | OUTPATIENT
Start: 2024-10-09 | End: 2024-10-12 | Stop reason: HOSPADM

## 2024-10-09 RX ORDER — DEXTROSE MONOHYDRATE 25 G/50ML
25 INJECTION, SOLUTION INTRAVENOUS
Status: DISCONTINUED | OUTPATIENT
Start: 2024-10-09 | End: 2024-10-09

## 2024-10-09 RX ORDER — ATORVASTATIN CALCIUM 10 MG/1
20 TABLET, FILM COATED ORAL DAILY
Status: DISCONTINUED | OUTPATIENT
Start: 2024-10-09 | End: 2024-10-12 | Stop reason: HOSPADM

## 2024-10-09 RX ORDER — SODIUM CHLORIDE 0.9 % (FLUSH) 0.9 %
3 SYRINGE (ML) INJECTION AS NEEDED
Status: DISCONTINUED | OUTPATIENT
Start: 2024-10-09 | End: 2024-10-09 | Stop reason: HOSPADM

## 2024-10-09 RX ORDER — ACETAMINOPHEN 650 MG/1
650 SUPPOSITORY RECTAL EVERY 4 HOURS PRN
Status: DISCONTINUED | OUTPATIENT
Start: 2024-10-09 | End: 2024-10-12 | Stop reason: HOSPADM

## 2024-10-09 RX ORDER — IBUPROFEN 600 MG/1
1 TABLET ORAL
Status: DISCONTINUED | OUTPATIENT
Start: 2024-10-09 | End: 2024-10-09

## 2024-10-09 RX ORDER — DROPERIDOL 2.5 MG/ML
0.62 INJECTION, SOLUTION INTRAMUSCULAR; INTRAVENOUS ONCE AS NEEDED
Status: DISCONTINUED | OUTPATIENT
Start: 2024-10-09 | End: 2024-10-09 | Stop reason: HOSPADM

## 2024-10-09 RX ORDER — MAGNESIUM HYDROXIDE 1200 MG/15ML
LIQUID ORAL AS NEEDED
Status: DISCONTINUED | OUTPATIENT
Start: 2024-10-09 | End: 2024-10-09 | Stop reason: HOSPADM

## 2024-10-09 RX ORDER — METOPROLOL SUCCINATE 25 MG/1
25 TABLET, EXTENDED RELEASE ORAL EVERY 12 HOURS SCHEDULED
Status: DISCONTINUED | OUTPATIENT
Start: 2024-10-09 | End: 2024-10-12 | Stop reason: HOSPADM

## 2024-10-09 RX ORDER — HYDROCODONE BITARTRATE AND ACETAMINOPHEN 5; 325 MG/1; MG/1
1 TABLET ORAL EVERY 4 HOURS PRN
Status: DISCONTINUED | OUTPATIENT
Start: 2024-10-09 | End: 2024-10-12 | Stop reason: HOSPADM

## 2024-10-09 RX ORDER — ACETAMINOPHEN 160 MG/5ML
650 SOLUTION ORAL EVERY 4 HOURS PRN
Status: DISCONTINUED | OUTPATIENT
Start: 2024-10-09 | End: 2024-10-12 | Stop reason: HOSPADM

## 2024-10-09 RX ORDER — LIDOCAINE HYDROCHLORIDE 20 MG/ML
INJECTION, SOLUTION EPIDURAL; INFILTRATION; INTRACAUDAL; PERINEURAL AS NEEDED
Status: DISCONTINUED | OUTPATIENT
Start: 2024-10-09 | End: 2024-10-09 | Stop reason: SURG

## 2024-10-09 RX ORDER — CETIRIZINE HYDROCHLORIDE 10 MG/1
10 TABLET ORAL DAILY
Status: DISCONTINUED | OUTPATIENT
Start: 2024-10-09 | End: 2024-10-12 | Stop reason: HOSPADM

## 2024-10-09 RX ORDER — FAMOTIDINE 20 MG/1
40 TABLET, FILM COATED ORAL
Status: DISCONTINUED | OUTPATIENT
Start: 2024-10-09 | End: 2024-10-12 | Stop reason: HOSPADM

## 2024-10-09 RX ADMIN — PROPOFOL 50 MG: 10 INJECTION, EMULSION INTRAVENOUS at 07:30

## 2024-10-09 RX ADMIN — Medication 100 MG: at 07:21

## 2024-10-09 RX ADMIN — ROCURONIUM 5 MG: 50 INJECTION, SOLUTION INTRAVENOUS at 07:21

## 2024-10-09 RX ADMIN — FENTANYL CITRATE 100 MCG: 50 INJECTION, SOLUTION INTRAMUSCULAR; INTRAVENOUS at 07:17

## 2024-10-09 RX ADMIN — Medication 3 ML: at 20:57

## 2024-10-09 RX ADMIN — HYDROCODONE BITARTRATE AND ACETAMINOPHEN 1 TABLET: 5; 325 TABLET ORAL at 18:46

## 2024-10-09 RX ADMIN — CEFAZOLIN 2000 MG: 2 INJECTION, POWDER, FOR SOLUTION INTRAMUSCULAR; INTRAVENOUS at 07:38

## 2024-10-09 RX ADMIN — DOCUSATE SODIUM 50 MG AND SENNOSIDES 8.6 MG 2 TABLET: 8.6; 5 TABLET, FILM COATED ORAL at 20:50

## 2024-10-09 RX ADMIN — EPHEDRINE SULFATE 10 MG: 50 INJECTION INTRAVENOUS at 07:30

## 2024-10-09 RX ADMIN — FENTANYL CITRATE 50 MCG: 50 INJECTION, SOLUTION INTRAMUSCULAR; INTRAVENOUS at 09:20

## 2024-10-09 RX ADMIN — FENTANYL CITRATE 50 MCG: 50 INJECTION, SOLUTION INTRAMUSCULAR; INTRAVENOUS at 07:45

## 2024-10-09 RX ADMIN — ONDANSETRON 4 MG: 2 INJECTION INTRAMUSCULAR; INTRAVENOUS at 18:01

## 2024-10-09 RX ADMIN — PROPOFOL 150 MCG/KG/MIN: 10 INJECTION, EMULSION INTRAVENOUS at 07:24

## 2024-10-09 RX ADMIN — LIDOCAINE HYDROCHLORIDE 100 MG: 20 INJECTION, SOLUTION EPIDURAL; INFILTRATION; INTRACAUDAL; PERINEURAL at 07:21

## 2024-10-09 RX ADMIN — OXYCODONE HYDROCHLORIDE 20 MG: 20 TABLET, FILM COATED, EXTENDED RELEASE ORAL at 06:04

## 2024-10-09 RX ADMIN — GUAIFENESIN 600 MG: 600 TABLET, EXTENDED RELEASE ORAL at 17:27

## 2024-10-09 RX ADMIN — ACETAMINOPHEN 1000 MG: 500 TABLET, FILM COATED ORAL at 06:39

## 2024-10-09 RX ADMIN — INSULIN GLARGINE 45 UNITS: 100 INJECTION, SOLUTION SUBCUTANEOUS at 20:50

## 2024-10-09 RX ADMIN — GLYCOPYRROLATE 0.2 MG: 0.2 INJECTION INTRAMUSCULAR; INTRAVENOUS at 07:31

## 2024-10-09 RX ADMIN — HYDROMORPHONE HYDROCHLORIDE 0.5 MG: 1 INJECTION, SOLUTION INTRAMUSCULAR; INTRAVENOUS; SUBCUTANEOUS at 10:35

## 2024-10-09 RX ADMIN — SODIUM CHLORIDE, POTASSIUM CHLORIDE, SODIUM LACTATE AND CALCIUM CHLORIDE: 600; 310; 30; 20 INJECTION, SOLUTION INTRAVENOUS at 07:14

## 2024-10-09 RX ADMIN — PROPOFOL 100 MG: 10 INJECTION, EMULSION INTRAVENOUS at 07:44

## 2024-10-09 RX ADMIN — OXYCODONE AND ACETAMINOPHEN 1 TABLET: 325; 10 TABLET ORAL at 09:29

## 2024-10-09 RX ADMIN — ACETAMINOPHEN 650 MG: 325 TABLET ORAL at 20:53

## 2024-10-09 RX ADMIN — FENTANYL CITRATE 50 MCG: 50 INJECTION, SOLUTION INTRAMUSCULAR; INTRAVENOUS at 09:44

## 2024-10-09 RX ADMIN — Medication 3 ML: at 12:52

## 2024-10-09 RX ADMIN — SODIUM CHLORIDE, POTASSIUM CHLORIDE, SODIUM LACTATE AND CALCIUM CHLORIDE 1000 ML: 600; 310; 30; 20 INJECTION, SOLUTION INTRAVENOUS at 06:06

## 2024-10-09 RX ADMIN — HYDROMORPHONE HYDROCHLORIDE 0.5 MG: 1 INJECTION, SOLUTION INTRAMUSCULAR; INTRAVENOUS; SUBCUTANEOUS at 09:40

## 2024-10-09 RX ADMIN — FENTANYL CITRATE 100 MCG: 50 INJECTION, SOLUTION INTRAMUSCULAR; INTRAVENOUS at 07:28

## 2024-10-09 RX ADMIN — HYDROMORPHONE HYDROCHLORIDE 0.5 MG: 1 INJECTION, SOLUTION INTRAMUSCULAR; INTRAVENOUS; SUBCUTANEOUS at 09:15

## 2024-10-09 RX ADMIN — CEFAZOLIN 2000 MG: 2 INJECTION, POWDER, FOR SOLUTION INTRAMUSCULAR; INTRAVENOUS at 17:20

## 2024-10-09 RX ADMIN — ONDANSETRON 4 MG: 2 INJECTION INTRAMUSCULAR; INTRAVENOUS at 08:26

## 2024-10-09 RX ADMIN — PROPOFOL 140 MG: 10 INJECTION, EMULSION INTRAVENOUS at 07:21

## 2024-10-09 RX ADMIN — METOPROLOL SUCCINATE 25 MG: 25 TABLET, EXTENDED RELEASE ORAL at 17:27

## 2024-10-09 NOTE — PROGRESS NOTES
[facility] Medicine Consult  Consults    Date of Admission: 10/9/2024  Date of Consult: 10/09/24    Primary Care Physician: Mike Erazo MD  Referring Physician: Dr. Horton  Chief Complaint/Reason for Consultation: Diabetic management.    Subjective   History of Present Illness  Patient is a 79-year-old woman who has significant past medical history as listed below.  The hospitalist service requested to see patient regarding diabetic management.  Record indicates that she uses Toujeo 50 units nightly.  There is also lispro.  I am not sure the dose but could be 60 units 3 times a day.  I do not have record of A1c since December 29, 2022 (7.4%)  Her blood sugar runs anywhere from 156-205.  She is currently on Glucomander.   Discussed with pharmacist regarding Glucomander.  Apparently had not been using this because of how tedious it is.  The patient might be better served using home regimen and sliding scale insulin with as needed hypoglycemic protocol.    Patient  lateral interbody fusion today (both the cervical and lumbar spine fusion).  10/9/2024     Pre-op Diagnosis:      1. Status post previous right C7-T1 foraminotomy, diskectomy, Dr. Garcia, December 2004   2. Status post ACDF C5-7, 04/19/2019   3. Status post previous left L5-S1 decompression, Dr. Barnett, October 2002   4. Status post left LLIF L3-5 with instrumentation L3-4, 03/13/2017   5. Status post right L3-S1 decompression, PSF with instrumentation L3-S1, 03/15/2017   6. Recurrent low back pain, left side   7. Left anterior thigh radiculopathy   8. Multilevel thoracolumbar degenerative disc disease, worst at L2-3   9. Focal scoliosis concave left L2-3   10. Facet arthropathy L2-3   11. Central and foraminal stenosis left worse than right L2-3   12. Possible pseudoarthrosis L5-S1   13. Mild lucency bilateral S1 pedicle screws     Post-op Diagnosis:     same     Procedure/CPT® Codes:     1.  Right lateral  lumbar interbody fusion L2-3  2.  Anterior spinal instrumentation L2-3 (ATEC lateral plate and screw)  3.  Use of PEEK interbody biomechanical device for fusion L2-3 (ATEC PEEK spacer)  4.  Use of allograft bone matrix for fusion (Induce NMP Fibers)  5.  Use of fluoroscopy for confirmation of surgical level, placement of PEEK spacer and instrumentation  6.  Intraoperative neural monitoring       I spoke to patient, nurse and pharmacist at bedside.  She expressed concern about her medication and her body.  She would like to use her Toujeo and insulin regimen at home.  She has continuous blood glucose monitor attached to her right arm.  She expressed that she is just used to this for several years now.    As mentioned earlier she is on Glucomander here in our hospital.  I tried to accommodate her preference but apparently pharmacist Hung expressed limitation in doing so.  As compromise address her medical issue, we are all doing an agreement to continue on harmlessly substituted basal insulin and will place the patient on sliding scale insulin.  There will be a as needed hypoglycemic protocol as well.  We can sort this things out in the morning further when the head pharmacist is available and see if patient can use her own medications.      Review of Systems   No complaint of shortness of breath difficulty breathing, chest pain, palpitation, swelling  No complaint of nausea vomiting or abdominal pain  No dizziness or lightheadedness  No syncope or near syncopal episode    Otherwise complete ROS is negative except as mentioned above.    Past Medical History:   Past Medical History:   Diagnosis Date    Anesthesia complication     severe nausea and vomitig    Arthritis     Back pain     Basal cell carcinoma     Blister of great toe of left foot     Nonthermal, initial encounter    Bunion     Cancer     skin     Dyslipidemia     Elevated blood pressure     Essential hypertension 09/13/2016    Nimesh LECHUGA  (gastroesophageal reflux disease)     History of cerebrovascular accident     1999    Hyperlipidemia     Hypo-osmolality and hyponatremia     Ingrowing nail     Melanoma     Neuropathy in diabetes     Some    Onychomycosis     Plantar fasciitis     PONV (postoperative nausea and vomiting)     Sleep apnea     Snores     Stroke     TIA (transient ischemic attack) 1999    After stroke    Type 2 diabetes mellitus     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    Urinary incontinence     Vitamin D deficiency      Past Surgical History:  Past Surgical History:   Procedure Laterality Date    ANTERIOR CERVICAL DISCECTOMY W/ FUSION N/A 04/19/2019    Procedure: ANTERIOR CERVICAL DISCECTOMY FUSION C5-7;  Surgeon: MIGDALIA Horton MD;  Location:  [facility] OR;  Service: Orthopedic Spine    ARM LESION/CYST EXCISION Right 08/17/2022    Procedure: RIGHT WRIST MARGINAL MASS EXCISION;  Surgeon: Magen Pardo MD;  Location:  [facility] OR;  Service: Orthopedics;  Laterality: Right;    BACK SURGERY      BLEPHAROPLASTY      CARDIAC CATHETERIZATION      CARPAL TUNNEL RELEASE      CERVICAL SPINE SURGERY      cydney-laminotomy c7-t1    COLONOSCOPY      ENDOSCOPY      EYE SURGERY Bilateral     cataracts     FOOT SURGERY      Toe nails removed    LAPAROSCOPIC CHOLECYSTECTOMY      LUMBAR DISC SURGERY      LUMBAR FUSION Left 03/13/2017    Procedure: LEFT LUMBAR LATERAL INTERBODY FUSION L 3-5 WITH INSTRUMENTATION;  Surgeon: MIGDALIA Horton MD;  Location:  [facility];  Service:     LUMBAR LAMINECTOMY WITH FUSION Right 03/15/2017    Procedure: RIGHT L3-S1 POSSIBLE RIGHT L 3-4 HEMILAMINECTOMY FACETECTOMY DECOMPRESSION TRANSFORAMINAL LUMBAR INTERBODY FUSION L4-S1 POSTERIOR SPINAL FUSION WITH INSTRUMENTATION L3-S1;  Surgeon: MIGDALIA Horton MD;  Location:  [facility] OR;  Service:     MYRINGOTOMY W/ TUBES      NAIL BED REMOVAL/REVISION  03/11/2016    NECK SURGERY      NISSEN FUNDOPLICATION LAPAROSCOPIC      OTHER SURGICAL HISTORY       had left and right big toenials removed mar 2016    TOENAIL EXCISION      Both big toes    WRIST SURGERY Right     ganglian cyst removal     Social History:  reports that she has never smoked. She has never been exposed to tobacco smoke. She has never used smokeless tobacco. She reports that she does not drink alcohol and does not use drugs.    Family History: family history includes Cancer in her mother and another family member; Diabetes in her brother and another family member; Heart disease in her brother, father, and another family member; Hypertension in an other family member.     Allergies:   Allergies   Allergen Reactions    Erythromycin Other (See Comments) and Shortness Of Breath     Eyes see big black spots    Humulin N [Insulin Isophane] Shortness Of Breath    Mold Extract [Trichophyton] Shortness Of Breath    Sudafed [Pseudoephedrine] Shortness Of Breath    Sulfa Antibiotics Shortness Of Breath    Other Itching     Silk Tape    Adhesive Tape Itching    Molds & Smuts Itching     Medications: Scheduled Meds:atorvastatin, 20 mg, Oral, Daily  ceFAZolin, 2,000 mg, Intravenous, Q8H  cetirizine, 10 mg, Oral, Daily  famotidine, 40 mg, Oral, BID AC  guaiFENesin, 600 mg, Oral, Q12H  insulin glargine, 1-200 Units, Subcutaneous, Nightly - Glucommander  insulin lispro, 1-200 Units, Subcutaneous, 4x Daily With Meals & Nightly  metoprolol succinate XL, 25 mg, Oral, Q12H  senna-docusate sodium, 2 tablet, Oral, BID  sodium chloride, 3 mL, Intravenous, Q12H      Continuous Infusions:   PRN Meds:.  acetaminophen **OR** acetaminophen **OR** acetaminophen    senna-docusate sodium **AND** polyethylene glycol **AND** bisacodyl **AND** bisacodyl    dextrose    dextrose    dextrose    dextrose    glucagon (human recombinant)    HYDROcodone-acetaminophen    insulin lispro    ondansetron ODT **OR** ondansetron    sodium chloride    I have utilized all available immediate resources to obtain, update, or review the patient's  current medications (including all prescriptions, over-the-counter products, herbals, cannabis/cannabidiol products, and vitamin/mineral/dietary (nutritional) supplements).     Objective   Objective    Physical Exam:   Temp:  [97.1 °F (36.2 °C)-97.6 °F (36.4 °C)] 97.5 °F (36.4 °C)  Heart Rate:  [48-78] 58  Resp:  [12-18] 16  BP: ()/(42-80) 105/51  Physical Exam  Hemodynamically stable  GEN: Awake, alert, interactive, in NAD  HEENT: Atraumatic, PERRLA, EOMI, Anicteric, Trachea midline  Lungs: CTAB, no wheezing/rales/rhonchi  Heart: RRR, +S1/s2, no rub  ABD: soft, nt/nd, +BS, no guarding/rebound  Extremities: atraumatic, no cyanosis, no edema  Skin: no rashes or lesions  Neuro: AAOx3, no focal deficits  Psych: normal mood & affect    Results Reviewed:  I have personally reviewed current lab, radiology, and data and agree with results.  Lab Results (last 24 hours)       Procedure Component Value Units Date/Time    POC Glucose Once [[id number]]  (Abnormal) Collected: 10/09/24 1205    Specimen: Blood Updated: 10/09/24 1239     Glucose 156 mg/dL      Comment: : [id number]  [place]ierstonMeter ID: [id number]       POC Glucose Once [[id number]]  (Abnormal) Collected: 10/09/24 0851    Specimen: Blood Updated: 10/09/24 0903     Glucose 176 mg/dL      Comment: : [id number] Sudheer LoriMeter ID: [id number]       POC Glucose Once [[id number]]  (Abnormal) Collected: 10/09/24 0553    Specimen: Blood Updated: 10/09/24 0604     Glucose 170 mg/dL      Comment: : [id number] [place]  DMeter ID: [id number]             Imaging Results (Last 24 Hours)       Procedure Component Value Units Date/Time    XR Spine Lumbar AP & Lateral [[id number]] Collected: 10/09/24 0837     Updated: 10/09/24 0842    Narrative:      EXAMINATION:  XR SPINE LUMBAR AP AND LATERAL-  10/9/2024 6:15 AM     HISTORY: Lumbar interbody fusion.     COMPARISON: No comparison study.     NUMBER OF IMAGES: 4     FLUOROSCOPY TIME: 21 seconds.      FLUOROSCOPIC DOSE: 15.9 mGy.     FINDINGS: Fluoroscopic images demonstrate pre-existing lumbar fusion  extending from L3-S1. A new fusion was performed at the L2-3 level  today. The L2-3 fusion implant appears to be well positioned. Bone  detail is limited. Please see the operative report separately.          Impression:      Operative images, as discussed.     This report was signed and finalized on 10/9/2024 8:39 AM by Dr. Jason Hayes MD.       FL C Arm During Surgery [[id number]] Resulted: 10/09/24 0824     Updated: 10/09/24 0824    Narrative:      This procedure was auto-finalized with no dictation required.            Assessment / Plan   Assessment:   Active Hospital Problems    Diagnosis     **Lumbar radiculopathy     Chronic bilateral low back pain without sciatica     Lumbar spondylosis     Degeneration of intervertebral disc of lumbar region with discogenic back pain and lower extremity pain     Degeneration of intervertebral disc of lumbar region           Medical Decision Making  Number and Complexity of problems:  Lumbar radiculopathy status post cervical and lumbar interbody fusion October 9 by Dr. Horton  Diabetes mellitus type 2 insulin treated.  Labile blood pressure reading.  Has had blood pressures 80/61-possibly intraoperatively but also had as high as 185/60 earlier today at 5:33 AM.  Bradycardia-parameter for beta-blocker in place  Hyperlipidemia-continue statin          Treatment Plan  I think patient would best be served by using basal insulin with sliding scale insulin instead of Glucomander.  Discussed with nursing staff.  Discussed with pharmacist.  Will check A1c level.  Patient typically follows with Dr. Regan Jules in outpatient setting.  Will need to monitor blood pressure.    Postoperative care per primary service.      atorvastatin, 20 mg, Oral, Daily  ceFAZolin, 2,000 mg, Intravenous, Q8H  cetirizine, 10 mg, Oral, Daily  famotidine, 40 mg, Oral, BID AC  guaiFENesin, 600 mg,  Oral, Q12H  insulin glargine, 1-200 Units, Subcutaneous, Nightly - Glucommander  insulin lispro, 1-200 Units, Subcutaneous, 4x Daily With Meals & Nightly  metoprolol succinate XL, 25 mg, Oral, Q12H  senna-docusate sodium, 2 tablet, Oral, BID  sodium chloride, 3 mL, Intravenous, Q12H        Conditions and Status  Fair, stable     [facility] Data  External documents reviewed: [facility] in [facility]  Cardiac tracing (EKG, telemetry) interpretation: Normal sinus rhythm from EKG September 26, 2024  Radiology interpretation: Reviewed as above  Labs reviewed: Sugar reading noted  Any tests that were considered but not ordered: None     Decision rules/scores evaluated (example [id number], Wells, etc): -     Discussed with: Patient, nursing staff and pharmacist Hung     Care Planning  Shared decision making: Patient and primary service  Code status and discussions: Full code    Disposition  Social Determinants of Health that impact treatment or disposition: None identified at this time  I expect the patient to be discharged by the primary service.     I confirmed that the patient's Advance Care Plan is present, code status is documented, or surrogate decision maker is listed in the patient's medical record.     The patient's surrogate decision maker is spouse  Patient seen and examined by me on   Electronically signed by Osbaldo Marinelli MD, 10/09/24, 17:16 CDT.

## 2024-10-09 NOTE — ANESTHESIA PREPROCEDURE EVALUATION
Anesthesia Evaluation     Patient summary reviewed   history of anesthetic complications:  PONV  NPO Solid Status: > 8 hours  NPO Liquid Status: > 8 hours           Airway   Mallampati: II  TM distance: >3 FB  Neck ROM: full  Dental - normal exam     Pulmonary    (+) ,sleep apnea  (-) COPD, asthma, not a smoker  Cardiovascular   Exercise tolerance: good (4-7 METS)    ECG reviewed  Patient on routine beta blocker and Beta blocker given within 24 hours of surgery    (+) hypertension, dysrhythmias Tachycardia, hyperlipidemia  (-) pacemaker, past MI, angina, cardiac stents      Neuro/Psych  (+) TIA (1999), CVA (1999)  (-) seizures  GI/Hepatic/Renal/Endo    (+) GERD, diabetes mellitus (continuous glucose monitor) type 2 using insulin  (-) liver disease, no renal disease    Musculoskeletal     (+) back pain  Abdominal    Substance History      OB/GYN          Other   arthritis,   history of cancer                  Anesthesia Plan    ASA 3     general     (Propofol infusion for severe PONV)  intravenous induction     Anesthetic plan, risks, benefits, and alternatives have been provided, discussed and informed consent has been obtained with: patient.

## 2024-10-09 NOTE — THERAPY EVALUATION
Patient Name: Alisia Velez  : 1945    MRN: [id number]                              Today's Date: 10/9/2024       Admit Date: 10/9/2024    Visit Dx:     ICD-10-CM ICD-9-CM   1. Impaired mobility [Z74.09]  Z74.09 799.89     Patient Active Problem List   Diagnosis    Osteopenia    Mixed hyperlipidemia    Essential hypertension    Type 2 diabetes mellitus without complication, without long-term current use of insulin    Spinal stenosis, lumbar    Gastroesophageal reflux disease without esophagitis    Type 2 diabetes mellitus with diabetic polyneuropathy, with long-term current use of insulin    Vitamin D deficiency    B12 deficiency    Age-related osteoporosis without current pathological fracture    Stenosis, cervical spine    Mass of right wrist    Chronic bilateral low back pain without sciatica    Lumbar spondylosis    Degeneration of intervertebral disc of lumbar region with discogenic back pain and lower extremity pain    Lumbar radiculopathy    Degeneration of intervertebral disc of lumbar region     Past Medical History:   Diagnosis Date    Anesthesia complication     severe nausea and vomitig    Arthritis     Back pain     Basal cell carcinoma     Blister of great toe of left foot     Nonthermal, initial encounter    Bunion     Cancer     skin     Dyslipidemia     Elevated blood pressure     Essential hypertension 2016    Nimesh archventura     GERD (gastroesophageal reflux disease)     History of cerebrovascular accident         Hyperlipidemia     Hypo-osmolality and hyponatremia     Ingrowing nail     Melanoma     Neuropathy in diabetes     Some    Onychomycosis     Plantar fasciitis     PONV (postoperative nausea and vomiting)     Sleep apnea     Snores     Stroke     TIA (transient ischemic attack)     After stroke    Type 2 diabetes mellitus     Type 2 diabetes mellitus with circulatory disorder 2016    Urinary incontinence     Vitamin D deficiency      Past Surgical History:    Procedure Laterality Date    ANTERIOR CERVICAL DISCECTOMY W/ FUSION N/A 04/19/2019    Procedure: ANTERIOR CERVICAL DISCECTOMY FUSION C5-7;  Surgeon: MIGDALIA Horton MD;  Location:  [facility];  Service: Orthopedic Spine    ARM LESION/CYST EXCISION Right 08/17/2022    Procedure: RIGHT WRIST MARGINAL MASS EXCISION;  Surgeon: Magen Pardo MD;  Location:  [facility];  Service: Orthopedics;  Laterality: Right;    BACK SURGERY      BLEPHAROPLASTY      CARDIAC CATHETERIZATION      CARPAL TUNNEL RELEASE      CERVICAL SPINE SURGERY      cydney-laminotomy c7-t1    COLONOSCOPY      ENDOSCOPY      EYE SURGERY Bilateral     cataracts     FOOT SURGERY      Toe nails removed    LAPAROSCOPIC CHOLECYSTECTOMY      LUMBAR DISC SURGERY      LUMBAR FUSION Left 03/13/2017    Procedure: LEFT LUMBAR LATERAL INTERBODY FUSION L 3-5 WITH INSTRUMENTATION;  Surgeon: MIGDALIA Horton MD;  Location:  [facility];  Service:     LUMBAR LAMINECTOMY WITH FUSION Right 03/15/2017    Procedure: RIGHT L3-S1 POSSIBLE RIGHT L 3-4 HEMILAMINECTOMY FACETECTOMY DECOMPRESSION TRANSFORAMINAL LUMBAR INTERBODY FUSION L4-S1 POSTERIOR SPINAL FUSION WITH INSTRUMENTATION L3-S1;  Surgeon: MIGDALIA Horton MD;  Location:  [facility] OR;  Service:     MYRINGOTOMY W/ TUBES      NAIL BED REMOVAL/REVISION  03/11/2016    NECK SURGERY      NISSEN FUNDOPLICATION LAPAROSCOPIC      OTHER SURGICAL HISTORY      had left and right big toenials removed mar 2016    TOENAIL EXCISION      Both big toes    WRIST SURGERY Right     ganglian cyst removal      General Information       Row Name 10/09/24 1310          Physical Therapy Time and Intention    Document Type evaluation  cc: recurrent low back pain on her left side along with left anterior thigh radiculopathy. s/p R LLIF L2-3 on 10/9. Plans for posterior procedure on 10/11  -MS     Mode of Treatment physical therapy;co-treatment  -MS       Row Name 10/09/24 1310          General Information    Patient Profile Reviewed yes  -MS      Prior Level of Function independent:;all household mobility;community mobility;ADL's  -MS     Existing Precautions/Restrictions fall;spinal;LSO;brace worn when out of bed  -MS       Row Name 10/09/24 1310          Living Environment    People in Home spouse  -MS       Row Name 10/09/24 1310          Home Main Entrance    Number of Stairs, Main Entrance four  -MS     Stair Railings, Main Entrance railings on both sides of stairs  -MS       Row Name 10/09/24 1310          Cognition    Orientation Status (Cognition) oriented x 4  -MS       Row Name 10/09/24 1310          Safety Issues, Functional Mobility    Impairments Affecting Function (Mobility) balance;endurance/activity tolerance;strength;pain  -MS               User Key  (r) = Recorded By, (t) = Taken By, (c) = Cosigned By      Initials Name Provider Type    Helena Bailey, PT, DPT, NCS Physical Therapist                   Mobility       Row Name 10/09/24 1310          Bed Mobility    Bed Mobility rolling left;sidelying-sit  -MS     Rolling Left [place] (Bed Mobility) standby assist;verbal cues  -MS     Sidelying-Sit [place] (Bed Mobility) standby assist;verbal cues  -MS     Assistive Device (Bed Mobility) bed rails  -MS       Row Name 10/09/24 1310          Sit-Stand Transfer    Sit-Stand [place] (Transfers) contact guard;verbal cues  -MS       Row Name 10/09/24 1310          Gait/Stairs (Locomotion)    [place] Level (Gait) contact guard;verbal cues;nonverbal cues (demo/gesture)  -MS     Distance in Feet (Gait) 100  -MS     Comment, (Gait/Stairs) wide base of support with slow cadance  -MS               User Key  (r) = Recorded By, (t) = Taken By, (c) = Cosigned By      Initials Name Provider Type    Helena Bailey, PT, DPT, NCS Physical Therapist                   Obj/Interventions       Row Name 10/09/24 1310          Range of Motion Comprehensive    General Range of Motion bilateral lower extremity ROM WFL  -MS       Row Name  10/09/24 1310          Strength Comprehensive (MMT)    Comment, General Manual Muscle Testing (MMT) Assessment B LE 5/5  -MS       Row Name 10/09/24 1310          Balance    Balance Assessment sitting static balance;sitting dynamic balance;standing static balance;standing dynamic balance  -MS     Static Sitting Balance standby assist  -MS     Dynamic Sitting Balance standby assist  -MS     Position, Sitting Balance sitting edge of bed;unsupported  -MS     Static Standing Balance contact guard  -MS     Dynamic Standing Balance contact guard  -MS     Position/Device Used, Standing Balance unsupported  -MS       Row Name 10/09/24 1310          Sensory Assessment (Somatosensory)    Sensory Assessment (Somatosensory) sensation intact  -MS               User Key  (r) = Recorded By, (t) = Taken By, (c) = Cosigned By      Initials Name Provider Type    Helena Bailey, PT, DPT, NCS Physical Therapist                   Goals/Plan       Row Name 10/09/24 1310          Bed Mobility Goal 1 (PT)    Activity/Assistive Device (Bed Mobility Goal 1, PT) bed mobility activities, all  -MS     [place] Level/Cues Needed (Bed Mobility Goal 1, PT) independent  -MS     Time Frame (Bed Mobility Goal 1, PT) long term goal (LTG);by discharge  -MS     Progress/Outcomes (Bed Mobility Goal 1, PT) new goal  -MS       Row Name 10/09/24 1310          Transfer Goal 1 (PT)    Activity/Assistive Device (Transfer Goal 1, PT) sit-to-stand/stand-to-sit;bed-to-chair/chair-to-bed  -MS     [place] Level/Cues Needed (Transfer Goal 1, PT) independent  -MS     Time Frame (Transfer Goal 1, PT) long term goal (LTG);by discharge  -MS     Progress/Outcome (Transfer Goal 1, PT) new goal  -MS       Row Name 10/09/24 1310          Gait Training Goal 1 (PT)    Activity/Assistive Device (Gait Training Goal 1, PT) gait (walking locomotion);decrease fall risk;increase endurance/gait distance;improve balance and speed  -MS     [place] Level (Gait  Training Goal 1, PT) independent  -MS     Distance (Gait Training Goal 1, PT) 200ft  -MS     Time Frame (Gait Training Goal 1, PT) long term goal (LTG);by discharge  -MS     Progress/Outcome (Gait Training Goal 1, PT) new goal  -MS       Row Name 10/09/24 1310          Therapy Assessment/Plan (PT)    Planned Therapy Interventions (PT) balance training;bed mobility training;gait training;patient/family education;orthotic fitting/training;transfer training  -MS               User Key  (r) = Recorded By, (t) = Taken By, (c) = Cosigned By      Initials Name Provider Type    MS Helena Mora R, PT, DPT, NCS Physical Therapist                   Clinical Impression       Row Name 10/09/24 1310          Pain    Pretreatment Pain Rating 4/10  -MS     Posttreatment Pain Rating 4/10  -MS     Pain Location - back  -MS     Pain Intervention(s) Repositioned;Ambulation/increased activity  -MS       Row Name 10/09/24 1310          Plan of Care Review    Plan of Care Reviewed With patient;spouse  -MS     Progress no change  -MS     Outcome Evaluation The patient presents alert and oriented x4 lying in bed with LSO present in the room with her spouse. She demonstrates no focal neurological deficits in strength, sensation, or coordination. She was able to perform bed mobility and sit to stand with a wide base of support. She ambulated with a wide base of support and slow cadance. She will benefit from continued PT to work on increased activity and improved gait mechanics between surgeries. Recommend discharge home with assist after her second surgery.  -MS       Row Name 10/09/24 1310          Therapy Assessment/Plan (PT)    Patient/Family Therapy Goals Statement (PT) go home  -MS     Rehab Potential (PT) good, to achieve stated therapy goals  -MS     Criteria for Skilled Interventions Met (PT) yes;meets criteria;skilled treatment is necessary  -MS     Therapy Frequency (PT) 2 times/day  -MS     Predicted Duration of Therapy  Intervention (PT) until discharge  -MS       Row Name 10/09/24 1310          Positioning and Restraints    Post Treatment Position chair  -MS     In Chair reclined;call light within reach;encouraged to call for assist;with family/caregiver;with brace  -MS               User Key  (r) = Recorded By, (t) = Taken By, (c) = Cosigned By      Initials Name Provider Type    Helena Bailey NYASIA, PT, DPT, NCS Physical Therapist                   Outcome Measures       Row Name 10/09/24 1310 10/09/24 1300       How much help from another person do you currently need...    Turning from your back to your side while in flat bed without using bedrails? 3  -MS 3  -MR    Moving from lying on back to sitting on the side of a flat bed without bedrails? 3  -MS 3  -MR    Moving to and from a bed to a chair (including a wheelchair)? 3  -MS 3  -MR    Standing up from a chair using your arms (e.g., wheelchair, bedside chair)? 3  -MS 3  -MR    Climbing 3-5 steps with a railing? 2  -MS 3  -MR    To walk in hospital room? 3  -MS 3  -MR    AM-PAC 6 Clicks Score (PT) 17  -MS 18  -MR    Highest Level of Mobility Goal 5 --> Static standing  -MS 6 --> Walk 10 steps or more  -MR      Row Name 10/09/24 1311 10/09/24 1310       Functional Assessment    Outcome Measure Options AM-PAC 6 Clicks Daily Activity (OT)  -LS AM-PAC 6 Clicks Basic Mobility (PT)  -MS              User Key  (r) = Recorded By, (t) = Taken By, (c) = Cosigned By      Initials Name Provider Type    MS Helena Mora, PT, DPT, NCS Physical Therapist    Emily Mathis OTR/L Occupational Therapist    Pamela Bucio, RN Registered Nurse                                 Physical Therapy Education       Title: PT OT SLP Therapies (In Progress)       Topic: Physical Therapy (In Progress)       Point: Mobility training (Done)       Learning Progress Summary             Patient Acceptance, E, VU by MS at 10/9/2024 1310    Comment: role of PT in her care, spinal restrictions, use of LSO                          Point: Home exercise program (Not Started)       Learner Progress:  Not documented in this visit.              Point: Body mechanics (Not Started)       Learner Progress:  Not documented in this visit.              Point: Precautions (Done)       Learning Progress Summary             Patient Acceptance, E, VU by MS at 10/9/2024 1310    Comment: role of PT in her care, spinal restrictions, use of LSO                                         User Key       Initials Effective Dates Name Provider Type Discipline    MS 07/11/23 -  Helena Mora, PT, DPT, NCS Physical Therapist PT                  PT Recommendation and Plan  Planned Therapy Interventions (PT): balance training, bed mobility training, gait training, patient/family education, orthotic fitting/training, transfer training  Plan of Care Reviewed With: patient, spouse  Progress: no change  Outcome Evaluation: The patient presents alert and oriented x4 lying in bed with LSO present in the room with her spouse. She demonstrates no focal neurological deficits in strength, sensation, or coordination. She was able to perform bed mobility and sit to stand with a wide base of support. She ambulated with a wide base of support and slow cadance. She will benefit from continued PT to work on increased activity and improved gait mechanics between surgeries. Recommend discharge home with assist after her second surgery.     Time Calculation:         PT Charges       Row Name 10/09/24 1310             Time Calculation    Start Time 1310  -MS      Stop Time 1350  -MS      Time Calculation (min) 40 min  -MS      PT Received On 10/09/24  -MS      PT Goal Re-Cert Due Date 10/19/24  -MS         Untimed Charges    PT Eval/Re-eval Minutes 40  -MS         Total Minutes    Untimed Charges Total Minutes 40  -MS       Total Minutes 40  -MS                User Key  (r) = Recorded By, (t) = Taken By, (c) = Cosigned By      Initials Name Provider Type    MS Mora  Helena MURRELL PT, DPT, NCS Physical Therapist                      PT G-Codes  Outcome Measure Options: AM-PAC 6 Clicks Daily Activity (OT)  AM-PAC 6 Clicks Score (PT): 17  AM-PAC 6 Clicks Score (OT): 19  PT Discharge Summary  Anticipated Discharge Disposition (PT): home with assist    Helena Moar, PT, DPT, NCS  10/9/2024

## 2024-10-09 NOTE — ANESTHESIA POSTPROCEDURE EVALUATION
"Patient: Alisia Velez    Procedure Summary       Date: 10/09/24 Room / Location: [facility] OR  /  [facility] OR    Anesthesia Start: 0714 Anesthesia Stop: 0852    Procedure: RIGHT LATERAL LUMBAR INTERBODY FUSION WITH INSTRUMENTATION L2-3 (Right: Spine Lumbar) Diagnosis: (M54.16)    Surgeons: MIGDALIA Horton MD Provider: Silvina Pimentel CRNA    Anesthesia Type: general ASA Status: 3            Anesthesia Type: general    Vitals  Vitals Value Taken Time   /57 10/09/24 1145   Temp 97.3 °F (36.3 °C) 10/09/24 1145   Pulse 62 10/09/24 1145   Resp 18 10/09/24 1145   SpO2 96 % 10/09/24 1145           Post Anesthesia Care and Evaluation    Patient location during evaluation: PACU  Patient participation: complete - patient participated  Level of consciousness: awake and awake and alert  Pain score: 0  Pain management: adequate    Airway patency: patent  Anesthetic complications: No anesthetic complications  PONV Status: none  Cardiovascular status: acceptable  Respiratory status: acceptable  Hydration status: acceptable    Comments: Patient discharged according to acceptable Shirley score per RN assessment. See nursing records for further information.     Blood pressure 115/48, pulse 61, temperature 97.5 °F (36.4 °C), temperature source Oral, resp. rate 14, height 161 cm (63.39\"), weight 74.1 kg (163 lb 6.4 oz), last menstrual period 02/27/2000, SpO2 93%, not currently breastfeeding.      "

## 2024-10-09 NOTE — PLAN OF CARE
Goal Outcome Evaluation:   VSS. Up stand by assist. A&Ox4. Room air. O2 at night PRN. Accuchecks. No complaints at this time. Will continue to monitor.

## 2024-10-09 NOTE — ANESTHESIA PROCEDURE NOTES
Airway  Urgency: elective    Date/Time: 10/9/2024 7:23 AM  Airway not difficult    General Information and Staff    Patient location during procedure: OR    Indications and Patient Condition  Indications for airway management: airway protection    Preoxygenated: yes  Mask difficulty assessment: 2 - vent by mask + OA or adjuvant +/- NMBA    Final Airway Details  Final airway type: endotracheal airway      Successful airway: ETT  Cuffed: yes   Successful intubation technique: direct laryngoscopy  Facilitating devices/methods: anterior pressure/BURP  Endotracheal tube insertion site: oral  Blade: [place]  Blade size: 3  ETT size (mm): 7.0  Cormack-Lehane Classification: grade IIa - partial view of glottis  Placement verified by: chest auscultation and capnometry   Cuff volume (mL): 7  Measured from: lips  ETT/EBT  to lips (cm): 22  Number of attempts at approach: 1  Assessment: lips, teeth, and gum same as pre-op and atraumatic intubation

## 2024-10-09 NOTE — PLAN OF CARE
Goal Outcome Evaluation:  Plan of Care Reviewed With: patient, spouse        Progress: no change  Outcome Evaluation: The patient presents alert and oriented x4 lying in bed with LSO present in the room with her spouse. She demonstrates no focal neurological deficits in strength, sensation, or coordination. She was able to perform bed mobility and sit to stand with a wide base of support. She ambulated with a wide base of support and slow cadance. She will benefit from continued PT to work on increased activity and improved gait mechanics between surgeries. Recommend discharge home with assist after her second surgery.      Anticipated Discharge Disposition (PT): home with assist

## 2024-10-09 NOTE — THERAPY EVALUATION
Patient Name: Alisia Velez  : 1945    MRN: [id number]                              Today's Date: 10/9/2024       Admit Date: 10/9/2024    Visit Dx: No diagnosis found.  Patient Active Problem List   Diagnosis    Osteopenia    Mixed hyperlipidemia    Essential hypertension    Type 2 diabetes mellitus without complication, without long-term current use of insulin    Spinal stenosis, lumbar    Gastroesophageal reflux disease without esophagitis    Type 2 diabetes mellitus with diabetic polyneuropathy, with long-term current use of insulin    Vitamin D deficiency    B12 deficiency    Age-related osteoporosis without current pathological fracture    Stenosis, cervical spine    Mass of right wrist    Chronic bilateral low back pain without sciatica    Lumbar spondylosis    Degeneration of intervertebral disc of lumbar region with discogenic back pain and lower extremity pain    Lumbar radiculopathy     Past Medical History:   Diagnosis Date    Anesthesia complication     severe nausea and vomitig    Arthritis     Back pain     Basal cell carcinoma     Blister of great toe of left foot     Nonthermal, initial encounter    Bunion     Cancer     skin     Dyslipidemia     Elevated blood pressure     Essential hypertension 2016    Fallen arches     GERD (gastroesophageal reflux disease)     History of cerebrovascular accident         Hyperlipidemia     Hypo-osmolality and hyponatremia     Ingrowing nail     Melanoma     Neuropathy in diabetes     Some    Onychomycosis     Plantar fasciitis     PONV (postoperative nausea and vomiting)     Sleep apnea     Snores     Stroke     TIA (transient ischemic attack)     After stroke    Type 2 diabetes mellitus     Type 2 diabetes mellitus with circulatory disorder 2016    Urinary incontinence     Vitamin D deficiency      Past Surgical History:   Procedure Laterality Date    ANTERIOR CERVICAL DISCECTOMY W/ FUSION N/A 2019    Procedure: ANTERIOR CERVICAL  DISCECTOMY FUSION C5-7;  Surgeon: MIGDALIA Horton MD;  Location:  [facility];  Service: Orthopedic Spine    ARM LESION/CYST EXCISION Right 08/17/2022    Procedure: RIGHT WRIST MARGINAL MASS EXCISION;  Surgeon: Magen Pardo MD;  Location:  [facility];  Service: Orthopedics;  Laterality: Right;    BACK SURGERY      BLEPHAROPLASTY      CARDIAC CATHETERIZATION      CARPAL TUNNEL RELEASE      CERVICAL SPINE SURGERY      cydney-laminotomy c7-t1    COLONOSCOPY      ENDOSCOPY      EYE SURGERY Bilateral     cataracts     FOOT SURGERY      Toe nails removed    LAPAROSCOPIC CHOLECYSTECTOMY      LUMBAR DISC SURGERY      LUMBAR FUSION Left 03/13/2017    Procedure: LEFT LUMBAR LATERAL INTERBODY FUSION L 3-5 WITH INSTRUMENTATION;  Surgeon: MIGADLIA Horton MD;  Location:  [facility];  Service:     LUMBAR LAMINECTOMY WITH FUSION Right 03/15/2017    Procedure: RIGHT L3-S1 POSSIBLE RIGHT L 3-4 HEMILAMINECTOMY FACETECTOMY DECOMPRESSION TRANSFORAMINAL LUMBAR INTERBODY FUSION L4-S1 POSTERIOR SPINAL FUSION WITH INSTRUMENTATION L3-S1;  Surgeon: MIGDALIA Horton MD;  Location:  [facility];  Service:     MYRINGOTOMY W/ TUBES      NAIL BED REMOVAL/REVISION  03/11/2016    NECK SURGERY      NISSEN FUNDOPLICATION LAPAROSCOPIC      OTHER SURGICAL HISTORY      had left and right big toenials removed mar 2016    TOENAIL EXCISION      Both big toes    WRIST SURGERY Right     ganglian cyst removal      General Information       Row Name 10/09/24 1311          OT Time and Intention    Document Type evaluation  cc: recurrent low back pain on her left side along with left anterior thigh radiculopathy. s/p R LLIF L2-3 on 10/9. Plans for posterior procedure on 10/11  -     Mode of Treatment occupational therapy  -       Row Name 10/09/24 1319          General Information    Patient Profile Reviewed yes  -LS     Prior Level of Function independent:;ADL's;all household mobility;community mobility;home management;cooking;cleaning;driving;shopping  -LS      Existing Precautions/Restrictions fall;spinal;LSO;brace worn when out of bed  -     Barriers to Rehab previous functional deficit  -       Row Name 10/09/24 1311          Occupational Profile    Environmental Supports and Barriers (Occupational Profile) Tub shower with tub transfer bench and grab bars. Grab bars next to toilet. Requires use of step stool to get into/out of bed. Other AD/DME: sock aide, dressing stick, rwx  -       Row Name 10/09/24 1311          Living Environment    People in Home spouse  -       Row Name 10/09/24 1311          Home Main Entrance    Number of Stairs, Main Entrance four  -LS     Stair Railings, Main Entrance railings on both sides of stairs  -       Row Name 10/09/24 1311          Stairs Within Home, Primary    Number of Stairs, Within Home, Primary other (see comments)  Flight of stairs to second level; Pt does not utilize  -       Row Name 10/09/24 1311          Cognition    Orientation Status (Cognition) oriented x 4  -LS       Row Name 10/09/24 1311          Safety Issues, Functional Mobility    Impairments Affecting Function (Mobility) balance;endurance/activity tolerance;strength;pain  -               User Key  (r) = Recorded By, (t) = Taken By, (c) = Cosigned By      Initials Name Provider Type     Emily White OTR/L Occupational Therapist                     Mobility/ADL's       Row Name 10/09/24 1311          Bed Mobility    Bed Mobility rolling left;sidelying-sit  -     Rolling Left [place] (Bed Mobility) standby assist;verbal cues  -     Sidelying-Sit [place] (Bed Mobility) standby assist;verbal cues  -     Assistive Device (Bed Mobility) bed rails  -       Row Name 10/09/24 1311          Transfers    Transfers sit-stand transfer;stand-sit transfer;toilet transfer  -       Row Name 10/09/24 1311          Sit-Stand Transfer    Sit-Stand [place] (Transfers) contact guard;verbal cues  -       Row Name 10/09/24 1311           Stand-Sit Transfer    Stand-Sit [place] (Transfers) contact guard;verbal cues  -LS       Row Name 10/09/24 1311          Toilet Transfer    Type (Toilet Transfer) sit-stand;stand-sit  -LS     [place] Level (Toilet Transfer) contact guard;verbal cues  -LS       Row Name 10/09/24 1311          Functional Mobility    Functional Mobility- Ind. Level contact guard assist  -LS       Row Name 10/09/24 1311          Activities of Daily Living    BADL Assessment/Intervention upper body dressing;lower body dressing;toileting  -       Row Name 10/09/24 1311          Upper Body Dressing Assessment/Training    [place] Level (Upper Body Dressing) upper body dressing skills;verbal cues;minimum assist (75% patient effort)  -LS     Position (Upper Body Dressing) edge of bed sitting  -LS     Comment, (Upper Body Dressing) LSO  -       Row Name 10/09/24 1311          Lower Body Dressing Assessment/Training    [place] Level (Lower Body Dressing) lower body dressing skills;moderate assist (50% patient effort)  -LS     Position (Lower Body Dressing) unsupported sitting;unsupported standing  -       Row Name 10/09/24 1311          Toileting Assessment/Training    [place] Level (Toileting) toileting skills;contact guard assist  -LS     Position (Toileting) unsupported sitting;unsupported standing  -LS               User Key  (r) = Recorded By, (t) = Taken By, (c) = Cosigned By      Initials Name Provider Type    Emily Mathis, OTR/L Occupational Therapist                   Obj/Interventions       Row Name 10/09/24 1311          Sensory Assessment (Somatosensory)    Sensory Assessment (Somatosensory) UE sensation intact  -       Row Name 10/09/24 1311          Vision Assessment/Intervention    Visual Impairment/Limitations WFL;corrective lenses for reading  -       Row Name 10/09/24 1311          Range of Motion Comprehensive    General Range of Motion bilateral upper extremity ROM WFL  -       Row  Name 10/09/24 1311          Strength Comprehensive (MMT)    Comment, General Manual Muscle Testing (MMT) Assessment BUE strength grossly 4+/5  -LS       Row Name 10/09/24 1311          Balance    Balance Assessment sitting static balance;sitting dynamic balance;standing static balance;standing dynamic balance  -LS     Static Sitting Balance standby assist  -LS     Dynamic Sitting Balance standby assist  -LS     Position, Sitting Balance sitting edge of bed;unsupported  -LS     Static Standing Balance contact guard  -LS     Dynamic Standing Balance contact guard  -LS     Position/Device Used, Standing Balance supported;other (see comments);unsupported  initially utilized HHA  -LS               User Key  (r) = Recorded By, (t) = Taken By, (c) = Cosigned By      Initials Name Provider Type    Emily Mathis, OTR/L Occupational Therapist                   Goals/Plan       Row Name 10/09/24 1311          Transfer Goal 1 (OT)    Activity/Assistive Device (Transfer Goal 1, OT) tub;tub bench;toilet  -LS     [place] Level/Cues Needed (Transfer Goal 1, OT) modified independence  -LS     Time Frame (Transfer Goal 1, OT) long term goal (LTG);10 days  -LS     Progress/Outcome (Transfer Goal 1, OT) new goal  -LS       Row Name 10/09/24 1311          Dressing Goal 1 (OT)    Activity/Device (Dressing Goal 1, OT) dressing skills, all  -LS     [place]/Cues Needed (Dressing Goal 1, OT) modified independence  -LS     Time Frame (Dressing Goal 1, OT) long term goal (LTG);10 days  -LS     Strategies/Barriers (Dressing Goal 1, OT) Including LSO  -LS     Progress/Outcome (Dressing Goal 1, OT) new goal  -LS       Row Name 10/09/24 1311          Toileting Goal 1 (OT)    Activity/Device (Toileting Goal 1, OT) toileting skills, all  -LS     [place] Level/Cues Needed (Toileting Goal 1, OT) modified independence  -LS     Time Frame (Toileting Goal 1, OT) long term goal (LTG);10 days  -LS     Progress/Outcome (Toileting Goal 1,  OT) new goal  -LS       Row Name 10/09/24 1311          Problem Specific Goal 1 (OT)    Problem Specific Goal 1 (OT) Pt will independently implement one pain management technique to decrease pain and improve functional performance.  -LS     Time Frame (Problem Specific Goal 1, OT) long term goal (LTG);by discharge  -LS     Progress/Outcome (Problem Specific Goal 1, OT) new goal  -LS       Row Name 10/09/24 1311          Therapy Assessment/Plan (OT)    Planned Therapy Interventions (OT) activity tolerance training;functional balance retraining;occupation/activity based interventions;ROM/therapeutic exercise;strengthening exercise;transfer/mobility retraining;patient/caregiver education/training;adaptive equipment training;BADL retraining;orthotic fabrication/fitting/training  -               User Key  (r) = Recorded By, (t) = Taken By, (c) = Cosigned By      Initials Name Provider Type    LS Emily White, OTR/L Occupational Therapist                   Clinical Impression       Row Name 10/09/24 1311          Pain Assessment    Pretreatment Pain Rating 4/10  -LS     Posttreatment Pain Rating 4/10  -LS     Pain Location - back  -LS     Pain Intervention(s) Repositioned;Ambulation/increased activity  -       Row Name 10/09/24 1311          Plan of Care Review    Plan of Care Reviewed With patient;spouse  -LS     Progress no change  -LS     Outcome Evaluation OT eval completed. Pt in fowlers upon therapist arrival; A&Ox4; c/o 4/10 back pain; Pt's spouse also present. Pt reports Mod I with all BADLs including fxl ambulation at baseline. Today, Pt was educated on spinal precautions, donning/doffing of LSO, and LSO wear schedule; Pt verbalized understanding. Pt performed log roll onto L side and sidelying>sit utilizing bedrail with CGA and verbal cues for sequencing and body mechanics. Pt dependent to jennifer B socks. Pt donned LSO while seated EOB with Min A and verbal cues. Pt performed all sit<>stands to/from bed,  chair, and toilet with CGA. Pt ambulated from bed>BR utilizing HHA with CGA. Pt performed toileting task requiring SBA for genoveva hygiene while seated with SBA. Pt donned brief while sitting/standing as appropriate with Mod A. After toileting, Pt ambulated community distance in hallway utilizing no AD with CGA. BUE strength WFL. Skilled OT intervention indicated in order to address deficits in fxl mobility, fxl activity tolerance, balance, strength, and use of adaptive techniques/equipment during performance of BADLs. Recommend home with assist at discharge.  -       Row Name 10/09/24 1311          Therapy Assessment/Plan (OT)    Rehab Potential (OT) good, to achieve stated therapy goals  -     Criteria for Skilled Therapeutic Interventions Met (OT) yes;skilled treatment is necessary  -     Therapy Frequency (OT) 5 times/wk  -LS       Row Name 10/09/24 1311          Therapy Plan Review/Discharge Plan (OT)    Anticipated Discharge Disposition (OT) home with assist  -LS       Row Name 10/09/24 1311          Positioning and Restraints    Pre-Treatment Position in bed  -LS     Post Treatment Position chair  -LS     In Chair reclined;call light within reach;encouraged to call for assist;legs elevated  -LS               User Key  (r) = Recorded By, (t) = Taken By, (c) = Cosigned By      Initials Name Provider Type    Emily Mathis OTR/L Occupational Therapist                   Outcome Measures       Row Name 10/09/24 1311          How much help from another is currently needed...    Putting on and taking off regular lower body clothing? 2  -LS     Bathing (including washing, rinsing, and drying) 3  -LS     Toileting (which includes using toilet bed pan or urinal) 3  -LS     Putting on and taking off regular upper body clothing 3  -LS     Taking care of personal grooming (such as brushing teeth) 4  -LS     Eating meals 4  -LS     AM-PAC 6 Clicks Score (OT) 19  -LS       Row Name 10/09/24 1300          How much help  from another person do you currently need...    Turning from your back to your side while in flat bed without using bedrails? 3  -MR     Moving from lying on back to sitting on the side of a flat bed without bedrails? 3  -MR     Moving to and from a bed to a chair (including a wheelchair)? 3  -MR     Standing up from a chair using your arms (e.g., wheelchair, bedside chair)? 3  -MR     Climbing 3-5 steps with a railing? 3  -MR     To walk in hospital room? 3  -MR     AM-PAC 6 Clicks Score (PT) 18  -MR     Highest Level of Mobility Goal 6 --> Walk 10 steps or more  -MR       Row Name 10/09/24 1311          Functional Assessment    Outcome Measure Options AM-PAC 6 Clicks Daily Activity (OT)  -               User Key  (r) = Recorded By, (t) = Taken By, (c) = Cosigned By      Initials Name Provider Type    Emily Mathis, OTR/L Occupational Therapist    Pamela Bucio, RN Registered Nurse                    Occupational Therapy Education       Title: PT OT SLP Therapies (In Progress)       Topic: Occupational Therapy (In Progress)       Point: ADL training (Done)       Description:   Instruct learner(s) on proper safety adaptation and remediation techniques during self care or transfers.   Instruct in proper use of assistive devices.                  Learning Progress Summary             Patient Acceptance, E, VU by  at 10/9/2024 3336                         Point: Home exercise program (Not Started)       Description:   Instruct learner(s) on appropriate technique for monitoring, assisting and/or progressing therapeutic exercises/activities.                  Learner Progress:  Not documented in this visit.              Point: Precautions (Done)       Description:   Instruct learner(s) on prescribed precautions during self-care and functional transfers.                  Learning Progress Summary             Patient Acceptance, E, VU by  at 10/9/2024 5604                         Point: Body mechanics (Done)        Description:   Instruct learner(s) on proper positioning and spine alignment during self-care, functional mobility activities and/or exercises.                  Learning Progress Summary             Patient Acceptance, E, VU by EDU at 10/9/2024 2879                                         User Key       Initials Effective Dates Name Provider Type Discipline    EDU 06/20/22 -  Emily White, OTR/L Occupational Therapist OT                  OT Recommendation and Plan  Planned Therapy Interventions (OT): activity tolerance training, functional balance retraining, occupation/activity based interventions, ROM/therapeutic exercise, strengthening exercise, transfer/mobility retraining, patient/caregiver education/training, adaptive equipment training, BADL retraining, orthotic fabrication/fitting/training  Therapy Frequency (OT): 5 times/wk  Plan of Care Review  Plan of Care Reviewed With: patient, spouse  Progress: no change  Outcome Evaluation: OT eval completed. Pt in fowlers upon therapist arrival; A&Ox4; c/o 4/10 back pain; Pt's spouse also present. Pt reports Mod I with all BADLs including fxl ambulation at baseline. Today, Pt was educated on spinal precautions, donning/doffing of LSO, and LSO wear schedule; Pt verbalized understanding. Pt performed log roll onto L side and sidelying>sit utilizing bedrail with CGA and verbal cues for sequencing and body mechanics. Pt dependent to jennifer B socks. Pt donned LSO while seated EOB with Min A and verbal cues. Pt performed all sit<>stands to/from bed, chair, and toilet with CGA. Pt ambulated from bed>BR utilizing HHA with CGA. Pt performed toileting task requiring SBA for genoveva hygiene while seated with SBA. Pt donned brief while sitting/standing as appropriate with Mod A. After toileting, Pt ambulated community distance in hallway utilizing no AD with CGA. BUE strength WFL. Skilled OT intervention indicated in order to address deficits in fxl mobility, fxl activity tolerance,  balance, strength, and use of adaptive techniques/equipment during performance of BADLs. Recommend home with assist at discharge.     Time Calculation:         Time Calculation- OT       Row Name 10/09/24 1311             Time Calculation- OT    OT Start Time 1311  +10 minutes chart review  -LS      OT Stop Time 1348  -LS      OT Time Calculation (min) 37 min  -LS      OT Non-Billable Time (min) 47 min  -LS      OT Received On 10/09/24  -      OT Goal Re-Cert Due Date 10/19/24  -                User Key  (r) = Recorded By, (t) = Taken By, (c) = Cosigned By      Initials Name Provider Type    LS Emily White, OTR/L Occupational Therapist                  Therapy Charges for Today       Code Description Service Date Service Provider Modifiers Qty    [id number]  OT EVAL LOW COMPLEXITY 3 10/9/2024 Emily White, OTR/L GO 1                 Emily White OTR/L  10/9/2024

## 2024-10-09 NOTE — PLAN OF CARE
Goal Outcome Evaluation:  Plan of Care Reviewed With: patient, spouse        Progress: no change  Outcome Evaluation: OT eval completed. Pt in fowlers upon therapist arrival; A&Ox4; c/o 4/10 back pain; Pt's spouse also present. Pt reports Mod I with all BADLs including fxl ambulation at baseline. Today, Pt was educated on spinal precautions, donning/doffing of LSO, and LSO wear schedule; Pt verbalized understanding. Pt performed log roll onto L side and sidelying>sit utilizing bedrail with CGA and verbal cues for sequencing and body mechanics. Pt dependent to jennifer B socks. Pt donned LSO while seated EOB with Min A and verbal cues. Pt performed all sit<>stands to/from bed, chair, and toilet with CGA. Pt ambulated from bed>BR utilizing HHA with CGA. Pt performed toileting task requiring SBA for genoveva hygiene while seated with SBA. Pt donned brief while sitting/standing as appropriate with Mod A. After toileting, Pt ambulated community distance in hallway utilizing no AD with CGA. BUE strength WFL. Skilled OT intervention indicated in order to address deficits in fxl mobility, fxl activity tolerance, balance, strength, and use of adaptive techniques/equipment during performance of BADLs. Recommend home with assist at discharge.      Anticipated Discharge Disposition (OT): home with assist

## 2024-10-09 NOTE — OP NOTE
LUMBAR LATERAL INTERBODY FUSION  Procedure Note    Alisia Velez  10/9/2024    Pre-op Diagnosis:     1. Status post previous right C7-T1 foraminotomy, diskectomy, Dr. Garcia, December 2004   2. Status post ACDF C5-7, 04/19/2019   3. Status post previous left L5-S1 decompression, Dr. Barnett, October 2002   4. Status post left LLIF L3-5 with instrumentation L3-4, 03/13/2017   5. Status post right L3-S1 decompression, PSF with instrumentation L3-S1, 03/15/2017   6. Recurrent low back pain, left side   7. Left anterior thigh radiculopathy   8. Multilevel thoracolumbar degenerative disc disease, worst at L2-3   9. Focal scoliosis concave left L2-3   10. Facet arthropathy L2-3   11. Central and foraminal stenosis left worse than right L2-3   12. Possible pseudoarthrosis L5-S1   13. Mild lucency bilateral S1 pedicle screws    Post-op Diagnosis:    same    Procedure/CPT® Codes:    1.  Right lateral lumbar interbody fusion L2-3  2.  Anterior spinal instrumentation L2-3 (ATEC lateral plate and screw)  3.  Use of PEEK interbody biomechanical device for fusion L2-3 (ATEC PEEK spacer)  4.  Use of allograft bone matrix for fusion (Induce NMP Fibers)  5.  Use of fluoroscopy for confirmation of surgical level, placement of PEEK spacer and instrumentation  6.  Intraoperative neural monitoring    Anesthesia: General    Surgeon: DENG Horton MD    Assistant: Dayron Mckinney PA-C    Estimated Blood Loss: Minimal    Complications: None    Condition: Stable to PACU.    Indications:    The patient is a 79-year-old who sees Dr. Mike Padilla for medical issues.  She has had fusion procedures in both the cervical and lumbar spine.  She presented to the office with recurrent low back pain on her left side along with left anterior thigh radiculopathy.  Repeat imaging studies revealed multilevel thoracolumbar degenerative disc disease that was worse at L2-3, where there was focal scoliosis concave to the left and facet arthropathy  contributing to central and foraminal stenosis that matched her symptoms.    After failing conservative measures, it was mutually decided that surgery would be the best option. Risks, benefits, and complications of surgery were discussed with the patient. The patient appeared well informed and wished to proceed. We specifically discussed the risk of infection, blood loss, nerve root injury, CSF leak, and the possibility of incomplete resolution of symptoms. We also discussed the possible risk of a nonunion and the potential need for additional surgery in the event of a pseudoarthrosis or hardware failure.     We elected proceed with a staged operation.  It is planned we will be proceeding with a second posterior procedure at a later date.    Operative Procedure:    After obtaining informed consent and verifying the correct operative site, the patient was brought to the operating room and placed supine on operating table.  A general anesthetic was provided by the anesthesia service with the assistance of an endotracheal tube.  Once this was appropriately positioned and secured, the patient was carefully rotated into the lateral decubitus position with the right side up. All bony prominences were well-padded.  3 inch wide cloth tape was used to maintain the patient's position.  It was also used to tip open the pelvis slightly allowing better access to the lumbar spine through a lateral approach.  Fluoroscopy was then used to identify the L2-3 level, and the skin was marked for our planned incision.  The right flank was then prepped and draped in the usual sterile fashion.  A surgical timeout was taken to confirm this was the correct patient, we were working at the correct level, and that preoperative antibiotics were given in a timely fashion.    An oblique incision was created of the right flank using a 10 blade scalpel directly centered over the L2-3 segment. Dissection was carried bluntly through subcutaneous  tissues. Blunt dissection was also carried between the external oblique and internal oblique musculature. The transversalis fascia was pierced allowing access to the retroperitoneal space. At this point, a series of neuromonitoring probes were used to safely access the L2-3 level. We used stimulated and free run EMG for this purpose. Once nerve roots were confirmed to be out of harm's way, self retaining retractors were placed for continuous exposure.     An annulotomy was then created at the L2-3 area using a 15 blade scalpel on a long handle. A Ross elevator was used to remove disc material off of the endplates. Disc material was removed using Kerrisons, pituitaries, and forward angled curettes. Once all disc material had been retrieved, I used the Ross elevator to divide the contralateral annulus. This assisted with mobilization of the vertebral body. We then used a series of endplate scrapers to prepare the endplates for interbody fusion. Trial spacers were malleted into position and for the disc space it was felt that a 12 mm x 45 mm implant would be the best fit to restore disc height. The disc space was then thoroughly irrigated with saline solution.     A PEEK spacer from the [facility] instrumentation set measuring 12 mm x 45 mm x 22 mm was then packed with allograft bone matrix called Induce NMP Fibers as tightly as possible. This PEEK spacer was then malleted into the L2-3 disc space under fluoroscopic guidance. It was placed as a PEEK interbody biomechanical device to assist with interbody fusion. Once this spacer was confirmed to be properly positioned, I chose a 1-hole plate to help augment the fusion of L2-3 and maintain position of the spacer.  This plate was held into position using a 5.5 mm x 35 mm screw into L2 as fixation.     The wound was then irrigated thoroughly. A thorough inspection was then undertaken to ensure that we had adequate hemostasis. Bleeding at this point was controlled using thrombin  with Gelfoam powder and bipolar cautery. Closure was then accomplished with a #1 Vicryl reapproximating the internal and external oblique musculature. Immediate subcutaneous cutaneous tissues were closed with a 3-0 Vicryl. And skin closure was accomplished using Mastisol and Steri-Strips. The wound was then sterilely dressed. The patient was then carefully rotated supine onto a hospital gurney, extubated, and sent to the recovery room in good stable condition.     The patient tolerated the procedure well. There were no complications. We estimate blood loss to be minimal. The patient remained hemodynamically stable.     Intraoperative neuro monitoring was ordered and carried out throughout the procedure to add an increased level of safety for the patient.  The interpreting physician was available by means of real-time continuous, bidirectional, remote audio and visual communication as needed throughout the entire procedure.  Modalities used during the procedure included SSEP, EEG, MEP, EMG, and TOF.  There were no neuro monitoring signal changes during the procedure.    Dayron Mckinney PA-C provided critical assistance during the procedure. His assistance was medically necessary in order to allow the procedure to occur in the most safe and efficient manner.    DENG Horton MD     Date: 10/9/2024  Time: 14:29 CDT

## 2024-10-10 LAB
ANION GAP SERPL CALCULATED.3IONS-SCNC: 11 MMOL/L (ref 5–15)
BASOPHILS # BLD AUTO: 0.03 10*3/MM3 (ref 0–0.2)
BASOPHILS NFR BLD AUTO: 0.3 % (ref 0–1.5)
BUN SERPL-MCNC: 12 MG/DL (ref 8–23)
BUN/CREAT SERPL: 15 (ref 7–25)
CALCIUM SPEC-SCNC: 9.2 MG/DL (ref 8.6–10.5)
CHLORIDE SERPL-SCNC: 94 MMOL/L (ref 98–107)
CO2 SERPL-SCNC: 28 MMOL/L (ref 22–29)
CREAT SERPL-MCNC: 0.8 MG/DL (ref 0.57–1)
DEPRECATED RDW RBC AUTO: 44.3 FL (ref 37–54)
EGFRCR SERPLBLD CKD-EPI 2021: 76 ML/MIN/1.73
EOSINOPHIL # BLD AUTO: 0.1 10*3/MM3 (ref 0–0.4)
EOSINOPHIL NFR BLD AUTO: 1 % (ref 0.3–6.2)
ERYTHROCYTE [DISTWIDTH] IN BLOOD BY AUTOMATED COUNT: 12.3 % (ref 12.3–15.4)
GLUCOSE BLDC GLUCOMTR-MCNC: 151 MG/DL (ref 70–130)
GLUCOSE BLDC GLUCOMTR-MCNC: 213 MG/DL (ref 70–130)
GLUCOSE BLDC GLUCOMTR-MCNC: 221 MG/DL (ref 70–130)
GLUCOSE BLDC GLUCOMTR-MCNC: 230 MG/DL (ref 70–130)
GLUCOSE SERPL-MCNC: 232 MG/DL (ref 65–99)
HCT VFR BLD AUTO: 36.9 % (ref 34–46.6)
HGB BLD-MCNC: 11.8 G/DL (ref 12–15.9)
IMM GRANULOCYTES # BLD AUTO: 0.03 10*3/MM3 (ref 0–0.05)
IMM GRANULOCYTES NFR BLD AUTO: 0.3 % (ref 0–0.5)
LYMPHOCYTES # BLD AUTO: 2.71 10*3/MM3 (ref 0.7–3.1)
LYMPHOCYTES NFR BLD AUTO: 27.5 % (ref 19.6–45.3)
MCH RBC QN AUTO: 31.3 PG (ref 26.6–33)
MCHC RBC AUTO-ENTMCNC: 32 G/DL (ref 31.5–35.7)
MCV RBC AUTO: 97.9 FL (ref 79–97)
MONOCYTES # BLD AUTO: 0.75 10*3/MM3 (ref 0.1–0.9)
MONOCYTES NFR BLD AUTO: 7.6 % (ref 5–12)
NEUTROPHILS NFR BLD AUTO: 6.23 10*3/MM3 (ref 1.7–7)
NEUTROPHILS NFR BLD AUTO: 63.3 % (ref 42.7–76)
NRBC BLD AUTO-RTO: 0 /100 WBC (ref 0–0.2)
PLATELET # BLD AUTO: 225 10*3/MM3 (ref 140–450)
PMV BLD AUTO: 9.9 FL (ref 6–12)
POTASSIUM SERPL-SCNC: 3.9 MMOL/L (ref 3.5–5.2)
RBC # BLD AUTO: 3.77 10*6/MM3 (ref 3.77–5.28)
SODIUM SERPL-SCNC: 133 MMOL/L (ref 136–145)
WBC NRBC COR # BLD AUTO: 9.85 10*3/MM3 (ref 3.4–10.8)

## 2024-10-10 PROCEDURE — 82948 REAGENT STRIP/BLOOD GLUCOSE: CPT

## 2024-10-10 PROCEDURE — 63710000001 INSULIN LISPRO (HUMAN) PER 5 UNITS: Performed by: INTERNAL MEDICINE

## 2024-10-10 PROCEDURE — 97116 GAIT TRAINING THERAPY: CPT

## 2024-10-10 PROCEDURE — 25010000002 CEFAZOLIN PER 500 MG: Performed by: ORTHOPAEDIC SURGERY

## 2024-10-10 PROCEDURE — 80048 BASIC METABOLIC PNL TOTAL CA: CPT | Performed by: ORTHOPAEDIC SURGERY

## 2024-10-10 PROCEDURE — 85025 COMPLETE CBC W/AUTO DIFF WBC: CPT | Performed by: ORTHOPAEDIC SURGERY

## 2024-10-10 PROCEDURE — 97535 SELF CARE MNGMENT TRAINING: CPT | Performed by: OCCUPATIONAL THERAPIST

## 2024-10-10 RX ORDER — INSULIN LISPRO 100 [IU]/ML
0-24 INJECTION, SOLUTION INTRAVENOUS; SUBCUTANEOUS
Status: DISCONTINUED | OUTPATIENT
Start: 2024-10-10 | End: 2024-10-12 | Stop reason: HOSPADM

## 2024-10-10 RX ADMIN — CETIRIZINE HYDROCHLORIDE 10 MG: 10 TABLET, FILM COATED ORAL at 09:51

## 2024-10-10 RX ADMIN — HYDROCODONE BITARTRATE AND ACETAMINOPHEN 1 TABLET: 5; 325 TABLET ORAL at 18:17

## 2024-10-10 RX ADMIN — METOPROLOL SUCCINATE 25 MG: 25 TABLET, EXTENDED RELEASE ORAL at 09:50

## 2024-10-10 RX ADMIN — Medication 3 ML: at 09:52

## 2024-10-10 RX ADMIN — BISACODYL 10 MG: 10 SUPPOSITORY RECTAL at 12:46

## 2024-10-10 RX ADMIN — HYDROCODONE BITARTRATE AND ACETAMINOPHEN 1 TABLET: 5; 325 TABLET ORAL at 10:32

## 2024-10-10 RX ADMIN — FAMOTIDINE 40 MG: 20 TABLET, FILM COATED ORAL at 18:17

## 2024-10-10 RX ADMIN — ATORVASTATIN CALCIUM 20 MG: 10 TABLET, FILM COATED ORAL at 09:51

## 2024-10-10 RX ADMIN — CEFAZOLIN 2000 MG: 2 INJECTION, POWDER, FOR SOLUTION INTRAMUSCULAR; INTRAVENOUS at 00:26

## 2024-10-10 RX ADMIN — Medication 3 ML: at 21:44

## 2024-10-10 RX ADMIN — HYDROCODONE BITARTRATE AND ACETAMINOPHEN 1 TABLET: 5; 325 TABLET ORAL at 06:51

## 2024-10-10 RX ADMIN — INSULIN LISPRO 10 UNITS: 100 INJECTION, SOLUTION INTRAVENOUS; SUBCUTANEOUS at 09:51

## 2024-10-10 RX ADMIN — HYDROCODONE BITARTRATE AND ACETAMINOPHEN 1 TABLET: 5; 325 TABLET ORAL at 00:25

## 2024-10-10 RX ADMIN — INSULIN LISPRO 5 UNITS: 100 INJECTION, SOLUTION INTRAVENOUS; SUBCUTANEOUS at 12:49

## 2024-10-10 RX ADMIN — FAMOTIDINE 40 MG: 20 TABLET, FILM COATED ORAL at 09:51

## 2024-10-10 RX ADMIN — GUAIFENESIN 600 MG: 600 TABLET, EXTENDED RELEASE ORAL at 21:41

## 2024-10-10 RX ADMIN — GUAIFENESIN 600 MG: 600 TABLET, EXTENDED RELEASE ORAL at 09:51

## 2024-10-10 NOTE — THERAPY TREATMENT NOTE
Patient Name: Alisia Velez  : 1945    MRN: [id number]                              Today's Date: 10/10/2024       Admit Date: 10/9/2024    Visit Dx:     ICD-10-CM ICD-9-CM   1. Impaired mobility [Z74.09]  Z74.09 799.89     Patient Active Problem List   Diagnosis    Osteopenia    Mixed hyperlipidemia    Essential hypertension    Type 2 diabetes mellitus without complication, without long-term current use of insulin    Spinal stenosis, lumbar    Gastroesophageal reflux disease without esophagitis    Type 2 diabetes mellitus with diabetic polyneuropathy, with long-term current use of insulin    Vitamin D deficiency    B12 deficiency    Age-related osteoporosis without current pathological fracture    Stenosis, cervical spine    Mass of right wrist    Chronic bilateral low back pain without sciatica    Lumbar spondylosis    Degeneration of intervertebral disc of lumbar region with discogenic back pain and lower extremity pain    Lumbar radiculopathy    Degeneration of intervertebral disc of lumbar region     Past Medical History:   Diagnosis Date    Anesthesia complication     severe nausea and vomitig    Arthritis     Back pain     Basal cell carcinoma     Blister of great toe of left foot     Nonthermal, initial encounter    Bunion     Cancer     skin     Dyslipidemia     Elevated blood pressure     Essential hypertension 2016    Nimesh archventura     GERD (gastroesophageal reflux disease)     History of cerebrovascular accident         Hyperlipidemia     Hypo-osmolality and hyponatremia     Ingrowing nail     Melanoma     Neuropathy in diabetes     Some    Onychomycosis     Plantar fasciitis     PONV (postoperative nausea and vomiting)     Sleep apnea     Snores     Stroke     TIA (transient ischemic attack)     After stroke    Type 2 diabetes mellitus     Type 2 diabetes mellitus with circulatory disorder 2016    Urinary incontinence     Vitamin D deficiency      Past Surgical History:    Procedure Laterality Date    ANTERIOR CERVICAL DISCECTOMY W/ FUSION N/A 04/19/2019    Procedure: ANTERIOR CERVICAL DISCECTOMY FUSION C5-7;  Surgeon: MIGDALIA Horton MD;  Location:  [facility];  Service: Orthopedic Spine    ARM LESION/CYST EXCISION Right 08/17/2022    Procedure: RIGHT WRIST MARGINAL MASS EXCISION;  Surgeon: Magen Pardo MD;  Location:  [facility];  Service: Orthopedics;  Laterality: Right;    BACK SURGERY      BLEPHAROPLASTY      CARDIAC CATHETERIZATION      CARPAL TUNNEL RELEASE      CERVICAL SPINE SURGERY      cydney-laminotomy c7-t1    COLONOSCOPY      ENDOSCOPY      EYE SURGERY Bilateral     cataracts     FOOT SURGERY      Toe nails removed    LAPAROSCOPIC CHOLECYSTECTOMY      LUMBAR DISC SURGERY      LUMBAR FUSION Left 03/13/2017    Procedure: LEFT LUMBAR LATERAL INTERBODY FUSION L 3-5 WITH INSTRUMENTATION;  Surgeon: MIGDALIA Horton MD;  Location:  [facility];  Service:     LUMBAR FUSION Right 10/9/2024    Procedure: RIGHT LATERAL LUMBAR INTERBODY FUSION WITH INSTRUMENTATION L2-3;  Surgeon: MIGDALIA Horton MD;  Location:  [facility];  Service: Orthopedic Spine;  Laterality: Right;    LUMBAR LAMINECTOMY WITH FUSION Right 03/15/2017    Procedure: RIGHT L3-S1 POSSIBLE RIGHT L 3-4 HEMILAMINECTOMY FACETECTOMY DECOMPRESSION TRANSFORAMINAL LUMBAR INTERBODY FUSION L4-S1 POSTERIOR SPINAL FUSION WITH INSTRUMENTATION L3-S1;  Surgeon: MIGDALIA Horton MD;  Location:  [facility];  Service:     MYRINGOTOMY W/ TUBES      NAIL BED REMOVAL/REVISION  03/11/2016    NECK SURGERY      NISSEN FUNDOPLICATION LAPAROSCOPIC      OTHER SURGICAL HISTORY      had left and right big toenials removed mar 2016    TOENAIL EXCISION      Both big toes    WRIST SURGERY Right     ganglian cyst removal      General Information       Row Name 10/10/24 0805          OT Time and Intention    Document Type therapy note (daily note)  -CH     Mode of Treatment occupational therapy  -CH       Row Name 10/10/24 0805          General  Information    Patient Profile Reviewed yes  -     Existing Precautions/Restrictions fall;spinal;LSO;brace worn when out of bed  -       Row Name 10/10/24 0805          Cognition    Orientation Status (Cognition) oriented x 4  -       Row Name 10/10/24 0805          Safety Issues, Functional Mobility    Impairments Affecting Function (Mobility) balance;endurance/activity tolerance;strength;pain  -               User Key  (r) = Recorded By, (t) = Taken By, (c) = Cosigned By      Initials Name Provider Type     Shruthi Beasley, OTR/L Occupational Therapist                     Mobility/ADL's       Row Name 10/10/24 0805          Transfers    Transfers sit-stand transfer;stand-sit transfer  -       Row Name 10/10/24 0805          Sit-Stand Transfer    Sit-Stand Davis (Transfers) supervision  -       Row Name 10/10/24 0805          Stand-Sit Transfer    Stand-Sit Davis (Transfers) supervision  -       Row Name 10/10/24 0805          Activities of Daily Living    BADL Assessment/Intervention upper body dressing;feeding  -       Row Name 10/10/24 0805          Upper Body Dressing Assessment/Training    Davis Level (Upper Body Dressing) supervision;contact guard assist;don;doff  -     Position (Upper Body Dressing) unsupported sitting;supported standing  -     Comment, (Upper Body Dressing) LSO  -       Row Name 10/10/24 0805          Lower Body Dressing Assessment/Training    Davis Level (Lower Body Dressing) shoes/slippers;pants/bottoms;socks  -     Assistive Devices (Lower Body Dressing) long-handled shoe horn;reacher;sock-aid  -     Comment, (Lower Body Dressing) Pt typically uses AE.  Reports her sock aid is cracked and needs to be replaced.  Pt wishes to go to local [employer] supply store to obtain.  -       Row Name 10/10/24 0805          Self-Feeding Assessment/Training    Davis Level (Feeding) independent;finger foods;liquids to mouth;scoop food and bring to  mouth  -CH     Position (Self-Feeding) supported sitting  -               User Key  (r) = Recorded By, (t) = Taken By, (c) = Cosigned By      Initials Name Provider Type    Shruthi Mujica OTR/L Occupational Therapist                   Obj/Interventions       Row Name 10/10/24 0805          Balance    Balance Interventions sitting;standing;sit to stand;supported;static;dynamic;occupation based/functional task  -               User Key  (r) = Recorded By, (t) = Taken By, (c) = Cosigned By      Initials Name Provider Type    Shruthi Mujica, OTR/L Occupational Therapist                   Goals/Plan    No documentation.                  Clinical Impression       Row Name 10/10/24 0805          Pain Assessment    Pretreatment Pain Rating 5/10  -CH     Posttreatment Pain Rating 5/10  -CH       Row Name 10/10/24 0805          Plan of Care Review    Plan of Care Reviewed With patient  -CH     Progress no change  -     Outcome Evaluation OT tx complete.  Pt. AxO x 4 & pleasant.  Seated in chair feeding self AM meal.  OTR provided handouts reguarding spinal precautions, LSO wearing schedule & mgmt, fall risks, & home safety. Ms. Velez reports no difficulty with LSO but upon observation it needs to be adjusted. Pt was able to adjust to more appropriate fit with vc's & CGA.  OTR reviewed & edu'd pt in AE for LB self care & toileting. Pt typically uses AE & has for years.  Reports her sock aid is cracked and needs to be replaced. Pt wishes to go to local med supply store to obtain.  OTR provided edu on toilet aid but pt reports spouse assists.  Cont OT tx  -CH       Row Name 10/10/24 0805          Therapy Assessment/Plan (OT)    Rehab Potential (OT) good, to achieve stated therapy goals  -     Criteria for Skilled Therapeutic Interventions Met (OT) yes;skilled treatment is necessary  -     Therapy Frequency (OT) 5 times/wk  -       Row Name 10/10/24 0805          Therapy Plan Review/Discharge Plan (OT)     Anticipated Discharge Disposition (OT) home with assist  -       Row Name 10/10/24 0805          Positioning and Restraints    Pre-Treatment Position sitting in chair/recliner  -CH     Post Treatment Position chair  -CH     In Chair sitting;call light within reach;encouraged to call for assist;with brace  -               User Key  (r) = Recorded By, (t) = Taken By, (c) = Cosigned By      Initials Name Provider Type    Shruthi Mujica, OTR/L Occupational Therapist                   Outcome Measures       Row Name 10/10/24 0805          How much help from another is currently needed...    Putting on and taking off regular lower body clothing? 2  -CH     Bathing (including washing, rinsing, and drying) 3  -CH     Toileting (which includes using toilet bed pan or urinal) 3  -CH     Putting on and taking off regular upper body clothing 3  -CH     Taking care of personal grooming (such as brushing teeth) 4  -CH     Eating meals 4  -CH     AM-PAC 6 Clicks Score (OT) 19  -CH       Row Name 10/09/24 2053          How much help from another person do you currently need...    Turning from your back to your side while in flat bed without using bedrails? 3  -LD     Moving from lying on back to sitting on the side of a flat bed without bedrails? 3  -LD     Moving to and from a bed to a chair (including a wheelchair)? 3  -LD     Standing up from a chair using your arms (e.g., wheelchair, bedside chair)? 3  -LD     Climbing 3-5 steps with a railing? 2  -LD     To walk in hospital room? 3  -LD     AM-PAC 6 Clicks Score (PT) 17  -LD     Highest Level of Mobility Goal 5 --> Static standing  -LD       Row Name 10/10/24 0805          Functional Assessment    Outcome Measure Options AM-PAC 6 Clicks Daily Activity (OT)  -               User Key  (r) = Recorded By, (t) = Taken By, (c) = Cosigned By      Initials Name Provider Type    Shruthi Mujica, OTR/L Occupational Therapist    Malia Chu, RN Registered Nurse                     Occupational Therapy Education       Title: PT OT SLP Therapies (In Progress)       Topic: Occupational Therapy (In Progress)       Point: ADL training (Done)       Description:   Instruct learner(s) on proper safety adaptation and remediation techniques during self care or transfers.   Instruct in proper use of assistive devices.                  Learning Progress Summary             Patient Acceptance, E,D, VU by  at 10/10/2024 0826    Acceptance, E, VU by  at 10/9/2024 1359                         Point: Home exercise program (Not Started)       Description:   Instruct learner(s) on appropriate technique for monitoring, assisting and/or progressing therapeutic exercises/activities.                  Learner Progress:  Not documented in this visit.              Point: Precautions (Done)       Description:   Instruct learner(s) on prescribed precautions during self-care and functional transfers.                  Learning Progress Summary             Patient Acceptance, E,D, VU by  at 10/10/2024 0826    Acceptance, E, VU by  at 10/9/2024 1359                         Point: Body mechanics (Done)       Description:   Instruct learner(s) on proper positioning and spine alignment during self-care, functional mobility activities and/or exercises.                  Learning Progress Summary             Patient Acceptance, E,D, VU by  at 10/10/2024 0826    Acceptance, E, VU by  at 10/9/2024 1359                                         User Key       Initials Effective Dates Name Provider Type Discipline     07/11/23 -  Shruthi Beasley, OTR/L Occupational Therapist OT     06/20/22 -  Emily White OTR/L Occupational Therapist OT                  OT Recommendation and Plan  Therapy Frequency (OT): 5 times/wk  Plan of Care Review  Plan of Care Reviewed With: patient  Progress: no change  Outcome Evaluation: OT tx complete.  Pt. AxO x 4 & pleasant.  Seated in chair feeding self AM meal.  OTR provided  handouts reguarding spinal precautions, LSO wearing schedule & mgmt, fall risks, & home safety. Ms. Velez reports no difficulty with LSO but upon observation it needs to be adjusted. Pt was able to adjust to more appropriate fit with vc's & CGA.  OTR reviewed & edu'd pt in AE for LB self care & toileting. Pt typically uses AE & has for years.  Reports her sock aid is cracked and needs to be replaced. Pt wishes to go to local [employer] supply store to obtain.  OTR provided edu on toilet aid but pt reports spouse assists.  Cont OT tx     Time Calculation:         Time Calculation- OT       Row Name 10/10/24 0805             Time Calculation- OT    OT Start Time 0805  -CH      OT Stop Time 0828  -CH      OT Time Calculation (min) 23 min  -CH      OT Received On 10/10/24  -         Timed Charges    83089 - OT Self Care/Mgmt Minutes 23  -CH         Total Minutes    Timed Charges Total Minutes 23  -CH       Total Minutes 23  -CH                User Key  (r) = Recorded By, (t) = Taken By, (c) = Cosigned By      Initials Name Provider Type    CH Shruthi Beasley OTR/L Occupational Therapist                  Therapy Charges for Today       Code Description Service Date Service Provider Modifiers Qty    [id number] HC OT SELF CARE/MGMT/TRAIN EA 15 MIN 10/10/2024 Shruthi Beasley OTR/CARLOS GO 2                 SOFI Bob/CARLOS  10/10/2024

## 2024-10-10 NOTE — PROGRESS NOTES
DELMI Mckinney PA-C   Progress Note        Subjective/Overnight Events:  Pain controlled overnight, up with assist, voiding, notes reviewed regarding insulin management.    Vitals  Vitals:    10/09/24 2200 10/10/24 0005 10/10/24 0324 10/10/24 0614   BP:  121/51 122/41 123/50   BP Location:  Left arm Left arm Left arm   Patient Position:  Lying Lying Sitting   Pulse: (!) 128 67 79 60   Resp: 25 16 16 16   Temp:  97.6 °F (36.4 °C) 98.5 °F (36.9 °C)    TempSrc:  Oral Oral    SpO2: 98% 97% 97% 97%   Weight:       Height:           Current Facility-Administered Medications   Medication Dose Route Frequency Provider Last Rate Last Admin    acetaminophen (TYLENOL) tablet 650 mg  650 mg Oral Q4H PRN MIGDALIA Horton MD   650 mg at 10/09/24 2053    Or    acetaminophen (TYLENOL) 160 MG/5ML oral solution 650 mg  650 mg Oral Q4H PRN MIGDALIA Horton MD        Or    acetaminophen (TYLENOL) suppository 650 mg  650 mg Rectal Q4H PRN MIGDALIA Horton MD        atorvastatin (LIPITOR) tablet 20 mg  20 mg Oral Daily MIGDALIA Horton MD        sennosides-docusate (PERICOLACE) 8.6-50 MG per tablet 2 tablet  2 tablet Oral BID MIGDALIA Horton MD   2 tablet at 10/09/24 2050    And    polyethylene glycol (MIRALAX) packet 17 g  17 g Oral Daily PRN MIGDALIA Horton MD        And    bisacodyl (DULCOLAX) EC tablet 5 mg  5 mg Oral Daily PRN MIGDALIA Horton MD        And    bisacodyl (DULCOLAX) suppository 10 mg  10 mg Rectal Daily PRN MIGDALIA Horton MD        [START ON 10/11/2024] ceFAZolin 2000 mg IVPB in 100 mL NS (MBP)  2,000 mg Intravenous On Call to OR Franklin Mckinney PA-C        cetirizine (zyrTEC) tablet 10 mg  10 mg Oral Daily MIGDALIA Horton MD        dextrose (D50W) (25 g/50 mL) IV injection 25 g  25 g Intravenous Q15 Min PRN Osbaldo Marinelli MD        dextrose (GLUTOSE) oral gel 15 g  15 g Oral Q15 Min PROsbaldo Tran MD        famotidine  (PEPCID) tablet 40 mg  40 mg Oral BID AC MIGDALIA Horton MD        glucagon (GLUCAGEN) injection 1 mg  1 mg Intramuscular Q15 Min PRN Osbaldo Marinelli MD        guaiFENesin (MUCINEX) 12 hr tablet 600 mg  600 mg Oral Q12H MIGDALIA Horton MD   600 mg at 10/09/24 1727    HYDROcodone-acetaminophen (NORCO) 5-325 MG per tablet 1 tablet  1 tablet Oral Q4H PRN MIGDALIA Horton MD   1 tablet at 10/10/24 0651    insulin glargine (LANTUS, SEMGLEE) injection 50 Units  50 Units Subcutaneous Nightly Osbaldo Marinelli MD   45 Units at 10/09/24 2050    Insulin Lispro (humaLOG) injection 2-9 Units  2-9 Units Subcutaneous 4x Daily AC & at Bedtime Osbaldo Marinelli MD        metoprolol succinate XL (TOPROL-XL) 24 hr tablet 25 mg  25 mg Oral Q12H MIGDALIA Horton MD   25 mg at 10/09/24 1727    ondansetron ODT (ZOFRAN-ODT) disintegrating tablet 4 mg  4 mg Oral Q6H PRN MIGDALIA Horton MD        Or    ondansetron (ZOFRAN) injection 4 mg  4 mg Intravenous Q6H PRN MIGDALIA Horton MD   4 mg at 10/09/24 1801    sodium chloride 0.9 % flush 10 mL  10 mL Intravenous PRN MIGDALIA Horton MD        sodium chloride 0.9 % flush 3 mL  3 mL Intravenous Q12H MIGDALIA Horton MD   3 mL at 10/09/24 2057       PHYSICAL EXAM:    Orientation:  alert and oriented to person, place, and time    Incision:  no significant drainage    Upper Extremity Motor :  5/5 bilaterally    Upper Motor Neuron Signs:  none     Lower Extremity Motor :  equal bilaterally    Lower Extremity Sensory:  Tibial nerve: Intact, Superficial peroneal nerve: Intact, and Deep peroneal nerve: Intact    Flatus:  flatus    ABNORMAL EXAM FINDINGS:  none    LABS:    Lab Results (last 24 hours)       Procedure Component Value Units Date/Time    POC Glucose Once [[id number]]  (Abnormal) Collected: 10/10/24 0729    Specimen: Blood Updated: 10/10/24 0742     Glucose 221 mg/dL      Comment: : [id number] Ganesh CoronaMetvenu ID: [id number]        Basic Metabolic Panel [[id number]]  (Abnormal) Collected: 10/10/24 0339    Specimen: Blood Updated: 10/10/24 0445     Glucose 232 mg/dL      BUN 12 mg/dL      Creatinine 0.80 mg/dL      Sodium 133 mmol/L      Potassium 3.9 mmol/L      Chloride 94 mmol/L      CO2 28.0 mmol/L      Calcium 9.2 mg/dL      BUN/Creatinine Ratio 15.0     Anion Gap 11.0 mmol/L      eGFR 76.0 mL/min/1.73     Narrative:      GFR Normal >60  Chronic Kidney Disease <60  Kidney Failure <15    The GFR formula is only valid for adults with stable renal function between ages 18 and 70.    CBC & Differential [[id number]]  (Abnormal) Collected: 10/10/24 0339    Specimen: Blood Updated: 10/10/24 0421    Narrative:      The following orders were created for panel order CBC & Differential.  Procedure                               Abnormality         Status                     ---------                               -----------         ------                     CBC Auto Differential[[id number]]        Abnormal            Final result                 Please view results for these tests on the individual orders.    CBC Auto Differential [[id number]]  (Abnormal) Collected: 10/10/24 0339    Specimen: Blood Updated: 10/10/24 0421     WBC 9.85 10*3/mm3      RBC 3.77 10*6/mm3      Hemoglobin 11.8 g/dL      Hematocrit 36.9 %      MCV 97.9 fL      MCH 31.3 pg      MCHC 32.0 g/dL      RDW 12.3 %      RDW-SD 44.3 fl      MPV 9.9 fL      Platelets 225 10*3/mm3      Neutrophil % 63.3 %      Lymphocyte % 27.5 %      Monocyte % 7.6 %      Eosinophil % 1.0 %      Basophil % 0.3 %      Immature Grans % 0.3 %      Neutrophils, Absolute 6.23 10*3/mm3      Lymphocytes, Absolute 2.71 10*3/mm3      Monocytes, Absolute 0.75 10*3/mm3      Eosinophils, Absolute 0.10 10*3/mm3      Basophils, Absolute 0.03 10*3/mm3      Immature Grans, Absolute 0.03 10*3/mm3      nRBC 0.0 /100 WBC     POC Glucose Once [[id number]]  (Abnormal) Collected: 10/09/24 2048    Specimen: Blood Updated:  10/09/24 2115     Glucose 179 mg/dL      Comment: : [id number] Brian (Eugene) AmandaMeter ID: [id number]       Hemoglobin A1c [[id number]]  (Abnormal) Collected: 10/09/24 1830    Specimen: Blood Updated: 10/09/24 1943     Hemoglobin A1C 7.40 %     Narrative:      Hemoglobin A1C Ranges:    Increased Risk for Diabetes  5.7% to 6.4%  Diabetes                     >= 6.5%  Diabetic Goal                < 7.0%    POC Glucose Once [[id number]]  (Abnormal) Collected: 10/09/24 1705    Specimen: Blood Updated: 10/09/24 1716     Glucose 205 mg/dL      Comment: : [id number] Barrett KierstonMeter ID: [id number]       POC Glucose Once [[id number]]  (Abnormal) Collected: 10/09/24 1205    Specimen: Blood Updated: 10/09/24 1239     Glucose 156 mg/dL      Comment: : [id number] Barrett KierstonMeter ID: [id number]       POC Glucose Once [[id number]]  (Abnormal) Collected: 10/09/24 0851    Specimen: Blood Updated: 10/09/24 0903     Glucose 176 mg/dL      Comment: : [id number] Sudheer LoriMeter ID: [id number]               ASSESSMENT AND PLAN:    Post operative day 1 status post lateral lumbar fusion L2-3    1:  Activity Level:  Up with PT/OT  2:  Pain Control:  Oral analgesia  3:  Discharge Planning:  Pending completion of next stage  4:  Other:  NPO after midnight, ABX on call to OR      Electronically signed by Franklin Mckinney PA-C 10/10/2024 08:09 CDT

## 2024-10-10 NOTE — PLAN OF CARE
Goal Outcome Evaluation:  Plan of Care Reviewed With: patient        Progress: improving  Outcome Evaluation: PT tx completed. Pt has no c/o this am. Up in chair. Education on back precautions and don/doffing LSO. Amb and transfer SBA/S ~ 125'

## 2024-10-10 NOTE — THERAPY TREATMENT NOTE
Acute Care - Physical Therapy Treatment Note   [place]     Patient Name: Alisia Velez  : 1945  MRN: [id number]  Today's Date: 10/10/2024      Visit Dx:     ICD-10-CM ICD-9-CM   1. Impaired mobility [Z74.09]  Z74.09 799.89     Patient Active Problem List   Diagnosis    Osteopenia    Mixed hyperlipidemia    Essential hypertension    Type 2 diabetes mellitus without complication, without long-term current use of insulin    Spinal stenosis, lumbar    Gastroesophageal reflux disease without esophagitis    Type 2 diabetes mellitus with diabetic polyneuropathy, with long-term current use of insulin    Vitamin D deficiency    B12 deficiency    Age-related osteoporosis without current pathological fracture    Stenosis, cervical spine    Mass of right wrist    Chronic bilateral low back pain without sciatica    Lumbar spondylosis    Degeneration of intervertebral disc of lumbar region with discogenic back pain and lower extremity pain    Lumbar radiculopathy    Degeneration of intervertebral disc of lumbar region     Past Medical History:   Diagnosis Date    Anesthesia complication     severe nausea and vomitig    Arthritis     Back pain     Basal cell carcinoma     Blister of great toe of left foot     Nonthermal, initial encounter    Bunion     Cancer     skin     Dyslipidemia     Elevated blood pressure     Essential hypertension 2016    Nimesh blanchard     GERD (gastroesophageal reflux disease)     History of cerebrovascular accident         Hyperlipidemia     Hypo-osmolality and hyponatremia     Ingrowing nail     Melanoma     Neuropathy in diabetes     Some    Onychomycosis     Plantar fasciitis     PONV (postoperative nausea and vomiting)     Sleep apnea     Snores     Stroke     TIA (transient ischemic attack)     After stroke    Type 2 diabetes mellitus     Type 2 diabetes mellitus with circulatory disorder 2016    Urinary incontinence     Vitamin D deficiency      Past Surgical History:    Procedure Laterality Date    ANTERIOR CERVICAL DISCECTOMY W/ FUSION N/A 04/19/2019    Procedure: ANTERIOR CERVICAL DISCECTOMY FUSION C5-7;  Surgeon: MIGDALIA Horton MD;  Location:  [facility] OR;  Service: Orthopedic Spine    ARM LESION/CYST EXCISION Right 08/17/2022    Procedure: RIGHT WRIST MARGINAL MASS EXCISION;  Surgeon: Magen Pardo MD;  Location:  [facility] OR;  Service: Orthopedics;  Laterality: Right;    BACK SURGERY      BLEPHAROPLASTY      CARDIAC CATHETERIZATION      CARPAL TUNNEL RELEASE      CERVICAL SPINE SURGERY      cydney-laminotomy c7-t1    COLONOSCOPY      ENDOSCOPY      EYE SURGERY Bilateral     cataracts     FOOT SURGERY      Toe nails removed    LAPAROSCOPIC CHOLECYSTECTOMY      LUMBAR DISC SURGERY      LUMBAR FUSION Left 03/13/2017    Procedure: LEFT LUMBAR LATERAL INTERBODY FUSION L 3-5 WITH INSTRUMENTATION;  Surgeon: MIGDALIA Horton MD;  Location:  [facility] OR;  Service:     LUMBAR FUSION Right 10/9/2024    Procedure: RIGHT LATERAL LUMBAR INTERBODY FUSION WITH INSTRUMENTATION L2-3;  Surgeon: MIGDALIA Horton MD;  Location:  [facility];  Service: Orthopedic Spine;  Laterality: Right;    LUMBAR LAMINECTOMY WITH FUSION Right 03/15/2017    Procedure: RIGHT L3-S1 POSSIBLE RIGHT L 3-4 HEMILAMINECTOMY FACETECTOMY DECOMPRESSION TRANSFORAMINAL LUMBAR INTERBODY FUSION L4-S1 POSTERIOR SPINAL FUSION WITH INSTRUMENTATION L3-S1;  Surgeon: MIGDALIA Horton MD;  Location:  [facility] OR;  Service:     MYRINGOTOMY W/ TUBES      NAIL BED REMOVAL/REVISION  03/11/2016    NECK SURGERY      NISSEN FUNDOPLICATION LAPAROSCOPIC      OTHER SURGICAL HISTORY      had left and right big toenials removed mar 2016    TOENAIL EXCISION      Both big toes    WRIST SURGERY Right     ganglian cyst removal     PT Assessment (Last 12 Hours)       PT Evaluation and Treatment       Row Name 10/10/24 0915          Physical Therapy Time and Intention    Subjective Information no complaints  -KJ     Document Type therapy note (daily  note)  -KJ     Mode of Treatment physical therapy  -KJ     Patient Effort good  -KJ       Row Name 10/10/24 0915          General Information    Existing Precautions/Restrictions brace worn when out of bed;fall;LSO;spinal  -KJ       Row Name 10/10/24 0915          Pain    Pretreatment Pain Rating 5/10  -KJ     Posttreatment Pain Rating 7/10  -KJ     Pain Location - back  -KJ       Row Name 10/10/24 0915          Bed Mobility    Comment, (Bed Mobility) up in chair  -KJ       Row Name 10/10/24 0915          Sit-Stand Transfer    Sit-Stand [place] (Transfers) supervision  -KJ       Row Name 10/10/24 0915          Stand-Sit Transfer    Stand-Sit [place] (Transfers) supervision  -KJ       Row Name 10/10/24 0915          Gait/Stairs (Locomotion)    [place] Level (Gait) supervision;contact guard  -KJ     Distance in Feet (Gait) 125  -KJ       Row Name             Wound 10/09/24 0744 Right flank Incision    Wound - Properties Group Placement Date: 10/09/24  -DT Placement Time: 0744 -DT Side: Right  -DT Location: flank  -DT Primary Wound Type: Incision  -DT    Retired Wound - Properties Group Placement Date: 10/09/24  -DT Placement Time: 0744 -DT Side: Right  -DT Location: flank  -DT Primary Wound Type: Incision  -DT    Retired Wound - Properties Group Date first assessed: 10/09/24  -DT Time first assessed: 0744 -DT Side: Right  -DT Location: flank  -DT Primary Wound Type: Incision  -DT      Row Name 10/10/24 0915          Positioning and Restraints    Pre-Treatment Position sitting in chair/recliner  -KJ     Post Treatment Position chair  -KJ     In Chair call light within reach  -KJ               User Key  (r) = Recorded By, (t) = Taken By, (c) = Cosigned By      Initials Name Provider Type    Latanya Lane, PTA Physical Therapist Assistant    Ruy Negron RN Registered Nurse                    Physical Therapy Education       Title: PT OT SLP Therapies (In Progress)       Topic: Physical  Therapy (In Progress)       Point: Mobility training (Done)       Learning Progress Summary             Patient Acceptance, E, VU by MS at 10/9/2024 1310    Comment: role of PT in her care, spinal restrictions, use of LSO                         Point: Home exercise program (Not Started)       Learner Progress:  Not documented in this visit.              Point: Body mechanics (Not Started)       Learner Progress:  Not documented in this visit.              Point: Precautions (Done)       Learning Progress Summary             Patient Acceptance, E, VU by MS at 10/9/2024 1310    Comment: role of PT in her care, spinal restrictions, use of LSO                                         User Key       Initials Effective Dates Name Provider Type Discipline    MS 07/11/23 -  Helena Mora, PT, DPT, NCS Physical Therapist PT                  PT Recommendation and Plan     Plan of Care Reviewed With: patient  Progress: improving  Outcome Evaluation: PT tx completed. Pt has no c/o this am. Up in chair. Education on back precautions and don/doffing LSO. Amb and transfer SBA/S ~ 125'   Outcome Measures       Row Name 10/10/24 1000             How much help from another person do you currently need...    Turning from your back to your side while in flat bed without using bedrails? 4  -KJ      Moving from lying on back to sitting on the side of a flat bed without bedrails? 3  -KJ      Moving to and from a bed to a chair (including a wheelchair)? 3  -KJ      Standing up from a chair using your arms (e.g., wheelchair, bedside chair)? 3  -KJ      Climbing 3-5 steps with a railing? 3  -KJ      To walk in hospital room? 3  -KJ      AM-PAC 6 Clicks Score (PT) 19  -KJ      Highest Level of Mobility Goal 6 --> Walk 10 steps or more  -KJ         Functional Assessment    Outcome Measure Options AM-PAC 6 Clicks Basic Mobility (PT)  -KJ                User Key  (r) = Recorded By, (t) = Taken By, (c) = Cosigned By      Initials Name Provider  Type    Latanya Lane PTA Physical Therapist Assistant                     Time Calculation:    PT Charges       Row Name 10/10/24 1019             Time Calculation    Start Time 0915  -KJ      Stop Time 0930  -KJ      Time Calculation (min) 15 min  -KJ      PT Received On 10/10/24  -KJ      PT Goal Re-Cert Due Date 10/19/24  -KJ         Time Calculation- PT    Total Timed Code Minutes- PT 15 minute(s)  -KJ                User Key  (r) = Recorded By, (t) = Taken By, (c) = Cosigned By      Initials Name Provider Type    Latanya Lane PTA Physical Therapist Assistant                  Therapy Charges for Today       Code Description Service Date Service Provider Modifiers Qty    [id number] HC GAIT TRAINING EA 15 MIN 10/10/2024 Latanya Osborne PTA GP 1            PT G-Codes  Outcome Measure Options: AM-PAC 6 Clicks Basic Mobility (PT)  AM-PAC 6 Clicks Score (PT): 19  AM-PAC 6 Clicks Score (OT): 19    Latanya Osborne PTA  10/10/2024

## 2024-10-10 NOTE — PLAN OF CARE
Goal Outcome Evaluation:    Pt Aox4. VSS on RA. Up standby to brm. LSO brace worn appropriately. BG monitored. Supplemental insulin orders adjusted by pharmacy per convo with MD, RN, pharmacist, and pt to reflect pt's home usage. Suppository given per pt request with minimal success. C/o pain managed with prn meds. Safety maintained.

## 2024-10-10 NOTE — PLAN OF CARE
Goal Outcome Evaluation:  Plan of Care Reviewed With: patient, caregiver        Progress: improving  Outcome Evaluation: RDN consult for diet education for diabetes. Pt reports to this dietitan that she has been diabetic for over thirty years and thinks she knows what she is doing. Refused education at this time. HgbA1c 7.4% this admission. Pt reports constipation; recent surgery. Recommended increase water intake and ambulation as tolerated or ordered per MD. Pt receives on her tray ticket with each meal the total number of carbohydrates for that meal. Provided menu for pt that also provides the amount of carbohydrates per selections. [facility]. Boost Glucose Control BID.Cont to follow for plan of care.

## 2024-10-10 NOTE — CASE MANAGEMENT/SOCIAL WORK
Discharge Planning Assessment  [facility]     Patient Name: Alisia Velez  MRN: [id number]  Today's Date: 10/10/2024    Admit Date: 10/9/2024        Discharge Needs Assessment       Row Name 10/10/24 1032       Living Environment    People in Home spouse    Name(s) of People in Home [place]    Current Living Arrangements home    Primary Care Provided by self;spouse/significant other    Provides Primary Care For spouse    Family Caregiver if Needed spouse    Family Caregiver Names [place]    Able to Return to Prior Arrangements yes       Resource/Environmental Concerns    Resource/Environmental Concerns none       Transition Planning    Patient/Family Anticipates Transition to home with family    Transportation Anticipated family or friend will provide       Discharge Needs Assessment    Readmission Within the Last 30 Days no previous admission in last 30 days    Equipment Currently Used at Home walker, rolling;bath bench;grab bar    Concerns to be Addressed discharge planning    Equipment Needed After Discharge none    Discharge Coordination/Progress spoke to patient who lives with spouse; 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                 Continued Care and Services - Admitted Since 10/9/2024    No active coordination exists for this encounter.       Expected Discharge Date and Time       Expected Discharge Date Expected Discharge Time    Oct 12, 2024            Demographic Summary    No documentation.                  Functional Status    No documentation.                  Psychosocial    No documentation.                  Abuse/Neglect    No documentation.                  Legal    No documentation.                  Substance Abuse    No documentation.                  Patient Forms    No documentation.                     Latanya Levine RN

## 2024-10-10 NOTE — PLAN OF CARE
Goal Outcome Evaluation:  Plan of Care Reviewed With: patient        Progress: no change  Outcome Evaluation: OT tx complete.  Pt. AxO x 4 & pleasant.  Seated in chair feeding self AM meal.  OTR provided handouts reguarding spinal precautions, LSO wearing schedule & mgmt, fall risks, & home safety. Ms. Velez reports no difficulty with LSO but upon observation it needs to be adjusted. Pt was able to adjust to more appropriate fit with vc's & CGA.  OTR reviewed & edu'd pt in AE for LB self care & toileting. Pt typically uses AE & has for years.  Reports her sock aid is cracked and needs to be replaced. Pt wishes to go to local med supply store to obtain.  OTR provided edu on toilet aid but pt reports spouse assists.  Cont OT tx      Anticipated Discharge Disposition (OT): home with assist

## 2024-10-10 NOTE — THERAPY TREATMENT NOTE
Acute Care - Physical Therapy Treatment Note   [place]     Patient Name: Alisia Velez  : 1945  MRN: [id number]  Today's Date: 10/10/2024      Visit Dx:     ICD-10-CM ICD-9-CM   1. Impaired mobility [Z74.09]  Z74.09 799.89     Patient Active Problem List   Diagnosis    Osteopenia    Mixed hyperlipidemia    Essential hypertension    Type 2 diabetes mellitus without complication, without long-term current use of insulin    Spinal stenosis, lumbar    Gastroesophageal reflux disease without esophagitis    Type 2 diabetes mellitus with diabetic polyneuropathy, with long-term current use of insulin    Vitamin D deficiency    B12 deficiency    Age-related osteoporosis without current pathological fracture    Stenosis, cervical spine    Mass of right wrist    Chronic bilateral low back pain without sciatica    Lumbar spondylosis    Degeneration of intervertebral disc of lumbar region with discogenic back pain and lower extremity pain    Lumbar radiculopathy    Degeneration of intervertebral disc of lumbar region     Past Medical History:   Diagnosis Date    Anesthesia complication     severe nausea and vomitig    Arthritis     Back pain     Basal cell carcinoma     Blister of great toe of left foot     Nonthermal, initial encounter    Bunion     Cancer     skin     Dyslipidemia     Elevated blood pressure     Essential hypertension 2016    Nimesh blanchard     GERD (gastroesophageal reflux disease)     History of cerebrovascular accident         Hyperlipidemia     Hypo-osmolality and hyponatremia     Ingrowing nail     Melanoma     Neuropathy in diabetes     Some    Onychomycosis     Plantar fasciitis     PONV (postoperative nausea and vomiting)     Sleep apnea     Snores     Stroke     TIA (transient ischemic attack)     After stroke    Type 2 diabetes mellitus     Type 2 diabetes mellitus with circulatory disorder 2016    Urinary incontinence     Vitamin D deficiency      Past Surgical History:    Procedure Laterality Date    ANTERIOR CERVICAL DISCECTOMY W/ FUSION N/A 04/19/2019    Procedure: ANTERIOR CERVICAL DISCECTOMY FUSION C5-7;  Surgeon: MIGDALIA Horton MD;  Location:  [facility] OR;  Service: Orthopedic Spine    ARM LESION/CYST EXCISION Right 08/17/2022    Procedure: RIGHT WRIST MARGINAL MASS EXCISION;  Surgeon: Magen Pardo MD;  Location:  [facility] OR;  Service: Orthopedics;  Laterality: Right;    BACK SURGERY      BLEPHAROPLASTY      CARDIAC CATHETERIZATION      CARPAL TUNNEL RELEASE      CERVICAL SPINE SURGERY      cydney-laminotomy c7-t1    COLONOSCOPY      ENDOSCOPY      EYE SURGERY Bilateral     cataracts     FOOT SURGERY      Toe nails removed    LAPAROSCOPIC CHOLECYSTECTOMY      LUMBAR DISC SURGERY      LUMBAR FUSION Left 03/13/2017    Procedure: LEFT LUMBAR LATERAL INTERBODY FUSION L 3-5 WITH INSTRUMENTATION;  Surgeon: MIGDALIA Horton MD;  Location:  [facility];  Service:     LUMBAR FUSION Right 10/9/2024    Procedure: RIGHT LATERAL LUMBAR INTERBODY FUSION WITH INSTRUMENTATION L2-3;  Surgeon: MIGDALIA Horton MD;  Location:  [facility];  Service: Orthopedic Spine;  Laterality: Right;    LUMBAR LAMINECTOMY WITH FUSION Right 03/15/2017    Procedure: RIGHT L3-S1 POSSIBLE RIGHT L 3-4 HEMILAMINECTOMY FACETECTOMY DECOMPRESSION TRANSFORAMINAL LUMBAR INTERBODY FUSION L4-S1 POSTERIOR SPINAL FUSION WITH INSTRUMENTATION L3-S1;  Surgeon: MIGDALIA Horton MD;  Location:  [facility] OR;  Service:     MYRINGOTOMY W/ TUBES      NAIL BED REMOVAL/REVISION  03/11/2016    NECK SURGERY      NISSEN FUNDOPLICATION LAPAROSCOPIC      OTHER SURGICAL HISTORY      had left and right big toenials removed mar 2016    TOENAIL EXCISION      Both big toes    WRIST SURGERY Right     ganglian cyst removal     PT Assessment (Last 12 Hours)       PT Evaluation and Treatment       Row Name 10/10/24 1359 10/10/24 0915       Physical Therapy Time and Intention    Subjective Information no complaints  -KJ no complaints  -KJ    Document  Type therapy note (daily note)  -KJ therapy note (daily note)  -KJ    Mode of Treatment physical therapy  -KJ physical therapy  -KJ    Patient Effort good  -KJ good  -KJ      Row Name 10/10/24 1359 10/10/24 0915       General Information    Existing Precautions/Restrictions brace worn when out of bed;fall;LSO;spinal  -KJ brace worn when out of bed;fall;LSO;spinal  -KJ      Row Name 10/10/24 1359 10/10/24 0915       Pain    Pretreatment Pain Rating 5/10  -KJ 5/10  -KJ    Posttreatment Pain Rating 5/10  -KJ 7/10  -KJ    Pain Location - back  -KJ back  -KJ      Row Name 10/10/24 0915          Bed Mobility    Comment, (Bed Mobility) up in chair  -KJ       Row Name 10/10/24 1359 10/10/24 0915       Sit-Stand Transfer    Sit-Stand [place] (Transfers) supervision  -KJ supervision  -KJ      Row Name 10/10/24 1359 10/10/24 0915       Stand-Sit Transfer    Stand-Sit [place] (Transfers) supervision  -KJ supervision  -KJ      Row Name 10/10/24 1359 10/10/24 0915       Gait/Stairs (Locomotion)    [place] Level (Gait) supervision;contact guard  -KJ supervision;contact guard  -KJ    Assistive Device (Gait) --  GAVE PT ROLLING WX  -KJ --    Distance in Feet (Gait) 150  -  -KJ      Row Name             Wound 10/09/24 0744 Right flank Incision    Wound - Properties Group Placement Date: 10/09/24  -DT Placement Time: 0744  -DT Side: Right  -DT Location: flank  -DT Primary Wound Type: Incision  -DT    Retired Wound - Properties Group Placement Date: 10/09/24  -DT Placement Time: 0744  -DT Side: Right  -DT Location: flank  -DT Primary Wound Type: Incision  -DT    Retired Wound - Properties Group Date first assessed: 10/09/24  -DT Time first assessed: 0744  -DT Side: Right  -DT Location: flank  -DT Primary Wound Type: Incision  -DT      Row Name 10/10/24 1359 10/10/24 0915       Positioning and Restraints    Pre-Treatment Position sitting in chair/recliner  -KJ sitting in chair/recliner  -KJ    Post Treatment  Position chair  -KJ chair  -KJ    In Chair -- call light within reach  -KJ              User Key  (r) = Recorded By, (t) = Taken By, (c) = Cosigned By      Initials Name Provider Type    Latanya Lane, PTA Physical Therapist Assistant    Ruy Negron, RN Registered Nurse                    Physical Therapy Education       Title: PT OT SLP Therapies (In Progress)       Topic: Physical Therapy (In Progress)       Point: Mobility training (Done)       Learning Progress Summary             Patient Acceptance, E, VU by MS at 10/9/2024 1310    Comment: role of PT in her care, spinal restrictions, use of LSO                         Point: Home exercise program (Not Started)       Learner Progress:  Not documented in this visit.              Point: Body mechanics (Not Started)       Learner Progress:  Not documented in this visit.              Point: Precautions (Done)       Learning Progress Summary             Patient Acceptance, E, VU by MS at 10/9/2024 1310    Comment: role of PT in her care, spinal restrictions, use of LSO                                         User Key       Initials Effective Dates Name Provider Type Discipline    MS 07/11/23 -  Helena Mora, PT, DPT, NCS Physical Therapist PT                  PT Recommendation and Plan     Plan of Care Reviewed With: patient  Progress: improving  Outcome Evaluation: PT tx completed. Pt has no c/o this am. Up in chair. Education on back precautions and don/doffing LSO. Amb and transfer SBA/S ~ 125'   Outcome Measures       Row Name 10/10/24 1000             How much help from another person do you currently need...    Turning from your back to your side while in flat bed without using bedrails? 4  -KJ      Moving from lying on back to sitting on the side of a flat bed without bedrails? 3  -KJ      Moving to and from a bed to a chair (including a wheelchair)? 3  -KJ      Standing up from a chair using your arms (e.g., wheelchair, bedside chair)? 3   -KJ      Climbing 3-5 steps with a railing? 3  -KJ      To walk in hospital room? 3  -KJ      AM-PAC 6 Clicks Score (PT) 19  -KJ      Highest Level of Mobility Goal 6 --> Walk 10 steps or more  -KJ         Functional Assessment    Outcome Measure Options AM-PAC 6 Clicks Basic Mobility (PT)  -KJ                User Key  (r) = Recorded By, (t) = Taken By, (c) = Cosigned By      Initials Name Provider Type    Latanya Lane PTA Physical Therapist Assistant                     Time Calculation:    PT Charges       Row Name 10/10/24 1445 10/10/24 1019          Time Calculation    Start Time 1359  -KJ 0915  -KJ     Stop Time 1413  -KJ 0930  -KJ     Time Calculation (min) 14 min  -KJ 15 min  -KJ     PT Received On -- 10/10/24  -KJ     PT Goal Re-Cert Due Date -- 10/19/24  -KJ        Time Calculation- PT    Total Timed Code Minutes- PT 14 minute(s)  -KJ 15 minute(s)  -KJ               User Key  (r) = Recorded By, (t) = Taken By, (c) = Cosigned By      Initials Name Provider Type    Latanya Lane PTA Physical Therapist Assistant                  Therapy Charges for Today       Code Description Service Date Service Provider Modifiers Qty    38972469262 HC GAIT TRAINING EA 15 MIN 10/10/2024 Latanya Osborne, ANTOLIN GP 1    52697530925 HC GAIT TRAINING EA 15 MIN 10/10/2024 Latanya Osborne PTA GP 1            PT G-Codes  Outcome Measure Options: AM-PAC 6 Clicks Basic Mobility (PT)  AM-PAC 6 Clicks Score (PT): 19  AM-PAC 6 Clicks Score (OT): 19    Latanya Osborne PTA  10/10/2024

## 2024-10-10 NOTE — PROGRESS NOTES
[facility] Medicine Services  INPATIENT PROGRESS NOTE    Patient Name: Alisia Velez  Date of Admission: 10/9/2024  Today's Date: 10/10/24  Length of Stay: 1  Primary Care Physician: Mike Erazo MD    Subjective   Chief Complaint: Follow-up  HPI   Patient very adamant on doing her own sliding scale insulin.  I called last night and today the pharmacist.  We are moving forward with a step by her preferred way of doing things.  A1c is 7.4% and been stable in comparison to prior record.    Checked with hospital regarding policy on doing so.  Obviously have to work with staffing also.    As next  step to the process:  Her formula includes:  Blood sugar reading -120 divided by 10  is equation #1  The next part will include nutritionist help regarding carbohydrate count.  Number of carbohydrate per meal divided by correction factor is equal to equation #2    Patient #1 Place equation #2 is equal to total number of insulin to be given Premeal.    Correction factors:  Breakfast: 1.3  Lunch 1.6  Supper 1.8      I informed her that we will try our best to see what we can do  Will continue on hypoglycemic protocol    No new event  Review of Systems   All pertinent negatives and positives are as above. All other systems have been reviewed and are negative unless otherwise stated.     Objective    Temp:  [97.3 °F (36.3 °C)-98.5 °F (36.9 °C)] 98.5 °F (36.9 °C)  Heart Rate:  [] 60  Resp:  [12-25] 16  BP: ()/(41-80) 123/50  Physical Exam  Hemodynamically stable  GEN: Awake, alert, interactive, in NAD  HEENT: Atraumatic, PERRLA, EOMI, Anicteric, Trachea midline  Lungs: CTAB, no wheezing/rales/rhonchi  Heart: RRR, +S1/s2, no rub  ABD: She is wearing a brace.  Extremities: atraumatic, no cyanosis, no edema  Skin: no rashes or lesions  Neuro: AAOx3, no focal deficits  Psych: normal mood & affect      Results Review:  I have reviewed the labs, radiology results, and diagnostic  "studies.    Laboratory Data:   Results from last 7 days   Lab Units 10/10/24  0339   WBC 10*3/mm3 9.85   HEMOGLOBIN g/dL 11.8*   HEMATOCRIT % 36.9   PLATELETS 10*3/mm3 225        Results from last 7 days   Lab Units 10/10/24  0339   SODIUM mmol/L 133*   POTASSIUM mmol/L 3.9   CHLORIDE mmol/L 94*   CO2 mmol/L 28.0   BUN mg/dL 12   CREATININE mg/dL 0.80   CALCIUM mg/dL 9.2   GLUCOSE mg/dL 232*       Culture Data:   No results found for: \"BLOODCX\", \"URINECX\", \"WOUNDCX\", \"MRSACX\", \"RESPCX\", \"STOOLCX\"    Radiology Data:   Imaging Results (Last 24 Hours)       Procedure Component Value Units Date/Time    XR Spine Lumbar AP & Lateral [[id number]] Collected: 10/09/24 0837     Updated: 10/09/24 0842    Narrative:      EXAMINATION:  XR SPINE LUMBAR AP AND LATERAL-  10/9/2024 6:15 AM     HISTORY: Lumbar interbody fusion.     COMPARISON: No comparison study.     NUMBER OF IMAGES: 4     FLUOROSCOPY TIME: 21 seconds.     FLUOROSCOPIC DOSE: 15.9 mGy.     FINDINGS: Fluoroscopic images demonstrate pre-existing lumbar fusion  extending from L3-S1. A new fusion was performed at the L2-3 level  today. The L2-3 fusion implant appears to be well positioned. Bone  detail is limited. Please see the operative report separately.          Impression:      Operative images, as discussed.     This report was signed and finalized on 10/9/2024 8:39 AM by Dr. Jason Hayes MD.               I have reviewed the patient's current medications.     Assessment/Plan   Assessment  Active Hospital Problems    Diagnosis     **Lumbar radiculopathy     Chronic bilateral low back pain without sciatica     Lumbar spondylosis     Degeneration of intervertebral disc of lumbar region with discogenic back pain and lower extremity pain     Degeneration of intervertebral disc of lumbar region        Medical Decision Making  Number and Complexity of problems:  Lumbar radiculopathy status post cervical and lumbar interbody fusion October 9 by Dr. Horton  Diabetes " mellitus type 2 insulin treated.  Labile blood pressure reading.  Has had blood pressures 80/61-possibly intraoperatively but also had as high as 185/60 earlier today at 5:33 AM.  Bradycardia (resolved)-parameter for beta-blocker in place  Hyperlipidemia-continue statin     Treatment:  I spoke to pharmacist Ryan.  Will begin with dictation #1 that she wanted  Will check with nutritionist about counting.  Will see how feasible this is with nursing as well.  Discussed with nurse iMli.  Continue present management  Postoperative care as per primary service    atorvastatin, 20 mg, Oral, Daily  [START ON 10/11/2024] ceFAZolin, 2,000 mg, Intravenous, On Call to OR  cetirizine, 10 mg, Oral, Daily  famotidine, 40 mg, Oral, BID AC  guaiFENesin, 600 mg, Oral, Q12H  insulin glargine, 50 Units, Subcutaneous, Nightly  insulin lispro, 0-24 Units, Subcutaneous, 4x Daily AC & at Bedtime  metoprolol succinate XL, 25 mg, Oral, Q12H  senna-docusate sodium, 2 tablet, Oral, BID  sodium chloride, 3 mL, Intravenous, Q12H      She has continuous blood glucose monitor.    Conditions and Status  Fair, stable     [facility] Data  External documents reviewed: Records in [facility]  Cardiac tracing (EKG, telemetry) interpretation: Normal sinus rhythm from EKG September 26, 2024  Radiology interpretation: Reviewed as above  Labs reviewed: Sugar reading noted  Any tests that were considered but not ordered: None     Decision rules/scores evaluated (example [id number], Wells, etc): -     Discussed with: Patient, nursing staff and pharmacist Hung Brannon  Shared decision making: Patient and primary service  Code status and discussions: Full code     Disposition  Social Determinants of Health that impact treatment or disposition: None identified at this time  I expect the patient to be discharged by the primary service.      I confirmed that the patient's Advance Care Plan is present, code status is documented, or surrogate decision maker is  listed in the patient's medical record.      The patient's surrogate decision maker is spouse    Electronically signed by Osbaldo Marinelli MD, 10/10/24, 08:42 CDT.

## 2024-10-11 ENCOUNTER — ANESTHESIA (OUTPATIENT)
Dept: PERIOP | Facility: HOSPITAL | Age: 79
End: 2024-10-11
Payer: MEDICARE

## 2024-10-11 ENCOUNTER — ANESTHESIA EVENT (OUTPATIENT)
Dept: PERIOP | Facility: HOSPITAL | Age: 79
End: 2024-10-11
Payer: MEDICARE

## 2024-10-11 ENCOUNTER — APPOINTMENT (OUTPATIENT)
Dept: GENERAL RADIOLOGY | Facility: HOSPITAL | Age: 79
DRG: 402 | End: 2024-10-11
Payer: MEDICARE

## 2024-10-11 LAB
GLUCOSE BLDC GLUCOMTR-MCNC: 219 MG/DL (ref 70–130)
GLUCOSE BLDC GLUCOMTR-MCNC: 226 MG/DL (ref 70–130)
GLUCOSE BLDC GLUCOMTR-MCNC: 235 MG/DL (ref 70–130)
GLUCOSE BLDC GLUCOMTR-MCNC: 237 MG/DL (ref 70–130)
GLUCOSE BLDC GLUCOMTR-MCNC: 365 MG/DL (ref 70–130)
GLUCOSE BLDC GLUCOMTR-MCNC: 393 MG/DL (ref 70–130)

## 2024-10-11 PROCEDURE — 25010000002 CEFAZOLIN PER 500 MG: Performed by: PHYSICIAN ASSISTANT

## 2024-10-11 PROCEDURE — 63710000001 INSULIN GLARGINE PER 5 UNITS: Performed by: ORTHOPAEDIC SURGERY

## 2024-10-11 PROCEDURE — 0SG0071 FUSION OF LUMBAR VERTEBRAL JOINT WITH AUTOLOGOUS TISSUE SUBSTITUTE, POSTERIOR APPROACH, POSTERIOR COLUMN, OPEN APPROACH: ICD-10-PCS | Performed by: ORTHOPAEDIC SURGERY

## 2024-10-11 PROCEDURE — 25810000003 LACTATED RINGERS PER 1000 ML: Performed by: ORTHOPAEDIC SURGERY

## 2024-10-11 PROCEDURE — 25010000002 PROPOFOL 10 MG/ML EMULSION

## 2024-10-11 PROCEDURE — 25010000002 FENTANYL CITRATE (PF) 50 MCG/ML SOLUTION

## 2024-10-11 PROCEDURE — 76000 FLUOROSCOPY <1 HR PHYS/QHP: CPT

## 2024-10-11 PROCEDURE — 72100 X-RAY EXAM L-S SPINE 2/3 VWS: CPT

## 2024-10-11 PROCEDURE — 25010000002 LIDOCAINE PF 2% 2 % SOLUTION

## 2024-10-11 PROCEDURE — 25010000002 DEXAMETHASONE PER 1 MG

## 2024-10-11 PROCEDURE — 0 BUPIVACAINE LIPOSOME 1.3 % SUSPENSION 10 ML VIAL: Performed by: ORTHOPAEDIC SURGERY

## 2024-10-11 PROCEDURE — 63710000001 INSULIN LISPRO (HUMAN) PER 5 UNITS: Performed by: ORTHOPAEDIC SURGERY

## 2024-10-11 PROCEDURE — C1713 ANCHOR/SCREW BN/BN,TIS/BN: HCPCS | Performed by: ORTHOPAEDIC SURGERY

## 2024-10-11 PROCEDURE — C9290 INJ, BUPIVACAINE LIPOSOME: HCPCS | Performed by: ORTHOPAEDIC SURGERY

## 2024-10-11 PROCEDURE — 25010000002 ONDANSETRON PER 1 MG

## 2024-10-11 PROCEDURE — 25010000002 CEFAZOLIN PER 500 MG: Performed by: ORTHOPAEDIC SURGERY

## 2024-10-11 PROCEDURE — 82948 REAGENT STRIP/BLOOD GLUCOSE: CPT

## 2024-10-11 PROCEDURE — 25810000003 SODIUM CHLORIDE PER 500 ML: Performed by: ORTHOPAEDIC SURGERY

## 2024-10-11 DEVICE — SPINOUS PROCESS CLAMP ASSEMBLY, 35-40MM
Type: IMPLANTABLE DEVICE | Site: SPINE LUMBAR | Status: FUNCTIONAL
Brand: BRIDGEPOINT

## 2024-10-11 RX ORDER — SUCCINYLCHOLINE/SOD CL,ISO/PF 200MG/10ML
SYRINGE (ML) INTRAVENOUS AS NEEDED
Status: DISCONTINUED | OUTPATIENT
Start: 2024-10-11 | End: 2024-10-11 | Stop reason: SURG

## 2024-10-11 RX ORDER — SODIUM CHLORIDE 0.9 % (FLUSH) 0.9 %
3-10 SYRINGE (ML) INJECTION AS NEEDED
Status: DISCONTINUED | OUTPATIENT
Start: 2024-10-11 | End: 2024-10-11 | Stop reason: HOSPADM

## 2024-10-11 RX ORDER — HYDROMORPHONE HYDROCHLORIDE 1 MG/ML
0.5 INJECTION, SOLUTION INTRAMUSCULAR; INTRAVENOUS; SUBCUTANEOUS
Status: DISCONTINUED | OUTPATIENT
Start: 2024-10-11 | End: 2024-10-11 | Stop reason: HOSPADM

## 2024-10-11 RX ORDER — ONDANSETRON 2 MG/ML
INJECTION INTRAMUSCULAR; INTRAVENOUS AS NEEDED
Status: DISCONTINUED | OUTPATIENT
Start: 2024-10-11 | End: 2024-10-11 | Stop reason: SURG

## 2024-10-11 RX ORDER — DROPERIDOL 2.5 MG/ML
0.62 INJECTION, SOLUTION INTRAMUSCULAR; INTRAVENOUS ONCE AS NEEDED
Status: DISCONTINUED | OUTPATIENT
Start: 2024-10-11 | End: 2024-10-11 | Stop reason: HOSPADM

## 2024-10-11 RX ORDER — MAGNESIUM HYDROXIDE 1200 MG/15ML
LIQUID ORAL AS NEEDED
Status: DISCONTINUED | OUTPATIENT
Start: 2024-10-11 | End: 2024-10-11 | Stop reason: HOSPADM

## 2024-10-11 RX ORDER — FLUMAZENIL 0.1 MG/ML
0.2 INJECTION INTRAVENOUS AS NEEDED
Status: DISCONTINUED | OUTPATIENT
Start: 2024-10-11 | End: 2024-10-11 | Stop reason: HOSPADM

## 2024-10-11 RX ORDER — MIDAZOLAM HYDROCHLORIDE 2 MG/2ML
0.5 INJECTION, SOLUTION INTRAMUSCULAR; INTRAVENOUS
Status: DISCONTINUED | OUTPATIENT
Start: 2024-10-11 | End: 2024-10-11 | Stop reason: HOSPADM

## 2024-10-11 RX ORDER — LABETALOL HYDROCHLORIDE 5 MG/ML
5 INJECTION, SOLUTION INTRAVENOUS
Status: DISCONTINUED | OUTPATIENT
Start: 2024-10-11 | End: 2024-10-11 | Stop reason: HOSPADM

## 2024-10-11 RX ORDER — OXYCODONE HCL 20 MG/1
20 TABLET, FILM COATED, EXTENDED RELEASE ORAL ONCE
Status: COMPLETED | OUTPATIENT
Start: 2024-10-11 | End: 2024-10-11

## 2024-10-11 RX ORDER — ONDANSETRON 2 MG/ML
4 INJECTION INTRAMUSCULAR; INTRAVENOUS
Status: DISCONTINUED | OUTPATIENT
Start: 2024-10-11 | End: 2024-10-11 | Stop reason: HOSPADM

## 2024-10-11 RX ORDER — SODIUM CHLORIDE 0.9 % (FLUSH) 0.9 %
3 SYRINGE (ML) INJECTION EVERY 12 HOURS SCHEDULED
Status: DISCONTINUED | OUTPATIENT
Start: 2024-10-11 | End: 2024-10-11 | Stop reason: HOSPADM

## 2024-10-11 RX ORDER — FENTANYL CITRATE 50 UG/ML
INJECTION, SOLUTION INTRAMUSCULAR; INTRAVENOUS AS NEEDED
Status: DISCONTINUED | OUTPATIENT
Start: 2024-10-11 | End: 2024-10-11 | Stop reason: SURG

## 2024-10-11 RX ORDER — SODIUM CHLORIDE 0.9 % (FLUSH) 0.9 %
10 SYRINGE (ML) INJECTION EVERY 12 HOURS SCHEDULED
Status: DISCONTINUED | OUTPATIENT
Start: 2024-10-11 | End: 2024-10-11 | Stop reason: HOSPADM

## 2024-10-11 RX ORDER — SODIUM CHLORIDE 0.9 % (FLUSH) 0.9 %
10 SYRINGE (ML) INJECTION AS NEEDED
Status: DISCONTINUED | OUTPATIENT
Start: 2024-10-11 | End: 2024-10-11 | Stop reason: HOSPADM

## 2024-10-11 RX ORDER — SODIUM CHLORIDE 9 MG/ML
INJECTION, SOLUTION INTRAVENOUS AS NEEDED
Status: DISCONTINUED | OUTPATIENT
Start: 2024-10-11 | End: 2024-10-11 | Stop reason: HOSPADM

## 2024-10-11 RX ORDER — OXYCODONE AND ACETAMINOPHEN 10; 325 MG/1; MG/1
1 TABLET ORAL EVERY 4 HOURS PRN
Status: DISCONTINUED | OUTPATIENT
Start: 2024-10-11 | End: 2024-10-11 | Stop reason: HOSPADM

## 2024-10-11 RX ORDER — DEXAMETHASONE SODIUM PHOSPHATE 4 MG/ML
INJECTION, SOLUTION INTRA-ARTICULAR; INTRALESIONAL; INTRAMUSCULAR; INTRAVENOUS; SOFT TISSUE AS NEEDED
Status: DISCONTINUED | OUTPATIENT
Start: 2024-10-11 | End: 2024-10-11 | Stop reason: SURG

## 2024-10-11 RX ORDER — NALOXONE HCL 0.4 MG/ML
0.04 VIAL (ML) INJECTION AS NEEDED
Status: DISCONTINUED | OUTPATIENT
Start: 2024-10-11 | End: 2024-10-11 | Stop reason: HOSPADM

## 2024-10-11 RX ORDER — SODIUM CHLORIDE, SODIUM LACTATE, POTASSIUM CHLORIDE, CALCIUM CHLORIDE 600; 310; 30; 20 MG/100ML; MG/100ML; MG/100ML; MG/100ML
100 INJECTION, SOLUTION INTRAVENOUS CONTINUOUS PRN
Status: DISPENSED | OUTPATIENT
Start: 2024-10-11 | End: 2024-10-12

## 2024-10-11 RX ORDER — PROPOFOL 10 MG/ML
VIAL (ML) INTRAVENOUS AS NEEDED
Status: DISCONTINUED | OUTPATIENT
Start: 2024-10-11 | End: 2024-10-11 | Stop reason: SURG

## 2024-10-11 RX ORDER — EPHEDRINE SULFATE 50 MG/ML
INJECTION INTRAVENOUS AS NEEDED
Status: DISCONTINUED | OUTPATIENT
Start: 2024-10-11 | End: 2024-10-11 | Stop reason: SURG

## 2024-10-11 RX ORDER — LIDOCAINE HYDROCHLORIDE 20 MG/ML
INJECTION, SOLUTION EPIDURAL; INFILTRATION; INTRACAUDAL; PERINEURAL AS NEEDED
Status: DISCONTINUED | OUTPATIENT
Start: 2024-10-11 | End: 2024-10-11 | Stop reason: SURG

## 2024-10-11 RX ORDER — LIDOCAINE HYDROCHLORIDE 10 MG/ML
0.5 INJECTION, SOLUTION EPIDURAL; INFILTRATION; INTRACAUDAL; PERINEURAL ONCE AS NEEDED
Status: DISCONTINUED | OUTPATIENT
Start: 2024-10-11 | End: 2024-10-11 | Stop reason: HOSPADM

## 2024-10-11 RX ORDER — IBUPROFEN 600 MG/1
600 TABLET, FILM COATED ORAL EVERY 6 HOURS PRN
Status: DISCONTINUED | OUTPATIENT
Start: 2024-10-11 | End: 2024-10-11 | Stop reason: HOSPADM

## 2024-10-11 RX ORDER — BUPIVACAINE HCL/0.9 % NACL/PF 0.125 %
PLASTIC BAG, INJECTION (ML) EPIDURAL AS NEEDED
Status: DISCONTINUED | OUTPATIENT
Start: 2024-10-11 | End: 2024-10-11 | Stop reason: SURG

## 2024-10-11 RX ORDER — FENTANYL CITRATE 50 UG/ML
50 INJECTION, SOLUTION INTRAMUSCULAR; INTRAVENOUS
Status: DISCONTINUED | OUTPATIENT
Start: 2024-10-11 | End: 2024-10-11 | Stop reason: HOSPADM

## 2024-10-11 RX ORDER — SODIUM CHLORIDE 9 MG/ML
40 INJECTION, SOLUTION INTRAVENOUS AS NEEDED
Status: DISCONTINUED | OUTPATIENT
Start: 2024-10-11 | End: 2024-10-11 | Stop reason: HOSPADM

## 2024-10-11 RX ADMIN — PROPOFOL 150 MG: 10 INJECTION, EMULSION INTRAVENOUS at 10:48

## 2024-10-11 RX ADMIN — CEFAZOLIN 2000 MG: 2 INJECTION, POWDER, FOR SOLUTION INTRAMUSCULAR; INTRAVENOUS at 10:55

## 2024-10-11 RX ADMIN — DOCUSATE SODIUM 50 MG AND SENNOSIDES 8.6 MG 2 TABLET: 8.6; 5 TABLET, FILM COATED ORAL at 20:05

## 2024-10-11 RX ADMIN — EPHEDRINE SULFATE 10 MG: 50 INJECTION INTRAVENOUS at 11:49

## 2024-10-11 RX ADMIN — OXYCODONE HYDROCHLORIDE 20 MG: 20 TABLET, FILM COATED, EXTENDED RELEASE ORAL at 08:46

## 2024-10-11 RX ADMIN — Medication 200 MCG: at 11:06

## 2024-10-11 RX ADMIN — EPHEDRINE SULFATE 10 MG: 50 INJECTION INTRAVENOUS at 11:10

## 2024-10-11 RX ADMIN — SODIUM CHLORIDE, POTASSIUM CHLORIDE, SODIUM LACTATE AND CALCIUM CHLORIDE 100 ML/HR: 600; 310; 30; 20 INJECTION, SOLUTION INTRAVENOUS at 08:38

## 2024-10-11 RX ADMIN — LIDOCAINE HYDROCHLORIDE 100 MG: 20 INJECTION, SOLUTION EPIDURAL; INFILTRATION; INTRACAUDAL; PERINEURAL at 10:48

## 2024-10-11 RX ADMIN — METOPROLOL SUCCINATE 25 MG: 25 TABLET, EXTENDED RELEASE ORAL at 20:44

## 2024-10-11 RX ADMIN — Medication 10 ML: at 09:01

## 2024-10-11 RX ADMIN — Medication 100 MCG: at 10:55

## 2024-10-11 RX ADMIN — REMIFENTANIL HYDROCHLORIDE 0.1 MCG/KG/MIN: 1 INJECTION, POWDER, LYOPHILIZED, FOR SOLUTION INTRAVENOUS at 10:48

## 2024-10-11 RX ADMIN — DEXAMETHASONE SODIUM PHOSPHATE 4 MG: 4 INJECTION INTRA-ARTICULAR; INTRALESIONAL; INTRAMUSCULAR; INTRAVENOUS; SOFT TISSUE at 11:02

## 2024-10-11 RX ADMIN — GUAIFENESIN 600 MG: 600 TABLET, EXTENDED RELEASE ORAL at 20:05

## 2024-10-11 RX ADMIN — INSULIN LISPRO 24 UNITS: 100 INJECTION, SOLUTION INTRAVENOUS; SUBCUTANEOUS at 20:04

## 2024-10-11 RX ADMIN — ONDANSETRON 4 MG: 2 INJECTION INTRAMUSCULAR; INTRAVENOUS at 12:21

## 2024-10-11 RX ADMIN — FAMOTIDINE 40 MG: 20 TABLET, FILM COATED ORAL at 17:26

## 2024-10-11 RX ADMIN — Medication 160 MG: at 10:48

## 2024-10-11 RX ADMIN — PROPOFOL 150 MCG/KG/MIN: 10 INJECTION, EMULSION INTRAVENOUS at 10:48

## 2024-10-11 RX ADMIN — Medication 200 MCG: at 11:00

## 2024-10-11 RX ADMIN — Medication 100 MCG: at 11:50

## 2024-10-11 RX ADMIN — FENTANYL CITRATE 100 MCG: 50 INJECTION, SOLUTION INTRAMUSCULAR; INTRAVENOUS at 10:48

## 2024-10-11 RX ADMIN — CEFAZOLIN 2000 MG: 2 INJECTION, POWDER, FOR SOLUTION INTRAMUSCULAR; INTRAVENOUS at 17:26

## 2024-10-11 RX ADMIN — Medication 3 ML: at 20:05

## 2024-10-11 RX ADMIN — INSULIN GLARGINE 50 UNITS: 100 INJECTION, SOLUTION SUBCUTANEOUS at 20:04

## 2024-10-11 RX ADMIN — INSULIN LISPRO 20 UNITS: 100 INJECTION, SOLUTION INTRAVENOUS; SUBCUTANEOUS at 17:26

## 2024-10-11 NOTE — ANESTHESIA POSTPROCEDURE EVALUATION
"Patient: Alisia Velez    Procedure Summary       Date: 10/11/24 Room / Location: [facility] OR  /  [facility] OR    Anesthesia Start: 1045 Anesthesia Stop: 1225    Procedure: EXPLORATION OF FUSION L3-S1, POSTERIOR SPINAL FUSION WITH INSTRUMENTATION L2-3 (Spine Lumbar) Diagnosis: (M54.16)    Surgeons: MIGDALIA Horton MD Provider: Ryan Fisher CRNA    Anesthesia Type: general ASA Status: 3            Anesthesia Type: general    Vitals  Vitals Value Taken Time   /62 10/11/24 1311   Temp 97.8 °F (36.6 °C) 10/11/24 1310   Pulse 93 10/11/24 1313   Resp 16 10/11/24 1310   SpO2 95 % 10/11/24 1313   Vitals shown include unfiled device data.        Post Anesthesia Care and Evaluation    Patient location during evaluation: PHASE II  Patient participation: complete - patient participated  Level of consciousness: awake and awake and alert  Pain score: 0  Pain management: adequate    Airway patency: patent  Anesthetic complications: No anesthetic complications  PONV Status: none  Cardiovascular status: acceptable  Respiratory status: acceptable  Hydration status: acceptable    Comments: Patient discharged according to acceptable Shirley score per RN assessment. See nursing records for further information.     Blood pressure 145/62, pulse 95, temperature 97.8 °F (36.6 °C), temperature source Temporal, resp. rate 16, height 161 cm (63.39\"), weight 74.1 kg (163 lb 6.4 oz), last menstrual period 02/27/2000, SpO2 96%, not currently breastfeeding.      "

## 2024-10-11 NOTE — OP NOTE
LUMBAR LAMINECTOMY POSTERIOR LUMBAR INTERBODY FUSION  Procedure Note    Alisia Velez  10/11/2024    Pre-op Diagnosis:     1. Status post previous right C7-T1 foraminotomy, diskectomy, Dr. Garcia, December 2004   2. Status post ACDF C5-7, 04/19/2019   3. Status post previous left L5-S1 decompression, Dr. Barnett, October 2002   4. Status post left LLIF L3-5 with instrumentation L3-4, 03/13/2017   5. Status post right L3-S1 decompression, PSF with instrumentation L3-S1, 03/15/2017   6. Recurrent low back pain, left side   7. Left anterior thigh radiculopathy   8. Multilevel thoracolumbar degenerative disc disease, worst at L2-3   9. Focal scoliosis concave left L2-3   10. Facet arthropathy L2-3   11. Central and foraminal stenosis left worse than right L2-3   12. Possible pseudoarthrosis L5-S1   13. Mild lucency bilateral S1 pedicle screws  14.  Status post right LLIF with instrumentation L2-3, 10/9/2024    Post-op Diagnosis:    same    Procedure/CPT® Codes:    1.  Posterior spinal fusion L2-3  2.  Posterior spinal instrumentation L2-3 (ATEC interspinous fixation device)  3.  Use of locally obtained autograft bone for fusion  4.  Use of allograft bone matrix for fusion (Induce NMP Fibers)  5.  Use of fluoroscopy for confirmation of surgical level and placement of instrumentation  6.  Intraoperative neural monitoring with pedicle screw stimulation    Anesthesia: General    Surgeon: DENG Horton MD    Assistant: Dayron Mckinney PA-C    Estimated Blood Loss: minimal    Complications: None    Condition: Stable to PACU.    Indications:    The patient is a 79-year-old who sees Dr. Mike Padilla for medical issues.  She has had fusion procedures in both the cervical and lumbar spine.  She presented to the office with recurrent low back pain on her left side along with left anterior thigh radiculopathy.  Repeat imaging studies revealed multilevel thoracolumbar degenerative disc disease that was worse at L2-3, where there  was focal scoliosis concave to the left and facet arthropathy contributing to central and foraminal stenosis that matched her symptoms.    After failing conservative measures, it was mutually decided that surgery would be the best option. Risks, benefits, and complications of surgery were discussed with the patient. The patient appeared well informed and wished to proceed. We specifically discussed the risk of infection, blood loss, nerve root injury, CSF leak, and the possibility of incomplete resolution of symptoms. We also discussed the possible risk of a nonunion and the potential need for additional surgery in the event of a pseudoarthrosis or hardware failure.     We elected proceed with a staged operation.  Previously on 10/9/2024, the patient underwent a right lateral fusion of L2-3 with instrumentation.  Today we return to surgery for the second posterior stage of the procedure.     Operative Procedure:    After obtaining informed consent and verifying the correct operative site, the patient was brought to the operating room. A general anesthetic was provided by the anesthesia service with the assistance of an endotracheal tube. Once this was appropriately positioned and secured, the patient was carefully rotated prone onto a Mikey frame. All bony processes were well-padded. The lumbar region was prepped and draped in usual sterile fashion. A surgical timeout was taken to confirm this was the correct patient, we were working at the correct levels, and that preoperative antibiotics were given in a timely fashion.    Using fluoroscopy for guidance, a midline incision was created overlying the L2-3 segment using a 10 blade scalpel.  Dissection was carried through subcutaneous tissues using Bovie cautery.  The lumbar fascia was divided in line with the incision and dissection was carried on both sides of the spinous processes of L2 and L3 down to the interlaminar area.  A 4 angled curette was used to confirm  with fluoroscopy that we were indeed at the correct level.  Dissection was then carried laterally exposing the facet joints of L2-3 bilaterally.  Self-retaining retractors were placed for continuous exposure.  The facet joint capsules were destroyed using Bovie cautery bilaterally exposing as much bone as possible.  The wound was then thoroughly irrigated with saline solution.    I used a rongeur to remove the interspinous ligament between the spinous processes of L2 and L3.  Curettes were used to clean the bone edges in preparation for fusion.  This was also done on the interlaminar bony surface areas of L2 and L3 and to clean any soft tissues off of the facet joints.  The high-speed bur was then used to decorticate the facet joints, destroying as much of the facet cartilage as possible.  I also decorticated the lateral pars region and the lamina of L2 and L3.  The locally obtained autograft bone was left in situ constituting a posterior fusion of L2-3.    An appropriate sized interspinous clamp from the [facility] instrumentation set was then chosen and placed across the spinous processes of L2 and L3.  This implant was squeezed used the appropriate  and compression devices provided by [facility] as fixation connecting the spinous process of L2 and L3.  The appropriate screwdriver was then used to tighten the implant into its locked position.  Once properly positioned, allograft bone matrix called Induce NMP Fibers were packed all along the lateral margins of the implant down to the lamina as well as in the space between the actual spinous processes of L2 and L3.  This constituted a posterior fusion of L2-3.    An inspection was then undertaken to ensure that we had adequate hemostasis.  Bleeding at this point was controlled using Avitene and bipolar cautery.  Final fluoroscopy imaging confirmed adequate position of the newly placed posterior instrumentation spanning L2-3.    Wound closure was then accomplished by  reapproximating the fascia with a #1 Vicryl area immediate subcutaneous tissues were closed using a 2-0 Vicryl and skin closure was augmented using Mastisol and Steri-Strips.  The incision was then washed and sterilely dressed with a Bioclusive dressing.    The patient was then carefully rotated supine onto a hospital gurney, extubated, and sent to the recovery room in good stable condition.  The patient tolerated the procedure well.  There were no complications.  We estimated blood loss to be minimal.    Intraoperative neuro monitoring was ordered and carried out throughout the procedure to add an increased level of safety for the patient.  The interpreting physician was available by means of real-time continuous, bidirectional, remote audio and visual communication as needed throughout the entire procedure.  Modalities used during the procedure included SSEP, EEG, EMG, and TOF.  There were no neuro monitoring signal changes during the procedure.    Dayron Mckinney PA-C provided critical assistance during the procedure.  His assistance was medically necessary in order to allow the procedure to occur in the most safe and efficient manner.    DENG Horton MD     Date: 10/11/2024  Time: 12:22 CDT

## 2024-10-11 NOTE — PROGRESS NOTES
1         [facility] Medicine Services  INPATIENT PROGRESS NOTE    Patient Name: Alisia Velez  Date of Admission: 10/9/2024  Today's Date: 10/11/24  Length of Stay: 2  Primary Care Physician: Mike Erazo MD    Subjective   Chief Complaint: Follow-up on blood sugar  HPI   Spoke to pharmacist yesterday to see how we can work on her preferred home insulin regimen.  We also requested nutritionist to help us on carb counting to address  additional insulin coverage    Noted slightly elevated blood pressure reading today at 1 45-1 54 systolic in comparison to 121-132 systolic blood pressure yesterday  Otherwise heart rate adequately controlled and afebrile.  Maintaining adequate oxygenation in room air in the mid to upper 90s    Patient expecting surgery today.  She  is more comfortable with current sugar reading.    Review of Systems   All pertinent negatives and positives are as above. All other systems have been reviewed and are negative unless otherwise stated.     Objective    Temp:  [98.2 °F (36.8 °C)-98.7 °F (37.1 °C)] 98.2 °F (36.8 °C)  Heart Rate:  [69-86] 86  Resp:  [16] 16  BP: (124-145)/(42-59) 145/58  Physical Exam  Hemodynamically stable  GEN: Awake, alert, interactive, in NAD  HEENT: Atraumatic, PERRLA, EOMI, Anicteric, Trachea midline  Lungs: CTAB, no wheezing/rales/rhonchi  Heart: RRR, +S1/s2, no rub  ABD: She is wearing a brace.  Extremities: atraumatic, no cyanosis, no edema  Skin: no rashes or lesions  Neuro: AAOx3, no focal deficits  Psych: normal mood & affect      Results Review:  I have reviewed the labs, radiology results, and diagnostic studies.    Laboratory Data:   Results from last 7 days   Lab Units 10/10/24  0339   WBC 10*3/mm3 9.85   HEMOGLOBIN g/dL 11.8*   HEMATOCRIT % 36.9   PLATELETS 10*3/mm3 225        Results from last 7 days   Lab Units 10/10/24  0339   SODIUM mmol/L 133*   POTASSIUM mmol/L 3.9   CHLORIDE mmol/L 94*   CO2 mmol/L 28.0   BUN  "mg/dL 12   CREATININE mg/dL 0.80   CALCIUM mg/dL 9.2   GLUCOSE mg/dL 232*       Culture Data:   No results found for: \"BLOODCX\", \"URINECX\", \"WOUNDCX\", \"MRSACX\", \"RESPCX\", \"STOOLCX\"    Radiology Data:   Imaging Results (Last 24 Hours)       ** No results found for the last 24 hours. **            I have reviewed the patient's current medications.     Assessment/Plan   Assessment  Active Hospital Problems    Diagnosis     **Lumbar radiculopathy     Chronic bilateral low back pain without sciatica     Lumbar spondylosis     Degeneration of intervertebral disc of lumbar region with discogenic back pain and lower extremity pain     Degeneration of intervertebral disc of lumbar region          Medical Decision Making  Number and Complexity of problems:   Lumbar radiculopathy status post cervical and lumbar interbody fusion October 9 by Dr. Horton  Diabetes mellitus type 2 insulin treated.  Less labile blood pressure reading.    Bradycardia (resolved)-parameter for beta-blocker in place  Hyperlipidemia-continue statin  Mildly low serum sodium of unclear significance.  Monitor for any symptoms and consider rechecking chemistry in the next day or so             Treatment Plan  Awaiting for \"next stage\" from primary service  Continue to monitor blood sugar and address accordingly per regimen  Monitor blood pressure and adjust medications as needed.  No new labs  atorvastatin, 20 mg, Oral, Daily  ceFAZolin, 2,000 mg, Intravenous, On Call to OR  cetirizine, 10 mg, Oral, Daily  famotidine, 40 mg, Oral, BID AC  guaiFENesin, 600 mg, Oral, Q12H  insulin glargine, 50 Units, Subcutaneous, Nightly  insulin lispro, 0-24 Units, Subcutaneous, 4x Daily AC & at Bedtime  metoprolol succinate XL, 25 mg, Oral, Q12H  oxyCODONE, 20 mg, Oral, Once  senna-docusate sodium, 2 tablet, Oral, BID  sodium chloride, 10 mL, Intravenous, Q12H  sodium chloride, 3 mL, Intravenous, Q12H        Conditions and Status  Fair, stable     [facility] Data  External " documents reviewed: Records in [facility]  Cardiac tracing (EKG, telemetry) interpretation: Normal sinus rhythm from EKG September 26, 2024  Radiology interpretation:-  Labs reviewed: Sugar reading noted  Any tests that were considered but not ordered: None     Decision rules/scores evaluated (example [id number], Wells, etc): -     Discussed with: Patient, , nursing staff       Care Planning  Shared decision making: Patient and primary service  Code status and discussions: Full code   I confirmed that the patient's Advance Care Plan is present, code status is documented, or surrogate decision maker is listed in the patient's medical record.      Disposition  Social Determinants of Health that impact treatment or disposition: None identified at this time  I expect the patient to be discharged by the primary service.        The patient's surrogate decision maker is spouse    Electronically signed by Osbaldo Marinelli MD, 10/11/24, 07:53 CDT.

## 2024-10-11 NOTE — PLAN OF CARE
Goal Outcome Evaluation:  Plan of Care Reviewed With: patient        Progress: improving  Outcome Evaluation: Pt received back from PACU to room 328 this shift. Minimal c/o pain since surgery today. No request for pain meds currently. Safety maintained. A/o x4. IV IID. Dressing to right flank CDI. Dressing to lumbar CDI. N/v check WNL. PPP. Accu checks ACHS. Sliding scale insulin available as needed. Up with asst x1 with LSO. DTV. Cont to monitor.

## 2024-10-11 NOTE — ANESTHESIA PROCEDURE NOTES
Airway  Urgency: elective    Date/Time: 10/11/2024 10:50 AM  Airway not difficult    General Information and Staff    Patient location during procedure: OR  CRNA/CAA: Ryan Fisher CRNA    Indications and Patient Condition    Preoxygenated: yes  Mask difficulty assessment: 1 - vent by mask    Final Airway Details  Final airway type: endotracheal airway      Successful airway: ETT  Cuffed: yes   Successful intubation technique: video laryngoscopy  Endotracheal tube insertion site: oral  Blade: Dill  Blade size: 3  ETT size (mm): 7.5  Cormack-Lehane Classification: grade I - full view of glottis  Placement verified by: chest auscultation   Cuff volume (mL): 5  Measured from: lips  ETT/EBT  to lips (cm): 21  Number of attempts at approach: 1  Assessment: lips, teeth, and gum same as pre-op and atraumatic intubation

## 2024-10-11 NOTE — ANESTHESIA PREPROCEDURE EVALUATION
Anesthesia Evaluation     Patient summary reviewed   history of anesthetic complications:  PONV  NPO Solid Status: > 8 hours  NPO Liquid Status: > 8 hours           Airway   Mallampati: II  TM distance: >3 FB  Neck ROM: full  Dental - normal exam     Pulmonary    (+) ,sleep apnea  (-) COPD, asthma, not a smoker  Cardiovascular   Exercise tolerance: good (4-7 METS)    ECG reviewed  Patient on routine beta blocker and Beta blocker given within 24 hours of surgery    (+) hypertension, dysrhythmias Tachycardia, hyperlipidemia  (-) pacemaker, past MI, angina, cardiac stents      Neuro/Psych  (+) TIA (1999), CVA (1999)  (-) seizures  GI/Hepatic/Renal/Endo    (+) GERD, diabetes mellitus (continuous glucose monitor) type 2 using insulin  (-) liver disease, no renal disease    Musculoskeletal     (+) back pain  Abdominal    Substance History      OB/GYN          Other   arthritis,   history of cancer                      Anesthesia Plan    ASA 3     general     (Propofol infusion for severe PONV (again) )  intravenous induction     Anesthetic plan, risks, benefits, and alternatives have been provided, discussed and informed consent has been obtained with: patient.

## 2024-10-12 ENCOUNTER — READMISSION MANAGEMENT (OUTPATIENT)
Dept: CALL CENTER | Facility: HOSPITAL | Age: 79
End: 2024-10-12
Payer: MEDICARE

## 2024-10-12 VITALS
TEMPERATURE: 98 F | RESPIRATION RATE: 16 BRPM | OXYGEN SATURATION: 96 % | HEIGHT: 63 IN | BODY MASS INDEX: 28.95 KG/M2 | HEART RATE: 76 BPM | SYSTOLIC BLOOD PRESSURE: 142 MMHG | WEIGHT: 163.4 LBS | DIASTOLIC BLOOD PRESSURE: 50 MMHG

## 2024-10-12 LAB
GLUCOSE BLDC GLUCOMTR-MCNC: 181 MG/DL (ref 70–130)
GLUCOSE BLDC GLUCOMTR-MCNC: 315 MG/DL (ref 70–130)
GLUCOSE BLDC GLUCOMTR-MCNC: 324 MG/DL (ref 70–130)

## 2024-10-12 PROCEDURE — 97164 PT RE-EVAL EST PLAN CARE: CPT | Performed by: PHYSICAL THERAPIST

## 2024-10-12 PROCEDURE — 97168 OT RE-EVAL EST PLAN CARE: CPT

## 2024-10-12 PROCEDURE — 25010000002 CEFAZOLIN PER 500 MG: Performed by: ORTHOPAEDIC SURGERY

## 2024-10-12 PROCEDURE — 63710000001 INSULIN LISPRO (HUMAN) PER 5 UNITS: Performed by: ORTHOPAEDIC SURGERY

## 2024-10-12 PROCEDURE — 82948 REAGENT STRIP/BLOOD GLUCOSE: CPT

## 2024-10-12 RX ADMIN — FAMOTIDINE 40 MG: 20 TABLET, FILM COATED ORAL at 10:18

## 2024-10-12 RX ADMIN — ATORVASTATIN CALCIUM 20 MG: 10 TABLET, FILM COATED ORAL at 10:19

## 2024-10-12 RX ADMIN — CETIRIZINE HYDROCHLORIDE 10 MG: 10 TABLET, FILM COATED ORAL at 10:18

## 2024-10-12 RX ADMIN — GUAIFENESIN 600 MG: 600 TABLET, EXTENDED RELEASE ORAL at 10:18

## 2024-10-12 RX ADMIN — Medication 3 ML: at 10:19

## 2024-10-12 RX ADMIN — CEFAZOLIN 2000 MG: 2 INJECTION, POWDER, FOR SOLUTION INTRAMUSCULAR; INTRAVENOUS at 02:03

## 2024-10-12 RX ADMIN — INSULIN LISPRO 20 UNITS: 100 INJECTION, SOLUTION INTRAVENOUS; SUBCUTANEOUS at 10:30

## 2024-10-12 NOTE — PLAN OF CARE
Goal Outcome Evaluation:  Plan of Care Reviewed With: patient        Progress: improving  Outcome Evaluation: OT re-eval completed. Pt seated in chair upon therapist arrival; A&Ox4; No c/o pain; LSO donned. Pt able to recall 3/3 spinal precautions this date. Pt doffed/donned LSO while seated with supervision and verbal cues. Pt performed sit<>stand and ambulated community distance in hallway utilizing rwx with SBA. Pt navigated up/down 4 steps utilizing B handrails with CGA. BUE strength WFL. Skilled OT intervention not indicated at this time. Pt plans to discharge home with her  this date. OT to sign off.    Anticipated Discharge Disposition (OT): home with assist

## 2024-10-12 NOTE — PROGRESS NOTES
"1         [facility] Medicine Services  INPATIENT PROGRESS NOTE    Patient Name: Alisia Velez  Date of Admission: 10/9/2024  Today's Date: 10/12/24  Length of Stay: 3  Primary Care Physician: Mike Erazo MD    Subjective   Chief Complaint: Follow-up on blood sugar  HPI   No new complaints  Sugar reading reviewed  She is anticipating she will be discharged today.  Official order from primary service has not been placed.    Otherwise, I mentioned to her that she can go back to her routine sugar regimen upon discharge and work closely with Dr. Spears (endocrinologist)    Review of Systems   All pertinent negatives and positives are as above. All other systems have been reviewed and are negative unless otherwise stated.     Objective    Temp:  [97.3 °F (36.3 °C)-99.2 °F (37.3 °C)] 98 °F (36.7 °C)  Heart Rate:  [71-99] 75  Resp:  [12-18] 16  BP: (111-152)/(48-77) 125/51  FiO2 (%):  [100 %] 100 %  Physical Exam      Hemodynamically stable  GEN: Awake, alert, interactive, in NAD  HEENT: Atraumatic, PERRLA, EOMI, Anicteric, Trachea midline  Lungs: CTAB, no wheezing/rales/rhonchi  Heart: RRR, +S1/s2, no rub  ABD: She is wearing a brace.  Extremities: atraumatic, no cyanosis, no edema  Skin: no rashes or lesions  Neuro: AAOx3, no focal deficits  Psych: normal mood & affect      Results Review:  I have reviewed the labs, radiology results, and diagnostic studies.    Laboratory Data:   Results from last 7 days   Lab Units 10/10/24  0339   WBC 10*3/mm3 9.85   HEMOGLOBIN g/dL 11.8*   HEMATOCRIT % 36.9   PLATELETS 10*3/mm3 225        Results from last 7 days   Lab Units 10/10/24  0339   SODIUM mmol/L 133*   POTASSIUM mmol/L 3.9   CHLORIDE mmol/L 94*   CO2 mmol/L 28.0   BUN mg/dL 12   CREATININE mg/dL 0.80   CALCIUM mg/dL 9.2   GLUCOSE mg/dL 232*       Culture Data:   No results found for: \"BLOODCX\", \"URINECX\", \"WOUNDCX\", \"MRSACX\", \"RESPCX\", \"STOOLCX\"    Radiology Data:   Imaging Results " (Last 24 Hours)       Procedure Component Value Units Date/Time    XR Spine Lumbar 2 or 3 View [[id number]] Collected: 10/11/24 1512     Updated: 10/11/24 1516    Narrative:      EXAMINATION: XR SPINE LUMBAR 2 OR 3 VW- 10/11/2024 3:12 PM     HISTORY: posterior fusion; Z74.09-Other reduced mobility.     REPORT:     Fluoroscopy time: 8.4 seconds     Radiation dose: 1.1345 Gycm2 (Air Kerma).     Number of fluoro spot images obtained: 2.     COMPARISON: Lumbar spine x-rays 10/9/2024.     Intraoperative or intra-procedural fluoroscopic spot films of the lumbar  spine were obtained under the supervision of the orthopedic surgery  service, no radiologist was present for image acquisition. The images  demonstrate multiple levels posterior and lateral interbody fusion,  please see the operative or procedural notes for details.        This report was signed and finalized on 10/11/2024 3:13 PM by Dr. Enrico Lindo MD.       FL C Arm During Surgery [[id number]] Resulted: 10/11/24 1452     Updated: 10/11/24 1452    Narrative:      This procedure was auto-finalized with no dictation required.            I have reviewed the patient's current medications.     Assessment/Plan   Assessment  Active Hospital Problems    Diagnosis     **Lumbar radiculopathy     Chronic bilateral low back pain without sciatica     Lumbar spondylosis     Degeneration of intervertebral disc of lumbar region with discogenic back pain and lower extremity pain     Degeneration of intervertebral disc of lumbar region          Medical Decision Making  Number and Complexity of problems:   Lumbar radiculopathy status post cervical and lumbar interbody fusion October 9 by Dr. Horton  Diabetes mellitus type 2 insulin treated/hyperglycemia  Less labile blood pressure reading.    Bradycardia (resolved)-parameter for beta-blocker in place  Hyperlipidemia-continue statin  Mildly low serum sodium of unclear significance.  Monitor for any symptoms and consider  rechecking chemistry in the next day or so           Treatment Plan    Continue to monitor blood sugar and address accordingly per regimen.  Relayed to nurse Regan current blood sugar reading over 300.  Patient has not received her regimen this morning.  Monitor blood pressure and adjust medications as needed.  No new labs  atorvastatin, 20 mg, Oral, Daily  cetirizine, 10 mg, Oral, Daily  famotidine, 40 mg, Oral, BID AC  guaiFENesin, 600 mg, Oral, Q12H  insulin glargine, 50 Units, Subcutaneous, Nightly  insulin lispro, 0-24 Units, Subcutaneous, 4x Daily AC & at Bedtime  metoprolol succinate XL, 25 mg, Oral, Q12H  senna-docusate sodium, 2 tablet, Oral, BID  sodium chloride, 3 mL, Intravenous, Q12H        Conditions and Status  Fair, stable     [facility] Data  External documents reviewed: Records in [facility]  Cardiac tracing (EKG, telemetry) interpretation: Normal sinus rhythm from EKG September 26, 2024  Radiology interpretation:-  Labs reviewed: Sugar reading noted  Any tests that were considered but not ordered: None     Decision rules/scores evaluated (example [id number], Wells, etc): -     Discussed with: Patient, , nursing staff       Care Planning  Shared decision making: Patient and primary service  Code status and discussions: Full code   I confirmed that the patient's Advance Care Plan is present, code status is documented, or surrogate decision maker is listed in the patient's medical record.      Disposition  Social Determinants of Health that impact treatment or disposition: None identified at this time  I expect the patient to be discharged by the primary service.   Okay from my standpoint for discharge with recommendation to follow-up with her endocrinologist.  No changes from my standpoint regarding diabetic management.  Will sign off.  Thank you for this kind consult    The patient's surrogate decision maker is spouse    Electronically signed by Osbaldo Marinelli MD, 10/12/24, 09:43 CDT.

## 2024-10-12 NOTE — DISCHARGE SUMMARY
[facility]  Franklin Mckinney PA-C  DISCHARGE SUMMARY  Patient ID:  Alisia Velez  [id number]  79 y.o.  1945    Admit date: 10/9/2024    Discharge date and time: 10/12/2024    Admitting Physician: MIGDALIA Horton MD     Indication for Admission: Planned surgical admission     Admission Diagnoses: Lumbar radiculopathy [M54.16]  Degeneration of intervertebral disc of lumbar region [M51.369]    Discharge Diagnoses: Lumbar radiculopathy [M54.16]  Degeneration of intervertebral disc of lumbar region [M51.369]    Procedures: Lateral lumbar fusion L2/3, posterior spinal fusion L2/3    Problem List:   Lumbar radiculopathy    Chronic bilateral low back pain without sciatica    Lumbar spondylosis    Degeneration of intervertebral disc of lumbar region with discogenic back pain and lower extremity pain    Degeneration of intervertebral disc of lumbar region      Consults:  Family Medicine    Admission Condition: stable    Discharged Condition: stable    Hospital Course: No immediate post operative complication     Disposition: home    Patient Instructions:      Discharge Medications        Continue These Medications        Instructions Start Date   Insulin Pen Needle 32G X 6 MM misc   4 x daily             ASK your doctor about these medications        Instructions Start Date   acetaminophen 650 MG 8 hr tablet  Commonly known as: TYLENOL   650 mg, Oral, Every 8 Hours PRN      aspirin 81 MG EC tablet   81 mg, Oral, Daily      atorvastatin 20 MG tablet  Commonly known as: LIPITOR   20 mg, Oral, Daily      Baqsimi One Pack 3 MG/DOSE powder  Generic drug: Glucagon   1 each, Nasal, As Needed, Apply intranasal if hypoglycemia      cetirizine 10 MG tablet  Commonly known as: zyrTEC   10 mg, Oral, Daily      CoQ10 200 MG capsule   1 tablet, Oral, Daily      famotidine 40 MG tablet  Commonly known as: PEPCID   40 mg, Oral, Daily      guaiFENesin 600 MG 12 hr tablet  Commonly known as: MUCINEX   1,200 mg, Oral, 2 Times  Daily      Insulin Glargine (1 Unit Dial) 300 UNIT/ML solution pen-injector injection  Commonly known as: TOUJEO  Ask about: Which instructions should I use?   50 Units, Subcutaneous, Nightly      Insulin Lispro-aabc 200 UNIT/ML solution pen-injector   60 Units, Subcutaneous, 3 Times Daily With Meals, Sliding scale      metoprolol succinate XL 25 MG 24 hr tablet  Commonly known as: TOPROL-XL   25 mg, Oral, 2 Times Daily      OMEGA-3 FATTY ACIDS-VITAMIN E PO   1 capsule, Oral, Daily,  Hold for surgery      Vitamin D-3 25 MCG (1000 UT) capsule   1 capsule, Oral, 3 Times Daily             Activity: Avoid bending lifting or twisting, brace when up and out of bed, no driving while taking narcotic pain medication, walk 15 minutes 4 times daily  Diet: diabetic diet  Wound Care: keep wound clean and dry  Other: Contact [facility] office with questions or concerns    Follow-up with Dr Horton or PA's in 2 weeks.    Electronically signed by Franklin Mckinney PA-C 12:15 CDT 10/12/2024

## 2024-10-12 NOTE — THERAPY DISCHARGE NOTE
Acute Care - Occupational Therapy Discharge  [facility]    Patient Name: Alisia Velez  : 1945    MRN: [id number]                              Today's Date: 10/12/2024       Admit Date: 10/9/2024    Visit Dx:     ICD-10-CM ICD-9-CM   1. Impaired mobility [Z74.09]  Z74.09 799.89     Patient Active Problem List   Diagnosis    Osteopenia    Mixed hyperlipidemia    Essential hypertension    Type 2 diabetes mellitus without complication, without long-term current use of insulin    Spinal stenosis, lumbar    Gastroesophageal reflux disease without esophagitis    Type 2 diabetes mellitus with diabetic polyneuropathy, with long-term current use of insulin    Vitamin D deficiency    B12 deficiency    Age-related osteoporosis without current pathological fracture    Stenosis, cervical spine    Mass of right wrist    Chronic bilateral low back pain without sciatica    Lumbar spondylosis    Degeneration of intervertebral disc of lumbar region with discogenic back pain and lower extremity pain    Lumbar radiculopathy    Degeneration of intervertebral disc of lumbar region     Past Medical History:   Diagnosis Date    Anesthesia complication     severe nausea and vomitig    Arthritis     Back pain     Basal cell carcinoma     Blister of great toe of left foot     Nonthermal, initial encounter    Bunion     Cancer     skin     Dyslipidemia     Elevated blood pressure     Essential hypertension 2016    Nimesh blanchard     GERD (gastroesophageal reflux disease)     History of cerebrovascular accident         Hyperlipidemia     Hypo-osmolality and hyponatremia     Ingrowing nail     Melanoma     Neuropathy in diabetes     Some    Onychomycosis     Plantar fasciitis     PONV (postoperative nausea and vomiting)     Sleep apnea     Snores     Stroke     TIA (transient ischemic attack)     After stroke    Type 2 diabetes mellitus     Type 2 diabetes mellitus with circulatory disorder 2016    Urinary incontinence      Vitamin D deficiency      Past Surgical History:   Procedure Laterality Date    ANTERIOR CERVICAL DISCECTOMY W/ FUSION N/A 04/19/2019    Procedure: ANTERIOR CERVICAL DISCECTOMY FUSION C5-7;  Surgeon: MIGDALIA Horton MD;  Location:  [facility];  Service: Orthopedic Spine    ARM LESION/CYST EXCISION Right 08/17/2022    Procedure: RIGHT WRIST MARGINAL MASS EXCISION;  Surgeon: Magen Pardo MD;  Location:  [facility];  Service: Orthopedics;  Laterality: Right;    BACK SURGERY      BLEPHAROPLASTY      CARDIAC CATHETERIZATION      CARPAL TUNNEL RELEASE      CERVICAL SPINE SURGERY      cydney-laminotomy c7-t1    COLONOSCOPY      ENDOSCOPY      EYE SURGERY Bilateral     cataracts     FOOT SURGERY      Toe nails removed    LAPAROSCOPIC CHOLECYSTECTOMY      LUMBAR DISC SURGERY      LUMBAR FUSION Left 03/13/2017    Procedure: LEFT LUMBAR LATERAL INTERBODY FUSION L 3-5 WITH INSTRUMENTATION;  Surgeon: MIGDALIA Horton MD;  Location:  [facility];  Service:     LUMBAR FUSION Right 10/9/2024    Procedure: RIGHT LATERAL LUMBAR INTERBODY FUSION WITH INSTRUMENTATION L2-3;  Surgeon: MIGDALIA Horton MD;  Location:  [facility];  Service: Orthopedic Spine;  Laterality: Right;    LUMBAR LAMINECTOMY WITH FUSION Right 03/15/2017    Procedure: RIGHT L3-S1 POSSIBLE RIGHT L 3-4 HEMILAMINECTOMY FACETECTOMY DECOMPRESSION TRANSFORAMINAL LUMBAR INTERBODY FUSION L4-S1 POSTERIOR SPINAL FUSION WITH INSTRUMENTATION L3-S1;  Surgeon: MIGDALIA Hroton MD;  Location:  [facility];  Service:     MYRINGOTOMY W/ TUBES      NAIL BED REMOVAL/REVISION  03/11/2016    NECK SURGERY      NISSEN FUNDOPLICATION LAPAROSCOPIC      OTHER SURGICAL HISTORY      had left and right big toenials removed mar 2016    TOENAIL EXCISION      Both big toes    WRIST SURGERY Right     ganglian cyst removal      General Information       Row Name 10/12/24 0736          OT Time and Intention    Document Type re-evaluation  -LS     Mode of Treatment occupational therapy  -LS      Patient Effort good  -       Row Name 10/12/24 07          General Information    Existing Precautions/Restrictions fall;spinal;LSO;brace worn when out of bed  -     Barriers to Rehab previous functional deficit  -       Row Name 10/12/24 [facility]          Cognition    Orientation Status (Cognition) oriented x 4  -       Row Name 10/12/24 [facility]          Safety Issues/Impairments Affecting Functional Mobility    Safety Issues Affecting Function (Mobility) --  -LS     Impairments Affecting Function (Mobility) endurance/activity tolerance;range of motion (ROM);balance  -     Cognitive Impairments, Mobility Safety/Performance --  -LS               User Key  (r) = Recorded By, (t) = Taken By, (c) = Cosigned By      Initials Name Provider Type     Emily White OTR/L Occupational Therapist                   Mobility/ADL's       Row Name 10/12/24 0800          Bed Mobility    Bed Mobility --  -LS     Rolling Right Muse (Bed Mobility) --  -LS     Sidelying-Sit Muse (Bed Mobility) --  -LS     Assistive Device (Bed Mobility) --  -       Row Name 10/12/24 0800 10/12/24 [facility]       Transfers    Transfers --  -LS sit-stand transfer;stand-sit transfer  -      Row Name 10/12/24 [facility]          Sit-Stand Transfer    Sit-Stand Muse (Transfers) standby assist  -       Row Name 10/12/24 07          Stand-Sit Transfer    Stand-Sit Muse (Transfers) standby assist  -       Row Name 10/12/24 [facility]          Functional Mobility    Functional Mobility- Ind. Level standby assist  -     Functional Mobility- Device walker, front-wheeled  -       Row Name 10/12/24 [facility]          Activities of Daily Living    BADL Assessment/Intervention upper body dressing;lower body dressing  -       Row Name 10/12/24 [facility]          Upper Body Dressing Assessment/Training    Muse Level (Upper Body Dressing) upper body dressing skills;supervision;verbal cues  -     Position (Upper Body Dressing)  unsupported sitting  -[facility] Name 10/12/24 [facility]          Lower Body Dressing Assessment/Training    [place] Level (Lower Body Dressing) lower body dressing skills;minimum assist (75% patient effort)  -LS     Position (Lower Body Dressing) unsupported sitting;supported standing  -               User Key  (r) = Recorded By, (t) = Taken By, (c) = Cosigned By      Initials Name Provider Type    LS Emily White, OTR/L Occupational Therapist                   Obj/Interventions       Row Name 10/12/24 [facility]          Sensory Assessment (Somatosensory)    Sensory Assessment (Somatosensory) UE sensation intact  -[facility] Name 10/12/24 [facility]          Vision Assessment/Intervention    Visual Impairment/Limitations WFL;corrective lenses for reading  -[facility] Name 10/12/24 [facility]          Range of Motion Comprehensive    General Range of Motion bilateral upper extremity ROM WFL  -LS       Row Name 10/12/24 [facility]          Strength Comprehensive (MMT)    Comment, General Manual Muscle Testing (MMT) Assessment BUE strength grossly 4+/5  -[facility] Name 10/12/24 [facility]          Balance    Balance Assessment sitting static balance;sitting dynamic balance;standing static balance;standing dynamic balance  -LS     Static Sitting Balance independent  -LS     Dynamic Sitting Balance independent  -LS     Position, Sitting Balance sitting in chair  -LS     Static Standing Balance standby assist  -LS     Dynamic Standing Balance standby assist  -LS     Position/Device Used, Standing Balance supported;walker, front-wheeled  -               User Key  (r) = Recorded By, (t) = Taken By, (c) = Cosigned By      Initials Name Provider Type    LS Emily White, OTR/L Occupational Therapist                   Goals/Plan    No documentation.                  Clinical Impression       [facility] Name 10/12/24 [facility]          Pain Assessment    Pretreatment Pain Rating 0/10 - no pain  -LS     Posttreatment Pain Rating 0/10 - no pain  -LS        Row Name 10/12/24 0736          Plan of Care Review    Plan of Care Reviewed With patient  -LS     Progress improving  -LS     Outcome Evaluation OT re-eval completed. Pt seated in chair upon therapist arrival; A&Ox4; No c/o pain; LSO donned. Pt able to recall 3/3 spinal precautions this date. Pt doffed/donned LSO while seated with supervision and verbal cues. Pt performed sit<>stand and ambulated community distance in hallway utilizing rwx with SBA. Pt navigated up/down 4 steps utilizing B handrails with CGA. BUE strength WFL. Skilled OT intervention not indicated at this time. Pt plans to discharge home with her  this date. OT to sign off.  -LS       Row Name 10/12/24 0736          Therapy Assessment/Plan (OT)    Criteria for Skilled Therapeutic Interventions Met (OT) no;does not meet criteria for skilled intervention  -LS     Therapy Frequency (OT) evaluation only  -       Row Name 10/12/24 0736          Therapy Plan Review/Discharge Plan (OT)    Anticipated Discharge Disposition (OT) home with assist  -       Row Name 10/12/24 0736          Positioning and Restraints    Pre-Treatment Position sitting in chair/recliner  -LS     Post Treatment Position chair  -LS     In Chair sitting;call light within reach;encouraged to call for assist;with brace  -LS               User Key  (r) = Recorded By, (t) = Taken By, (c) = Cosigned By      Initials Name Provider Type    LS Emily White, OTR/L Occupational Therapist                   Outcome Measures       Row Name 10/12/24 0736          How much help from another is currently needed...    Putting on and taking off regular lower body clothing? 3  -LS     Bathing (including washing, rinsing, and drying) 4  -LS     Toileting (which includes using toilet bed pan or urinal) 4  -LS     Putting on and taking off regular upper body clothing 4  -LS     Taking care of personal grooming (such as brushing teeth) 4  -LS     Eating meals 4  -LS     AM-PAC 6 Clicks Score  (OT) 23  -LS       Row Name 10/12/24 0740          How much help from another person do you currently need...    Turning from your back to your side while in flat bed without using bedrails? 4  -KR     Moving from lying on back to sitting on the side of a flat bed without bedrails? 4  -KR     Moving to and from a bed to a chair (including a wheelchair)? 3  -KR     Standing up from a chair using your arms (e.g., wheelchair, bedside chair)? 4  -KR     Climbing 3-5 steps with a railing? 3  -KR     To walk in hospital room? 3  -KR     AM-PAC 6 Clicks Score (PT) 21  -KR     Highest Level of Mobility Goal 6 --> Walk 10 steps or more  -KR       Row Name 10/12/24 0740 10/12/24 0736       Functional Assessment    Outcome Measure Options AM-PAC 6 Clicks Basic Mobility (PT)  -KR AM-PAC 6 Clicks Daily Activity (OT)  -LS              User Key  (r) = Recorded By, (t) = Taken By, (c) = Cosigned By      Initials Name Provider Type    Belkys Quinones, PT DPT Physical Therapist    Emily Mathis OTR/L Occupational Therapist                  Occupational Therapy Education       Title: PT OT SLP Therapies (In Progress)       Topic: Occupational Therapy (In Progress)       Point: ADL training (Done)       Description:   Instruct learner(s) on proper safety adaptation and remediation techniques during self care or transfers.   Instruct in proper use of assistive devices.                  Learning Progress Summary            Patient Acceptance, E, VU by  at 10/12/2024 0842    Acceptance, E,D, VU by  at 10/10/2024 0826    Acceptance, E, VU by  at 10/9/2024 1359                      Point: Home exercise program (Not Started)       Description:   Instruct learner(s) on appropriate technique for monitoring, assisting and/or progressing therapeutic exercises/activities.                  Learner Progress:  Not documented in this visit.              Point: Precautions (Done)       Description:   Instruct learner(s) on prescribed  precautions during self-care and functional transfers.                  Learning Progress Summary            Patient Acceptance, E, VU by  at 10/12/2024 0842    Acceptance, E,D, VU by  at 10/10/2024 0826    Acceptance, E, VU by  at 10/9/2024 1359                      Point: Body mechanics (Done)       Description:   Instruct learner(s) on proper positioning and spine alignment during self-care, functional mobility activities and/or exercises.                  Learning Progress Summary            Patient Acceptance, E, VU by  at 10/12/2024 0842    Acceptance, E,D, VU by  at 10/10/2024 0826    Acceptance, E, VU by  at 10/9/2024 1359                                      User Key       Initials Effective Dates Name Provider Type Discipline     07/11/23 -  Shruthi Beasley, OTR/L Occupational Therapist OT     06/20/22 -  Emily White OTR/L Occupational Therapist OT                  OT Recommendation and Plan  Planned Therapy Interventions (OT): activity tolerance training, functional balance retraining, occupation/activity based interventions, ROM/therapeutic exercise, strengthening exercise, transfer/mobility retraining, patient/caregiver education/training, adaptive equipment training, BADL retraining, orthotic fabrication/fitting/training  Therapy Frequency (OT): evaluation only  Plan of Care Review  Plan of Care Reviewed With: patient  Progress: improving  Outcome Evaluation: OT re-eval completed. Pt seated in chair upon therapist arrival; A&Ox4; No c/o pain; LSO donned. Pt able to recall 3/3 spinal precautions this date. Pt doffed/donned LSO while seated with supervision and verbal cues. Pt performed sit<>stand and ambulated community distance in hallway utilizing rwx with SBA. Pt navigated up/down 4 steps utilizing B handrails with CGA. BUE strength WFL. Skilled OT intervention not indicated at this time. Pt plans to discharge home with her  this date. OT to sign off.  Plan of Care Reviewed  With: patient  Outcome Evaluation: OT re-eval completed. Pt seated in chair upon therapist arrival; A&Ox4; No c/o pain; LSO donned. Pt able to recall 3/3 spinal precautions this date. Pt doffed/donned LSO while seated with supervision and verbal cues. Pt performed sit<>stand and ambulated community distance in hallway utilizing rwx with SBA. Pt navigated up/down 4 steps utilizing B handrails with CGA. BUE strength WFL. Skilled OT intervention not indicated at this time. Pt plans to discharge home with her  this date. OT to sign off.     Time Calculation:         Time Calculation- OT       Row Name 10/12/24 0736             Time Calculation- OT    OT Start Time 0736  +10 minutes chart review  -LS      OT Stop Time 0800  -      OT Time Calculation (min) 24 min  -      Total Timed Code Minutes- OT 34 minute(s)  -      OT Received On 10/12/24  -                User Key  (r) = Recorded By, (t) = Taken By, (c) = Cosigned By      Initials Name Provider Type     Emily White OTR/L Occupational Therapist                  Therapy Charges for Today       Code Description Service Date Service Provider Modifiers Qty    [id number]  OT RE-EVAL 2 10/12/2024 Emily White OTR/L GO 1               OT Discharge Summary  Anticipated Discharge Disposition (OT): home with assist  Reason for Discharge: At baseline function  Outcomes Achieved: Refer to plan of care for updates on goals achieved  Discharge Destination: Home with assist    SOFI Walker/CARLOS  10/12/2024

## 2024-10-12 NOTE — PLAN OF CARE
Goal Outcome Evaluation:   Alert and oriented. 2 L NC while sleeping. VSS. No c/o pain. Up with LSO brace and walker with standby assist. Adequate UOP. Afebrile.

## 2024-10-12 NOTE — OUTREACH NOTE
Prep Survey      Flowsheet Row Responses   [facility] facility patient discharged from? [facility]   Is LACE score < 7 ? No   Eligibility Readm Mgmt   Discharge diagnosis EXPLORATION OF FUSION L3-S1, POSTERIOR SPINAL FUSION WITH INSTRUMENTATION L2-3   Does the patient have one of the following disease processes/diagnoses(primary or secondary)? General Surgery   Does the patient have Home health ordered? No   Is there a DME ordered? No   Prep survey completed? Yes            LILLY RODRÍGUEZ - Registered Nurse

## 2024-10-12 NOTE — NURSING NOTE
Pt being dc home with family. Dc instructions given to pt and . Up with asst x1 with LSO and walker. Voiding per BRP. Dressing to lumbar and right flank CDI. N/v check WNL. Stable upon d/c.

## 2024-10-12 NOTE — PLAN OF CARE
Goal Outcome Evaluation:  Plan of Care Reviewed With: patient           Outcome Evaluation: Physical therapy re-evaluation performed post 2nd back surgery. Pt needed verbal cues for LSO placement,  but able to complete donning. Verbalized spinal precautions. She was stand by assist for gait and stairs. No skilled physical therapy needed at this time. She has all equipment needed at home. Anticipate home with assist upon d/c.    Anticipated Discharge Disposition (PT): home with assist

## 2024-10-12 NOTE — THERAPY RE-EVALUATION
Patient Name: Alisia Velez  : 1945    MRN: [id number]                              Today's Date: 10/12/2024       Admit Date: 10/9/2024    Visit Dx:     ICD-10-CM ICD-9-CM   1. Impaired mobility [Z74.09]  Z74.09 799.89     Patient Active Problem List   Diagnosis    Osteopenia    Mixed hyperlipidemia    Essential hypertension    Type 2 diabetes mellitus without complication, without long-term current use of insulin    Spinal stenosis, lumbar    Gastroesophageal reflux disease without esophagitis    Type 2 diabetes mellitus with diabetic polyneuropathy, with long-term current use of insulin    Vitamin D deficiency    B12 deficiency    Age-related osteoporosis without current pathological fracture    Stenosis, cervical spine    Mass of right wrist    Chronic bilateral low back pain without sciatica    Lumbar spondylosis    Degeneration of intervertebral disc of lumbar region with discogenic back pain and lower extremity pain    Lumbar radiculopathy    Degeneration of intervertebral disc of lumbar region     Past Medical History:   Diagnosis Date    Anesthesia complication     severe nausea and vomitig    Arthritis     Back pain     Basal cell carcinoma     Blister of great toe of left foot     Nonthermal, initial encounter    Bunion     Cancer     skin     Dyslipidemia     Elevated blood pressure     Essential hypertension 2016    Nimesh archventura     GERD (gastroesophageal reflux disease)     History of cerebrovascular accident         Hyperlipidemia     Hypo-osmolality and hyponatremia     Ingrowing nail     Melanoma     Neuropathy in diabetes     Some    Onychomycosis     Plantar fasciitis     PONV (postoperative nausea and vomiting)     Sleep apnea     Snores     Stroke     TIA (transient ischemic attack)     After stroke    Type 2 diabetes mellitus     Type 2 diabetes mellitus with circulatory disorder 2016    Urinary incontinence     Vitamin D deficiency      Past Surgical History:    Procedure Laterality Date    ANTERIOR CERVICAL DISCECTOMY W/ FUSION N/A 04/19/2019    Procedure: ANTERIOR CERVICAL DISCECTOMY FUSION C5-7;  Surgeon: MIGDALIA Horton MD;  Location:  [facility];  Service: Orthopedic Spine    ARM LESION/CYST EXCISION Right 08/17/2022    Procedure: RIGHT WRIST MARGINAL MASS EXCISION;  Surgeon: Magen Pardo MD;  Location:  [facility];  Service: Orthopedics;  Laterality: Right;    BACK SURGERY      BLEPHAROPLASTY      CARDIAC CATHETERIZATION      CARPAL TUNNEL RELEASE      CERVICAL SPINE SURGERY      cydney-laminotomy c7-t1    COLONOSCOPY      ENDOSCOPY      EYE SURGERY Bilateral     cataracts     FOOT SURGERY      Toe nails removed    LAPAROSCOPIC CHOLECYSTECTOMY      LUMBAR DISC SURGERY      LUMBAR FUSION Left 03/13/2017    Procedure: LEFT LUMBAR LATERAL INTERBODY FUSION L 3-5 WITH INSTRUMENTATION;  Surgeon: MIGDALIA Horton MD;  Location:  [facility];  Service:     LUMBAR FUSION Right 10/9/2024    Procedure: RIGHT LATERAL LUMBAR INTERBODY FUSION WITH INSTRUMENTATION L2-3;  Surgeon: MIGDALIA Horton MD;  Location:  [facility];  Service: Orthopedic Spine;  Laterality: Right;    LUMBAR LAMINECTOMY WITH FUSION Right 03/15/2017    Procedure: RIGHT L3-S1 POSSIBLE RIGHT L 3-4 HEMILAMINECTOMY FACETECTOMY DECOMPRESSION TRANSFORAMINAL LUMBAR INTERBODY FUSION L4-S1 POSTERIOR SPINAL FUSION WITH INSTRUMENTATION L3-S1;  Surgeon: MIGDALIA Horton MD;  Location:  [facility];  Service:     MYRINGOTOMY W/ TUBES      NAIL BED REMOVAL/REVISION  03/11/2016    NECK SURGERY      NISSEN FUNDOPLICATION LAPAROSCOPIC      OTHER SURGICAL HISTORY      had left and right big toenials removed mar 2016    TOENAIL EXCISION      Both big toes    WRIST SURGERY Right     ganglian cyst removal      General Information       Row Name 10/12/24 0740          Physical Therapy Time and Intention    Document Type re-evaluation;discharge evaluation/summary  -KR     Mode of Treatment physical therapy  -KR       Row Name  10/12/24 0740          General Information    Patient Profile Reviewed yes  -KR     Existing Precautions/Restrictions brace worn when out of bed;fall;LSO;spinal  -KR       Row Name 10/12/24 0740          Living Environment    People in Home spouse  -KR       Row Name 10/12/24 0740          Home Main Entrance    Number of Stairs, Main Entrance four  -KR     Stair Railings, Main Entrance railings on both sides of stairs  -KR       Row Name 10/12/24 0740          Stairs Within Home, Primary    Number of Stairs, Within Home, Primary none  -KR       Row Name 10/12/24 0740          Cognition    Orientation Status (Cognition) oriented x 4  -KR       Row Name 10/12/24 0740          Safety Issues/Impairments Affecting Functional Mobility    Impairments Affecting Function (Mobility) balance;endurance/activity tolerance  -KR               User Key  (r) = Recorded By, (t) = Taken By, (c) = Cosigned By      Initials Name Provider Type    Belkys Quinones, PT DPT Physical Therapist                   Mobility       Row Name 10/12/24 0745 10/12/24 0740       Bed Mobility    Comment, (Bed Mobility) up in chair  -KR up in chair  -KR      Row Name 10/12/24 0740          Bed-Chair Transfer    Bed-Chair Kay (Transfers) standby assist  -KR     Assistive Device (Bed-Chair Transfers) walker, front-wheeled  -KR       Row Name 10/12/24 0740          Sit-Stand Transfer    Sit-Stand Kay (Transfers) supervision  -KR     Assistive Device (Sit-Stand Transfers) walker, front-wheeled  -KR       Row Name 10/12/24 0745 10/12/24 0740       Gait/Stairs (Locomotion)    Kay Level (Gait) -- standby assist  -KR    Assistive Device (Gait) -- walker, front-wheeled  -KR    Patient was able to Ambulate yes  -KR yes  -KR    Distance in Feet (Gait) -- 150  -KR    Deviations/Abnormal Patterns (Gait) -- stride length decreased  -KR    Bilateral Gait Deviations -- decreased arm swing  -KR    Kay Level (Stairs) -- stand by  assist  -KR    Handrail Location (Stairs) -- both sides  -KR    Number of Steps (Stairs) -- 4  -KR    Ascending Technique (Stairs) -- step-over-step;step-to-step  -KR    Descending Technique (Stairs) -- step-over-step;step-to-step  -KR              User Key  (r) = Recorded By, (t) = Taken By, (c) = Cosigned By      Initials Name Provider Type    Belkys Quinones PT DPT Physical Therapist                   Obj/Interventions       Row Name 10/12/24 0740          Range of Motion Comprehensive    General Range of Motion bilateral lower extremity ROM WFL  -KR       Row Name 10/12/24 0740          Strength Comprehensive (MMT)    Comment, General Manual Muscle Testing (MMT) Assessment L hip flexion 4/5, L great toe extension 4-/5, R great toe extension 4/5, otherwise 4+/5 BLEs  -KR       Row Name 10/12/24 0740          Balance    Static Sitting Balance independent  -KR     Dynamic Sitting Balance independent  -KR     Position, Sitting Balance sitting in chair  -KR     Static Standing Balance standby assist  -KR     Dynamic Standing Balance standby assist  -KR     Position/Device Used, Standing Balance supported;walker, rolling  -KR       Row Name 10/12/24 0740          Sensory Assessment (Somatosensory)    Sensory Assessment (Somatosensory) LE sensation intact  -KR               User Key  (r) = Recorded By, (t) = Taken By, (c) = Cosigned By      Initials Name Provider Type    Belkys Quinones, PT DPT Physical Therapist                   Goals/Plan       Row Name 10/12/24 0740          Bed Mobility Goal 1 (PT)    Activity/Assistive Device (Bed Mobility Goal 1, PT) bed mobility activities, all  -KR     [place] Level/Cues Needed (Bed Mobility Goal 1, PT) independent  -KR     Progress/Outcomes (Bed Mobility Goal 1, PT) goal met  -KR       Row Name 10/12/24 0740          Transfer Goal 1 (PT)    Progress/Outcome (Transfer Goal 1, PT) goal partially met  -KR       Row Name 10/12/24 0740          Gait Training  Goal 1 (PT)    Progress/Outcome (Gait Training Goal 1, PT) goal partially met  -KR               User Key  (r) = Recorded By, (t) = Taken By, (c) = Cosigned By      Initials Name Provider Type    Belkys Quinones, PT DPT Physical Therapist                   Clinical Impression       Row Name 10/12/24 0740          Pain    Pretreatment Pain Rating 0/10 - no pain  -KR     Posttreatment Pain Rating 0/10 - no pain  -KR       Row Name 10/12/24 0740          Plan of Care Review    Outcome Evaluation Physical therapy re-evaluation performed post 2nd back surgery. Pt needed verbal cues for LSO placement,  but able to complete donning. Verbalized spinal precautions. She was stand by assist for gait and stairs. No skilled physical therapy needed at this time. She has all equipment needed at home. Anticipate home with assist upon d/c.  -KR       Row Name 10/12/24 0740          Therapy Assessment/Plan (PT)    Patient/Family Therapy Goals Statement (PT) go home  -KR     Criteria for Skilled Interventions Met (PT) no problems identified which require skilled intervention  -KR       Row Name 10/12/24 0740          Vital Signs    Pre Patient Position Sitting  -KR     Post Patient Position Sitting  -KR       Row Name 10/12/24 0740          Positioning and Restraints    Pre-Treatment Position sitting in chair/recliner  -KR     In Chair call light within reach;sitting;encouraged to call for assist  -KR       Row Name 10/12/24 0740          Plan of Care Review    Plan of Care Reviewed With patient  -KR               User Key  (r) = Recorded By, (t) = Taken By, (c) = Cosigned By      Initials Name Provider Type    Belkys Quinones, PT DPT Physical Therapist                   Outcome Measures       Row Name 10/12/24 0740          How much help from another person do you currently need...    Turning from your back to your side while in flat bed without using bedrails? 4  -KR     Moving from lying on back to sitting on the side  of a flat bed without bedrails? 4  -KR     Moving to and from a bed to a chair (including a wheelchair)? 3  -KR     Standing up from a chair using your arms (e.g., wheelchair, bedside chair)? 4  -KR     Climbing 3-5 steps with a railing? 3  -KR     To walk in hospital room? 3  -KR     AM-PAC 6 Clicks Score (PT) 21  -KR     Highest Level of Mobility Goal 6 --> Walk 10 steps or more  -KR       Row Name 10/12/24 0740          Functional Assessment    Outcome Measure Options AM-PAC 6 Clicks Basic Mobility (PT)  -KR               User Key  (r) = Recorded By, (t) = Taken By, (c) = Cosigned By      Initials Name Provider Type    Belkys Quinones, PT DPT Physical Therapist                                 Physical Therapy Education       Title: PT OT SLP Therapies (In Progress)       Topic: Physical Therapy (Resolved)       Point: Mobility training (Resolved)       Learning Progress Summary            Patient Acceptance, E,TB, VU,DU by KR at 10/12/2024 0837    Acceptance, E, VU by MS at 10/9/2024 1310    Comment: role of PT in her care, spinal restrictions, use of LSO                      Point: Home exercise program (Resolved)       Learner Progress:  Not documented in this visit.              Point: Body mechanics (Resolved)       Learner Progress:  Not documented in this visit.              Point: Precautions (Resolved)       Learning Progress Summary            Patient Acceptance, E,TB, VU,DU by KR at 10/12/2024 0837    Acceptance, E, VU by MS at 10/9/2024 1310    Comment: role of PT in her care, spinal restrictions, use of LSO                                      User Key       Initials Effective Dates Name Provider Type Discipline    [facility] 06/03/24 -  Belkys Rockwell PT DPT Physical Therapist PT    MS 07/11/23 -  Helena Mora, PT, DPT, NCS Physical Therapist PT                  PT Recommendation and Plan     Plan of Care Reviewed With: patient  Outcome Evaluation: Physical therapy re-evaluation performed  post 2nd back surgery. Pt needed verbal cues for LSO placement,  but able to complete donning. Verbalized spinal precautions. She was stand by assist for gait and stairs. No skilled physical therapy needed at this time. She has all equipment needed at home. Anticipate home with assist upon d/c.     Time Calculation:         PT Charges       Row Name 10/12/24 0839             Time Calculation    Start Time 0740  -KR      Stop Time 0758  + 10 minutes of chart review  -KR      Time Calculation (min) 18 min  -KR                User Key  (r) = Recorded By, (t) = Taken By, (c) = Cosigned By      Initials Name Provider Type    Belkys Quinones PT DPT Physical Therapist                  Therapy Charges for Today       Code Description Service Date Service Provider Modifiers Qty    [id number] HC PT RE-EVAL ESTABLISHED PLAN 2 10/12/2024 Belkys Rockwell PT DPT GP 1            PT G-Codes  Outcome Measure Options: AM-PAC 6 Clicks Basic Mobility (PT)  AM-PAC 6 Clicks Score (PT): 21  AM-PAC 6 Clicks Score (OT): 19  PT Discharge Summary  Anticipated Discharge Disposition (PT): home with assist  Outcomes Achieved: Patient able to partially acheive established goals  Discharge Destination: Home with assist    Belkys Rockwell PT DPT  10/12/2024

## 2024-10-12 NOTE — DISCHARGE INSTR - ACTIVITY
Activity: Avoid bending lifting or twisting, brace when up and out of bed, no driving while taking narcotic pain medication, walk 15 minutes 4 times daily  Diet: diabetic diet  Wound Care: keep wound clean and dry  Other: Contact [facility] office with questions or concerns

## 2024-10-13 NOTE — THERAPY DISCHARGE NOTE
Acute Care - Physical Therapy Discharge Summary  [facility]       Patient Name: Alisia Velez  : 1945  MRN: [id number]    Today's Date: 10/13/2024                 Admit Date: 10/9/2024      PT Recommendation and Plan    Visit Dx:    ICD-10-CM ICD-9-CM   1. Impaired mobility [Z74.09]  Z74.09 799.89                PT Rehab Goals       Row Name 10/13/24 1606             Bed Mobility Goal 1 (PT)    Activity/Assistive Device (Bed Mobility Goal 1, PT) bed mobility activities, all  -KJ      Star Level/Cues Needed (Bed Mobility Goal 1, PT) independent  -KJ      Time Frame (Bed Mobility Goal 1, PT) long term goal (LTG);by discharge  -KJ      Progress/Outcomes (Bed Mobility Goal 1, PT) goal not met  -KJ         Transfer Goal 1 (PT)    Activity/Assistive Device (Transfer Goal 1, PT) sit-to-stand/stand-to-sit;bed-to-chair/chair-to-bed  -KJ      Star Level/Cues Needed (Transfer Goal 1, PT) independent  -KJ      Time Frame (Transfer Goal 1, PT) long term goal (LTG);by discharge  -KJ      Progress/Outcome (Transfer Goal 1, PT) goal not met  -KJ         Gait Training Goal 1 (PT)    Activity/Assistive Device (Gait Training Goal 1, PT) gait (walking locomotion);decrease fall risk;increase endurance/gait distance;improve balance and speed  -KJ      Star Level (Gait Training Goal 1, PT) independent  -KJ      Distance (Gait Training Goal 1, PT) 200ft  -KJ      Time Frame (Gait Training Goal 1, PT) long term goal (LTG);by discharge  -KJ      Progress/Outcome (Gait Training Goal 1, PT) goal not met  -KJ                User Key  (r) = Recorded By, (t) = Taken By, (c) = Cosigned By      Initials Name Provider Type Discipline    Latanya Lane, PTA Physical Therapist Assistant PT                        PT Discharge Summary  Anticipated Discharge Disposition (PT): home  Reason for Discharge: Discharge from facility  Outcomes Achieved: Refer to plan of care for updates on goals achieved  Discharge  Destination: Home      Latanya RAYMOND Dylno, PTA   10/13/2024

## 2024-10-16 ENCOUNTER — READMISSION MANAGEMENT (OUTPATIENT)
Dept: CALL CENTER | Facility: HOSPITAL | Age: 79
End: 2024-10-16
Payer: MEDICARE

## 2024-10-16 NOTE — OUTREACH NOTE
General Surgery Week 1 Survey      Flowsheet Row Responses   [facility] patient discharged from? [facility]   Does the patient have one of the following disease processes/diagnoses(primary or secondary)? General Surgery   Week 1 attempt successful? Yes   Call start time 1046   Call end time 1049   Discharge diagnosis EXPLORATION OF FUSION L3-S1, POSTERIOR SPINAL FUSION WITH INSTRUMENTATION L2-3   Meds reviewed with patient/caregiver? Yes   Does the patient have all medications related to this admission filled (includes all antibiotics, pain medications, etc.) N/A   Is the patient taking all medications as directed (includes completed medication regime)? Yes   Does the patient have a follow up appointment scheduled with their surgeon? Yes   Has the patient kept scheduled appointments due by today? N/A   Has home health visited the patient within 72 hours of discharge? N/A   Psychosocial issues? No   Did the patient receive a copy of their discharge instructions? Yes   Nursing interventions Reviewed instructions with patient   What is the patient's perception of their health status since discharge? Improving   Nursing interventions Nurse provided patient education   Is the patient /caregiver able to teach back basic post-op care? Lifting as instructed by MD in discharge instructions, Take showers only when approved by MD-sponge bathe until then, No tub bath, swimming, or hot tub until instructed by MD   Is the patient/caregiver able to teach back signs and symptoms of incisional infection? Fever, Pus or odor from incision, Incisional warmth, Increased drainage or bleeding, Increased redness, swelling or pain at the incisonal site   Is the patient/caregiver able to teach back steps to recovery at home? Set small, achievable goals for return to baseline health, Eat a well-balance diet   If the patient is a current smoker, are they able to teach back resources for cessation? Not a smoker   Is the patient/caregiver  able to teach back the hierarchy of who to call/visit for symptoms/problems? PCP, Specialist, Home health nurse, Urgent Care, ED, 911 Yes   Week 1 call completed? Yes   Graduated Yes   Graduated/Revoked comments Pt reports she id oding well. no needs.   Call end time 1049            RICKY TAPIA - Registered Nurse

## 2024-12-18 NOTE — PROGRESS NOTES
[facility] - PODIATRY    Today's Date: 12/23/24    Patient Name: Alisia Velez  MRN: [id number]  CSN: [id number]  PCP: Mike Erazo MD  Referring Provider: No ref. provider found    SUBJECTIVE     Chief Complaint   Patient presents with    Follow-up     Mike Erazo MD 6/13/2024-3 MO FU DIABETIC FOOT CARE CLINIC        HPI: Alisia Velez, a 79 y.o.female, comes to clinic as a(n) established patient complaining of long, irregular toenails of both feet . Patient has h/o Arthritis, back pain, skin cancer, hyperlipidemia, hypertension, GERD, melanoma, CVA, diabetes mellitus with neuropathy . Patient is IDDM with last stated BG level of 140mg/dl.  Notes nails are long, thick, and discolored.  Due to neuropathy and back issues she is unable to reach feet to care for them herself.   Patient reports she had low back surgery in October.  Still currently wearing a back brace.  Patient follows with endocrinology for diabetic control.  Denies pain at the present time. Relates previous treatment(s) including Nail care by podiatry . Denies any constitutional symptoms. No other pedal complaints at this time.    Past Medical History:   Diagnosis Date    Anesthesia complication     severe nausea and vomitig    Arthritis     Back pain     Basal cell carcinoma     Blister of great toe of left foot     Nonthermal, initial encounter    Bunion     Cancer     skin     Dyslipidemia     Elevated blood pressure     Essential hypertension 09/13/2016    Fallen arches     GERD (gastroesophageal reflux disease)     History of cerebrovascular accident     1999    Hyperlipidemia     Hypo-osmolality and hyponatremia     Ingrowing nail     Melanoma     Neuropathy in diabetes     Some    Onychomycosis     Plantar fasciitis     PONV (postoperative nausea and vomiting)     Sleep apnea     Snores     Stroke     TIA (transient ischemic attack) 1999    After stroke    Type 2 diabetes mellitus     Type 2  diabetes mellitus with circulatory disorder 09/13/2016    Urinary incontinence     Vitamin D deficiency      Past Surgical History:   Procedure Laterality Date    ANTERIOR CERVICAL DISCECTOMY W/ FUSION N/A 04/19/2019    Procedure: ANTERIOR CERVICAL DISCECTOMY FUSION C5-7;  Surgeon: MIGDALIA Horton MD;  Location:  [facility];  Service: Orthopedic Spine    ARM LESION/CYST EXCISION Right 08/17/2022    Procedure: RIGHT WRIST MARGINAL MASS EXCISION;  Surgeon: Magen Pardo MD;  Location:  [facility];  Service: Orthopedics;  Laterality: Right;    BACK SURGERY      BLEPHAROPLASTY      CARDIAC CATHETERIZATION      CARPAL TUNNEL RELEASE      CERVICAL SPINE SURGERY      cydney-laminotomy c7-t1    COLONOSCOPY      ENDOSCOPY      EYE SURGERY Bilateral     cataracts     FOOT SURGERY      Toe nails removed    LAPAROSCOPIC CHOLECYSTECTOMY      LUMBAR DISC SURGERY      LUMBAR FUSION Left 03/13/2017    Procedure: LEFT LUMBAR LATERAL INTERBODY FUSION L 3-5 WITH INSTRUMENTATION;  Surgeon: MIGDALIA Horton MD;  Location:  [facility];  Service:     LUMBAR FUSION Right 10/9/2024    Procedure: RIGHT LATERAL LUMBAR INTERBODY FUSION WITH INSTRUMENTATION L2-3;  Surgeon: MIGDALIA Horton MD;  Location:  [facility];  Service: Orthopedic Spine;  Laterality: Right;    LUMBAR LAMINECTOMY WITH FUSION Right 03/15/2017    Procedure: RIGHT L3-S1 POSSIBLE RIGHT L 3-4 HEMILAMINECTOMY FACETECTOMY DECOMPRESSION TRANSFORAMINAL LUMBAR INTERBODY FUSION L4-S1 POSTERIOR SPINAL FUSION WITH INSTRUMENTATION L3-S1;  Surgeon: MIGDALIA Horton MD;  Location:  [facility];  Service:     LUMBAR LAMINECTOMY WITH FUSION N/A 10/11/2024    Procedure: EXPLORATION OF FUSION L3-S1, POSTERIOR SPINAL FUSION WITH INSTRUMENTATION L2-3;  Surgeon: MIGDALIA Horton MD;  Location:  [facility];  Service: Orthopedic Spine;  Laterality: N/A;    MYRINGOTOMY W/ TUBES      NAIL BED REMOVAL/REVISION  03/11/2016    NECK SURGERY      NISSEN FUNDOPLICATION LAPAROSCOPIC      OTHER SURGICAL  HISTORY      had left and right big toenials removed mar 2016    TOENAIL EXCISION      Both big toes    WRIST SURGERY Right     ganglian cyst removal     Family History   Problem Relation Age of Onset    Cancer Other     Diabetes Other     Heart disease Other     Hypertension Other     Cancer Mother     Heart disease Father     Diabetes Brother     Heart disease Brother      Social History     Socioeconomic History    Marital status:    Tobacco Use    Smoking status: Never     Passive exposure: Never    Smokeless tobacco: Never   Vaping Use    Vaping status: Never Used   Substance and Sexual Activity    Alcohol use: Never    Drug use: Never    Sexual activity: Not Currently     Partners: Male     Birth control/protection: Post-menopausal     Comment: Used pill many years ago     Allergies   Allergen Reactions    Erythromycin Other (See Comments) and Shortness Of Breath     Eyes see big black spots    Humulin N [Insulin Isophane] Shortness Of Breath    Mold Extract [Trichophyton] Shortness Of Breath    Sudafed [Pseudoephedrine] Shortness Of Breath    Sulfa Antibiotics Shortness Of Breath    Other Itching     Silk Tape    Adhesive Tape Itching    Molds & Smuts Itching     Current Outpatient Medications   Medication Sig Dispense Refill    acetaminophen (TYLENOL) 650 MG 8 hr tablet Take 1 tablet by mouth Every 8 (Eight) Hours As Needed for Mild Pain.      aspirin 81 MG EC tablet Take 1 tablet by mouth Daily.      atorvastatin (LIPITOR) 20 MG tablet Take 1 tablet by mouth Daily.      cetirizine (ZyrTEC) 10 MG tablet Take 1 tablet by mouth Daily.      Cholecalciferol (Vitamin D-3) 25 MCG (1000 UT) capsule Take 1,000 Units by mouth 3 (Three) Times a Day.      Coenzyme Q10 (COQ10) 200 MG capsule Take 1 capsule by mouth Daily.      famotidine (PEPCID) 40 MG tablet Take 1 tablet by mouth Daily.      Glucagon (Baqsimi One Pack) 3 MG/DOSE powder 1 each into the nostril(s) as directed by provider As Needed (Hypoglycemia).  Apply intranasal if hypoglycemia 2 each 11    guaiFENesin (MUCINEX) 600 MG 12 hr tablet Take 2 tablets by mouth 2 (Two) Times a Day.      Insulin Glargine, 1 Unit Dial, (TOUJEO) 300 UNIT/ML solution pen-injector injection Inject 50 Units under the skin into the appropriate area as directed Every Night.      Insulin Lispro-aabc 200 UNIT/ML solution pen-injector Inject 60 Units under the skin into the appropriate area as directed 3 (Three) Times a Day With Meals. Sliding scale (Patient taking differently: Inject 33-40 Units under the skin into the appropriate area as directed 3 (Three) Times a Day With Meals. Sliding scale) 27 mL 3    Insulin Pen Needle 32G X 6 MM misc 4 x daily 360 each 3    metoprolol succinate XL (TOPROL-XL) 25 MG 24 hr tablet Take 1 tablet by mouth 2 (Two) Times a Day.      OMEGA-3 FATTY ACIDS-VITAMIN E PO Take 1 capsule by mouth Daily.  Hold for surgery       No current facility-administered medications for this visit.     Review of Systems   Constitutional:  Negative for chills and fever.   HENT:  Negative for congestion.    Respiratory:  Negative for shortness of breath.    Cardiovascular:  Negative for chest pain and leg swelling.   Gastrointestinal:  Negative for constipation, diarrhea, nausea and vomiting.   Musculoskeletal:  Positive for arthralgias and back pain. Negative for myalgias.   Skin:  Negative for wound.   Neurological:  Positive for numbness.       OBJECTIVE     Vitals:    12/23/24 0934   BP: 140/64   Pulse: 71   SpO2: 94%       PHYSICAL EXAM  GEN:   Accompanied by spouse.     Foot/Ankle Exam    GENERAL  Appearance:  elderly  Orientation:  AAOx3  Affect:  appropriate  Gait:  unimpaired  Assistance:  independent  Right shoe gear: casual shoe  Left shoe gear: casual shoe    VASCULAR     Right Foot Vascularity   Dorsalis pedis:  2+  Posterior tibial:  2+  Skin temperature:  warm  Edema grading:  None  CFT:  3  Pedal hair growth:  Present  Varicosities:  none     Left Foot  Vascularity   Dorsalis pedis:  2+  Posterior tibial:  2+  Skin temperature:  warm  Edema grading:  None  CFT:  3  Pedal hair growth:  Present  Varicosities:  none     NEUROLOGIC     Right Foot Neurologic   Light touch sensation: diminished  Vibratory sensation: diminished  Hot/Cold sensation: diminished     Left Foot Neurologic   Light touch sensation: diminished  Vibratory sensation: diminished  Hot/Cold sensation:  diminished    MUSCULOSKELETAL     Right Foot Musculoskeletal   Tenderness:  none    Arch:  Pes planus (Mild)  Hallux valgus: Yes    Hallux limitus: No       Left Foot Musculoskeletal   Tenderness:  none  Arch:  Pes planus (Mild)  Hallux valgus: Yes    Hallux limitus: No      MUSCLE STRENGTH     Right Foot Muscle Strength   Foot dorsiflexion:  5  Foot plantar flexion:  5  Foot inversion:  5  Foot eversion:  5     Left Foot Muscle Strength   Foot dorsiflexion:  5  Foot plantar flexion:  5  Foot inversion:  5  Foot eversion:  5    RANGE OF MOTION     Right Foot Range of Motion   Foot and ankle ROM within normal limits       Left Foot Range of Motion   Foot and ankle ROM within normal limits      DERMATOLOGIC      Right Foot Dermatologic   Skin  Right foot skin is intact. (stucco keratoses)  Nails  1.  Absent.  2.  Positive for elongated and abnormal thickness.  3.  Positive for elongated and abnormal thickness.  4.  Positive for elongated and abnormal thickness.  5.  Positive for elongated and abnormal thickness.  Nails comment:  2-5     Left Foot Dermatologic   Skin  Left foot skin is intact. (stucco keratoses)   Nails comment:  2-5  Nails  1.  Absent.  2.  Positive for elongated and abnormal thickness.  3.  Positive for elongated and abnormal thickness.  4.  Positive for elongated and abnormally thick.  5.  Positive for elongated and abnormally thick.      RADIOLOGY/NUCLEAR:  No results found.    LABORATORY/CULTURE RESULTS:      PATHOLOGY RESULTS:       ASSESSMENT/PLAN     Diagnoses and all orders for  this visit:    1. Onychomycosis (Primary)    2. Type 2 diabetes mellitus with diabetic polyneuropathy, with long-term current use of insulin    3. Valgus deformity of both great toes    4. Pes planus of both feet      Comprehensive lower extremity examination and evaluation was performed.  Discussed findings and treatment plan including risks, benefits, and treatment options with patient in detail. Patient agreed with treatment plan.  After verbal consent obtained, nail(s) x8 debrided of length and thickness with nail nipper without incidence  Patient may maintain nails and calluses at home utilizing emery board or pumice stone between visits as needed  Reviewed at home diabetic foot care including daily foot checks.  Continue diabetic monitoring and control under direction of endocrinologist.  Remain conservative with foot deformities.  An After Visit Summary was printed and given to the patient at discharge, including (if requested) any available informative/educational handouts regarding diagnosis, treatment, or medications. All questions were answered to patient/family satisfaction. Should symptoms fail to improve or worsen they agree to call or return to clinic or to go to the Emergency Department. Discussed the importance of following up with any needed screening tests/labs/specialist appointments and any requested follow-up recommended by me today. Importance of maintaining follow-up discussed and patient accepts that missed appointments can delay diagnosis and potentially lead to worsening of conditions.  Return in about 3 months (around 3/23/2025) for With Christine CASILLAS, Diabetic Foot Exam, Schedule Foot Care Clinic., or sooner if acute issues arise.    Lab Frequency Next Occurrence       This document has been electronically signed by VINNY Davila on December 23, 2024 12:23 CST

## 2024-12-23 ENCOUNTER — OFFICE VISIT (OUTPATIENT)
Age: 79
End: 2024-12-23
Payer: MEDICARE

## 2024-12-23 VITALS
SYSTOLIC BLOOD PRESSURE: 140 MMHG | BODY MASS INDEX: 29.63 KG/M2 | OXYGEN SATURATION: 94 % | WEIGHT: 161 LBS | HEART RATE: 71 BPM | HEIGHT: 62 IN | DIASTOLIC BLOOD PRESSURE: 64 MMHG

## 2024-12-23 DIAGNOSIS — M20.12 VALGUS DEFORMITY OF BOTH GREAT TOES: ICD-10-CM

## 2024-12-23 DIAGNOSIS — Z79.4 TYPE 2 DIABETES MELLITUS WITH DIABETIC POLYNEUROPATHY, WITH LONG-TERM CURRENT USE OF INSULIN: ICD-10-CM

## 2024-12-23 DIAGNOSIS — M21.42 PES PLANUS OF BOTH FEET: ICD-10-CM

## 2024-12-23 DIAGNOSIS — E11.42 TYPE 2 DIABETES MELLITUS WITH DIABETIC POLYNEUROPATHY, WITH LONG-TERM CURRENT USE OF INSULIN: ICD-10-CM

## 2024-12-23 DIAGNOSIS — M21.41 PES PLANUS OF BOTH FEET: ICD-10-CM

## 2024-12-23 DIAGNOSIS — B35.1 ONYCHOMYCOSIS: Primary | ICD-10-CM

## 2024-12-23 DIAGNOSIS — M20.11 VALGUS DEFORMITY OF BOTH GREAT TOES: ICD-10-CM

## 2025-03-21 NOTE — PROGRESS NOTES
[facility] - PODIATRY    Today's Date: 03/26/25    Patient Name: Alisia Velez  MRN: [id number]  CSN: [id number]  PCP: Mike Erazo MD  Referring Provider: No ref. provider found    SUBJECTIVE     Chief Complaint   Patient presents with    Follow-up     Mike Erazo MD  3 months for Diabetic Foot Exam, Schedule Foot Care  Pt here for nail/foot care. Pt reports no pain.    Diabetes     134 mg/dl BG     HPI: Alisia Velez, a 79 y.o.female, comes to clinic as a(n) established patient complaining of long, irregular toenails of both feet . Patient has h/o Arthritis, back pain, skin cancer, hyperlipidemia, hypertension, GERD, melanoma, CVA, diabetes mellitus with neuropathy . Patient is IDDM with last stated BG level of 134mg/dl.  Last hemoglobin A1c of 7.0%.  Notes nails are long, thick, and discolored.  Due to neuropathy and back issues she is unable to reach feet to care for them herself.   Patient reports she had low back surgery in October and is scheduled for neck surgery in April.  Patient follows with endocrinology for diabetic control.  Denies pain at the present time.  Patient reports that she does not have any issues with pain from her bilateral bunions.  Relates previous treatment(s) including Nail care by podiatry . Denies any constitutional symptoms. No other pedal complaints at this time.    Past Medical History:   Diagnosis Date    Allergic rhinitis     Anesthesia complication     severe nausea and vomitig    Arthritis     Back pain     Basal cell carcinoma     Blister of great toe of left foot     Nonthermal, initial encounter    Bunion     Cancer     skin     Dyslipidemia     Elevated blood pressure     Essential hypertension 09/13/2016    Nimesh blancahrd     GERD (gastroesophageal reflux disease) June 22 2020    Tiff procedure    History of cerebrovascular accident     1999    HL (hearing loss)     Hyperlipidemia     Hypo-osmolality and hyponatremia     Ingrowing  nail     Melanoma     Neuropathy in diabetes     Some    Onychomycosis     Plantar fasciitis     PONV (postoperative nausea and vomiting)     Sleep apnea     Snore once in a while    Snores     Stroke     Stroke    TIA (transient ischemic attack) 1999    After stroke    Tinnitus     TMJ dysfunction     Type 2 diabetes mellitus     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    Urinary incontinence     Vitamin D deficiency      Past Surgical History:   Procedure Laterality Date    ANTERIOR CERVICAL DISCECTOMY W/ FUSION N/A 04/19/2019    Procedure: ANTERIOR CERVICAL DISCECTOMY FUSION C5-7;  Surgeon: MIGDALIA Horton MD;  Location:  [facility];  Service: Orthopedic Spine    ARM LESION/CYST EXCISION Right 08/17/2022    Procedure: RIGHT WRIST MARGINAL MASS EXCISION;  Surgeon: Magen Pardo MD;  Location:  [facility];  Service: Orthopedics;  Laterality: Right;    BACK SURGERY      BLEPHAROPLASTY      BRONCHOSCOPY      Had before tiff procedure    CARDIAC CATHETERIZATION      CARPAL TUNNEL RELEASE      CERVICAL SPINE SURGERY      cydney-laminotomy c7-t1    COLONOSCOPY      ENDOSCOPY      EYE SURGERY Bilateral     cataracts     FOOT SURGERY      Toe nails removed    LAPAROSCOPIC CHOLECYSTECTOMY      LUMBAR DISC SURGERY      LUMBAR FUSION Left 03/13/2017    Procedure: LEFT LUMBAR LATERAL INTERBODY FUSION L 3-5 WITH INSTRUMENTATION;  Surgeon: MIGDALIA Horton MD;  Location:  [facility];  Service:     LUMBAR FUSION Right 10/09/2024    Procedure: RIGHT LATERAL LUMBAR INTERBODY FUSION WITH INSTRUMENTATION L2-3;  Surgeon: MIGDALIA Horton MD;  Location:  [facility];  Service: Orthopedic Spine;  Laterality: Right;    LUMBAR LAMINECTOMY WITH FUSION Right 03/15/2017    Procedure: RIGHT L3-S1 POSSIBLE RIGHT L 3-4 HEMILAMINECTOMY FACETECTOMY DECOMPRESSION TRANSFORAMINAL LUMBAR INTERBODY FUSION L4-S1 POSTERIOR SPINAL FUSION WITH INSTRUMENTATION L3-S1;  Surgeon: MIGDALIA Horton MD;  Location:  [facility];  Service:      LUMBAR LAMINECTOMY WITH FUSION N/A 10/11/2024    Procedure: EXPLORATION OF FUSION L3-S1, POSTERIOR SPINAL FUSION WITH INSTRUMENTATION L2-3;  Surgeon: MIGDALIA Horton MD;  Location:  [facility];  Service: Orthopedic Spine;  Laterality: N/A;    MYRINGOTOMY W/ TUBES      NAIL BED REMOVAL/REVISION  03/11/2016    NECK SURGERY      NISSEN FUNDOPLICATION LAPAROSCOPIC      OTHER SURGICAL HISTORY      had left and right big toenials removed mar 2016    TOENAIL EXCISION      Both big toes    WRIST SURGERY Right     ganglian cyst removal     Family History   Problem Relation Age of Onset    Cancer Other     Diabetes Other     Heart disease Other     Hypertension Other     Cancer Mother     Heart disease Father     Diabetes Brother     Heart disease Brother     Diabetes Brother     Heart disease Brother      Social History     Socioeconomic History    Marital status:    Tobacco Use    Smoking status: Never     Passive exposure: Never    Smokeless tobacco: Never   Vaping Use    Vaping status: Never Used   Substance and Sexual Activity    Alcohol use: Never    Drug use: Never    Sexual activity: Not Currently     Partners: Male     Birth control/protection: Post-menopausal     Comment: Used pill many years ago     Allergies   Allergen Reactions    Erythromycin Other (See Comments) and Shortness Of Breath     Eyes see big black spots    Humulin N [Insulin Isophane] Shortness Of Breath    Mold Extract [Trichophyton] Shortness Of Breath    Sudafed [Pseudoephedrine] Shortness Of Breath    Sulfa Antibiotics Shortness Of Breath    Other Itching     Silk Tape    Adhesive Tape Itching    Molds & Smuts Itching     Current Outpatient Medications   Medication Sig Dispense Refill    acetaminophen (TYLENOL) 650 MG 8 hr tablet Take 1 tablet by mouth Every 8 (Eight) Hours As Needed for Mild Pain.      aspirin 81 MG EC tablet Take 1 tablet by mouth Daily.      atorvastatin (LIPITOR) 20 MG tablet Take 1 tablet by mouth Daily.       cetirizine (ZyrTEC) 10 MG tablet Take 1 tablet by mouth Daily.      Cholecalciferol (Vitamin D-3) 25 MCG (1000 UT) capsule Take 1,000 Units by mouth 3 (Three) Times a Day.      Coenzyme Q10 (COQ10) 200 MG capsule Take 1 capsule by mouth Daily.      famotidine (PEPCID) 40 MG tablet Take 1 tablet by mouth Daily.      Glucagon (Baqsimi One Pack) 3 MG/DOSE powder 1 each into the nostril(s) as directed by provider As Needed (Hypoglycemia). Apply intranasal if hypoglycemia 2 each 11    guaiFENesin (MUCINEX) 600 MG 12 hr tablet Take 2 tablets by mouth 2 (Two) Times a Day.      Insulin Glargine, 1 Unit Dial, (TOUJEO) 300 UNIT/ML solution pen-injector injection Inject 50 Units under the skin into the appropriate area as directed Every Night.      Insulin Lispro-aabc 200 UNIT/ML solution pen-injector Inject 60 Units under the skin into the appropriate area as directed 3 (Three) Times a Day With Meals. Sliding scale (Patient taking differently: Inject 33-40 Units under the skin into the appropriate area as directed 3 (Three) Times a Day With Meals. Sliding scale) 27 mL 3    Insulin Pen Needle 32G X 6 MM misc 4 x daily 360 each 3    metoprolol succinate XL (TOPROL-XL) 25 MG 24 hr tablet Take 1 tablet by mouth 2 (Two) Times a Day.      OMEGA-3 FATTY ACIDS-VITAMIN E PO Take 1 capsule by mouth Daily.  Hold for surgery       No current facility-administered medications for this visit.     Review of Systems   Constitutional:  Negative for chills and fever.   HENT:  Negative for congestion.    Respiratory:  Negative for shortness of breath.    Cardiovascular:  Negative for chest pain and leg swelling.   Gastrointestinal:  Negative for constipation, diarrhea, nausea and vomiting.   Musculoskeletal:  Positive for arthralgias and back pain. Negative for myalgias.   Skin:  Negative for wound.   Neurological:  Positive for numbness.       OBJECTIVE     Vitals:    03/26/25 1003   BP: 124/82   Pulse: 70   SpO2: 98%         PHYSICAL  EXAM  GEN:   Accompanied by spouse.     Foot/Ankle Exam    GENERAL  Appearance:  elderly  Orientation:  AAOx3  Affect:  appropriate  Gait:  unimpaired  Assistance:  independent  Right shoe gear: casual shoe  Left shoe gear: casual shoe    VASCULAR     Right Foot Vascularity   Dorsalis pedis:  2+  Posterior tibial:  2+  Skin temperature:  warm  Edema grading:  None  CFT:  3  Pedal hair growth:  Present  Varicosities:  none     Left Foot Vascularity   Dorsalis pedis:  2+  Posterior tibial:  2+  Skin temperature:  warm  Edema grading:  None  CFT:  3  Pedal hair growth:  Present  Varicosities:  none     NEUROLOGIC     Right Foot Neurologic   Light touch sensation: diminished  Vibratory sensation: diminished  Hot/Cold sensation: diminished     Left Foot Neurologic   Light touch sensation: diminished  Vibratory sensation: diminished  Hot/Cold sensation:  diminished    MUSCULOSKELETAL     Right Foot Musculoskeletal   Tenderness:  none    Arch:  Pes planus (Mild)  Hallux valgus: Yes    Hallux limitus: No       Left Foot Musculoskeletal   Tenderness:  none  Arch:  Pes planus (Mild)  Hallux valgus: Yes    Hallux limitus: No      MUSCLE STRENGTH     Right Foot Muscle Strength   Foot dorsiflexion:  5  Foot plantar flexion:  5  Foot inversion:  5  Foot eversion:  5     Left Foot Muscle Strength   Foot dorsiflexion:  5  Foot plantar flexion:  5  Foot inversion:  5  Foot eversion:  5    RANGE OF MOTION     Right Foot Range of Motion   Foot and ankle ROM within normal limits       Left Foot Range of Motion   Foot and ankle ROM within normal limits      DERMATOLOGIC      Right Foot Dermatologic   Skin  Right foot skin is intact. (stucco keratoses)  Nails  1.  Absent.  2.  Positive for elongated and abnormal thickness.  3.  Positive for elongated and abnormal thickness.  4.  Positive for elongated and abnormal thickness.  5.  Positive for elongated and abnormal thickness.  Nails comment:  2-5     Left Foot Dermatologic   Skin  Left  foot skin is intact. (stucco keratoses)   Nails comment:  2-5  Nails  1.  Absent.  2.  Positive for elongated and abnormal thickness.  3.  Positive for elongated and abnormal thickness.  4.  Positive for elongated and abnormally thick.  5.  Positive for elongated and abnormally thick.      RADIOLOGY/NUCLEAR:  No results found.    LABORATORY/CULTURE RESULTS:      PATHOLOGY RESULTS:       ASSESSMENT/PLAN     Diagnoses and all orders for this visit:    1. Onychomycosis (Primary)    2. Type 2 diabetes mellitus with diabetic polyneuropathy, with long-term current use of insulin    3. Valgus deformity of both great toes    4. Pes planus of both feet    5. Foot callus    6. Encounter for diabetic foot exam    Comprehensive lower extremity examination and evaluation was performed.  Discussed findings and treatment plan including risks, benefits, and treatment options with patient in detail. Patient agreed with treatment plan.  Hemoglobin A1c reviewed.  CMP reviewed.  Vitamin B-12 reviewed.  Endocrinology note reviewed.  Diabetic foot exam performed.  After verbal consent obtained, nail(s) x8 debrided of length and thickness with nail nipper without incidence  Patient may maintain nails and calluses at home utilizing emery board or pumice stone between visits as needed  Reviewed at home diabetic foot care including daily foot checks.  Continue diabetic monitoring and control under direction of endocrinologist.  Remain conservative with foot deformities.  An After Visit Summary was printed and given to the patient at discharge, including (if requested) any available informative/educational handouts regarding diagnosis, treatment, or medications. All questions were answered to patient/family satisfaction. Should symptoms fail to improve or worsen they agree to call or return to clinic or to go to the Emergency Department. Discussed the importance of following up with any needed screening tests/labs/specialist appointments and  any requested follow-up recommended by me today. Importance of maintaining follow-up discussed and patient accepts that missed appointments can delay diagnosis and potentially lead to worsening of conditions.  Return in about 3 months (around 6/26/2025) for Diabetic foot care clinic, With Christine CASILLAS., or sooner if acute issues arise.    Lab Frequency Next Occurrence       This document has been electronically signed by VINNY Davila on March 26, 2025 11:57 CDT

## 2025-03-25 ENCOUNTER — TELEPHONE (OUTPATIENT)
Age: 80
End: 2025-03-25
Payer: MEDICARE

## 2025-03-25 NOTE — TELEPHONE ENCOUNTER
Attempted to contact pt regarding appt reminder. Appt is Wednesday, March 26th at 10:00a.m. Left message/Hub to relay.

## 2025-03-26 ENCOUNTER — OFFICE VISIT (OUTPATIENT)
Age: 80
End: 2025-03-26
Payer: MEDICARE

## 2025-03-26 VITALS
OXYGEN SATURATION: 98 % | SYSTOLIC BLOOD PRESSURE: 124 MMHG | HEART RATE: 70 BPM | DIASTOLIC BLOOD PRESSURE: 82 MMHG | WEIGHT: 160 LBS | BODY MASS INDEX: 29.44 KG/M2 | HEIGHT: 62 IN

## 2025-03-26 DIAGNOSIS — B35.1 ONYCHOMYCOSIS: Primary | ICD-10-CM

## 2025-03-26 DIAGNOSIS — Z79.4 TYPE 2 DIABETES MELLITUS WITH DIABETIC POLYNEUROPATHY, WITH LONG-TERM CURRENT USE OF INSULIN: ICD-10-CM

## 2025-03-26 DIAGNOSIS — M20.11 VALGUS DEFORMITY OF BOTH GREAT TOES: ICD-10-CM

## 2025-03-26 DIAGNOSIS — M21.42 PES PLANUS OF BOTH FEET: ICD-10-CM

## 2025-03-26 DIAGNOSIS — M20.12 VALGUS DEFORMITY OF BOTH GREAT TOES: ICD-10-CM

## 2025-03-26 DIAGNOSIS — L84 FOOT CALLUS: ICD-10-CM

## 2025-03-26 DIAGNOSIS — E11.42 TYPE 2 DIABETES MELLITUS WITH DIABETIC POLYNEUROPATHY, WITH LONG-TERM CURRENT USE OF INSULIN: ICD-10-CM

## 2025-03-26 DIAGNOSIS — M21.41 PES PLANUS OF BOTH FEET: ICD-10-CM

## 2025-03-26 DIAGNOSIS — E11.9 ENCOUNTER FOR DIABETIC FOOT EXAM: ICD-10-CM

## 2025-04-11 ENCOUNTER — PRE-ADMISSION TESTING (OUTPATIENT)
Dept: PREADMISSION TESTING | Facility: HOSPITAL | Age: 80
End: 2025-04-11
Payer: MEDICARE

## 2025-04-11 VITALS
HEIGHT: 63 IN | WEIGHT: 168.21 LBS | BODY MASS INDEX: 29.8 KG/M2 | SYSTOLIC BLOOD PRESSURE: 166 MMHG | HEART RATE: 76 BPM | OXYGEN SATURATION: 95 % | RESPIRATION RATE: 18 BRPM | DIASTOLIC BLOOD PRESSURE: 46 MMHG

## 2025-04-11 LAB
ALBUMIN SERPL-MCNC: 4.3 G/DL (ref 3.5–5.2)
ALBUMIN/GLOB SERPL: 1.5 G/DL
ALP SERPL-CCNC: 43 U/L (ref 39–117)
ALT SERPL W P-5'-P-CCNC: 32 U/L (ref 1–33)
ANION GAP SERPL CALCULATED.3IONS-SCNC: 14 MMOL/L (ref 5–15)
APTT PPP: 26.9 SECONDS (ref 24.5–36)
AST SERPL-CCNC: 29 U/L (ref 1–32)
BILIRUB SERPL-MCNC: 0.3 MG/DL (ref 0–1.2)
BILIRUB UR QL STRIP: NEGATIVE
BUN SERPL-MCNC: 14 MG/DL (ref 8–23)
BUN/CREAT SERPL: 21.5 (ref 7–25)
CALCIUM SPEC-SCNC: 9.5 MG/DL (ref 8.6–10.5)
CHLORIDE SERPL-SCNC: 103 MMOL/L (ref 98–107)
CLARITY UR: CLEAR
CO2 SERPL-SCNC: 24 MMOL/L (ref 22–29)
COLOR UR: YELLOW
CREAT SERPL-MCNC: 0.65 MG/DL (ref 0.57–1)
DEPRECATED RDW RBC AUTO: 45.8 FL (ref 37–54)
EGFRCR SERPLBLD CKD-EPI 2021: 90.8 ML/MIN/1.73
ERYTHROCYTE [DISTWIDTH] IN BLOOD BY AUTOMATED COUNT: 13.2 % (ref 12.3–15.4)
GLOBULIN UR ELPH-MCNC: 2.9 GM/DL
GLUCOSE SERPL-MCNC: 161 MG/DL (ref 65–99)
GLUCOSE UR STRIP-MCNC: NEGATIVE MG/DL
HCT VFR BLD AUTO: 40.1 % (ref 34–46.6)
HGB BLD-MCNC: 13.5 G/DL (ref 12–15.9)
HGB UR QL STRIP.AUTO: NEGATIVE
INR PPP: 1.06 (ref 0.91–1.09)
KETONES UR QL STRIP: ABNORMAL
LEUKOCYTE ESTERASE UR QL STRIP.AUTO: NEGATIVE
MCH RBC QN AUTO: 31.8 PG (ref 26.6–33)
MCHC RBC AUTO-ENTMCNC: 33.7 G/DL (ref 31.5–35.7)
MCV RBC AUTO: 94.6 FL (ref 79–97)
NITRITE UR QL STRIP: NEGATIVE
PH UR STRIP.AUTO: <=5 [PH] (ref 5–8)
PLATELET # BLD AUTO: 195 10*3/MM3 (ref 140–450)
PMV BLD AUTO: 9.9 FL (ref 6–12)
POTASSIUM SERPL-SCNC: 4.1 MMOL/L (ref 3.5–5.2)
PROT SERPL-MCNC: 7.2 G/DL (ref 6–8.5)
PROT UR QL STRIP: NEGATIVE
PROTHROMBIN TIME: 14.3 SECONDS (ref 11.8–14.8)
RBC # BLD AUTO: 4.24 10*6/MM3 (ref 3.77–5.28)
SODIUM SERPL-SCNC: 141 MMOL/L (ref 136–145)
SP GR UR STRIP: 1.02 (ref 1–1.03)
UROBILINOGEN UR QL STRIP: ABNORMAL
WBC NRBC COR # BLD AUTO: 7.51 10*3/MM3 (ref 3.4–10.8)

## 2025-04-11 PROCEDURE — 85610 PROTHROMBIN TIME: CPT

## 2025-04-11 PROCEDURE — 80053 COMPREHEN METABOLIC PANEL: CPT

## 2025-04-11 PROCEDURE — 93010 ELECTROCARDIOGRAM REPORT: CPT | Performed by: STUDENT IN AN ORGANIZED HEALTH CARE EDUCATION/TRAINING PROGRAM

## 2025-04-11 PROCEDURE — 85730 THROMBOPLASTIN TIME PARTIAL: CPT

## 2025-04-11 PROCEDURE — 93005 ELECTROCARDIOGRAM TRACING: CPT

## 2025-04-11 PROCEDURE — 85027 COMPLETE CBC AUTOMATED: CPT

## 2025-04-11 PROCEDURE — 81003 URINALYSIS AUTO W/O SCOPE: CPT

## 2025-04-11 PROCEDURE — 36415 COLL VENOUS BLD VENIPUNCTURE: CPT

## 2025-04-11 NOTE — DISCHARGE INSTRUCTIONS
DATE:  12/22/2019   TIME OF RECEIPT FROM LAB:  0138  LAB TEST:  Potassium  LAB VALUE:  2.9  RESULTS GIVEN WITH READ-BACK TO (PROVIDER):  Dr. Lopez  TIME LAB VALUE REPORTED TO PROVIDER:   0139     Preparing for Surgery  Follow these instructions before the procedure:  Several days or weeks before your procedure        Ask your health care provider about:  Changing or stopping your regular medicines. This is especially important if you are taking diabetes medicines or blood thinners.  Taking medicines such as aspirin and ibuprofen. These medicines can thin your blood. Do not take these medicines unless your health care provider tells you to take them.  Taking over-the-counter medicines, vitamins, herbs, and supplements.    Contact your surgeon if you:  Develop a fever of more than 100.4°F (38°C) or other feelings of illness during the 48 hours before your surgery.  Have symptoms that get worse.  Have questions or concerns about your surgery.  If you are going home the same day of your surgery you will need to arrange for a responsible adult, age 18 years old or older, to drive you home from the hospital and stay with you for 24 hours. Verification of the  will be made prior to any procedure requiring sedation. You may not go home in a taxi or any form of public transportation by yourself.     Day before your procedure      24 hours before your procedure DO NOT drink alcoholic beverages or smoke.  24 hours before your procedure STOP taking Erectile Dysfunction medication (i.e.,Cialis, Viagra)   You may be asked to shower with a germ-killing soap.  Day of your procedure   You may take the following medication(s) the morning of surgery with a sip of water: metoprolol and famotidine       8 hours before your scheduled arrival time, STOP all food, any dairy products, and full liquids. This includes hard candy, chewing gum or mints. This is extremely important to prevent serious complications.     Up to 2 hours before your scheduled arrival time, you may have clear liquids no cream, powder, or pulp of any kind. Safe options are water, black coffee, plain tea, soda, Gatorade/Powerade, clear broth, apple  juice.    2 hours before your scheduled arrival time, STOP drinking clear liquids.    You may need to take another shower with a germ-killing soap before you leave home in the morning. Do not use perfumes, colognes, or body lotions.  Wear comfortable loose-fitting clothing.  Remove all jewelry including body piercing and rings, dark colored nail polish, and make up prior to arrival at the hospital. Leave all valuables at home.   Bring your hearing aids if you rely on them.  Do not wear contact lenses. If you wear eyeglasses remember to bring a case to store them in while you are in surgery.  Do not use denture adhesives since you will be asked to remove them during your surgery.    You do not need to bring your home medications into the hospital.   Bring your sleep apnea device with you on the day of your surgery (if this applies to you).  If you have an Inspire implant for sleep apnea, please bring the remote with you on the day of surgery.  If you wear portable oxygen, bring it with you.   If you are staying overnight, you may bring a bag of items you may need such as slippers, robe and a change of clothes for your discharge. You may want to leave these items in the car until you are ready for them since your family will take your belongings when you leave the pre-operative area.  Arrive at the hospital as scheduled by the office. You will be asked to arrive 2 hours prior to your surgery time in order to prepare for your procedure.  When you arrive at the hospital  Go to the registration desk located at the main entrance of the hospital.  After registration is completed, you will be given a beeper and a sticker sheet. Take the stickers to Outpatient Surgery and place in the tray at the end of the desk to notify the staff that you have arrived and registered.   Return to the lobby to wait. You are not always called back according to the time of arrival but rather the time your doctor will be ready.  When your  beeper lights up and vibrates proceed through the double doors, under the stairs, and a member of the Outpatient Surgery staff will escort you to your preoperative room.

## 2025-04-14 LAB
QT INTERVAL: 402 MS
QTC INTERVAL: 421 MS

## 2025-04-30 ENCOUNTER — TELEPHONE (OUTPATIENT)
Dept: OBGYN CLINIC | Age: 80
End: 2025-04-30

## 2025-04-30 DIAGNOSIS — Z12.31 ENCOUNTER FOR SCREENING MAMMOGRAM FOR MALIGNANT NEOPLASM OF BREAST: Primary | ICD-10-CM

## 2025-05-07 ENCOUNTER — RESULTS FOLLOW-UP (OUTPATIENT)
Dept: OBGYN CLINIC | Age: 80
End: 2025-05-07

## 2025-05-07 DIAGNOSIS — Z12.31 ENCOUNTER FOR SCREENING MAMMOGRAM FOR MALIGNANT NEOPLASM OF BREAST: ICD-10-CM

## 2025-05-16 ENCOUNTER — PRE-ADMISSION TESTING (OUTPATIENT)
Dept: PREADMISSION TESTING | Facility: HOSPITAL | Age: 80
End: 2025-05-16
Payer: MEDICARE

## 2025-05-16 VITALS
HEART RATE: 74 BPM | DIASTOLIC BLOOD PRESSURE: 101 MMHG | RESPIRATION RATE: 20 BRPM | SYSTOLIC BLOOD PRESSURE: 159 MMHG | OXYGEN SATURATION: 95 % | HEIGHT: 63 IN | BODY MASS INDEX: 29.38 KG/M2 | WEIGHT: 165.79 LBS

## 2025-05-16 LAB
ALBUMIN SERPL-MCNC: 4.3 G/DL (ref 3.5–5.2)
ALBUMIN/GLOB SERPL: 1.7 G/DL
ALP SERPL-CCNC: 47 U/L (ref 39–117)
ALT SERPL W P-5'-P-CCNC: 32 U/L (ref 1–33)
ANION GAP SERPL CALCULATED.3IONS-SCNC: 11 MMOL/L (ref 5–15)
APTT PPP: 28.1 SECONDS (ref 24.5–36)
AST SERPL-CCNC: 28 U/L (ref 1–32)
BASOPHILS # BLD AUTO: 0.03 10*3/MM3 (ref 0–0.2)
BASOPHILS NFR BLD AUTO: 0.4 % (ref 0–1.5)
BILIRUB SERPL-MCNC: 0.2 MG/DL (ref 0–1.2)
BILIRUB UR QL STRIP: NEGATIVE
BUN SERPL-MCNC: 14 MG/DL (ref 8–23)
BUN/CREAT SERPL: 17.5 (ref 7–25)
CALCIUM SPEC-SCNC: 9.4 MG/DL (ref 8.6–10.5)
CHLORIDE SERPL-SCNC: 104 MMOL/L (ref 98–107)
CLARITY UR: CLEAR
CO2 SERPL-SCNC: 24 MMOL/L (ref 22–29)
COLOR UR: YELLOW
CREAT SERPL-MCNC: 0.8 MG/DL (ref 0.57–1)
DEPRECATED RDW RBC AUTO: 46.5 FL (ref 37–54)
EGFRCR SERPLBLD CKD-EPI 2021: 76 ML/MIN/1.73
EOSINOPHIL # BLD AUTO: 0.1 10*3/MM3 (ref 0–0.4)
EOSINOPHIL NFR BLD AUTO: 1.4 % (ref 0.3–6.2)
ERYTHROCYTE [DISTWIDTH] IN BLOOD BY AUTOMATED COUNT: 13.2 % (ref 12.3–15.4)
GLOBULIN UR ELPH-MCNC: 2.6 GM/DL
GLUCOSE SERPL-MCNC: 214 MG/DL (ref 65–99)
GLUCOSE UR STRIP-MCNC: ABNORMAL MG/DL
HCT VFR BLD AUTO: 40.2 % (ref 34–46.6)
HGB BLD-MCNC: 13.2 G/DL (ref 12–15.9)
HGB UR QL STRIP.AUTO: NEGATIVE
IMM GRANULOCYTES # BLD AUTO: 0.02 10*3/MM3 (ref 0–0.05)
IMM GRANULOCYTES NFR BLD AUTO: 0.3 % (ref 0–0.5)
INR PPP: 1.03 (ref 0.91–1.09)
KETONES UR QL STRIP: NEGATIVE
LEUKOCYTE ESTERASE UR QL STRIP.AUTO: NEGATIVE
LYMPHOCYTES # BLD AUTO: 2.57 10*3/MM3 (ref 0.7–3.1)
LYMPHOCYTES NFR BLD AUTO: 36.2 % (ref 19.6–45.3)
MCH RBC QN AUTO: 31.5 PG (ref 26.6–33)
MCHC RBC AUTO-ENTMCNC: 32.8 G/DL (ref 31.5–35.7)
MCV RBC AUTO: 95.9 FL (ref 79–97)
MONOCYTES # BLD AUTO: 0.64 10*3/MM3 (ref 0.1–0.9)
MONOCYTES NFR BLD AUTO: 9 % (ref 5–12)
NEUTROPHILS NFR BLD AUTO: 3.73 10*3/MM3 (ref 1.7–7)
NEUTROPHILS NFR BLD AUTO: 52.7 % (ref 42.7–76)
NITRITE UR QL STRIP: NEGATIVE
NRBC BLD AUTO-RTO: 0 /100 WBC (ref 0–0.2)
PH UR STRIP.AUTO: <=5 [PH] (ref 5–8)
PLATELET # BLD AUTO: 187 10*3/MM3 (ref 140–450)
PMV BLD AUTO: 10.2 FL (ref 6–12)
POTASSIUM SERPL-SCNC: 4.3 MMOL/L (ref 3.5–5.2)
PROT SERPL-MCNC: 6.9 G/DL (ref 6–8.5)
PROT UR QL STRIP: NEGATIVE
PROTHROMBIN TIME: 14 SECONDS (ref 11.8–14.8)
RBC # BLD AUTO: 4.19 10*6/MM3 (ref 3.77–5.28)
SODIUM SERPL-SCNC: 139 MMOL/L (ref 136–145)
SP GR UR STRIP: 1.01 (ref 1–1.03)
UROBILINOGEN UR QL STRIP: ABNORMAL
WBC NRBC COR # BLD AUTO: 7.09 10*3/MM3 (ref 3.4–10.8)

## 2025-05-16 PROCEDURE — 80053 COMPREHEN METABOLIC PANEL: CPT

## 2025-05-16 PROCEDURE — 85730 THROMBOPLASTIN TIME PARTIAL: CPT

## 2025-05-16 PROCEDURE — 85025 COMPLETE CBC W/AUTO DIFF WBC: CPT

## 2025-05-16 PROCEDURE — 85610 PROTHROMBIN TIME: CPT

## 2025-05-16 PROCEDURE — 81003 URINALYSIS AUTO W/O SCOPE: CPT

## 2025-05-16 PROCEDURE — 36415 COLL VENOUS BLD VENIPUNCTURE: CPT

## 2025-05-16 NOTE — DISCHARGE INSTRUCTIONS

## 2025-06-24 NOTE — PROGRESS NOTES
[facility] - PODIATRY    Today's Date: 06/27/25    Patient Name: Alisia Vleez  MRN: [id number]  CSN: [id number]  PCP: Mike Erazo MD  Referring Provider: No ref. provider found    SUBJECTIVE     Chief Complaint   Patient presents with    Follow-up     Mike Erazo MD  Return in about 3 months for Diabetic foot care clinic   Pt here for nail/foot care. Pt reports no pain or issues.      Diabetes     115 mg/dl BG     HPI: Alisia Velez, a 80 y.o.female, comes to clinic as a(n) established patient complaining of long, irregular toenails of both feet. Patient has h/o Arthritis, back pain, skin cancer, hyperlipidemia, hypertension, GERD, melanoma, CVA, diabetes mellitus with neuropathy. Patient is IDDM with last stated BG level of 115mg/dl.  Last hemoglobin A1c of 7.0%.  Notes nails are long, thick, and discolored.  Due to neuropathy and back issues she is unable to reach feet to care for them herself.   Patient reports she had low back surgery in October and is scheduled for neck surgery inJuly.  Patient follows with endocrinology for diabetic control.  Denies pain at the present time.  Patient reports that she does not have any issues with pain from her bilateral bunions.  Relates previous treatment(s) including Nail care by podiatry. Denies any constitutional symptoms. No other pedal complaints at this time.    Past Medical History:   Diagnosis Date    Allergic rhinitis     Anesthesia complication     severe nausea and vomitig    Arthritis     Back pain     Basal cell carcinoma     Blister of great toe of left foot     Nonthermal, initial encounter    Bunion     Cancer     skin     Dyslipidemia     Elevated blood pressure     Essential hypertension 09/13/2016    Nimesh blanchard     GERD (gastroesophageal reflux disease) June 22 2020    Tiff procedure    History of cerebrovascular accident     1999    HL (hearing loss)     Hyperlipidemia     Hypo-osmolality and hyponatremia      Created, printed and mailed letter to patient. Patient to follow up with with regular provider.  Deyanira Cheng MA/  For Teams Alicia     Ingrowing nail     Melanoma     Neuropathy in diabetes     Some    Onychomycosis     Plantar fasciitis     PONV (postoperative nausea and vomiting)     Snores     Stroke     Stroke    TIA (transient ischemic attack) 1999    After stroke    Tinnitus     TMJ dysfunction     Type 2 diabetes mellitus     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    Urinary incontinence     UTI (urinary tract infection)     Vitamin D deficiency      Past Surgical History:   Procedure Laterality Date    ANTERIOR CERVICAL DISCECTOMY W/ FUSION N/A 04/19/2019    Procedure: ANTERIOR CERVICAL DISCECTOMY FUSION C5-7;  Surgeon: MIGDALIA Horton MD;  Location:  [facility] OR;  Service: Orthopedic Spine    ARM LESION/CYST EXCISION Right 08/17/2022    Procedure: RIGHT WRIST MARGINAL MASS EXCISION;  Surgeon: Magen Pardo MD;  Location:  [facility] OR;  Service: Orthopedics;  Laterality: Right;    BACK SURGERY      BLEPHAROPLASTY      BRONCHOSCOPY      Had before tiff procedure    CARDIAC CATHETERIZATION      CARPAL TUNNEL RELEASE      CERVICAL SPINE SURGERY      cydney-laminotomy c7-t1    COLONOSCOPY      ENDOSCOPY      EYE SURGERY Bilateral     cataracts     FOOT SURGERY      Toe nails removed    LAPAROSCOPIC CHOLECYSTECTOMY      LUMBAR DISC SURGERY      LUMBAR FUSION Left 03/13/2017    Procedure: LEFT LUMBAR LATERAL INTERBODY FUSION L 3-5 WITH INSTRUMENTATION;  Surgeon: MIGDALIA Horton MD;  Location:  [facility] OR;  Service:     LUMBAR FUSION Right 10/09/2024    Procedure: RIGHT LATERAL LUMBAR INTERBODY FUSION WITH INSTRUMENTATION L2-3;  Surgeon: MIGDALIA Horton MD;  Location:  [facility] OR;  Service: Orthopedic Spine;  Laterality: Right;    LUMBAR LAMINECTOMY WITH FUSION Right 03/15/2017    Procedure: RIGHT L3-S1 POSSIBLE RIGHT L 3-4 HEMILAMINECTOMY FACETECTOMY DECOMPRESSION TRANSFORAMINAL LUMBAR INTERBODY FUSION L4-S1 POSTERIOR SPINAL FUSION WITH INSTRUMENTATION L3-S1;  Surgeon: MIGDALIA Horton MD;  Location:  [facility] OR;  Service:      LUMBAR LAMINECTOMY WITH FUSION N/A 10/11/2024    Procedure: EXPLORATION OF FUSION L3-S1, POSTERIOR SPINAL FUSION WITH INSTRUMENTATION L2-3;  Surgeon: MIGDALIA Horton MD;  [facility];  Service: Orthopedic Spine;  Laterality: N/A;    MYRINGOTOMY W/ TUBES      NAIL BED REMOVAL/REVISION  03/11/2016    NECK SURGERY      NISSEN FUNDOPLICATION LAPAROSCOPIC      OTHER SURGICAL HISTORY      had left and right big toenials removed mar 2016    TOTAL HIP ARTHROPLASTY Right 2023     Family History   Problem Relation Age of Onset    Cancer Other     Diabetes Other     Heart disease Other     Hypertension Other     Cancer Mother     Heart disease Father     Diabetes Brother     Heart disease Brother     Diabetes Brother     Heart disease Brother      Social History     Socioeconomic History    Marital status:    Tobacco Use    Smoking status: Never     Passive exposure: Never    Smokeless tobacco: Never   Vaping Use    Vaping status: Never Used   Substance and Sexual Activity    Alcohol use: Never    Drug use: Never    Sexual activity: Not Currently     Partners: Male     Birth control/protection: Post-menopausal     Comment: Used pill many years ago     Allergies   Allergen Reactions    Erythromycin Other (See Comments) and Shortness Of Breath     Eyes see big black spots    Humulin N [Insulin Isophane] Shortness Of Breath    Mold Extract [Trichophyton] Shortness Of Breath    Sudafed [Pseudoephedrine] Shortness Of Breath    Sulfa Antibiotics Shortness Of Breath    Other Itching     Silk Tape    Adhesive Tape Itching    Molds & Smuts Itching     Current Outpatient Medications   Medication Sig Dispense Refill    acetaminophen (TYLENOL) 650 MG 8 hr tablet Take 1 tablet by mouth 2 (Two) Times a Day.      aspirin 81 MG EC tablet Take 1 tablet by mouth Daily.      atorvastatin (LIPITOR) 20 MG tablet Take 1 tablet by mouth Daily.      cetirizine (ZyrTEC) 10 MG tablet Take 1 tablet by mouth Daily.       Cholecalciferol (Vitamin D-3) 25 MCG (1000 UT) capsule Take 2,000 Units by mouth Daily.      Coenzyme Q10 (COQ10) 200 MG capsule Take 1 capsule by mouth Daily.      famotidine (PEPCID) 40 MG tablet Take 1 tablet by mouth Daily.      Glucagon (Baqsimi One Pack) 3 MG/DOSE powder 1 each into the nostril(s) as directed by provider As Needed (Hypoglycemia). Apply intranasal if hypoglycemia 2 each 11    guaiFENesin (MUCINEX) 600 MG 12 hr tablet Take 1 tablet by mouth Every Night.      Insulin Glargine, 1 Unit Dial, (TOUJEO) 300 UNIT/ML solution pen-injector injection Inject 58 Units under the skin into the appropriate area as directed Every Night.      Insulin Lispro-aabc 200 UNIT/ML solution pen-injector Inject 60 Units under the skin into the appropriate area as directed 3 (Three) Times a Day With Meals. Sliding scale (Patient taking differently: Inject 33-40 Units under the skin into the appropriate area as directed 3 (Three) Times a Day With Meals. Sliding scale) 27 mL 3    Insulin Pen Needle 32G X 6 MM misc 4 x daily 360 each 3    metoprolol succinate XL (TOPROL-XL) 25 MG 24 hr tablet Take 1 tablet by mouth 2 (Two) Times a Day.      OMEGA-3 FATTY ACIDS-VITAMIN E PO Take 1 capsule by mouth 2 (Two) Times a Day.  Hold for surgery       No current facility-administered medications for this visit.     Review of Systems   Constitutional:  Negative for chills and fever.   HENT:  Negative for congestion.    Respiratory:  Negative for shortness of breath.    Cardiovascular:  Negative for chest pain and leg swelling.   Gastrointestinal:  Negative for constipation, diarrhea, nausea and vomiting.   Musculoskeletal:  Positive for arthralgias and back pain. Negative for myalgias.   Skin:  Negative for wound.   Neurological:  Positive for numbness.       OBJECTIVE     Vitals:    06/27/25 1000   BP: 128/74   Pulse: 67   SpO2: 97%           PHYSICAL EXAM  GEN:   Accompanied by spouse.     Foot/Ankle Exam    GENERAL  Appearance:   elderly  Orientation:  AAOx3  Affect:  appropriate  Gait:  unimpaired  Assistance:  independent  Right shoe gear: casual shoe  Left shoe gear: casual shoe    VASCULAR     Right Foot Vascularity   Dorsalis pedis:  2+  Posterior tibial:  2+  Skin temperature:  warm  Edema grading:  None  CFT:  3  Pedal hair growth:  Present  Varicosities:  none     Left Foot Vascularity   Dorsalis pedis:  2+  Posterior tibial:  2+  Skin temperature:  warm  Edema grading:  None  CFT:  3  Pedal hair growth:  Present  Varicosities:  none     NEUROLOGIC     Right Foot Neurologic   Light touch sensation: diminished  Vibratory sensation: diminished  Hot/Cold sensation: diminished     Left Foot Neurologic   Light touch sensation: diminished  Vibratory sensation: diminished  Hot/Cold sensation:  diminished    MUSCULOSKELETAL     Right Foot Musculoskeletal   Tenderness:  none    Arch:  Pes planus (Mild)  Hallux valgus: Yes    Hallux limitus: No       Left Foot Musculoskeletal   Tenderness:  none  Arch:  Pes planus (Mild)  Hallux valgus: Yes    Hallux limitus: No      MUSCLE STRENGTH     Right Foot Muscle Strength   Foot dorsiflexion:  5  Foot plantar flexion:  5  Foot inversion:  5  Foot eversion:  5     Left Foot Muscle Strength   Foot dorsiflexion:  5  Foot plantar flexion:  5  Foot inversion:  5  Foot eversion:  5    RANGE OF MOTION     Right Foot Range of Motion   Foot and ankle ROM within normal limits       Left Foot Range of Motion   Foot and ankle ROM within normal limits      DERMATOLOGIC      Right Foot Dermatologic   Skin  Right foot skin is intact. (stucco keratoses)  Nails  1.  Absent.  2.  Positive for elongated and abnormal thickness.  3.  Positive for elongated and abnormal thickness.  4.  Positive for elongated and abnormal thickness.  5.  Positive for elongated and abnormal thickness.  Nails comment:  2-5     Left Foot Dermatologic   Skin  Left foot skin is intact. (stucco keratoses)   Nails comment:  2-5  Nails  1.   Absent.  2.  Positive for elongated and abnormal thickness.  3.  Positive for elongated and abnormal thickness.  4.  Positive for elongated and abnormally thick.  5.  Positive for elongated and abnormally thick.      RADIOLOGY/NUCLEAR:  No results found.    LABORATORY/CULTURE RESULTS:      PATHOLOGY RESULTS:       ASSESSMENT/PLAN     Diagnoses and all orders for this visit:    1. Onychomycosis (Primary)    2. Type 2 diabetes mellitus with diabetic polyneuropathy, with long-term current use of insulin    3. Valgus deformity of both great toes    4. Pes planus of both feet      Comprehensive lower extremity examination and evaluation was performed.  Discussed findings and treatment plan including risks, benefits, and treatment options with patient in detail. Patient agreed with treatment plan.  After verbal consent obtained, nail(s) x8 debrided of length and thickness with nail nipper without incidence  Patient may maintain nails and calluses at home utilizing emery board or pumice stone between visits as needed  Reviewed at home diabetic foot care including daily foot checks.  Continue diabetic monitoring and control under direction of endocrinologist.  Remain conservative with foot deformities.  Due to diabetic polyneuropathy, is recommended patient return for routine foot and nail care.  Encourage patient to call with any questions or concerns before next appointment.  An After Visit Summary was printed and given to the patient at discharge, including (if requested) any available informative/educational handouts regarding diagnosis, treatment, or medications. All questions were answered to patient/family satisfaction. Should symptoms fail to improve or worsen they agree to call or return to clinic or to go to the Emergency Department. Discussed the importance of following up with any needed screening tests/labs/specialist appointments and any requested follow-up recommended by me today. Importance of maintaining  follow-up discussed and patient accepts that missed appointments can delay diagnosis and potentially lead to worsening of conditions.  Return in about 3 months (around 9/27/2025) for Diabetic foot care clinic, With Christine CASILLAS., or sooner if acute issues arise.    Lab Frequency Next Occurrence       This document has been electronically signed by VINNY Davila on June 27, 2025 12:26 CDT

## 2025-06-27 ENCOUNTER — OFFICE VISIT (OUTPATIENT)
Age: 80
End: 2025-06-27
Payer: MEDICARE

## 2025-06-27 VITALS
HEART RATE: 67 BPM | WEIGHT: 165 LBS | SYSTOLIC BLOOD PRESSURE: 128 MMHG | BODY MASS INDEX: 29.23 KG/M2 | OXYGEN SATURATION: 97 % | DIASTOLIC BLOOD PRESSURE: 74 MMHG | HEIGHT: 63 IN

## 2025-06-27 DIAGNOSIS — B35.1 ONYCHOMYCOSIS: Primary | ICD-10-CM

## 2025-06-27 DIAGNOSIS — M20.11 VALGUS DEFORMITY OF BOTH GREAT TOES: ICD-10-CM

## 2025-06-27 DIAGNOSIS — M21.41 PES PLANUS OF BOTH FEET: ICD-10-CM

## 2025-06-27 DIAGNOSIS — M20.12 VALGUS DEFORMITY OF BOTH GREAT TOES: ICD-10-CM

## 2025-06-27 DIAGNOSIS — E11.42 TYPE 2 DIABETES MELLITUS WITH DIABETIC POLYNEUROPATHY, WITH LONG-TERM CURRENT USE OF INSULIN: ICD-10-CM

## 2025-06-27 DIAGNOSIS — M21.42 PES PLANUS OF BOTH FEET: ICD-10-CM

## 2025-06-27 DIAGNOSIS — Z79.4 TYPE 2 DIABETES MELLITUS WITH DIABETIC POLYNEUROPATHY, WITH LONG-TERM CURRENT USE OF INSULIN: ICD-10-CM

## 2025-08-21 ENCOUNTER — OFFICE VISIT (OUTPATIENT)
Age: 80
End: 2025-08-21
Payer: MEDICARE

## 2025-08-21 VITALS — BODY MASS INDEX: 28.88 KG/M2 | HEIGHT: 63 IN | WEIGHT: 163 LBS

## 2025-08-21 DIAGNOSIS — M25.552 LEFT HIP PAIN: Primary | ICD-10-CM

## 2025-08-21 PROCEDURE — 99214 OFFICE O/P EST MOD 30 MIN: CPT | Performed by: PHYSICIAN ASSISTANT

## 2025-08-21 PROCEDURE — G8400 PT W/DXA NO RESULTS DOC: HCPCS | Performed by: PHYSICIAN ASSISTANT

## 2025-08-21 PROCEDURE — 1159F MED LIST DOCD IN RCRD: CPT | Performed by: PHYSICIAN ASSISTANT

## 2025-08-21 PROCEDURE — 1123F ACP DISCUSS/DSCN MKR DOCD: CPT | Performed by: PHYSICIAN ASSISTANT

## 2025-08-21 PROCEDURE — 1090F PRES/ABSN URINE INCON ASSESS: CPT | Performed by: PHYSICIAN ASSISTANT

## 2025-08-21 PROCEDURE — 1036F TOBACCO NON-USER: CPT | Performed by: PHYSICIAN ASSISTANT

## 2025-08-21 PROCEDURE — G8427 DOCREV CUR MEDS BY ELIG CLIN: HCPCS | Performed by: PHYSICIAN ASSISTANT

## 2025-08-21 PROCEDURE — G8419 CALC BMI OUT NRM PARAM NOF/U: HCPCS | Performed by: PHYSICIAN ASSISTANT

## 2025-08-21 RX ORDER — PEN NEEDLE, DIABETIC 31 GX5/16"
NEEDLE, DISPOSABLE MISCELLANEOUS
COMMUNITY
Start: 2025-05-29

## 2025-08-21 ASSESSMENT — ENCOUNTER SYMPTOMS
COLOR CHANGE: 0
BACK PAIN: 1

## (undated) DEVICE — GLV SURG TRIUMPH PF LTX 7.5 STRL

## (undated) DEVICE — ANTIBACTERIAL UNDYED BRAIDED (POLYGLACTIN 910), SYNTHETIC ABSORBABLE SUTURE: Brand: COATED VICRYL

## (undated) DEVICE — ELECTRD BLD EZ CLN MOD XLNG 2.75IN

## (undated) DEVICE — BNDG COBAN S/ADHR WRP 2IN 5YD TN

## (undated) DEVICE — CVR BRD ARM 13X30

## (undated) DEVICE — SPNG GZ STRL 2S 4X4 12PLY

## (undated) DEVICE — SPNG DISSCT CHRRY 3/8IN STRL PK/5

## (undated) DEVICE — PATIENT RETURN ELECTRODE, SINGLE-USE, CONTACT QUALITY MONITORING, ADULT, WITH 9FT CORD, FOR PATIENTS WEIGING OVER 33LBS. (15KG): Brand: MEGADYNE

## (undated) DEVICE — PROB MONOPLR

## (undated) DEVICE — APPL CHLORAPREP W/TINT 26ML ORNG

## (undated) DEVICE — GLV SURG TRIUMPH GREEN W/ALOE PF LTX 8 STRL

## (undated) DEVICE — PIN DISTRACT TI 14MM STRL

## (undated) DEVICE — PACK,UNIVERSAL,NO GOWNS: Brand: MEDLINE

## (undated) DEVICE — SPK10277 JACKSON/PRO-AXIS KIT: Brand: SPK10277 JACKSON/PRO-AXIS KIT

## (undated) DEVICE — DRAPE,UTILITY,TAPE,15X26,STERILE: Brand: MEDLINE

## (undated) DEVICE — SHEET,DRAPE,53X77,STERILE: Brand: MEDLINE

## (undated) DEVICE — TBG PENCL TELESCP MEGADYNE SMOKE EVAC 10FT

## (undated) DEVICE — PTP ACCESS PUSHER CAP: Brand: LIF-PTP

## (undated) DEVICE — Device

## (undated) DEVICE — APPL CHLORAPREP HI/LITE 26ML ORNG

## (undated) DEVICE — GLV SURG SENSICARE MICRO PF LF 9 STRL

## (undated) DEVICE — PK SPINE LAT 30

## (undated) DEVICE — GLV SURG DERMASSURE GRN LF PF 8.0

## (undated) DEVICE — KWIRE TELLURIDE BLNT 1.4MM NS
Type: IMPLANTABLE DEVICE | Status: NON-FUNCTIONAL
Removed: 2017-03-15

## (undated) DEVICE — PK SPINE CERV ANT 30

## (undated) DEVICE — DRP C/ARMOR

## (undated) DEVICE — PK SPINE POST 30

## (undated) DEVICE — GLV SURG SENSICARE W/ALOE PF LF 7.5 STRL

## (undated) DEVICE — YANKAUER SUCTION INSTRUMENT WITHOUT CONTROL VENT, OPEN TIP, CLEAR: Brand: YANKAUER

## (undated) DEVICE — NDL HYPO PRECISIONGLIDE REG 25G 1 1/2

## (undated) DEVICE — GLV SURG NEOLON 2G PF LF 8 STRL

## (undated) DEVICE — SET ENDOSCP SEAL HYSTEROSCOPE RIG OUTFLO CHN DISP MYOSURE

## (undated) DEVICE — 4-PORT MANIFOLD: Brand: NEPTUNE 2

## (undated) DEVICE — 3.0MM PRECISION NEURO (MATCH HEAD)

## (undated) DEVICE — GLV SURG BIOGEL LTX PF 7 1/2

## (undated) DEVICE — KT PKIT PROAXIS W/PRONEVIEW ACP TBG

## (undated) DEVICE — COLR CERV VISTA TX 1SZ ADJ

## (undated) DEVICE — HALTR TRACT HD CERV STD UNIV

## (undated) DEVICE — PREP SOL POVIDONE/IODINE BT 4OZ

## (undated) DEVICE — PK TURNOVER RM ADV

## (undated) DEVICE — TP SILK DURAPORE 3IN

## (undated) DEVICE — ELECTRD NDL EDGE/INSUL/PFTE.787MM 2.84IN

## (undated) DEVICE — SAFEOP STIMULATING CLIP, STERILE: Brand: SAFEOP

## (undated) DEVICE — TP PROB BALL 160MM

## (undated) DEVICE — FLUID MGMT SYS FLUENT KIT 6/PK

## (undated) DEVICE — DRSNG GZ CURAD XEROFORM NONADHS 5X9IN STRL

## (undated) DEVICE — CONN FLX BREATHE CIRCT

## (undated) DEVICE — TRAP FLD MINIVAC MEGADYNE 100ML

## (undated) DEVICE — DRSNG BRDR MEPILEX P/OP SIL 4X8IN

## (undated) DEVICE — BIPOLAR SEALER 23-113-1 AQM 2.3: Brand: AQUAMANTYS™

## (undated) DEVICE — PAD,NON-ADHERENT,3X8,STERILE,LF,1/PK: Brand: MEDLINE

## (undated) DEVICE — SAFEOP EMG & SSEP NEEDLE ELECTRODE KIT: Brand: SAFEOP

## (undated) DEVICE — KT ACC LAT EMG/SSEP TIMBERLINE GEN2

## (undated) DEVICE — SYS ACC IPAS3 EMG I/O PED BVL

## (undated) DEVICE — ELECTRD BLD EDGE/INSUL1P 2.4X5.1MM STRL

## (undated) DEVICE — SIGMA LTP INTRADISCAL SHIM, NARROW: Brand: SIGMA

## (undated) DEVICE — GOWN,NON-REINFORCED,SIRUS,SET IN SLV,XXL: Brand: MEDLINE

## (undated) DEVICE — 3M™ STERI-STRIP™ REINFORCED ADHESIVE SKIN CLOSURES, R1547, 1/2 IN X 4 IN (12 MM X 100 MM), 6 STRIPS/ENVELOPE: Brand: 3M™ STERI-STRIP™

## (undated) DEVICE — GLV SURG NEOLON 2G PF LF 7.5 STRL

## (undated) DEVICE — CATH IV ANGIO FEP 12G 3IN LTBLU 10PK

## (undated) DEVICE — SYR LL TP 10ML STRL

## (undated) DEVICE — PK GRFT DEL

## (undated) DEVICE — SOLUTION IRRIG 3000ML 0.9% SOD CHL USP UROMATIC PLAS CONT

## (undated) DEVICE — TAPE,CLOTH/SILK,CURAD,3"X10YD,LF,40/CS: Brand: CURAD

## (undated) DEVICE — SURGICAL PROCEDURE PACK GYNECOLOGIC MIN LOURDES HOSP

## (undated) DEVICE — GLV SURG SENSICARE W/ALOE PF LF 8 STRL

## (undated) DEVICE — WIPE THERAWASH SLV SPEC CARE 2PK

## (undated) DEVICE — LIF AMP, TWO SCREW PLATE, 04 WITH CENTER SCREW, RIGID, GREEN
Type: IMPLANTABLE DEVICE | Site: SPINE LUMBAR | Status: NON-FUNCTIONAL
Brand: LIF AMP
Removed: 2024-10-09

## (undated) DEVICE — DISPOSABLE TOURNIQUET CUFF SINGLE BLADDER, SINGLE PORT AND QUICK CONNECT CONNECTOR: Brand: COLOR CUFF

## (undated) DEVICE — GLV SURG TRIUMPH GREEN W/ALOE PF LTX 7 STRL

## (undated) DEVICE — NDL TP BVL JAMSHIDI ACC GUIDE ARCUS

## (undated) DEVICE — TOTAL TRAY, 16FR 10ML SIL FOLEY, URN: Brand: MEDLINE

## (undated) DEVICE — PK EXTRM 30

## (undated) DEVICE — SPNG GZ WOVN 4X4IN 12PLY 10/BX STRL

## (undated) DEVICE — GLV SURG TRIUMPH NATURAL W/ALOE PF LTX 8 STRL

## (undated) DEVICE — GLV SURG TRIUMPH MICRO PF LTX 8.5 STRL

## (undated) DEVICE — CABL F/O LITE BIF 4.6MM 10FT STRL

## (undated) DEVICE — TROCAR TIP, STAINLESS STEEL GUIDEWIRE, 310MM: Brand: IDENTITI

## (undated) DEVICE — BANDAGE,GAUZE,CONFORMING,1"X75",STRL,LF: Brand: MEDLINE

## (undated) DEVICE — SIGMA LTP BLADE EXTENDER, NARROW, 26MM: Brand: SIGMA

## (undated) DEVICE — CABL BIPOL MEGADYNE 12FT DISP

## (undated) DEVICE — HEMOST ABS GELFOAM GELATIN SPNG SZ100

## (undated) DEVICE — CVR UNIV C/ARM

## (undated) DEVICE — SUT ETHLN 4/0 FS2 18IN 662H

## (undated) DEVICE — GLV SURG TRIUMPH ORTHO W/ALOE PF LTX 8.5 STRL

## (undated) DEVICE — GLV SURG BIOGEL LTX PF 6 1/2

## (undated) DEVICE — INTENDED FOR TISSUE SEPARATION, AND OTHER PROCEDURES THAT REQUIRE A SHARP SURGICAL BLADE TO PUNCTURE OR CUT.: Brand: BARD-PARKER ® STAINLESS STEEL BLADES

## (undated) DEVICE — UNDERGLOVE SURG SZ 8 FNGR THK0.21MIL GRN LTX BEAD CUF

## (undated) DEVICE — BAPTIST TURNOVER KIT: Brand: MEDLINE INDUSTRIES, INC.

## (undated) DEVICE — GLV SURG GRN DERMASSURE LF PF 7.5

## (undated) DEVICE — GOWN, ORBIS, XLNG/XXLARGE, STRL: Brand: MEDLINE

## (undated) DEVICE — SAFEOP STIMULATING DILATOR KIT, STERILE - OVAL: Brand: SAFEOP

## (undated) DEVICE — KWIRE TIMBERLINE BLNT
Type: IMPLANTABLE DEVICE | Status: NON-FUNCTIONAL
Removed: 2017-03-13